# Patient Record
Sex: FEMALE | Race: WHITE | NOT HISPANIC OR LATINO | Employment: FULL TIME | URBAN - METROPOLITAN AREA
[De-identification: names, ages, dates, MRNs, and addresses within clinical notes are randomized per-mention and may not be internally consistent; named-entity substitution may affect disease eponyms.]

---

## 2015-08-20 LAB
CHOLESTEROL, TOTAL: 206
GLUCOSE: 99
HIGH DENSITY CHOLESTEROL: 76 MG/DL
LDLC SERPL CALC-MCNC: 109 MG/DL (ref 0–160)
TRIGL SERPL-MCNC: 107 MG/DL

## 2017-01-11 RX ORDER — ESOMEPRAZOLE MAGNESIUM 40 MG/1
CAPSULE, DELAYED RELEASE ORAL
Qty: 30 CAPSULE | Refills: 2 | Status: SHIPPED | OUTPATIENT
Start: 2017-01-11 | End: 2017-05-01

## 2017-01-18 ENCOUNTER — TELEPHONE (OUTPATIENT)
Dept: INTERNAL MEDICINE | Facility: CLINIC | Age: 57
End: 2017-01-18

## 2017-03-15 DIAGNOSIS — I10 ESSENTIAL HYPERTENSION: ICD-10-CM

## 2017-03-15 RX ORDER — LOSARTAN POTASSIUM AND HYDROCHLOROTHIAZIDE 12.5; 1 MG/1; MG/1
TABLET ORAL
Qty: 90 TABLET | Refills: 0 | Status: SHIPPED | OUTPATIENT
Start: 2017-03-15 | End: 2017-06-21 | Stop reason: SDUPTHER

## 2017-04-04 DIAGNOSIS — R11.0 NAUSEA: ICD-10-CM

## 2017-04-04 RX ORDER — PROMETHAZINE HYDROCHLORIDE 25 MG/1
TABLET ORAL
Qty: 30 TABLET | Refills: 0 | Status: SHIPPED | OUTPATIENT
Start: 2017-04-04 | End: 2017-05-30 | Stop reason: ALTCHOICE

## 2017-04-27 ENCOUNTER — TELEPHONE (OUTPATIENT)
Dept: INTERNAL MEDICINE | Facility: CLINIC | Age: 57
End: 2017-04-27

## 2017-04-27 NOTE — TELEPHONE ENCOUNTER
Message left for pt to return call to clinic re: Rozerem prescription.  Received fax from pharmacy to do a PA, but PA was approved on 1/17/17.  Fax has [employer] listed as insurance.  Does pt have new insurance?

## 2017-04-28 NOTE — TELEPHONE ENCOUNTER
Pt stated she does have new insurance and to not worry about completing prior authorization at this time.

## 2017-05-01 ENCOUNTER — OFFICE VISIT (OUTPATIENT)
Dept: INTERNAL MEDICINE | Facility: CLINIC | Age: 57
End: 2017-05-01
Payer: MEDICAID

## 2017-05-01 VITALS
DIASTOLIC BLOOD PRESSURE: 84 MMHG | HEIGHT: 62 IN | WEIGHT: 200.81 LBS | HEART RATE: 77 BPM | BODY MASS INDEX: 36.95 KG/M2 | TEMPERATURE: 98 F | SYSTOLIC BLOOD PRESSURE: 136 MMHG | OXYGEN SATURATION: 99 %

## 2017-05-01 DIAGNOSIS — J32.0 MAXILLARY SINUSITIS, UNSPECIFIED CHRONICITY: Primary | ICD-10-CM

## 2017-05-01 PROCEDURE — 99999 PR PBB SHADOW E&M-EST. PATIENT-LVL III: CPT | Mod: PBBFAC,,, | Performed by: INTERNAL MEDICINE

## 2017-05-01 PROCEDURE — 99213 OFFICE O/P EST LOW 20 MIN: CPT | Mod: PBBFAC,PO | Performed by: INTERNAL MEDICINE

## 2017-05-01 PROCEDURE — 99213 OFFICE O/P EST LOW 20 MIN: CPT | Mod: S$PBB,,, | Performed by: INTERNAL MEDICINE

## 2017-05-01 RX ORDER — ZOLPIDEM TARTRATE 10 MG/1
1 TABLET ORAL NIGHTLY PRN
Refills: 3 | COMMUNITY
Start: 2017-04-27 | End: 2017-10-16

## 2017-05-01 RX ORDER — OMEPRAZOLE 20 MG/1
1 CAPSULE, DELAYED RELEASE ORAL DAILY
Refills: 0 | COMMUNITY
Start: 2017-04-18 | End: 2017-05-18

## 2017-05-01 RX ORDER — BENZONATATE 200 MG/1
200 CAPSULE ORAL 2 TIMES DAILY PRN
Qty: 20 CAPSULE | Refills: 0 | Status: SHIPPED | OUTPATIENT
Start: 2017-05-01 | End: 2017-05-08

## 2017-05-01 RX ORDER — ESZOPICLONE 3 MG/1
TABLET, FILM COATED ORAL
Refills: 2 | COMMUNITY
Start: 2017-03-03 | End: 2017-05-01

## 2017-05-01 RX ORDER — LORAZEPAM 0.5 MG/1
TABLET ORAL
Refills: 2 | COMMUNITY
Start: 2017-03-10 | End: 2018-04-18

## 2017-05-01 RX ORDER — DOXYCYCLINE HYCLATE 100 MG
100 TABLET ORAL EVERY 12 HOURS
Qty: 14 TABLET | Refills: 0 | Status: SHIPPED | OUTPATIENT
Start: 2017-05-01 | End: 2017-05-08

## 2017-05-01 RX ORDER — HYDROXYZINE PAMOATE 50 MG/1
1 CAPSULE ORAL
Refills: 0 | COMMUNITY
Start: 2017-04-18 | End: 2017-09-11

## 2017-05-01 RX ORDER — DULOXETIN HYDROCHLORIDE 60 MG/1
1 CAPSULE, DELAYED RELEASE ORAL DAILY
Refills: 0 | COMMUNITY
Start: 2017-04-14

## 2017-05-01 NOTE — MR AVS SNAPSHOT
[facility]  [address]  [place] [address]  Phone: [phone]  Fax: [phone]                  Miles Bowen   2017 1:40 PM   Office Visit    Description:  Female : 1960   Provider:  Carlos Paul MD   Department:  [facility] - Internal Medicine           Reason for Visit     Cough     Sinus Problem           Diagnoses this Visit        Comments    Maxillary sinusitis, unspecified chronicity    -  Primary            To Do List           Goals (5 Years of Data)     None      Follow-Up and Disposition     Return if symptoms worsen or fail to improve.       These Medications        Disp Refills Start End    benzonatate (TESSALON) 200 MG capsule 20 capsule 0 2017    Take 1 capsule (200 mg total) by mouth 2 (two) times daily as needed for Cough. - Oral    Pharmacy: [facility] Ph #: [phone]       doxycycline (VIBRA-TABS) 100 MG tablet 14 tablet 0 2017    Take 1 tablet (100 mg total) by mouth every 12 (twelve) hours. - Oral    Pharmacy: [facility]  #: [phone]         [facility]     [facility] -  Assistance  Unless otherwise directed by your provider, please contact [facility], our nurse care line that is available for  assistance.     Registered nurses in the [facility] provide: appointment scheduling, clinical advisement, health education, and other advisory services.  Call: [phone] (toll free)               Medications           Message regarding Medications     Verify the changes and/or additions to your medication regime listed below are the same as discussed with your clinician today.  If any of these changes or additions are incorrect, please notify your healthcare provider.        START taking these NEW medications        Refills    benzonatate (TESSALON) 200  MG capsule 0    Sig: Take 1 capsule (200 mg total) by mouth 2 (two) times daily as needed for Cough.    Class: Normal    Route: Oral    doxycycline (VIBRA-TABS) 100 MG tablet 0    Sig: Take 1 tablet (100 mg total) by mouth every 12 (twelve) hours.    Class: Normal    Route: Oral      STOP taking these medications     cyclobenzaprine (FLEXERIL) 10 MG tablet     hydrocodone-acetaminophen 7.5-325mg (NORCO) 7.5-325 mg per tablet Take 1 tablet by mouth as needed.    nortriptyline (PAMELOR) 25 MG capsule Take 1 capsule (25 mg total) by mouth every evening.    ROZEREM 8 mg tablet take 1 tablet by mouth once daily if needed for sleep    eszopiclone 3 mg Tab TK 1 T PO QD HS    esomeprazole (NEXIUM) 40 MG capsule take 1 capsule by mouth once daily           Verify that the below list of medications is an accurate representation of the medications you are currently taking.  If none reported, the list may be blank. If incorrect, please contact your healthcare provider. Carry this list with you in case of emergency.           Current Medications     duloxetine (CYMBALTA) 60 MG capsule Take 1 capsule by mouth once daily at 6am.    estradiol (ESTRACE) 0.5 MG tablet Take 0.5 mg by mouth once daily.    fluticasone (FLONASE) 50 mcg/actuation nasal spray 2 sprays by Each Nare route once daily.    hydrOXYzine pamoate (VISTARIL) 50 MG Cap Take 1 capsule by mouth as needed.    lorazepam (ATIVAN) 0.5 MG tablet TK 1 T PO Q 6 TO 8 H PRN    losartan-hydrochlorothiazide 100-12.5 mg (HYZAAR) 100-12.5 mg Tab take 1 tablet by mouth once daily    omeprazole (PRILOSEC) 20 MG capsule Take 1 capsule by mouth once daily at 6am.    promethazine (PHENERGAN) 25 MG tablet take 1 tablet by mouth every 6 hours if needed for nausea and vomiting    zolpidem (AMBIEN) 10 mg Tab Take 1 tablet by mouth nightly as needed.    benzonatate (TESSALON) 200 MG capsule Take 1 capsule (200 mg total) by mouth 2 (two) times daily as needed for Cough.    doxycycline  "(VIBRA-TABS) 100 MG tablet Take 1 tablet (100 mg total) by mouth every 12 (twelve) hours.           Clinical Reference Information           Your Vitals Were     BP Pulse Temp Height Weight SpO2    136/84 77 97.7 °F (36.5 °C) (Tympanic) 5' 2" (1.575 m) 91.1 kg (200 lb 13.4 oz) 99%    BMI                36.73 kg/m2          Blood Pressure          Most Recent Value    BP  136/84      Allergies as of 5/1/2017     Amoxicillin    Sulfamethoxazole-trimethoprim    Sumatriptan    Ibuprofen    Nsaids (Non-steroidal Anti-inflammatory Drug)    Prednisone    Singulair [Montelukast]      Immunizations Administered on Date of Encounter - 5/1/2017     None      Language Assistance Services     ATTENTION: Language assistance services are available, free of charge. Please call [phone].      ATENCIÓN: Si eboni swann, tiene a max disposición servicios gratuitos de asistencia lingüística. Llame al [phone].     CHÚ Ý: N?u b?n nói Ti?ng Vi?t, có các d?ch v? h? tr? ngôn ng? mi?n phí dành cho b?n. G?i s? [phone].         [facility] complies with applicable Federal civil rights laws and does not discriminate on the basis of race, color, national origin, age, disability, or sex.        "

## 2017-05-01 NOTE — PROGRESS NOTES
Subjective:      Patient ID: Miles Bowen is a 56 y.o. female.    Chief Complaint: Cough (x 3 days) and Sinus Problem (x 3 days)    HPI Comments: 57 yo with Patient Active Problem List:     Hypertension     Depression     Anxiety     Hematemesis without nausea     GERD (gastroesophageal reflux disease)     Hiatal hernia    Here today c/o cough and nasal congestion. Coughing up yellow mucus intermittently from throat.     Cough   This is a new problem. The current episode started in the past 7 days. The problem has been gradually worsening. The problem occurs every few minutes. Associated symptoms include chest pain, chills, heartburn, nasal congestion, postnasal drip, rhinorrhea, shortness of breath and sweats. Pertinent negatives include no ear pain, fever, hemoptysis, weight loss or wheezing. She has tried OTC cough suppressant, body position changes, cool air and rest for the symptoms. The treatment provided no relief. Her past medical history is significant for bronchitis, environmental allergies and pneumonia. There is no history of asthma or COPD.   Sinus Problem   This is a new problem. The current episode started in the past 7 days. The problem has been gradually improving since onset. There has been no fever. The pain is mild. Associated symptoms include chills, coughing, shortness of breath and sinus pressure. Pertinent negatives include no ear pain. The treatment provided no relief.     Review of Systems   Constitutional: Positive for chills. Negative for fever and weight loss.   HENT: Positive for postnasal drip, rhinorrhea and sinus pressure. Negative for ear pain.    Eyes: Negative for visual disturbance.   Respiratory: Positive for cough and shortness of breath. Negative for hemoptysis and wheezing.    Cardiovascular: Positive for chest pain.   Gastrointestinal: Positive for heartburn.   Allergic/Immunologic: Positive for environmental allergies.     Objective:   /84  Pulse 77  Temp 97.7  "°F (36.5 °C) (Tympanic)   Ht 5' 2" (1.575 m)  Wt 91.1 kg (200 lb 13.4 oz)  SpO2 99%  BMI 36.73 kg/m2    Physical Exam   Constitutional: She appears well-developed and well-nourished. No distress.   HENT:   Right Ear: Tympanic membrane normal.   Left Ear: Tympanic membrane normal.   Nose: Mucosal edema and rhinorrhea present. Right sinus exhibits no maxillary sinus tenderness and no frontal sinus tenderness. Left sinus exhibits no maxillary sinus tenderness and no frontal sinus tenderness.   Mouth/Throat: Posterior oropharyngeal erythema present. No oropharyngeal exudate or posterior oropharyngeal edema.   Cardiovascular: Normal rate.    Pulmonary/Chest: Effort normal.   Neurological: She is alert.   Skin: Skin is warm and dry.   Psychiatric: She has a normal mood and affect. Her behavior is normal.       Assessment:     1. Maxillary sinusitis, unspecified chronicity      Plan:   Maxillary sinusitis, unspecified chronicity  -     benzonatate (TESSALON) 200 MG capsule; Take 1 capsule (200 mg total) by mouth 2 (two) times daily as needed for Cough.  Dispense: 20 capsule; Refill: 0  -     doxycycline (VIBRA-TABS) 100 MG tablet; Take 1 tablet (100 mg total) by mouth every 12 (twelve) hours.  Dispense: 14 tablet; Refill: 0        Lab Frequency Next Occurrence   Mammo Digital Screening Bilat with CAD Once 9/9/2016   IR KRISTA Lumbar Once 10/13/2016         Return if symptoms worsen or fail to improve.  "

## 2017-05-15 ENCOUNTER — TELEPHONE (OUTPATIENT)
Dept: INTERNAL MEDICINE | Facility: CLINIC | Age: 57
End: 2017-05-15

## 2017-05-15 NOTE — TELEPHONE ENCOUNTER
Pt c/o feeling of acid in throat, burning, choking, and coughing fits.  Stated she is not sure if symptoms are from acid reflux or if it's from the sinus issues she has had for the past 2 weeks.  Advised pt that she can schedule an appt with Dr. Paul or with GI if she needs to discuss acid reflux.  Pt stated she will call back in a few days to schedule an appt if symptoms do not get better.

## 2017-05-15 NOTE — TELEPHONE ENCOUNTER
----- Message from Chris Fonseca sent at 5/15/2017 11:38 AM CDT -----  Contact: pt   States she is calling rg being in recently cold / flu symptoms and also wants to discuss having poss acid reflux and can be reached at [phone]//thanks/dbw

## 2017-05-17 ENCOUNTER — TELEPHONE (OUTPATIENT)
Dept: INTERNAL MEDICINE | Facility: CLINIC | Age: 57
End: 2017-05-17

## 2017-05-17 NOTE — TELEPHONE ENCOUNTER
Spoke with pt, she states that she is still coughing and has tried taking mucinex and robitussin with no resolution. Informed pt that she will need to come to clinic to be evaluated. Pt verbalized understanding and scheduled with Patricia on 5/18/17 at 11:20 am.

## 2017-05-17 NOTE — TELEPHONE ENCOUNTER
----- Message from Karmen Lane sent at 5/17/2017  1:29 PM CDT -----  Contact: Pt  Pt is returning the nurse call. Pls call pt back at [phone].

## 2017-05-17 NOTE — TELEPHONE ENCOUNTER
----- Message from Adenike Guzman sent at 5/17/2017 12:09 PM CDT -----  Contact: pt  Pt states she is still coughing and wants to be advised, pt can be reached at [phone]///[id number]

## 2017-05-18 ENCOUNTER — OFFICE VISIT (OUTPATIENT)
Dept: INTERNAL MEDICINE | Facility: CLINIC | Age: 57
End: 2017-05-18
Payer: MEDICAID

## 2017-05-18 VITALS
OXYGEN SATURATION: 98 % | DIASTOLIC BLOOD PRESSURE: 80 MMHG | HEIGHT: 62 IN | TEMPERATURE: 98 F | BODY MASS INDEX: 37.4 KG/M2 | WEIGHT: 203.25 LBS | SYSTOLIC BLOOD PRESSURE: 122 MMHG | HEART RATE: 81 BPM

## 2017-05-18 DIAGNOSIS — K21.9 GASTROESOPHAGEAL REFLUX DISEASE, ESOPHAGITIS PRESENCE NOT SPECIFIED: ICD-10-CM

## 2017-05-18 DIAGNOSIS — J32.9 CHRONIC SINUSITIS, UNSPECIFIED LOCATION: Primary | ICD-10-CM

## 2017-05-18 DIAGNOSIS — R05.9 COUGH: ICD-10-CM

## 2017-05-18 PROCEDURE — 99999 PR PBB SHADOW E&M-EST. PATIENT-LVL V: CPT | Mod: PBBFAC,,, | Performed by: PHYSICIAN ASSISTANT

## 2017-05-18 PROCEDURE — 99215 OFFICE O/P EST HI 40 MIN: CPT | Mod: PBBFAC,PO | Performed by: PHYSICIAN ASSISTANT

## 2017-05-18 PROCEDURE — 99213 OFFICE O/P EST LOW 20 MIN: CPT | Mod: S$PBB,,, | Performed by: PHYSICIAN ASSISTANT

## 2017-05-18 RX ORDER — DICYCLOMINE HYDROCHLORIDE 20 MG/1
10 TABLET ORAL
Refills: 1 | COMMUNITY
Start: 2017-05-06 | End: 2018-10-22 | Stop reason: SINTOL

## 2017-05-18 RX ORDER — PROMETHAZINE HYDROCHLORIDE AND DEXTROMETHORPHAN HYDROBROMIDE 6.25; 15 MG/5ML; MG/5ML
5 SYRUP ORAL NIGHTLY
Qty: 118 ML | Refills: 0 | Status: SHIPPED | OUTPATIENT
Start: 2017-05-18 | End: 2017-05-30

## 2017-05-18 RX ORDER — CEFDINIR 300 MG/1
300 CAPSULE ORAL 2 TIMES DAILY
Qty: 20 CAPSULE | Refills: 0 | Status: SHIPPED | OUTPATIENT
Start: 2017-05-18 | End: 2017-05-28

## 2017-05-18 RX ORDER — GUAIFENESIN 600 MG/1
600 TABLET, EXTENDED RELEASE ORAL 2 TIMES DAILY PRN
COMMUNITY
End: 2018-02-28

## 2017-05-18 RX ORDER — PANTOPRAZOLE SODIUM 40 MG/1
40 TABLET, DELAYED RELEASE ORAL 2 TIMES DAILY
Qty: 30 TABLET | Refills: 3 | Status: SHIPPED | OUTPATIENT
Start: 2017-05-18 | End: 2017-05-30 | Stop reason: CLARIF

## 2017-05-18 NOTE — MR AVS SNAPSHOT
[facility] Internal Medicine  [address]  [place] [address]  Phone: [phone]  Fax: [phone]                  Miles Bowen   2017 11:20 AM   Office Visit    Description:  Female : 1960   Provider:  Patricia Resendez PA-C   Department:  [facility] - Internal Medicine           Reason for Visit     Cough     Nasal Congestion     Chest Congestion           Diagnoses this Visit        Comments    Chronic sinusitis, unspecified location    -  Primary     Gastroesophageal reflux disease, esophagitis presence not specified         Cough                To Do List           Future Appointments        Provider Department Dept Phone    2017 12:00 PM [facility] XR2 [facility] [phone]      Goals (5 Years of Data)     None      Follow-Up and Disposition     Return if symptoms worsen or fail to improve.       These Medications        Disp Refills Start End    pantoprazole (PROTONIX) 40 MG tablet 30 tablet 3 2017    Take 1 tablet (40 mg total) by mouth 2 (two) times daily. - Oral    Pharmacy: [facility]  [place] Ph #: [phone]       promethazine-dextromethorphan (PROMETHAZINE-DM) 6.25-15 mg/5 mL Syrp 118 mL 0 2017    Take 5 mLs by mouth every evening. - Oral    Pharmacy: [facility]  [place] Ph #: [phone]       cefdinir (OMNICEF) 300 MG capsule 20 capsule 0 2017    Take 1 capsule (300 mg total) by mouth 2 (two) times daily. - Oral    Pharmacy: [facility]  [place] Ph #: [phone]         [facility]     [facility] -  Assistance  Unless otherwise directed by your provider, please contact [facility], our nurse care line that is available for  assistance.     Registered nurses in the [facility] provide: appointment scheduling,  clinical advisement, health education, and other advisory services.  Call: [phone] (toll free)               Medications           Message regarding Medications     Verify the changes and/or additions to your medication regime listed below are the same as discussed with your clinician today.  If any of these changes or additions are incorrect, please notify your healthcare provider.        START taking these NEW medications        Refills    pantoprazole (PROTONIX) 40 MG tablet 3    Sig: Take 1 tablet (40 mg total) by mouth 2 (two) times daily.    Class: Normal    Route: Oral    promethazine-dextromethorphan (PROMETHAZINE-DM) 6.25-15 mg/5 mL Syrp 0    Sig: Take 5 mLs by mouth every evening.    Class: Normal    Route: Oral    cefdinir (OMNICEF) 300 MG capsule 0    Sig: Take 1 capsule (300 mg total) by mouth 2 (two) times daily.    Class: Normal    Route: Oral      STOP taking these medications     omeprazole (PRILOSEC) 20 MG capsule Take 1 capsule by mouth once daily.            Verify that the below list of medications is an accurate representation of the medications you are currently taking.  If none reported, the list may be blank. If incorrect, please contact your healthcare provider. Carry this list with you in case of emergency.           Current Medications     ACETAMINOPHEN (TYLENOL EXTRA STRENGTH ORAL) Take 2 tablets by mouth every 4 to 6 hours as needed.    dicyclomine (BENTYL) 20 mg tablet Take 10 mg by mouth as needed.     duloxetine (CYMBALTA) 60 MG capsule Take 1 capsule by mouth once daily.     estradiol (ESTRACE) 0.5 MG tablet Take 0.5 mg by mouth once daily.    fluticasone (FLONASE) 50 mcg/actuation nasal spray 2 sprays by Each Nare route once daily.    guaifenesin (MUCINEX) 600 mg 12 hr tablet Take 600 mg by mouth 2 (two) times daily as needed for Congestion.    GUAIFENESIN/DEXTROMETHORPHAN (ROBITUSSIN-DM ORAL) Take 10 mLs by mouth as needed.    hydrOXYzine pamoate (VISTARIL) 50 MG Cap Take 1  "capsule by mouth as needed.    lorazepam (ATIVAN) 0.5 MG tablet TK 1 T PO Q 6 TO 8 H PRN    losartan-hydrochlorothiazide 100-12.5 mg (HYZAAR) 100-12.5 mg Tab take 1 tablet by mouth once daily    promethazine (PHENERGAN) 25 MG tablet take 1 tablet by mouth every 6 hours if needed for nausea and vomiting    zolpidem (AMBIEN) 10 mg Tab Take 1 tablet by mouth nightly as needed.    cefdinir (OMNICEF) 300 MG capsule Take 1 capsule (300 mg total) by mouth 2 (two) times daily.    pantoprazole (PROTONIX) 40 MG tablet Take 1 tablet (40 mg total) by mouth 2 (two) times daily.    promethazine-dextromethorphan (PROMETHAZINE-DM) 6.25-15 mg/5 mL Syrp Take 5 mLs by mouth every evening.           Clinical Reference Information           Your Vitals Were     BP Pulse Temp Height Weight SpO2    122/80 (BP Location: Right arm, Patient Position: Sitting) 81 97.6 °F (36.4 °C) (Tympanic) 5' 2" (1.575 m) 92.2 kg (203 lb 4.2 oz) 98%    BMI                37.18 kg/m2          Blood Pressure          Most Recent Value    BP  122/80      Allergies as of 5/18/2017     Amoxicillin    Sulfamethoxazole-trimethoprim    Sumatriptan    Ibuprofen    Nsaids (Non-steroidal Anti-inflammatory Drug)    Prednisone    Singulair [Montelukast]    Tessalon Perle [Benzonatate]      Immunizations Administered on Date of Encounter - 5/18/2017     None      Orders Placed During Today's Visit     Future Labs/Procedures Expected by Expires    X-Ray Chest PA And Lateral  5/18/2017 5/18/2018    X-Ray Sinuses Min 3 Views  5/18/2017 5/18/2018      Instructions      Over-the-counter supportive tx for colds and cough:   -start flonase nasal spray (fluticasone) 1 spray each nostril once/day. Continue use for 2-3 weeks.   - refrain from smoking, drink plenty of fluids, hot tea with honey, rest, take medications as prescribed, and use a humidifier or steam in the bathroom.   -Shower in the morning and evening to wash away any allergens and help reduce the production of mucus. "   -Try OTC saline nasal spray or nedi-pot using warm filtered water.   -Take tylenol or Advil for fever, sore throat, and muscle aches.  -warm salt water gargles or Listerine gargles for sore throat frequently throughout the day.  - Try OTC Mucinex (guafenesin) for cough with thick mucous.   - Zinc lozenge, Emergen-C, or Airborne,  to boost immune system.     -No more than 10 in 24 hours.  -Phenylephrine/pseudophedrine or anything labeled with a D after it is for congestion.   Avoid decongestants if diagnosed with hypertension or arrhythmias. To avoid  rebound congestion, refrain from taking decongestant for longer than 5 day.    -DM is for dextromethorphan. This is a cough suppressant.   -For prevention,extremely important to quit smoking, get annual influenza vaccines, reduce exposure to air pollution, and to frequently wash hands to avoid spread of infection.     Acute Bronchitis  Your healthcare provider has told you that you have acute bronchitis. Bronchitis is infection or inflammation of the bronchial tubes (airways in the lungs). Normally, air moves easily in and out of the airways. Bronchitis narrows the airways, making it harder for air to flow in and out of the lungs. This causes symptoms such as shortness of breath, coughing, and wheezing. Bronchitis can be acute or chronic. Acute means the condition comes on quickly and goes away in a short time. Chronic means a condition lasts a long time and often comes back. Read on to learn more about acute bronchitis.    What causes acute bronchitis?  Acute bronchitis almost always starts as a viral respiratory infection, such as a cold or the flu. Certain factors make it more likely for a cold or flu to turn into bronchitis. These include being very young or very old or having a heart or lung problem. Cigarette smoking also makes bronchitis more likely.  When bronchitis develops, the airways become swollen. The airways may also become infected with bacteria.  This is known as a secondary infection.  Diagnosing acute bronchitis  Your healthcare provider will examine you and ask about your symptoms and health history. You may also have a sputum culture to test the fluid in your lungs. Chest X-rays may be done to look for infection in the lungs.  Treating acute bronchitis  Bronchitis usually clears up as the cold or flu goes away. You can help feel better faster by doing the following:  · Take medicine as directed. You may be told to take ibuprofen or other over-the-counter medicines. These help relieve inflammation in your bronchial tubes. Your doctor may prescribe an inhaler to help open up the bronchial tubes. Most of the time, acute bronchitis is caused by a viral infection. Antibiotics are usually not prescribed for viral infections.  · Drink plenty of fluids, such as water, juice, or warm soup. Fluids loosen mucus so that you can cough it up. This helps you breathe more easily. Fluids also prevent dehydration.  · Make sure you get plenty of rest.  · Do not smoke. Do not allow anyone else to smoke in your home.  Recovery and follow-up  Follow up with your doctor as you are told. You will likely feel better in a week or two. But a dry cough can linger beyond that time. Let your doctor know if you still have symptoms (other than a dry cough) after 2 weeks. If youre prone to getting bronchial infections, let your doctor know. And take steps to protect yourself from future infections. These steps include stopping smoking and avoiding tobacco smoke, washing your hands often, and getting a yearly flu shot.  When to call the doctor  Call the doctor if you have any of the following:  · Fever of 100.4°F (38.0°C) higher  · Symptoms that get worse, or new symptoms  · Trouble breathing  · Symptoms that dont start to improve within a week, or within 3 days of taking antibiotics   Date Last Reviewed: 8/4/2014  © 4186-7814 [employer]. [address] [place] [address], [place] [address]. All rights reserved. This information is not intended as a substitute for professional medical care. Always follow your healthcare professional's instructions.        Cough, Chronic, Uncertain Cause (Adult)    Everyone has had a cough as part of the common cold, flu, or bronchitis. This kind of cough occurs along with an achy feeling, low-grade fever, nasal and sinus congestion, and a scratchy or sore throat. This usually gets better in 2 to 3 weeks. A cough that lasts longer than 3 weeks may be due to other causes.  If your cough does not improve over the next 2 weeks, further testing may be needed. Follow up with your healthcare provider as advised. Cough suppressants may be recommended. Based on your exam today, the exact cause of your cough is not certain. Below are some common causes for persistent cough.  Smokers cough  Smokers cough doesnt go away. If you continue to smoke, it only gets worse. The cough is from irritation in the air passages. Talk to your healthcare provider about quitting. Medicines or nicotine-replacement products, like gum or the patch, may make quitting easier.  Postnasal drip  A cough that is worse at night may be due to postnasal drip. Excess mucus in the nose drains from the back of your nose to your throat. This triggers the cough reflex. Postnasal drip may be due to a sinus infection or allergy. Common allergens include dust, tobacco smoke (both inhaled and secondhand smoke), environmental pollutants, pollen, mold, pets, cleaning agents, room deodorizers, and chemical fumes. Over-the-counter antihistamines or decongestants may be helpful for allergies. A sinus infection may requires antibiotic treatment. See your healthcare provider if symptoms continue.  Medicines  Certain prescribed medicines can cause a chronic cough in some people:  · ACE inhibitors for high blood pressure. These include benazepril, captopril, enalapril, fosinopril, lisinopril, quinapril, ramipril,  and others.  · Beta-blockers for high blood pressure and other conditions. These include propranolol, atenolol, metoprolol, nadolol, and others.  Let your healthcare provider know if you are taking any of these.  Asthma  Cough may be the only sign of mild asthma. You may have tests to find out if asthma is causing your cough. You may also take asthma medicine on a trial basis.  Acid reflux (heartburn, GERD)  The esophagus is the tube that carries food from the mouth to the stomach. A valve at its lower end prevents stomach acids from flowing upward. If this valve does not work properly, acid from the stomach enters the esophagus. This may cause a burning pain in the upper abdomen or lower chest, belching, or cough. Symptoms are often worse when lying flat. Avoid eating or drinking before bedtime. Try using extra pillows to raise your upper body, or place 4-inch blocks under the head of your bed. You may try an over-the-counter antacid or an acid-blocking medicine such as famotidine, cimetidine, ranitidine, esomeprazole, lansoprazole, or omeprazole. Stronger medicines for this condition can be prescribed by your healthcare provider.  Follow-up care  Follow up with your healthcare provider, or as advised, if your cough does not improve. Further testing may be needed.  Note: If an X-ray was taken, a specialist will review it. You will be notified of any new findings that may affect your care.  When to seek medical advice  Call your healthcare provider right away if any of these occur:  · Mild wheezing or difficulty breathing  · Fever of 100.4ºF (38ºC) or higher, or as directed by your healthcare provider  · Unexpected weight loss  · Coughing up large amounts of colored sputum  · Night sweats (sheets and pajamas get soaking wet)  Call 911, or get immediate medical care  Contact emergency services right away if any of these occur:  · Coughing up blood  · Moderate to severe trouble breathing or wheezing  Date Last  Reviewed: 9/13/2015  © 1922-5144 [employer]. [address] [place] [address], [place] [address]. All rights reserved. This information is not intended as a substitute for professional medical care. Always follow your healthcare professional's instructions.             Language Assistance Services     ATTENTION: Language assistance services are available, free of charge. Please call [phone].      ATENCIÓN: Si habla español, tiene a max disposición servicios gratuitos de asistencia lingüística. Llame al [phone].     [facility] Ý: N?u b?n nói Ti?ng Vi?t, có các d?ch v? h? tr? ngôn ng? mi?n phí dành cho b?n. G?i s? [phone].         [facility] complies with applicable Federal civil rights laws and does not discriminate on the basis of race, color, national origin, age, disability, or sex.

## 2017-05-18 NOTE — PROGRESS NOTES
Subjective:      Patient ID: Miles Bowen is a 56 y.o. female.    Chief Complaint: Cough; Nasal Congestion; and Chest Congestion    Cough   This is a new problem. The current episode started 1 to 4 weeks ago. The problem has been gradually worsening. Associated symptoms include nasal congestion, postnasal drip, rhinorrhea and shortness of breath. Pertinent negatives include no chest pain, chills, ear congestion, ear pain, fever, headaches, heartburn, hemoptysis, myalgias, rash, sore throat, sweats, weight loss or wheezing. Treatments tried: robitussin, mucinex, doxycycline for 10 days, tessalon pearls. The treatment provided no relief. There is no history of asthma, bronchiectasis, bronchitis, COPD, emphysema, environmental allergies or pneumonia.   Reflux not controlled on omeprazole.     Review of Systems   Constitutional: Negative for activity change, appetite change, chills, diaphoresis, fatigue, fever, unexpected weight change and weight loss.   HENT: Positive for congestion, postnasal drip, rhinorrhea, sinus pressure and sneezing. Negative for ear pain, hearing loss, sore throat, trouble swallowing and voice change.    Eyes: Negative.  Negative for visual disturbance.   Respiratory: Positive for cough and shortness of breath. Negative for hemoptysis, choking, chest tightness and wheezing.    Cardiovascular: Negative for chest pain, palpitations and leg swelling.   Gastrointestinal: Negative for abdominal distention, abdominal pain, blood in stool, constipation, diarrhea, heartburn, nausea and vomiting.   Endocrine: Negative for cold intolerance, heat intolerance, polydipsia and polyuria.   Genitourinary: Negative.  Negative for difficulty urinating and frequency.   Musculoskeletal: Negative for arthralgias, back pain, gait problem, joint swelling and myalgias.   Skin: Negative for color change, pallor, rash and wound.   Allergic/Immunologic: Negative for environmental allergies.   Neurological:  "Negative for dizziness, tremors, weakness, light-headedness, numbness and headaches.   Hematological: Negative for adenopathy.   Psychiatric/Behavioral: Negative for behavioral problems, confusion, self-injury, sleep disturbance and suicidal ideas. The patient is not nervous/anxious.      Objective:   /80 (BP Location: Right arm, Patient Position: Sitting)  Pulse 81  Temp 97.6 °F (36.4 °C) (Tympanic)   Ht 5' 2" (1.575 m)  Wt 92.2 kg (203 lb 4.2 oz)  SpO2 98%  BMI 37.18 kg/m2    Physical Exam   Constitutional: She is oriented to person, place, and time. She appears well-developed and well-nourished.   HENT:   Head: Normocephalic and atraumatic.   Right Ear: Hearing, tympanic membrane, external ear and ear canal normal. No tenderness.   Left Ear: Hearing, tympanic membrane, external ear and ear canal normal. No tenderness.   Nose: Mucosal edema and rhinorrhea present. No sinus tenderness. Right sinus exhibits maxillary sinus tenderness and frontal sinus tenderness. Left sinus exhibits maxillary sinus tenderness and frontal sinus tenderness.   Mouth/Throat: Uvula is midline and mucous membranes are normal. No oral lesions. Normal dentition. No dental abscesses, uvula swelling or dental caries. Oropharyngeal exudate and posterior oropharyngeal erythema present. No posterior oropharyngeal edema or tonsillar abscesses. No tonsillar exudate.   Eyes: Conjunctivae and EOM are normal. Pupils are equal, round, and reactive to light. Right eye exhibits no discharge. Left eye exhibits no discharge.   Neck: Normal range of motion. Neck supple.   Cardiovascular: Normal rate, regular rhythm and normal heart sounds.  Exam reveals no gallop and no friction rub.    No murmur heard.  Pulmonary/Chest: Effort normal. No accessory muscle usage. No respiratory distress. She has decreased breath sounds. She has no wheezes. She has no rales.   Lymphadenopathy:     She has no cervical adenopathy.   Neurological: She is alert and " oriented to person, place, and time.   Skin: Skin is warm. No rash noted. No erythema. No pallor.   Psychiatric: She has a normal mood and affect. Her behavior is normal. Judgment and thought content normal.   Nursing note and vitals reviewed.      Assessment:     1. Chronic sinusitis, unspecified location    2. Gastroesophageal reflux disease, esophagitis presence not specified    3. Cough      Plan:   Chronic sinusitis, unspecified location  -     X-Ray Sinuses Min 3 Views; Future; Expected date: 5/18/17  -     promethazine-dextromethorphan (PROMETHAZINE-DM) 6.25-15 mg/5 mL Syrp; Take 5 mLs by mouth every evening.  Dispense: 118 mL; Refill: 0  -     cefdinir (OMNICEF) 300 MG capsule; Take 1 capsule (300 mg total) by mouth 2 (two) times daily.  Dispense: 20 capsule; Refill: 0  -in AM take guafenesin(mucinex) DM with large glass of water, promethazine DM in the evening.   -cepacol max drops throughout the day for acute coughing spasm    Gastroesophageal reflux disease, esophagitis presence not specified  -     pantoprazole (PROTONIX) 40 MG tablet; Take 1 tablet (40 mg total) by mouth 2 (two) times daily.  Dispense: 30 tablet; Refill: 3  -discontinue omeprazole due to ineffective therapy    Cough  -     X-Ray Chest PA And Lateral; Future; Expected date: 5/18/17    Return if symptoms worsen or fail to improve.

## 2017-05-18 NOTE — PATIENT INSTRUCTIONS
Over-the-counter supportive tx for colds and cough:   -start flonase nasal spray (fluticasone) 1 spray each nostril once/day. Continue use for 2-3 weeks.   - refrain from smoking, drink plenty of fluids, hot tea with honey, rest, take medications as prescribed, and use a humidifier or steam in the bathroom.   -Shower in the morning and evening to wash away any allergens and help reduce the production of mucus.   -Try OTC saline nasal spray or nedi-pot using warm filtered water.   -Take tylenol or Advil for fever, sore throat, and muscle aches.  -warm salt water gargles or Listerine gargles for sore throat frequently throughout the day.  - Try OTC Mucinex (guafenesin) for cough with thick mucous.   - Zinc lozenge, Emergen-C, or Airborne,  to boost immune system.     -No more than 10 in 24 hours.  -Phenylephrine/pseudophedrine or anything labeled with a D after it is for congestion.   Avoid decongestants if diagnosed with hypertension or arrhythmias. To avoid  rebound congestion, refrain from taking decongestant for longer than 5 day.    -DM is for dextromethorphan. This is a cough suppressant.   -For prevention,extremely important to quit smoking, get annual influenza vaccines, reduce exposure to air pollution, and to frequently wash hands to avoid spread of infection.     Acute Bronchitis  Your healthcare provider has told you that you have acute bronchitis. Bronchitis is infection or inflammation of the bronchial tubes (airways in the lungs). Normally, air moves easily in and out of the airways. Bronchitis narrows the airways, making it harder for air to flow in and out of the lungs. This causes symptoms such as shortness of breath, coughing, and wheezing. Bronchitis can be acute or chronic. Acute means the condition comes on quickly and goes away in a short time. Chronic means a condition lasts a long time and often comes back. Read on to learn more about acute bronchitis.    What causes acute  bronchitis?  Acute bronchitis almost always starts as a viral respiratory infection, such as a cold or the flu. Certain factors make it more likely for a cold or flu to turn into bronchitis. These include being very young or very old or having a heart or lung problem. Cigarette smoking also makes bronchitis more likely.  When bronchitis develops, the airways become swollen. The airways may also become infected with bacteria. This is known as a secondary infection.  Diagnosing acute bronchitis  Your healthcare provider will examine you and ask about your symptoms and health history. You may also have a sputum culture to test the fluid in your lungs. Chest X-rays may be done to look for infection in the lungs.  Treating acute bronchitis  Bronchitis usually clears up as the cold or flu goes away. You can help feel better faster by doing the following:  · Take medicine as directed. You may be told to take ibuprofen or other over-the-counter medicines. These help relieve inflammation in your bronchial tubes. Your doctor may prescribe an inhaler to help open up the bronchial tubes. Most of the time, acute bronchitis is caused by a viral infection. Antibiotics are usually not prescribed for viral infections.  · Drink plenty of fluids, such as water, juice, or warm soup. Fluids loosen mucus so that you can cough it up. This helps you breathe more easily. Fluids also prevent dehydration.  · Make sure you get plenty of rest.  · Do not smoke. Do not allow anyone else to smoke in your home.  Recovery and follow-up  Follow up with your doctor as you are told. You will likely feel better in a week or two. But a dry cough can linger beyond that time. Let your doctor know if you still have symptoms (other than a dry cough) after 2 weeks. If youre prone to getting bronchial infections, let your doctor know. And take steps to protect yourself from future infections. These steps include stopping smoking and avoiding tobacco smoke,  washing your hands often, and getting a yearly flu shot.  When to call the doctor  Call the doctor if you have any of the following:  · Fever of 100.4°F (38.0°C) higher  · Symptoms that get worse, or new symptoms  · Trouble breathing  · Symptoms that dont start to improve within a week, or within 3 days of taking antibiotics   Date Last Reviewed: 8/4/2014  © 3822-2222 [employer]. [address] [place] [address], [place] [address]. All rights reserved. This information is not intended as a substitute for professional medical care. Always follow your healthcare professional's instructions.        Cough, Chronic, Uncertain Cause (Adult)    Everyone has had a cough as part of the common cold, flu, or bronchitis. This kind of cough occurs along with an achy feeling, low-grade fever, nasal and sinus congestion, and a scratchy or sore throat. This usually gets better in 2 to 3 weeks. A cough that lasts longer than 3 weeks may be due to other causes.  If your cough does not improve over the next 2 weeks, further testing may be needed. Follow up with your healthcare provider as advised. Cough suppressants may be recommended. Based on your exam today, the exact cause of your cough is not certain. Below are some common causes for persistent cough.  Smokers cough  Smokers cough doesnt go away. If you continue to smoke, it only gets worse. The cough is from irritation in the air passages. Talk to your healthcare provider about quitting. Medicines or nicotine-replacement products, like gum or the patch, may make quitting easier.  Postnasal drip  A cough that is worse at night may be due to postnasal drip. Excess mucus in the nose drains from the back of your nose to your throat. This triggers the cough reflex. Postnasal drip may be due to a sinus infection or allergy. Common allergens include dust, tobacco smoke (both inhaled and secondhand smoke), environmental pollutants, pollen, mold, pets, cleaning agents, room  deodorizers, and chemical fumes. Over-the-counter antihistamines or decongestants may be helpful for allergies. A sinus infection may requires antibiotic treatment. See your healthcare provider if symptoms continue.  Medicines  Certain prescribed medicines can cause a chronic cough in some people:  · ACE inhibitors for high blood pressure. These include benazepril, captopril, enalapril, fosinopril, lisinopril, quinapril, ramipril, and others.  · Beta-blockers for high blood pressure and other conditions. These include propranolol, atenolol, metoprolol, nadolol, and others.  Let your healthcare provider know if you are taking any of these.  Asthma  Cough may be the only sign of mild asthma. You may have tests to find out if asthma is causing your cough. You may also take asthma medicine on a trial basis.  Acid reflux (heartburn, GERD)  The esophagus is the tube that carries food from the mouth to the stomach. A valve at its lower end prevents stomach acids from flowing upward. If this valve does not work properly, acid from the stomach enters the esophagus. This may cause a burning pain in the upper abdomen or lower chest, belching, or cough. Symptoms are often worse when lying flat. Avoid eating or drinking before bedtime. Try using extra pillows to raise your upper body, or place 4-inch blocks under the head of your bed. You may try an over-the-counter antacid or an acid-blocking medicine such as famotidine, cimetidine, ranitidine, esomeprazole, lansoprazole, or omeprazole. Stronger medicines for this condition can be prescribed by your healthcare provider.  Follow-up care  Follow up with your healthcare provider, or as advised, if your cough does not improve. Further testing may be needed.  Note: If an X-ray was taken, a specialist will review it. You will be notified of any new findings that may affect your care.  When to seek medical advice  Call your healthcare provider right away if any of these occur:  · Mild  wheezing or difficulty breathing  · Fever of 100.4ºF (38ºC) or higher, or as directed by your healthcare provider  · Unexpected weight loss  · Coughing up large amounts of colored sputum  · Night sweats (sheets and pajamas get soaking wet)  Call 911, or get immediate medical care  Contact emergency services right away if any of these occur:  · Coughing up blood  · Moderate to severe trouble breathing or wheezing  Date Last Reviewed: 9/13/2015 © 2000-2016 [employer]. [address] [place] [address], [place] [address]. All rights reserved. This information is not intended as a substitute for professional medical care. Always follow your healthcare professional's instructions.

## 2017-05-26 ENCOUNTER — TELEPHONE (OUTPATIENT)
Dept: INTERNAL MEDICINE | Facility: CLINIC | Age: 57
End: 2017-05-26

## 2017-05-26 NOTE — TELEPHONE ENCOUNTER
----- Message from Christina García sent at 5/26/2017  9:52 AM CDT -----  Contact: Patient  Patient is returning a call, please call them back at [phone]. Thank you

## 2017-05-26 NOTE — TELEPHONE ENCOUNTER
----- Message from Jennifer Guzman sent at 5/26/2017  7:40 AM CDT -----  Pt states she has been in the office twice and she is still sick./ States she does not know what to do../ Pt can be reached at [phone] (home)

## 2017-05-29 ENCOUNTER — TELEPHONE (OUTPATIENT)
Dept: INTERNAL MEDICINE | Facility: CLINIC | Age: 57
End: 2017-05-29

## 2017-05-29 ENCOUNTER — NURSE TRIAGE (OUTPATIENT)
Dept: ADMINISTRATIVE | Facility: CLINIC | Age: 57
End: 2017-05-29

## 2017-05-29 NOTE — TELEPHONE ENCOUNTER
Pt stated she still has a cough and stated cough is so bad that it causes her to urinate on herself and vomit.  Advised pt that she needs to come to clinic for evaluation or at the very least complete the x-rays that were ordered on 5/18/17 by Patricia Resendez.  Pt stated that she cannot keep coming to clinic for a cough and that she will lose her job if she keeps coming to the doctor for the same thing.  Pt ended up scheduling an appt with Dr. Paul for 5/30/17 at 1:20 pm.

## 2017-05-29 NOTE — TELEPHONE ENCOUNTER
----- Message from Mariza Saab sent at 5/29/2017 11:33 AM CDT -----  Contact: Patient  Patient called to speak with the nurse; she wants to speak with someone before scheduling another appointment. She is still coughing and the medication doesn't help. She is now losing her voice and wants to know what she should do. She can be contacted at [phone].    Thanks,  Mariza

## 2017-05-29 NOTE — TELEPHONE ENCOUNTER
Attempted to call pt back twice.  Phone would ring and then make a loud buzzing sound.  V/m did not .

## 2017-05-29 NOTE — TELEPHONE ENCOUNTER
Pt reports ongoing cough which she has been seeing MD for as well as speaking w/ office/ taking meds as directed/ gets coughing episodes @ night that lead to vomiting and wetting herself/ has appt for f/u tomorrow afternoon/ inquiring as to what else she can do this PM to ease the cough    Reviewed basic interim care for cough--humidifier, warm fluids, steam in bathroom    Offered  @ [facility] this PM--she declined --states she will see Dr Paul tomorrow    Aliya White RN

## 2017-05-29 NOTE — TELEPHONE ENCOUNTER
----- Message from Sophy Roman sent at 5/29/2017  1:52 PM CDT -----  Contact: self [phone]  States that she is returning call please call back at [phone]//thank you acc

## 2017-05-29 NOTE — TELEPHONE ENCOUNTER
----- Message from Key Aleman sent at 5/29/2017  4:15 PM CDT -----  Contact: pt   States she need to speak to the nurse, she can't sleep at night due to coughing and she would like to get something. States she coughing until she throws up. Please call pt at [phone]. Thank you

## 2017-05-30 ENCOUNTER — TELEPHONE (OUTPATIENT)
Dept: INTERNAL MEDICINE | Facility: CLINIC | Age: 57
End: 2017-05-30

## 2017-05-30 ENCOUNTER — OFFICE VISIT (OUTPATIENT)
Dept: INTERNAL MEDICINE | Facility: CLINIC | Age: 57
End: 2017-05-30
Payer: MEDICAID

## 2017-05-30 VITALS
SYSTOLIC BLOOD PRESSURE: 146 MMHG | OXYGEN SATURATION: 97 % | BODY MASS INDEX: 37.48 KG/M2 | HEART RATE: 79 BPM | WEIGHT: 203.69 LBS | HEIGHT: 62 IN | DIASTOLIC BLOOD PRESSURE: 102 MMHG | TEMPERATURE: 97 F

## 2017-05-30 DIAGNOSIS — Z91.09 ENVIRONMENTAL ALLERGIES: Primary | ICD-10-CM

## 2017-05-30 DIAGNOSIS — R05.9 COUGH: ICD-10-CM

## 2017-05-30 PROCEDURE — 99999 PR PBB SHADOW E&M-EST. PATIENT-LVL IV: CPT | Mod: PBBFAC,,, | Performed by: INTERNAL MEDICINE

## 2017-05-30 PROCEDURE — 99214 OFFICE O/P EST MOD 30 MIN: CPT | Mod: PBBFAC,PO | Performed by: INTERNAL MEDICINE

## 2017-05-30 PROCEDURE — 99213 OFFICE O/P EST LOW 20 MIN: CPT | Mod: S$PBB,,, | Performed by: INTERNAL MEDICINE

## 2017-05-30 RX ORDER — CETIRIZINE HYDROCHLORIDE 10 MG/1
10 TABLET ORAL DAILY
Qty: 90 TABLET | Refills: 0 | Status: SHIPPED | OUTPATIENT
Start: 2017-05-30 | End: 2017-08-31 | Stop reason: SINTOL

## 2017-05-30 RX ORDER — PROMETHAZINE HYDROCHLORIDE AND CODEINE PHOSPHATE 6.25; 1 MG/5ML; MG/5ML
5 SOLUTION ORAL EVERY 8 HOURS
Qty: 150 ML | Refills: 0 | Status: SHIPPED | OUTPATIENT
Start: 2017-05-30 | End: 2018-02-28 | Stop reason: SDUPTHER

## 2017-05-30 NOTE — TELEPHONE ENCOUNTER
Spoke with pt, she was upset that I did not call her back after the phone had dropped twice after me being on hold. Informed pt that I have patients and cannot keep playing phone tag. Pt states then that she was unable to make it to the 9 am appointment even after her job gave her the okay. She requested to speak to supervisor, placed patient on hold; patient hung up. Called patient back to let her know that my supervisor was in a meeting but will give her the number to call back and pt expressed that she cannot keep wasting time on the phone while at work. States she will call back at her convenience.

## 2017-05-30 NOTE — TELEPHONE ENCOUNTER
----- Message from Tra Terry sent at 5/30/2017  8:39 AM CDT -----  Contact: pt  Pt is calling nurse staff regarding an early appt for today.  Pt was speaking with the nurse and the call dropped . Pt call back [phone] to be advised. thanks

## 2017-05-30 NOTE — TELEPHONE ENCOUNTER
----- Message from Christina García sent at 5/30/2017  7:50 AM CDT -----  Contact: Pateint  Patient has an appointment today, and states that she cant stop coughing, the reason for the appointment, patient wants to come in sooner, but there are no openings, please call her back at [phone]. Thank you

## 2017-05-30 NOTE — PROGRESS NOTES
"Subjective:      Patient ID: Miles Bowen is a 56 y.o. female.    Chief Complaint: Cough    Pt states could not take singulair or tessalon or sudafed caused heart palpitation.       Cough   This is a chronic problem. Episode onset: 3-4 weeks. The problem has been unchanged. The problem occurs every few minutes. The cough is non-productive (less productive than earlier in course). Associated symptoms include headaches and postnasal drip. Pertinent negatives include no ear congestion, ear pain, fever, nasal congestion, rash, rhinorrhea, sore throat, shortness of breath or wheezing. Associated symptoms comments: Itchy throat. The symptoms are aggravated by lying down (talking). She has tried prescription cough suppressant (doxy, cefdinir, mucinex, robitussin, tessalon, tylenol) for the symptoms.     Review of Systems   Constitutional: Negative for fever.   HENT: Positive for postnasal drip. Negative for ear pain, rhinorrhea and sore throat.    Respiratory: Positive for cough. Negative for shortness of breath and wheezing.    Skin: Negative for rash.   Neurological: Positive for headaches.     Objective:   BP (!) 146/102 (BP Location: Right arm, Patient Position: Sitting)   Pulse 79   Temp 97.3 °F (36.3 °C) (Tympanic)   Ht 5' 2" (1.575 m)   Wt 92.4 kg (203 lb 11.3 oz)   SpO2 97%   BMI 37.26 kg/m²     Physical Exam   Constitutional: She is oriented to person, place, and time. She appears well-developed and well-nourished. No distress.   HENT:   Head: Normocephalic and atraumatic.   Right Ear: Tympanic membrane normal.   Left Ear: Tympanic membrane normal.   Nose: Mucosal edema and rhinorrhea present. Right sinus exhibits no maxillary sinus tenderness and no frontal sinus tenderness. Left sinus exhibits no maxillary sinus tenderness and no frontal sinus tenderness.   Mouth/Throat: Posterior oropharyngeal erythema present. No oropharyngeal exudate or posterior oropharyngeal edema.   Eyes: EOM are normal. Pupils " are equal, round, and reactive to light.   Neck: Neck supple. No thyromegaly present.   Cardiovascular: Normal rate and regular rhythm.    Pulmonary/Chest: Breath sounds normal. She has no wheezes. She has no rales.   Abdominal: Soft. Bowel sounds are normal. There is no tenderness.   Lymphadenopathy:     She has no cervical adenopathy.   Neurological: She is alert and oriented to person, place, and time.   Skin: Skin is warm and dry.   Psychiatric: She has a normal mood and affect. Her behavior is normal.       Assessment:     1. Environmental allergies    2. Cough      Plan:   Environmental allergies  -     cetirizine (ZYRTEC) 10 MG tablet; Take 1 tablet (10 mg total) by mouth once daily.  Dispense: 90 tablet; Refill: 0    Cough  -     promethazine-codeine 6.25-10 mg/5 ml (PHENERGAN WITH CODEINE) 6.25-10 mg/5 mL syrup; Take 5 mLs by mouth every 8 (eight) hours. Prn cough  Dispense: 150 mL; Refill: 0      rec pt complete cxr, sinus xray, and allergy appt as prev ordered.     Lab Frequency Next Occurrence   Mammo Digital Screening Bilat with CAD Once 09/09/2016   IR KRISTA Lumbar Once 10/13/2016   X-Ray Sinuses Min 3 Views Once 05/18/2017   X-Ray Chest PA And Lateral Once 05/18/2017         Return if symptoms worsen or fail to improve.

## 2017-06-21 DIAGNOSIS — I10 ESSENTIAL HYPERTENSION: ICD-10-CM

## 2017-06-21 RX ORDER — LOSARTAN POTASSIUM AND HYDROCHLOROTHIAZIDE 12.5; 1 MG/1; MG/1
TABLET ORAL
Qty: 90 TABLET | Refills: 0 | Status: SHIPPED | OUTPATIENT
Start: 2017-06-21 | End: 2017-09-26 | Stop reason: SDUPTHER

## 2017-07-10 ENCOUNTER — TELEPHONE (OUTPATIENT)
Dept: INTERNAL MEDICINE | Facility: CLINIC | Age: 57
End: 2017-07-10

## 2017-07-10 NOTE — TELEPHONE ENCOUNTER
----- Message from Isi Rivers sent at 7/10/2017 11:05 AM CDT -----  pt would like to be worked in asap, refuses to see anyone else. states that she doesn't have medicaid anymore, just [employer] but didn't want to give me [employer] ins so unable to flor..[phone] (home)

## 2017-07-11 ENCOUNTER — TELEPHONE (OUTPATIENT)
Dept: INTERNAL MEDICINE | Facility: CLINIC | Age: 57
End: 2017-07-11

## 2017-07-11 NOTE — TELEPHONE ENCOUNTER
----- Message from Adenike Guzman sent at 7/11/2017 11:13 AM CDT -----  Contact: pt  Pt states she is returning a missed call, pt can be reached at [phone]///thxMW

## 2017-07-11 NOTE — TELEPHONE ENCOUNTER
----- Message from Nelly Goldman sent at 7/11/2017 10:51 AM CDT -----  Contact: Pt  Pt called and stated she was returning a call to the nurse. She can be reached [phone].    Thanks,  TF

## 2017-07-11 NOTE — TELEPHONE ENCOUNTER
Pt stated she had to leave work because headache was so bad.  Pt notified of Dr. Paul's recommendation to be seen by urgent care.  Pt verbalized understanding.

## 2017-07-11 NOTE — TELEPHONE ENCOUNTER
Pt stated she has a migraine and has taken Phenergan for the nausea and takes Extra Strength Tylenol 1000 mg to 1500 mg every 4 hours and migraine does not go away.  Stated most of her headaches are usually sinus related.   Advised pt that she would need to be seen in clinic for appropriate eval if otc meds are not helping.  Notified pt that first available appt with Dr. Paul is 8/1/17 and first available appt with another internal med provider is 7/18/17.  Pt requested recommendation from Dr. Paul.  Please advise.

## 2017-07-11 NOTE — TELEPHONE ENCOUNTER
----- Message from Karmen Lane sent at 7/11/2017  9:38 AM CDT -----  Contact: Pt  Pt is returning the nurse call. Pls call pt back at [phone].

## 2017-07-11 NOTE — TELEPHONE ENCOUNTER
----- Message from Catia Herrera sent at 7/11/2017 12:04 PM CDT -----  Pt returned the nurse s call but did not disclose /please call [phone]/ma

## 2017-07-24 DIAGNOSIS — R11.0 NAUSEA: ICD-10-CM

## 2017-07-24 RX ORDER — PROMETHAZINE HYDROCHLORIDE 25 MG/1
TABLET ORAL
Qty: 30 TABLET | Refills: 0 | Status: SHIPPED | OUTPATIENT
Start: 2017-07-24 | End: 2017-09-12 | Stop reason: SDUPTHER

## 2017-07-31 ENCOUNTER — TELEPHONE (OUTPATIENT)
Dept: FAMILY MEDICINE | Facility: CLINIC | Age: 57
End: 2017-07-31

## 2017-07-31 ENCOUNTER — OFFICE VISIT (OUTPATIENT)
Dept: FAMILY MEDICINE | Facility: CLINIC | Age: 57
End: 2017-07-31
Payer: COMMERCIAL

## 2017-07-31 DIAGNOSIS — K21.9 GASTROESOPHAGEAL REFLUX DISEASE, ESOPHAGITIS PRESENCE NOT SPECIFIED: ICD-10-CM

## 2017-07-31 DIAGNOSIS — J30.9 ALLERGIC RHINITIS, UNSPECIFIED CHRONICITY, UNSPECIFIED SEASONALITY, UNSPECIFIED TRIGGER: ICD-10-CM

## 2017-07-31 DIAGNOSIS — R51.9 HEADACHE, UNSPECIFIED HEADACHE TYPE: Primary | ICD-10-CM

## 2017-07-31 PROCEDURE — 99999 PR PBB SHADOW E&M-EST. PATIENT-LVL IV: CPT | Mod: PBBFAC,,, | Performed by: FAMILY MEDICINE

## 2017-07-31 PROCEDURE — 99214 OFFICE O/P EST MOD 30 MIN: CPT | Mod: S$GLB,,, | Performed by: FAMILY MEDICINE

## 2017-07-31 RX ORDER — OMEPRAZOLE 20 MG/1
CAPSULE, DELAYED RELEASE ORAL
Refills: 0 | COMMUNITY
Start: 2017-07-13 | End: 2017-07-31 | Stop reason: ALTCHOICE

## 2017-07-31 RX ORDER — BUTALBITAL, ACETAMINOPHEN AND CAFFEINE 50; 325; 40 MG/1; MG/1; MG/1
1 TABLET ORAL 2 TIMES DAILY PRN
Qty: 30 TABLET | Refills: 0 | Status: SHIPPED | OUTPATIENT
Start: 2017-07-31 | End: 2017-08-31

## 2017-07-31 RX ORDER — PANTOPRAZOLE SODIUM 20 MG/1
20 TABLET, DELAYED RELEASE ORAL DAILY
Qty: 30 TABLET | Refills: 2 | Status: SHIPPED | OUTPATIENT
Start: 2017-07-31 | End: 2017-08-11 | Stop reason: SDUPTHER

## 2017-07-31 NOTE — TELEPHONE ENCOUNTER
----- Message from Radha Rodarte sent at 7/31/2017  8:45 AM CDT -----  Contact: pt  Pt called to report she is in route. Pt needed help with directions. Pt can be reached at [phone]

## 2017-07-31 NOTE — PROGRESS NOTES
Subjective:       Patient ID: Miles Bowen is a 56 y.o. female.    Chief Complaint: Headache      HPI   Ms. Bowen presents to clinic today for complaints of congestion, runny nose, and sinus pressure.   She has been using her nose xray and antihistamine intermittently.     She also states she has been having headache more.   She states she lives in a town house and there are rodents in the townhouse.   She states it has been going on for 1 month.   She states she is deathly afraid and even fearful of sleeping due to the rodents.   She states she can hear them moving.   She states she is not able to move out.   She states lately the migraines are worst.     She also needs medicine for her stomach.   She was told that protonix will get paid for by her insurance.     Review of Systems   Constitutional: Negative for fever.   HENT: Positive for congestion, rhinorrhea, sinus pressure and sneezing. Negative for sore throat.    Respiratory: Positive for cough. Negative for shortness of breath.    Cardiovascular: Negative for chest pain.   Gastrointestinal: Positive for nausea. Negative for abdominal pain and vomiting.   Neurological: Positive for headaches.       Medication List with Changes/Refills   New Medications    BUTALBITAL-ACETAMINOPHEN-CAFFEINE -40 MG (FIORICET, ESGIC) -40 MG PER TABLET    Take 1 tablet by mouth 2 (two) times daily as needed for Pain or Headaches.    PANTOPRAZOLE (PROTONIX) 20 MG TABLET    Take 1 tablet (20 mg total) by mouth once daily.   Current Medications    ACETAMINOPHEN (TYLENOL EXTRA STRENGTH ORAL)    Take 2 tablets by mouth every 4 to 6 hours as needed.    CETIRIZINE (ZYRTEC) 10 MG TABLET    Take 1 tablet (10 mg total) by mouth once daily.    DICYCLOMINE (BENTYL) 20 MG TABLET    Take 10 mg by mouth as needed.     DULOXETINE (CYMBALTA) 60 MG CAPSULE    Take 1 capsule by mouth once daily.     ESTRADIOL (ESTRACE) 0.5 MG TABLET    Take 0.5 mg by mouth once daily.     FLUTICASONE (FLONASE) 50 MCG/ACTUATION NASAL SPRAY    2 sprays by Each Nare route once daily.    GUAIFENESIN (MUCINEX) 600 MG 12 HR TABLET    Take 600 mg by mouth 2 (two) times daily as needed for Congestion.    GUAIFENESIN/DEXTROMETHORPHAN (ROBITUSSIN-DM ORAL)    Take 10 mLs by mouth as needed.    HYDROXYZINE PAMOATE (VISTARIL) 50 MG CAP    Take 1 capsule by mouth as needed.    LORAZEPAM (ATIVAN) 0.5 MG TABLET    TK 1 T PO Q 6 TO 8 H PRN    LOSARTAN-HYDROCHLOROTHIAZIDE 100-12.5 MG (HYZAAR) 100-12.5 MG TAB    take 1 tablet by mouth once daily    PROMETHAZINE (PHENERGAN) 25 MG TABLET    take 1 tablet by mouth every 6 hours if needed for nausea and vomiting    ZOLPIDEM (AMBIEN) 10 MG TAB    Take 1 tablet by mouth nightly as needed.   Discontinued Medications    OMEPRAZOLE (PRILOSEC) 20 MG CAPSULE           Patient Active Problem List   Diagnosis    Hypertension    Depression    Anxiety    Hematemesis without nausea    GERD (gastroesophageal reflux disease)    Hiatal hernia         Objective:     Physical Exam   Constitutional: She is oriented to person, place, and time. She appears well-developed and well-nourished. No distress.   HENT:   Head: Normocephalic and atraumatic.   Right Ear: External ear normal.   Left Ear: External ear normal.   Eyes: EOM are normal. Right eye exhibits no discharge. Left eye exhibits no discharge.   Cardiovascular: Normal rate and regular rhythm.    Pulmonary/Chest: Effort normal and breath sounds normal. No respiratory distress. She has no wheezes.   Musculoskeletal: She exhibits no edema.   Neurological: She is alert and oriented to person, place, and time.   Skin: Skin is warm and dry. She is not diaphoretic. No erythema.   Psychiatric:   Tearful    Vitals reviewed.    Vitals:    07/31/17 0907   BP: 122/88   Pulse: 98   Resp: 18   Temp: 97 °F (36.1 °C)       Assessment/  PLAN     Headache, unspecified headache type  -     butalbital-acetaminophen-caffeine -40 mg (FIORICET,  ESGIC) -40 mg per tablet; Take 1 tablet by mouth 2 (two) times daily as needed for Pain or Headaches.  Dispense: 30 tablet; Refill: 0  - advised patient that sleep is very important to preventing headache , counseled on maybe getting mouse trap or sleeping over  At someone else house   - also counseled on water and healthy diet to prevent migraine   - stress reduction    Gastroesophageal reflux disease, esophagitis presence not specified  -     pantoprazole (PROTONIX) 20 MG tablet; Take 1 tablet (20 mg total) by mouth once daily.  Dispense: 30 tablet; Refill: 2      Allergic rhinitis, unspecified chronicity, unspecified seasonality, unspecified trigger  - continue flonase and zyrtec , take consistently       Plan as above   rtc prn     Opal Martinez MD  [facility]

## 2017-08-01 VITALS
HEART RATE: 98 BPM | WEIGHT: 205.69 LBS | TEMPERATURE: 97 F | HEIGHT: 62 IN | SYSTOLIC BLOOD PRESSURE: 122 MMHG | DIASTOLIC BLOOD PRESSURE: 88 MMHG | BODY MASS INDEX: 37.85 KG/M2 | RESPIRATION RATE: 18 BRPM | OXYGEN SATURATION: 97 %

## 2017-08-08 ENCOUNTER — TELEPHONE (OUTPATIENT)
Dept: INTERNAL MEDICINE | Facility: CLINIC | Age: 57
End: 2017-08-08

## 2017-08-08 NOTE — TELEPHONE ENCOUNTER
----- Message from Christina García sent at 8/8/2017  7:43 AM CDT -----  Contact: Patient  Patient states she has some urgent concerns that she would like to speak to nurse about, but would not be specific, please call her back at [phone]. Thank you

## 2017-08-09 ENCOUNTER — TELEPHONE (OUTPATIENT)
Dept: INTERNAL MEDICINE | Facility: CLINIC | Age: 57
End: 2017-08-09

## 2017-08-09 NOTE — TELEPHONE ENCOUNTER
Pt stated she has horrible pain in her chest and Protonix is not relieving pain.  Advised pt to go to nearest ER.  Pt verbalized understanding.

## 2017-08-09 NOTE — TELEPHONE ENCOUNTER
----- Message from Christina García sent at 8/9/2017 12:47 PM CDT -----  Contact: Patient  Patient is returning a call, please call them back at [phone]. Thank you

## 2017-08-10 ENCOUNTER — NURSE TRIAGE (OUTPATIENT)
Dept: ADMINISTRATIVE | Facility: CLINIC | Age: 57
End: 2017-08-10

## 2017-08-10 NOTE — TELEPHONE ENCOUNTER
Reason for Disposition   [1] Chest pain lasts > 5 minutes AND [2] described as crushing, pressure-like, or heavy   [1] Chest pain lasts > 5 minutes AND [2] age > 50    Protocols used: ST CHEST PAIN-A-

## 2017-08-11 ENCOUNTER — LAB VISIT (OUTPATIENT)
Dept: LAB | Facility: HOSPITAL | Age: 57
End: 2017-08-11
Attending: INTERNAL MEDICINE
Payer: COMMERCIAL

## 2017-08-11 ENCOUNTER — OFFICE VISIT (OUTPATIENT)
Dept: INTERNAL MEDICINE | Facility: CLINIC | Age: 57
End: 2017-08-11
Payer: COMMERCIAL

## 2017-08-11 VITALS
DIASTOLIC BLOOD PRESSURE: 84 MMHG | WEIGHT: 205.69 LBS | HEART RATE: 90 BPM | HEIGHT: 62 IN | TEMPERATURE: 97 F | SYSTOLIC BLOOD PRESSURE: 126 MMHG | OXYGEN SATURATION: 97 % | BODY MASS INDEX: 37.85 KG/M2

## 2017-08-11 DIAGNOSIS — R42 VERTIGO: Primary | ICD-10-CM

## 2017-08-11 DIAGNOSIS — R42 DIZZINESS: ICD-10-CM

## 2017-08-11 DIAGNOSIS — R11.0 NAUSEA: ICD-10-CM

## 2017-08-11 DIAGNOSIS — K21.9 GASTROESOPHAGEAL REFLUX DISEASE, ESOPHAGITIS PRESENCE NOT SPECIFIED: ICD-10-CM

## 2017-08-11 DIAGNOSIS — R07.89 ATYPICAL CHEST PAIN: ICD-10-CM

## 2017-08-11 DIAGNOSIS — K44.9 HIATAL HERNIA: ICD-10-CM

## 2017-08-11 LAB
T4 FREE SERPL-MCNC: 1.01 NG/DL
TSH SERPL DL<=0.005 MIU/L-ACNC: 0.27 UIU/ML

## 2017-08-11 PROCEDURE — 3079F DIAST BP 80-89 MM HG: CPT | Mod: S$GLB,,, | Performed by: INTERNAL MEDICINE

## 2017-08-11 PROCEDURE — 3008F BODY MASS INDEX DOCD: CPT | Mod: S$GLB,,, | Performed by: INTERNAL MEDICINE

## 2017-08-11 PROCEDURE — 3074F SYST BP LT 130 MM HG: CPT | Mod: S$GLB,,, | Performed by: INTERNAL MEDICINE

## 2017-08-11 PROCEDURE — 99214 OFFICE O/P EST MOD 30 MIN: CPT | Mod: S$GLB,,, | Performed by: INTERNAL MEDICINE

## 2017-08-11 PROCEDURE — 84439 ASSAY OF FREE THYROXINE: CPT

## 2017-08-11 PROCEDURE — 36415 COLL VENOUS BLD VENIPUNCTURE: CPT | Mod: PO

## 2017-08-11 PROCEDURE — 99999 PR PBB SHADOW E&M-EST. PATIENT-LVL III: CPT | Mod: PBBFAC,,, | Performed by: INTERNAL MEDICINE

## 2017-08-11 PROCEDURE — 84443 ASSAY THYROID STIM HORMONE: CPT

## 2017-08-11 RX ORDER — MECLIZINE HYDROCHLORIDE 25 MG/1
25 TABLET ORAL 3 TIMES DAILY PRN
Qty: 30 TABLET | Refills: 0 | Status: SHIPPED | OUTPATIENT
Start: 2017-08-11 | End: 2017-08-21 | Stop reason: SDUPTHER

## 2017-08-11 RX ORDER — PANTOPRAZOLE SODIUM 40 MG/1
40 TABLET, DELAYED RELEASE ORAL DAILY
Qty: 90 TABLET | Refills: 0 | Status: SHIPPED | OUTPATIENT
Start: 2017-08-11 | End: 2017-10-31 | Stop reason: SDUPTHER

## 2017-08-11 NOTE — PROGRESS NOTES
Subjective:      Patient ID: Miles Bowen is a 56 y.o. female.    Chief Complaint: Chest Pain (non cardiac)    55 yo with Patient Active Problem List:     Hypertension     Depression     Anxiety     Hematemesis without nausea     GERD (gastroesophageal reflux disease)     Hiatal hernia    Here today for hosp f/u.  Patient was admitted to [facility] for an overnight stay for chest pain.  She presented to the emergency room reporting 3 day history of substernal chest tightness and intermittent radiation to the left jaw.  It was not necessarily associated with exertion and resolved spontaneously.  It was not associated with shortness of breath nausea vomiting or diaphoresis.  She had rule out MI with serial cardiac enzymes and EKG.  She also had negative cardiac stress testing while an inpatient.  DC summary reviewed reveals normal CMP CBC without anemia.  Today she reports that her chest pain is improved but not resolved.  There is no more radiation.  She has been compliant with her medications including Protonix 20 mg.  Her chest pain/pressure extends from her epigastrium to her lower sternum.  She also complains of 4 - 5 days of dizziness.  Worse with certain movements and head position.  There is been no trauma.  She also reports that she developed a headache in the hospital after ntg.    Headache    This is a new problem. The current episode started in the past 7 days. The problem occurs constantly. The problem has been waxing and waning. The pain is located in the bilateral and frontal region. The pain does not radiate. The pain quality is not similar to prior headaches. The quality of the pain is described as aching. Pain severity now: mild to mod. Associated symptoms include dizziness and nausea. Pertinent negatives include no blurred vision, eye pain, eye redness, eye watering, facial sweating, fever, hearing loss, neck pain, numbness, phonophobia, photophobia or scalp tenderness.  "Associated symptoms comments: No diplopia. Exacerbated by: ntg. She has tried nothing for the symptoms. The treatment provided mild relief. Her past medical history is significant for migraine headaches and obesity. There is no history of immunosuppression or recent head traumas.   Dizziness:   Chronicity:  Recurrent  Onset:  In the past 7 days  Progression since onset:  Waxing and waning and unchanged  Frequency:  Constantly  Severity:  Moderate  Duration:  Off/on all day  Dizziness characteristics:  Sensation of movement   Associated symptoms: headaches and nausea.no hearing loss and no fever.  Aggravated by:  Position changes  Treatments tried:  Nothing  Improvements on treatment:  No relief   PMH includes: anxiety.no strokes, no head trauma and no head trauma.    Review of Systems   Constitutional: Negative for fever.   HENT: Negative for hearing loss.    Eyes: Negative for blurred vision, photophobia, pain and redness.   Gastrointestinal: Positive for nausea.   Musculoskeletal: Negative for neck pain.   Neurological: Positive for dizziness and headaches. Negative for numbness.     Objective:   /84 (BP Location: Right arm, Patient Position: Sitting)   Pulse 90   Temp 96.6 °F (35.9 °C) (Tympanic)   Ht 5' 2" (1.575 m)   Wt 93.3 kg (205 lb 11 oz)   SpO2 97%   BMI 37.62 kg/m²     Physical Exam   Constitutional: She is oriented to person, place, and time. She appears well-developed and well-nourished. No distress.   HENT:   Head: Normocephalic and atraumatic.   Right Ear: Tympanic membrane normal.   Left Ear: Tympanic membrane normal.   Mouth/Throat: Oropharynx is clear and moist.   Eyes: EOM are normal. Pupils are equal, round, and reactive to light.   Neck: Neck supple.   Cardiovascular: Normal rate and regular rhythm.    Pulmonary/Chest: Breath sounds normal. She has no wheezes. She has no rales.   Abdominal: Soft. Bowel sounds are normal. There is no tenderness.   Musculoskeletal: She exhibits no " edema.   Lymphadenopathy:     She has no cervical adenopathy.   Neurological: She is alert and oriented to person, place, and time. She has normal reflexes. She displays normal reflexes. No cranial nerve deficit. She exhibits normal muscle tone. Coordination normal.   Butch-Hallpike test is negative however symptoms reproduced with maneuver bilaterally.   Skin: Skin is warm and dry.   Psychiatric: She has a normal mood and affect. Her behavior is normal.     No visits with results within 2 Week(s) from this visit.   Latest known visit with results is:   Lab Visit on 09/26/2016   Component Date Value Ref Range Status    TSH 09/26/2016 0.245* 0.400 - 4.000 uIU/mL Final    WBC 09/26/2016 8.63  3.90 - 12.70 K/uL Final    RBC 09/26/2016 4.06  4.00 - 5.40 M/uL Final    Hemoglobin 09/26/2016 11.4* 12.0 - 16.0 g/dL Final    Hematocrit 09/26/2016 35.1* 37.0 - 48.5 % Final    MCV 09/26/2016 87  82 - 98 fL Final    MCH 09/26/2016 28.1  27.0 - 31.0 pg Final    MCHC 09/26/2016 32.5  32.0 - 36.0 % Final    RDW 09/26/2016 14.1  11.5 - 14.5 % Final    Platelets 09/26/2016 458* 150 - 350 K/uL Final    MPV 09/26/2016 9.6  9.2 - 12.9 fL Final    Gran # 09/26/2016 6.3  1.8 - 7.7 K/uL Final    Lymph # 09/26/2016 1.7  1.0 - 4.8 K/uL Final    Mono # 09/26/2016 0.5  0.3 - 1.0 K/uL Final    Eos # 09/26/2016 0.1  0.0 - 0.5 K/uL Final    Baso # 09/26/2016 0.04  0.00 - 0.20 K/uL Final    Gran% 09/26/2016 73.0  38.0 - 73.0 % Final    Lymph% 09/26/2016 19.9  18.0 - 48.0 % Final    Mono% 09/26/2016 5.8  4.0 - 15.0 % Final    Eosinophil% 09/26/2016 0.6  0.0 - 8.0 % Final    Basophil% 09/26/2016 0.5  0.0 - 1.9 % Final    Differential Method 09/26/2016 Automated   Final    Sodium 09/26/2016 137  136 - 145 mmol/L Final    Potassium 09/26/2016 3.3* 3.5 - 5.1 mmol/L Final    Chloride 09/26/2016 102  95 - 110 mmol/L Final    CO2 09/26/2016 22* 23 - 29 mmol/L Final    Glucose 09/26/2016 98  70 - 110 mg/dL Final    BUN, Bld  09/26/2016 6  6 - 20 mg/dL Final    Creatinine 09/26/2016 0.8  0.5 - 1.4 mg/dL Final    Calcium 09/26/2016 9.8  8.7 - 10.5 mg/dL Final    Total Protein 09/26/2016 7.4  6.0 - 8.4 g/dL Final    Albumin 09/26/2016 4.1  3.5 - 5.2 g/dL Final    Total Bilirubin 09/26/2016 0.8  0.1 - 1.0 mg/dL Final    Comment: For infants and newborns, interpretation of results should be based  on gestational age, weight and in agreement with clinical  observations.  Premature Infant recommended reference ranges:  Up to 24 hours.............<8.0 mg/dL  Up to 48 hours............<12.0 mg/dL  3-5 days..................<15.0 mg/dL  6-29 days.................<15.0 mg/dL      Alkaline Phosphatase 09/26/2016 68  55 - 135 U/L Final    AST 09/26/2016 11  10 - 40 U/L Final    ALT 09/26/2016 8* 10 - 44 U/L Final    Anion Gap 09/26/2016 13  8 - 16 mmol/L Final    eGFR if African American 09/26/2016 >60.0  >60 mL/min/1.73 m^2 Final    eGFR if non African American 09/26/2016 >60.0  >60 mL/min/1.73 m^2 Final    Comment: Calculation used to obtain the estimated glomerular filtration  rate (eGFR) is the CKD-EPI equation. Since race is unknown   in our information system, the eGFR values for   -American and Non--American patients are given   for each creatinine result.      Free T4 09/26/2016 0.97  0.71 - 1.51 ng/dL Final       Assessment:     1. Vertigo    2. Hiatal hernia    3. Dizziness    4. Nausea    5. Atypical chest pain    6. Gastroesophageal reflux disease, esophagitis presence not specified      Plan:   Vertigo    Hiatal hernia    Dizziness  -     meclizine (ANTIVERT) 25 mg tablet; Take 1 tablet (25 mg total) by mouth 3 (three) times daily as needed for Dizziness.  Dispense: 30 tablet; Refill: 0  Vertigo exercises discussed with patient.  Patient information regarding vertigo was selected for patient    Nausea  Comments:  reschedule with GI    Atypical chest pain  -     TSH; Future; Expected date: 08/11/2017  -      X-Ray Chest PA And Lateral; Future; Expected date: 08/11/2017    Gastroesophageal reflux disease, esophagitis presence not specified  Try increase protonix  -     pantoprazole (PROTONIX) 40 MG tablet; Take 1 tablet (40 mg total) by mouth once daily.  Dispense: 90 tablet; Refill: 0        Lab Frequency Next Occurrence   Mammo Digital Screening Bilat with CAD Once 09/09/2016   IR KRISTA Lumbar Once 10/13/2016   X-Ray Sinuses Min 3 Views Once 05/18/2017   X-Ray Chest PA And Lateral Once 05/18/2017         Return if symptoms worsen or fail to improve.

## 2017-08-11 NOTE — PATIENT INSTRUCTIONS
Managing Dizziness (Vertigo) with Medicines    Although medicines can't cure your problem, they can help control symptoms. Your doctor may prescribe medicines for a few weeks and then taper them off. Always take your medicine as prescribed. Never share your medicine with others.  Contact your healthcare provider right away if you have side effects from your medicines.   How medicines can help  · Treat infection or inflammation. If you have an infection caused by bacteria, your doctor can prescribe antibiotics.  · Limit conflicting balance signals. These medicines are often in pill form.  · Ease nausea. Suppositories, pills, or shots can reduce vomiting.  · Reduce pressure in the canals. Diuretics can be used to treat Meniere's disease. These medicines help your body get rid of extra fluid.  · Ease other symptoms. Other medicines can help ease depression and anxiety caused by living with dizziness or fainting.  Date Last Reviewed: 11/1/2016  © 3705-3521 [employer]. [address] [place] [address]. All rights reserved. This information is not intended as a substitute for professional medical care. Always follow your healthcare professional's instructions.        Benign Paroxysmal Positional Vertigo  Benign paroxysmal positional vertigo (BPPV) is a problem with the inner ear. The inner ear contains the vestibular system. This system is what helps you keep your balance. BPPV causes a feeling of spinning. It is a common problem of the vestibular system.  Understanding the vestibular system  The vestibular system of the ear is made up of very tiny parts. They include the utricle, saccule, and semicircular canals. The utricle is a tiny organ that contains calcium crystals. In some people, the crystals can move into the semicircular canals. When this happens, the system no longer works as it should. This causes BPPV. Benign means it is not life-threatening. Paroxysmal means it happens suddenly.  Positional means that it happens when you move your head. Vertigo is a feeling of spinning.  What causes BPPV?  Causes include injury to your head or neck. Other problems with the vestibular system may cause BPPV. In many people, the cause of BPPV is not known.  Symptoms of BPPV  You many have repeated feelings of spinning (vertigo). The vertigo usually lasts less than 1 minute. Some movements, suchas rolling over in bed, can bring on vertigo.  Diagnosing BPPV  Your primary health care provider may diagnose and treat your BPPV. Or you may see an ear, nose, and throat doctor (otolaryngologist). In some cases, you may see a nervous system doctor (neurologist).  The health care provider will ask about your symptoms and your medical history. He or she will examine you. You may have hearing and balance tests. As part of the exam, your health care provider may have you move your head and body in certain ways. If you have BPPV, the movements can bring on vertigo. Your provider will also look for abnormal movements of your eyes. You may have other tests to check your vestibular or nervous systems.  Treatment for BPPV  Your health care provider may try to move the calcium crystals. This is done by having you move your head and neck in certain ways. This treatment is safe and often works well. You may also be told to do these movements at home. You may still have vertigo for a few weeks. Your health care provider will recheck your symptoms, usually in about a month. Special physical therapy may also be part of treatment.  In rare cases surgery may be needed for BPPV that does not go away.     When to call the health care provider  Call your health care provider right away if you have any of these:  · Symptoms that do not go away with treatment  · Symptoms that get worse  · New symptoms   Date Last Reviewed: 3/19/2015  © 1559-9259 [employer]. [address] [place] [address], [place] [address]. All rights reserved. This  information is not intended as a substitute for professional medical care. Always follow your healthcare professional's instructions.        Benign Paroxysmal Positional Vertigo  Benign paroxysmal positional vertigo (BPPV) is a problem with the inner ear. The inner ear contains the vestibular system. This system is what helps you keep your balance. BPPV causes a feeling of spinning. It is a common problem of the vestibular system.  Understanding the vestibular system  The vestibular system of the ear is made up of very tiny parts. They include the utricle, saccule, and semicircular canals. The utricle is a tiny organ that contains calcium crystals. In some people, the crystals can move into the semicircular canals. When this happens, the system no longer works as it should. This causes BPPV. Benign means it is not life-threatening. Paroxysmal means it happens suddenly. Positional means that it happens when you move your head. Vertigo is a feeling of spinning.  What causes BPPV?  Causes include injury to your head or neck. Other problems with the vestibular system may cause BPPV. In many people, the cause of BPPV is not known.  Symptoms of BPPV  You many have repeated feelings of spinning (vertigo). The vertigo usually lasts less than 1 minute. Some movements, suchas rolling over in bed, can bring on vertigo.  Diagnosing BPPV  Your primary health care provider may diagnose and treat your BPPV. Or you may see an ear, nose, and throat doctor (otolaryngologist). In some cases, you may see a nervous system doctor (neurologist).  The health care provider will ask about your symptoms and your medical history. He or she will examine you. You may have hearing and balance tests. As part of the exam, your health care provider may have you move your head and body in certain ways. If you have BPPV, the movements can bring on vertigo. Your provider will also look for abnormal movements of your eyes. You may have other tests to  check your vestibular or nervous systems.  Treatment for BPPV  Your health care provider may try to move the calcium crystals. This is done by having you move your head and neck in certain ways. This treatment is safe and often works well. You may also be told to do these movements at home. You may still have vertigo for a few weeks. Your health care provider will recheck your symptoms, usually in about a month. Special physical therapy may also be part of treatment.  In rare cases surgery may be needed for BPPV that does not go away.     When to call the health care provider  Call your health care provider right away if you have any of these:  · Symptoms that do not go away with treatment  · Symptoms that get worse  · New symptoms   Date Last Reviewed: 3/19/2015  © 5135-6522 [employer]. [address] [place] [address], [place] [address]. All rights reserved. This information is not intended as a substitute for professional medical care. Always follow your healthcare professional's instructions.

## 2017-08-14 ENCOUNTER — TELEPHONE (OUTPATIENT)
Dept: INTERNAL MEDICINE | Facility: CLINIC | Age: 57
End: 2017-08-14

## 2017-08-14 DIAGNOSIS — E05.90 SUBCLINICAL HYPERTHYROIDISM: Primary | ICD-10-CM

## 2017-08-16 ENCOUNTER — TELEPHONE (OUTPATIENT)
Dept: INTERNAL MEDICINE | Facility: CLINIC | Age: 57
End: 2017-08-16

## 2017-08-16 NOTE — TELEPHONE ENCOUNTER
----- Message from Jo-Ann Prakash sent at 8/16/2017  2:38 PM CDT -----  Returning nurse call. Please call back at [phone]. thanks

## 2017-08-16 NOTE — TELEPHONE ENCOUNTER
----- Message from Andrea Duran sent at 8/16/2017  3:01 PM CDT -----  Contact: pt  She's calling in regards to a missed call, please advise, [phone] (home)

## 2017-08-16 NOTE — TELEPHONE ENCOUNTER
Notified pt that Dr. Paul recommends she complete thyroid labs as ordered and to discuss subclinical hyperthyroidism more with endocrine.  Pt verbalized understanding and scheduled appt with Dr. Fisher for 8/17/17 at 12:30 pm and scheduled lab work to follow that appt.

## 2017-08-21 ENCOUNTER — TELEPHONE (OUTPATIENT)
Dept: ENDOCRINOLOGY | Facility: CLINIC | Age: 57
End: 2017-08-21

## 2017-08-21 DIAGNOSIS — R42 DIZZINESS: ICD-10-CM

## 2017-08-21 RX ORDER — MECLIZINE HYDROCHLORIDE 25 MG/1
25 TABLET ORAL 3 TIMES DAILY PRN
Qty: 30 TABLET | Refills: 0 | Status: SHIPPED | OUTPATIENT
Start: 2017-08-21 | End: 2017-08-30 | Stop reason: SDUPTHER

## 2017-08-30 ENCOUNTER — OFFICE VISIT (OUTPATIENT)
Dept: ENDOCRINOLOGY | Facility: CLINIC | Age: 57
End: 2017-08-30
Payer: COMMERCIAL

## 2017-08-30 ENCOUNTER — LAB VISIT (OUTPATIENT)
Dept: LAB | Facility: HOSPITAL | Age: 57
End: 2017-08-30
Attending: INTERNAL MEDICINE
Payer: COMMERCIAL

## 2017-08-30 VITALS
HEIGHT: 62 IN | SYSTOLIC BLOOD PRESSURE: 128 MMHG | HEART RATE: 100 BPM | TEMPERATURE: 98 F | BODY MASS INDEX: 38.06 KG/M2 | DIASTOLIC BLOOD PRESSURE: 76 MMHG | WEIGHT: 206.81 LBS

## 2017-08-30 DIAGNOSIS — E04.2 MULTINODULAR GOITER: ICD-10-CM

## 2017-08-30 DIAGNOSIS — E05.90 SUBCLINICAL HYPERTHYROIDISM: Primary | ICD-10-CM

## 2017-08-30 DIAGNOSIS — E05.90 SUBCLINICAL HYPERTHYROIDISM: ICD-10-CM

## 2017-08-30 DIAGNOSIS — R42 DIZZINESS: ICD-10-CM

## 2017-08-30 LAB
T3FREE SERPL-MCNC: 2.9 PG/ML
T4 FREE SERPL-MCNC: 1.04 NG/DL
THYROGLOB AB SERPL IA-ACNC: <4 IU/ML
THYROPEROXIDASE IGG SERPL-ACNC: <6 IU/ML
TSH SERPL DL<=0.005 MIU/L-ACNC: 0.62 UIU/ML

## 2017-08-30 PROCEDURE — 99999 PR PBB SHADOW E&M-EST. PATIENT-LVL III: CPT | Mod: PBBFAC,,, | Performed by: INTERNAL MEDICINE

## 2017-08-30 PROCEDURE — 84481 FREE ASSAY (FT-3): CPT

## 2017-08-30 PROCEDURE — 86800 THYROGLOBULIN ANTIBODY: CPT

## 2017-08-30 PROCEDURE — 99244 OFF/OP CNSLTJ NEW/EST MOD 40: CPT | Mod: S$GLB,,, | Performed by: INTERNAL MEDICINE

## 2017-08-30 PROCEDURE — 84445 ASSAY OF TSI GLOBULIN: CPT

## 2017-08-30 PROCEDURE — 86376 MICROSOMAL ANTIBODY EACH: CPT

## 2017-08-30 PROCEDURE — 84439 ASSAY OF FREE THYROXINE: CPT

## 2017-08-30 PROCEDURE — 84443 ASSAY THYROID STIM HORMONE: CPT

## 2017-08-30 PROCEDURE — 36415 COLL VENOUS BLD VENIPUNCTURE: CPT | Mod: PO

## 2017-08-30 RX ORDER — METHIMAZOLE 5 MG/1
5 TABLET ORAL DAILY
Qty: 30 TABLET | Refills: 2 | Status: SHIPPED | OUTPATIENT
Start: 2017-08-30 | End: 2017-09-07

## 2017-08-30 NOTE — TELEPHONE ENCOUNTER
----- Message from Kelly Alonso sent at 8/30/2017  8:47 AM CDT -----  Contact: Patient   1. What is the name of the medication you are requesting? Rx Meclizine  2. What is the dose? N/A  3. How do you take the medication? Orally, topically, etc? oral  4. How often do you take this medication? Three times day (she thinks)  5. Do you need a 30 day or 90 day supply? 30 days if possible 90 days  6. How many refills are you requesting? N/A  7. What is your preferred pharmacy and location of the pharmacy?   [facility] - [place], [place] - [address]  [address] [address]  Phone: [phone] Fax: [phone]  8. Who can we contact with further questions? Patient/[phone]    1. What is the name of the medication you are requesting? Rx Promethazine codeine  2. What is the dose? N/A  3. How do you take the medication? Orally, topically, etc? oral  4. How often do you take this medication? At night  5. Do you need a 30 day or 90 day supply? N/A  6. How many refills are you requesting? N/A  7. What is your preferred pharmacy and location of the pharmacy?   [facility] - [place], [place] - [address]  [address] [facility] [address]  Phone: [phone] Fax: [phone]  8. Who can we contact with further questions? Patient/[phone]

## 2017-08-30 NOTE — PROGRESS NOTES
""This note will be shared with the patient"Subjective:       Patient ID: Miles Bowen is a 57 y.o. female.  Patient is new to me  Chief Complaint: Hyperthyroidism    HPI    Consultation was requested by Dr. Carlos Paul       Diagnosed: per Epic had suppressed TSH starting September 2016;    Previous radiology tests:  Thyroid ultrasound : Yes - September 2016and I directly reviewed the images.  Patient has multiple right-sided thyroid nodules largest measuring up to 1.8 cm, absent left thyroid, no history of thyroid surgery ;saw Dr. Ruth at [facility] and had repeat thyroid ultrasound showing the following: Multinodular goiter, conglomerate of nodules noted on mid-inferior right pole, biggest nodule measures 1.2 cm x 1.3 cm x 1.5 cm, has peripheral vascularity. No microcalcification.  - Absent Left thyroid lobe.    She had an attempted FNA but could not go through with procedure due to anxiety and pain  NM uptake and scan: Yes - October 2016 had increased uptake on the right side at 6 in 24 hours    Previous thyroid surgery: No    Congenital Absence left thyroid      Thyroid symptoms:weight gain, anxiousness, tremulousness and palpitations      Thyroid medications: none      Takes medication appropriately: n/a    I have reviewed the past medical, family and social history  Review of Systems   Constitutional: Positive for unexpected weight change. Negative for appetite change, fatigue and fever.   HENT: Negative for sore throat and trouble swallowing.    Eyes:        Eyes get watery and irritated   Respiratory: Negative for shortness of breath and wheezing.    Cardiovascular: Positive for palpitations. Negative for chest pain and leg swelling.   Gastrointestinal: Positive for nausea. Negative for diarrhea and vomiting.        Loose stools, has IBS   Endocrine: Positive for heat intolerance. Negative for cold intolerance, polydipsia, polyphagia and polyuria.   Genitourinary: Negative for difficulty urinating, " dysuria and menstrual problem.   Musculoskeletal: Negative for arthralgias and joint swelling.   Skin: Negative for rash.   Neurological: Negative for dizziness, weakness, numbness and headaches.   Psychiatric/Behavioral: Positive for sleep disturbance. Negative for confusion and dysphoric mood. The patient is nervous/anxious.         Mood swings       Objective:      Physical Exam   Constitutional: She is oriented to person, place, and time. She appears well-developed and well-nourished. No distress.   HENT:   Head: Normocephalic and atraumatic.   Right Ear: External ear normal.   Left Ear: External ear normal.   Nose: Nose normal.   Mouth/Throat: Oropharynx is clear and moist. No oropharyngeal exudate.   Eyes: Conjunctivae and EOM are normal. Pupils are equal, round, and reactive to light. No scleral icterus.   Has bilateral lid lag   Neck: No JVD present. No tracheal deviation present. No thyromegaly present.   Cardiovascular: Normal rate, regular rhythm, normal heart sounds and intact distal pulses.  Exam reveals no gallop and no friction rub.    No murmur heard.  Pulmonary/Chest: Effort normal and breath sounds normal. No respiratory distress. She has no wheezes. She has no rales.   Abdominal: Soft. Bowel sounds are normal. She exhibits no distension and no mass. There is no tenderness. There is no rebound and no guarding. No hernia.   Musculoskeletal: She exhibits no edema or deformity.   Lymphadenopathy:     She has no cervical adenopathy.   Neurological: She is alert and oriented to person, place, and time. She has normal reflexes. No cranial nerve deficit.   Skin: Skin is warm. No rash noted. No erythema.   Psychiatric: She has a normal mood and affect. Her behavior is normal.   Vitals reviewed.        Lab Review:   Lab Results   Component Value Date    TSH 0.268 (L) 08/11/2017    TSH 0.245 (L) 09/26/2016     Lab Results   Component Value Date    FREET4 1.01 08/11/2017    FREET4 0.97 09/26/2016     No  components found for: FREET3      Assessment:     1. Subclinical hyperthyroidism  TSH    T4, free    T3, free    Thyroid peroxidase antibody    Thyroid stimulating immunoglobulin    Anti-thyroglobulin antibody    CBC auto differential    Hepatic function panel   2. Multinodular goiter  US Soft Tissue Head Neck Thyroid        Based on chronic nature of her hyperthyroidism, and her symptoms and her findings on thyroid uptake likely has Graves' disease and did not due to long-standing history and symptoms we'll start on methimazole 5 mg to see if this may help some of her symptoms    Repeat thyroid ultrasound and consider fna-due to her anxiety issues we discuss possibly having her take a Valium prior to procedure    Check labs below  I reviewed treatment options for hyperthyroidism including antithyroid medications(ATD), or radioactive iodine(I-131) treatment or surgery(surgery isusually reserved for severe disease or allergy to ATD or if reason not to get I 131).     Discussed risks of worsening Graves orpitopathy with I131 especially in smokers and association of smoking with Grave's disease    Discussed the chances of Graves remission after prolonged use(12 to 18 months or more) of ATD therapy(50% remission if thyroid antibodies are normal )    Counseled patient that if on ATD therapy should call if fever, sore throat, rash, anorexia, nausea or vomiitng.  Reviewed side effects of ATDs are rare (agranulocytosis and liver failure) but can be very serious.     Discussed complications of radioactive iodine including nausea, local pain and thyroid storm(rare) and need for short term contact precautions        Plan:   Subclinical hyperthyroidism  -     TSH; Future; Expected date: 08/30/2017  -     T4, free; Future; Expected date: 08/30/2017  -     T3, free; Future; Expected date: 08/30/2017  -     Thyroid peroxidase antibody; Future; Expected date: 08/30/2017  -     Thyroid stimulating immunoglobulin; Future; Expected  date: 08/30/2017  -     Anti-thyroglobulin antibody; Future; Expected date: 08/30/2017  -     CBC auto differential; Future; Expected date: 08/30/2017  -     Hepatic function panel; Future; Expected date: 08/30/2017    Multinodular goiter  -     US Soft Tissue Head Neck Thyroid; Future; Expected date: 08/30/2017    Other orders  -     methimazole (TAPAZOLE) 5 MG Tab; Take 1 tablet (5 mg total) by mouth once daily.  Dispense: 30 tablet; Refill: 2        Return in about 6 weeks (around 10/11/2017).     Thank you to Dr. Carlos Paul for this consultation

## 2017-08-30 NOTE — LETTER
August 30, 2017      Carlos Paul MD  [address]  [place] [address]           [facility] - Endocrinology  [address]  [address]  [place] [address]  Phone: [phone]  Fax: [phone]          Patient: Miles Bowen   MR Number: [id number]   YOB: 1960   Date of Visit: 8/30/2017       Dear Dr. Carlos Paul:    Thank you for referring Miles Bowen to me for evaluation. Attached you will find relevant portions of my assessment and plan of care.    If you have questions, please do not hesitate to call me. I look forward to following Miles Bowen along with you.    Sincerely,    Caity Fisher MD    Enclosure  CC:  No Recipients    If you would like to receive this communication electronically, please contact [email or web address] or [phone] to request more information on [employer] access.    For providers and/or their staff who would like to refer a patient to [facility], please contact us through our one-stop-shop provider referral line, [facility], at [phone].    If you feel you have received this communication in error or would no longer like to receive these types of communications, please e-mail [email or web address]

## 2017-08-30 NOTE — TELEPHONE ENCOUNTER
Pt stated she needs refill of promethazine with codeine and meclizine because she is having a flare up of sinus issues with dry cough and sinuses are burning.  Stated she has been taking Zyrtec, Flonase, and Robitussin, all of which are not relieving symptoms.  Notified pt that meclizine was just refilled on 8/21/17.  Pt stated she is taking it three times daily so she only has one day of med left.  Notified pt that she will need to come to clinic for appropriate eval and treatment if otc meds are not helping sinus symptoms.  Pt verbalized understanding and scheduled appt for 8/31/17 at 2:00 pm.

## 2017-08-31 ENCOUNTER — OFFICE VISIT (OUTPATIENT)
Dept: INTERNAL MEDICINE | Facility: CLINIC | Age: 57
End: 2017-08-31
Payer: COMMERCIAL

## 2017-08-31 VITALS
WEIGHT: 207.25 LBS | BODY MASS INDEX: 38.14 KG/M2 | HEIGHT: 62 IN | HEART RATE: 123 BPM | SYSTOLIC BLOOD PRESSURE: 112 MMHG | TEMPERATURE: 97 F | OXYGEN SATURATION: 97 % | DIASTOLIC BLOOD PRESSURE: 80 MMHG

## 2017-08-31 DIAGNOSIS — J01.01 ACUTE RECURRENT MAXILLARY SINUSITIS: Primary | ICD-10-CM

## 2017-08-31 DIAGNOSIS — R05.9 COUGH: ICD-10-CM

## 2017-08-31 PROCEDURE — 99214 OFFICE O/P EST MOD 30 MIN: CPT | Mod: S$GLB,,, | Performed by: NURSE PRACTITIONER

## 2017-08-31 PROCEDURE — 3074F SYST BP LT 130 MM HG: CPT | Mod: S$GLB,,, | Performed by: NURSE PRACTITIONER

## 2017-08-31 PROCEDURE — 3079F DIAST BP 80-89 MM HG: CPT | Mod: S$GLB,,, | Performed by: NURSE PRACTITIONER

## 2017-08-31 PROCEDURE — 3008F BODY MASS INDEX DOCD: CPT | Mod: S$GLB,,, | Performed by: NURSE PRACTITIONER

## 2017-08-31 PROCEDURE — 99999 PR PBB SHADOW E&M-EST. PATIENT-LVL V: CPT | Mod: PBBFAC,,, | Performed by: NURSE PRACTITIONER

## 2017-08-31 RX ORDER — DOXYCYCLINE 100 MG/1
100 CAPSULE ORAL EVERY 12 HOURS
Qty: 14 CAPSULE | Refills: 0 | Status: SHIPPED | OUTPATIENT
Start: 2017-08-31 | End: 2017-09-07

## 2017-08-31 RX ORDER — PROMETHAZINE HYDROCHLORIDE AND CODEINE PHOSPHATE 6.25; 1 MG/5ML; MG/5ML
5 SOLUTION ORAL EVERY 8 HOURS PRN
Qty: 118 ML | Refills: 0 | Status: SHIPPED | OUTPATIENT
Start: 2017-08-31 | End: 2017-09-11

## 2017-08-31 RX ORDER — MECLIZINE HYDROCHLORIDE 25 MG/1
25 TABLET ORAL 3 TIMES DAILY PRN
Qty: 30 TABLET | Refills: 0 | Status: SHIPPED | OUTPATIENT
Start: 2017-08-31 | End: 2017-09-12 | Stop reason: SDUPTHER

## 2017-08-31 NOTE — TELEPHONE ENCOUNTER
Message left for pt to return call to clinic re: needs to reschedule appt as Dr. Paul is not in clinic today.

## 2017-08-31 NOTE — PATIENT INSTRUCTIONS
Sinusitis (Antibiotic Treatment)    The sinuses are air-filled spaces within the bones of the face. They connect to the inside of the nose. Sinusitis is an inflammation of the tissue lining the sinus cavity. Sinus inflammation can occur during a cold. It can also be due to allergies to pollens and other particles in the air. Sinusitis can cause symptoms of sinus congestion and fullness. A sinus infection causes fever, headache and facial pain. There is often green or yellow drainage from the nose or into the back of the throat (post-nasal drip). You have been given antibiotics to treat this condition.  Home care:  · Take the full course of antibiotics as instructed. Do not stop taking them, even if you feel better.  · Drink plenty of water, hot tea, and other liquids. This may help thin mucus. It also may promote sinus drainage.  · Heat may help soothe painful areas of the face. Use a towel soaked in hot water. Or,  the shower and direct the hot spray onto your face. Using a vaporizer along with a menthol rub at night may also help.   · An expectorant containing guaifenesin may help thin the mucus and promote drainage from the sinuses.  · Over-the-counter decongestants may be used unless a similar medicine was prescribed. Nasal sprays work the fastest. Use one that contains phenylephrine or oxymetazoline. First blow the nose gently. Then use the spray. Do not use these medicines more often than directed on the label or symptoms may get worse. You may also use tablets containing pseudoephedrine. Avoid products that combine ingredients, because side effects may be increased. Read labels. You can also ask the pharmacist for help. (NOTE: Persons with high blood pressure should not use decongestants. They can raise blood pressure.)  · Over-the-counter antihistamines may help if allergies contributed to your sinusitis.    · Do not use nasal rinses or irrigation during an acute sinus infection, unless told to by  your health care provider. Rinsing may spread the infection to other sinuses.  · Use acetaminophen or ibuprofen to control pain, unless another pain medicine was prescribed. (If you have chronic liver or kidney disease or ever had a stomach ulcer, talk with your doctor before using these medicines. Aspirin should never be used in anyone under 18 years of age who is ill with a fever. It may cause severe liver damage.)  · Don't smoke. This can worsen symptoms.  Follow-up care  Follow up with your healthcare provider or our staff if you are not improving within the next week.  When to seek medical advice  Call your healthcare provider if any of these occur:  · Facial pain or headache becoming more severe  · Stiff neck  · Unusual drowsiness or confusion  · Swelling of the forehead or eyelids  · Vision problems, including blurred or double vision  · Fever of 100.4ºF (38ºC) or higher, or as directed by your healthcare provider  · Seizure  · Breathing problems  · Symptoms not resolving within 10 days  Date Last Reviewed: 4/13/2015  © 9574-8119 [employer]. [address] [place] [address], [place] [address]. All rights reserved. This information is not intended as a substitute for professional medical care. Always follow your healthcare professional's instructions.

## 2017-08-31 NOTE — PROGRESS NOTES
CHIEF COMPLAINT/REASON FOR VISIT: nasal congestion, post nasal drip, sore throat, facial pressure    HISTORY OF PRESENT ILLNESS:    Miles Bowen.  57 y.o.  female complains of a sinus issue with nasal congestion, post nasal drip, sore throat, facial pressure,  ear fullness  and productive cough onset 7 days ago. Patient admits tried OTC medications with little relief.     PAST MEDICAL HISTORY:   Past Medical History:   Diagnosis Date    Anxiety     Depression     GERD (gastroesophageal reflux disease)     Heart murmur     Hypertension     MVP (mitral valve prolapse)     Obesity          PAST SURGICAL HISTORY:.  Past Surgical History:   Procedure Laterality Date    BACK SURGERY  2016    Bone fusion    COLONOSCOPY  2014    FIXATION KYPHOPLASTY THORACIC SPINE  05/2016    HYSTERECTOMY      NASAL SINUS SURGERY  2015    Polyps removed    SINUS SURGERY  11/2014    TONSILLECTOMY           SOCIAL HISTORY:.  Social History     Social History    Marital status:      Spouse name: N/A    Number of children: N/A    Years of education: N/A     Occupational History    Not on file.     Social History Main Topics    Smoking status: Former Smoker     Packs/day: 0.25     Years: 1.00     Types: Cigarettes     Quit date: 1/1/2006    Smokeless tobacco: Never Used    Alcohol use Yes      Comment: rarely    Drug use: No    Sexual activity: Not Currently     Other Topics Concern    Not on file     Social History Narrative     with 1 adult child.        ROS:  GENERAL: Reports no fever, chills.  SKIN: No rashes, itching or changes in color or texture of skin.  HEENT: Reports sinus pressure, nasal congestion, postnasal drip, sore throat, ear discomfort, rhinorrhea.  CHEST: Reports cough and sputum production (intermittent).  Denies shortness of breath and wheezing.  CARDIOVASCULAR: Denies chest pain, PND, orthopnea or reduced exercise tolerance.  ABDOMEN: Appetite fine. No weight loss.  ".  MUSCULOSKELETAL: No joint stiffness, pain or swelling. Denies back pain.  NEUROLOGIC: Report headaches. No history of seizures, paralysis, alteration of gait or coordination.  PSYCHIATRIC: Denies mood swings, depression or suicidal thoughts.    /80 (BP Location: Right arm, Patient Position: Sitting, BP Method: Large (Manual))   Pulse (!) 123   Temp 96.8 °F (36 °C) (Tympanic)   Ht 5' 2" (1.575 m)   Wt 94 kg (207 lb 3.7 oz)   SpO2 97%   BMI 37.90 kg/m² .    PE:   APPEARANCE: Well nourished, well developed, in mild distress.   SKIN: Normal skin turgor, no lesions.  HEENT: Turbinates red and enlarge, sinus tenderness (maxillary) on palpation, erythematous pharynx, TMs poor light reflex bilaterally.  CHEST: Lungs clear to auscultation. no wheezing  CARDIOVASCULAR: Regular rate and rhythm.  MUSCULOSKELETAL: Motor: 5/5 strength major flexors/extensors.    PLAN:   Advise Hydrate and rest.    Normal saline nasal wash  to irrigate sinuses and for congestion/runny nose.  Practice good handwashing.  Tylenol or Ibuprofen for fever, headache and body aches.  Warm salt water gargles for throat comfort.  Lozenges for throat comfort.  See PCP or go to ER if symptoms worsen or fail to improve with treatment.        DIAGNOSIS:  Acute recurrent maxillary sinusitis  -     doxycycline (VIBRAMYCIN) 100 MG Cap; Take 1 capsule (100 mg total) by mouth every 12 (twelve) hours.  Dispense: 14 capsule; Refill: 0    Cough  -     promethazine-codeine 6.25-10 mg/5 ml (PHENERGAN WITH CODEINE) 6.25-10 mg/5 mL syrup; Take 5 mLs by mouth every 8 (eight) hours as needed for Cough. May cause drowsiness.  Dispense: 118 mL; Refill: 0        Instructed to take all medications as ordered.  Informed if no improvement or symptoms worsens to return to clinic.  Informed to hydrate and rest.  Printed and review after visit summary with patient.  "

## 2017-08-31 NOTE — TELEPHONE ENCOUNTER
----- Message from Lina Fisher sent at 8/31/2017  8:43 AM CDT -----  Pt at [phone]//states she is returning your call//please call again//kathya/sariah

## 2017-09-01 LAB — TSI SER-ACNC: <0.1 IU/L

## 2017-09-05 ENCOUNTER — TELEPHONE (OUTPATIENT)
Dept: ENDOCRINOLOGY | Facility: CLINIC | Age: 57
End: 2017-09-05

## 2017-09-05 ENCOUNTER — TELEPHONE (OUTPATIENT)
Dept: INTERNAL MEDICINE | Facility: CLINIC | Age: 57
End: 2017-09-05

## 2017-09-05 DIAGNOSIS — J01.91 ACUTE RECURRENT SINUSITIS, UNSPECIFIED LOCATION: Primary | ICD-10-CM

## 2017-09-05 NOTE — TELEPHONE ENCOUNTER
----- Message from Elías Colbert sent at 9/5/2017 11:06 AM CDT -----  The is returning a call from the staff.  The pt can be reached at [phone]

## 2017-09-05 NOTE — TELEPHONE ENCOUNTER
----- Message from Elías Colbert sent at 9/5/2017 12:00 PM CDT -----  The pt would like a return call from the staff of Dr Paul.  The pt can be reached at [phone].

## 2017-09-05 NOTE — TELEPHONE ENCOUNTER
Pt advised the following, per MD orders:thyroid function tests were normal so I recommend stopping methimazole but will plan to follow. Pt verbalized understanding.

## 2017-09-05 NOTE — TELEPHONE ENCOUNTER
----- Message from Jose Antonio Islas sent at 9/5/2017  4:14 PM CDT -----  Pt is returning a call to nurse.        Please call pt back at [phone]

## 2017-09-05 NOTE — TELEPHONE ENCOUNTER
----- Message from Guillermina Lara sent at 9/5/2017  1:34 PM CDT -----  Patient states that she was returning your call.   Call her at [phone].                                  [email or web address]

## 2017-09-05 NOTE — TELEPHONE ENCOUNTER
----- Message from Lina Fisher sent at 9/5/2017  7:50 AM CDT -----  Pt at [phone]//states she saw a PA last week for a sinus infection//has not gotten any better//would like to discuss with your office//please call//thanks/sariah

## 2017-09-05 NOTE — TELEPHONE ENCOUNTER
Attempted to inform pt the following:thyroid function tests were normal so I recommend stopping methimazole but will plan to follow.

## 2017-09-06 NOTE — TELEPHONE ENCOUNTER
----- Message from Christina García sent at 9/6/2017  7:48 AM CDT -----  Contact: Patient  Patient needs to speak to Krystal, states she has al playing phone tag, please geeta her back [phone]. Thank you

## 2017-09-06 NOTE — TELEPHONE ENCOUNTER
Pt was seen in clinic on 8/31/17 for sinus infection and cough.  Was prescribed doxycycline 100 mg bid x 7 days and Phenergan with codeine.  Stated she has been taking meds as prescribed along with otc Flonase, Mucinex, and Zyrtec.  Stated cough and congestion are worsening.  Wants to know what else is recommended or does another antibiotic need to be prescribed.  Please advise.

## 2017-09-06 NOTE — TELEPHONE ENCOUNTER
Please ask Ms. Martinez to follow up on this. If treatment is not working then ENT referral may be warranted.

## 2017-09-06 NOTE — TELEPHONE ENCOUNTER
Pt saw Alicai Martinez NP, on 8/31/17 for sinus infection and cough.  Was prescribed doxycycline 100 mg bid x 7 days and Phenergan with codeine.  Stated she has been taking meds as prescribed along with otc Flonase, Mucinex, and Zyrtec.  Stated cough and congestion are worsening.  Wants to know what else is recommended or does another antibiotic need to be prescribed.  Pt was extremely upset at how long it has taken to get a hold of clinic due to back and forth phone calls.  Requested message be sent to Dr. Paul and not Ms. Martinez.

## 2017-09-06 NOTE — TELEPHONE ENCOUNTER
Message left for pt to return call to clinic to schedule ENT appt per Alicia Martinez' referral and recommendation.

## 2017-09-07 ENCOUNTER — OFFICE VISIT (OUTPATIENT)
Dept: OTOLARYNGOLOGY | Facility: CLINIC | Age: 57
End: 2017-09-07
Payer: COMMERCIAL

## 2017-09-07 ENCOUNTER — TELEPHONE (OUTPATIENT)
Dept: INTERNAL MEDICINE | Facility: CLINIC | Age: 57
End: 2017-09-07

## 2017-09-07 VITALS
DIASTOLIC BLOOD PRESSURE: 81 MMHG | TEMPERATURE: 97 F | HEIGHT: 62 IN | SYSTOLIC BLOOD PRESSURE: 140 MMHG | HEART RATE: 110 BPM | RESPIRATION RATE: 18 BRPM | BODY MASS INDEX: 38.46 KG/M2 | WEIGHT: 209 LBS

## 2017-09-07 DIAGNOSIS — R05.9 COUGH: Primary | ICD-10-CM

## 2017-09-07 DIAGNOSIS — J30.9 ALLERGIC RHINITIS, UNSPECIFIED CHRONICITY, UNSPECIFIED SEASONALITY, UNSPECIFIED TRIGGER: ICD-10-CM

## 2017-09-07 DIAGNOSIS — Z98.890 HISTORY OF ENDOSCOPIC SINUS SURGERY: ICD-10-CM

## 2017-09-07 PROCEDURE — 3077F SYST BP >= 140 MM HG: CPT | Mod: S$GLB,,, | Performed by: PHYSICIAN ASSISTANT

## 2017-09-07 PROCEDURE — 3008F BODY MASS INDEX DOCD: CPT | Mod: S$GLB,,, | Performed by: PHYSICIAN ASSISTANT

## 2017-09-07 PROCEDURE — 99999 PR PBB SHADOW E&M-EST. PATIENT-LVL V: CPT | Mod: PBBFAC,,, | Performed by: PHYSICIAN ASSISTANT

## 2017-09-07 PROCEDURE — 3079F DIAST BP 80-89 MM HG: CPT | Mod: S$GLB,,, | Performed by: PHYSICIAN ASSISTANT

## 2017-09-07 PROCEDURE — 99214 OFFICE O/P EST MOD 30 MIN: CPT | Mod: S$GLB,,, | Performed by: PHYSICIAN ASSISTANT

## 2017-09-07 RX ORDER — AZELASTINE 1 MG/ML
1 SPRAY, METERED NASAL 2 TIMES DAILY
Qty: 30 ML | Refills: 0 | Status: SHIPPED | OUTPATIENT
Start: 2017-09-07 | End: 2018-02-28

## 2017-09-07 NOTE — TELEPHONE ENCOUNTER
----- Message from Leela Martinez sent at 9/7/2017 11:28 AM CDT -----  Please call pt again at work number at [phone].

## 2017-09-07 NOTE — PROGRESS NOTES
Subjective:   Patient: Miles Bowen [id number], :1960   Visit date:2017 8:21 AM    Chief Complaint:  Cough (x 1 week) and Sinus Problem (patient complaints of cold/congestion)    HPI:  Miles is a 57 y.o. female who is here to see me today for the first time for evaluation of cough and congestion. She was last here with Dr. Cadena in 2016.  She has history of prior nasal surgery, including 2014 FESS with Dr. Singh.  She reports several episodes of sinusitis since May 2017 - treated with Doxycycline, Cefdinir, and Doxycycline again.  She also had an ED visit ([facility]) last month for chest pain.  She's currently complaining of cough and sinus congestion, burning in her sinuses and facial pressure.  She says her cough varies from dry to productive at times.  She's tried Tessalon and Phenergan with Codeine with no relief.  Her throat feels irritated at times.  She has some clear nasal drainage but not much and occasionally sneezes.  She always has itchy, watery eyes.  She's on Flonase now but says it's not working.  She's tried Zyrtec, Mucinex with no relief.  She had CT sinuses in 2016.  She was referred to allergist by Dr. Cadena but she did not go.  She's also been told to see a GI specialist but she doesn't think reflux is her issue either.  She's very frustrated today and says she's missing lots of work due to her illnesses and nothing is helping her.  She's worried about exposure to mold in her home. She's not had allergy testing.  There is carpet there; no pets.  She quit smoking years ago.  Denies fever.        Review of Systems:  -     Allergic/Immunologic: is allergic to amoxicillin; sulfamethoxazole-trimethoprim; sumatriptan; ibuprofen; nsaids (non-steroidal anti-inflammatory drug); prednisone; singulair [montelukast]; and tessalon perle [benzonatate]..  -     Constitutional: Current temp: 97.4 °F (36.3 °C) (Tympanic)    Her meds, allergies, medical, surgical, social & family histories were  "reviewed & updated:  -     She has a current medication list which includes the following prescription(s): doxycycline, duloxetine, estradiol, fluticasone, guaifenesin, losartan-hydrochlorothiazide 100-12.5 mg, pantoprazole, acetaminophen, azelastine, dicyclomine, hydroxyzine pamoate, lorazepam, meclizine, promethazine, promethazine-codeine 6.25-10 mg/5 ml, and zolpidem.  -     She  has a past medical history of Anxiety; Depression; GERD (gastroesophageal reflux disease); Heart murmur; Hypertension; MVP (mitral valve prolapse); and Obesity.   -     She  does not have any pertinent problems on file.   -     She  has a past surgical history that includes Hysterectomy; Tonsillectomy; Fixation kyphoplasty thoracic spine (05/2016); Sinus surgery (11/2014); Colonoscopy (2014); Nasal sinus surgery (2015); and Back surgery (2016).  -     She  reports that she quit smoking about 11 years ago. Her smoking use included Cigarettes. She has a 0.25 pack-year smoking history. She has never used smokeless tobacco. She reports that she drinks alcohol. She reports that she does not use drugs.  -     Her family history includes Cancer in her maternal grandmother; Heart attack (age of onset: 63) in her father; Hypertension in her father and mother.  -     She is allergic to amoxicillin; sulfamethoxazole-trimethoprim; sumatriptan; ibuprofen; nsaids (non-steroidal anti-inflammatory drug); prednisone; singulair [montelukast]; and tessalon perle [benzonatate].    Objective:     Physical Exam:  Vitals:  BP (!) 140/81   Pulse 110   Temp 97.4 °F (36.3 °C) (Tympanic)   Resp 18   Ht 5' 2" (1.575 m)   Wt 94.8 kg (208 lb 15.9 oz)   BMI 38.23 kg/m²   Appearance:  Well-developed, well-nourished.  Communication:  Able to communicate, no hoarseness.  Head & Face:  Normocephalic, atraumatic, R>L maxillary sinus tenderness, normal facial strength.  Eyes:  Extraocular motions intact.  Ears:  Otoscopy of external auditory canals and tympanic " membranes was normal, clinical speech reception thresholds grossly intact, no mass/lesion of auricle.  Nose:  No masses/lesions of external nose, nasal mucosa, septum midline, and turbinates were within normal limits.  Small central septal perforation noted.  Mouth:  No mass/lesion of lips, teeth, gums, hard/soft palate, tongue, tonsils, or oropharynx.  No erythema of posterior pharynx.  Neck & Lymphatics:  No cervical lymphadenopathy, no neck mass/crepitus/ asymmetry, trachea is midline, no thyroid enlargement/tenderness/mass.  Neuro/Psych: Alert with normal mood and affect.   Abdominal: Normal appearance.   Respiration/Chest:  Symmetric expansion during respiration, normal respiratory effort.  Skin:  Warm and intact  Cardiovascular:  No peripheral vascular edema or varicosities.    Assessment & Plan:   Miles was seen today for cough and sinus problem.    Diagnoses and all orders for this visit:    Cough  -     X-Ray Sinuses Min 3 Views; Future  -     Ambulatory Referral to Allergy  -     Ambulatory Referral to Gastroenterology    Allergic rhinitis, unspecified chronicity, unspecified seasonality, unspecified trigger  -     X-Ray Sinuses Min 3 Views; Future  -     Ambulatory Referral to Allergy  -     Ambulatory Referral to Gastroenterology    History of endoscopic sinus surgery    Other orders  -     azelastine (ASTELIN) 137 mcg (0.1 %) nasal spray; 1 spray (137 mcg total) by Nasal route 2 (two) times daily.        Miles presents today for initial evaluation with me of cough and congestion.  We discussed the cough is very complex symptom as it may arise from numerous sources and frequently represents a combination of contributing factors resulting in chronic cough.    We discussed that chronic postnasal drip/nasal drainage whether infectious or ALLERGIC in nature may result in chronic irritation of the larynx leading to irritation and cough.  We discussed the primary pulmonary pathology such as COPD or asthma may  also result in chronic cough.  Additionally, laryngopharyngeal reflux may be present in patients who have a paucity of traditional GERD symptoms as only 3-4 reflux events per day lead to laryngeal irritation, whereas it requires 30-40 reflux events per day before patients began having dyspepsia and heartburn.      I recommend evaluation of her sinuses with sinus x-rays to check for air-fluid levels.  I see films were ordered in May but I do not see images.  Discussed that if sinusitis noted on films, she will need course of antibiotics.  We also discussed that allergies, postnasal drainage can contribute to cough and for that, I recommend referral to allergist, Dr. Acosta and trial of Astelin in addition to her Flonase which she says she's currently using.  I also recommend referral to GI for evaluation of GE reflux.  She's currently on Protonix and says she's not having heartburn but still has occasional discomfort in her chest.  She's very frustrated today and is not happy with this treatment plan.

## 2017-09-07 NOTE — LETTER
September 7, 2017      [facility]  [address] [facility] [address]  [place] [address]  Phone: [phone]  Fax: [phone]       Patient: Miles Bowen   YOB: 1960  Date of Visit: 09/07/2017    To Whom It May Concern:    Valerie Bowen  was at [facility] on 09/07/2017. She may return to work on 09-07-17. Her appointment time was at 7:40 am and being the extent of the visit, patient did not leave the clinic until 9:30 am. If you have any questions or concerns, or if I can be of further assistance, please do not hesitate to contact me.    Sincerely,        Eligio Norman LPN

## 2017-09-07 NOTE — TELEPHONE ENCOUNTER
cxr ordered. After reviewing her medications and allergies the only possible additional cough medication would be Tessalon perles but she has an adverse reaction to this listed on her chart.  Make sure to take the astelin nasal spray as directed along with an antihistamine like allegra or zyrtec every morning. Continue protonix daily. Will let her know if her chest xray reveals any lung infection.

## 2017-09-07 NOTE — PROGRESS NOTES
Patient came in today to see RAGHAV Grossman for some congestion and a cough she had been having over the last week. Patient was examined by RAGHAV Grossman and after the visit was done, patient came to my desk for me to schedule her appointment with Dr. Acosta, sinus X Rays, and Gastroenterology. As I began scheduling patients appointments she began to complain that nothing was done for her today. The more the patient spoke the more upset she got. Patient did not want to schedule any more sinus X Rays because she had just done some a few months back. Patient was complaining that she is going to have to miss even more work and we act like she doesn't have a job. Patient stated she is leaving here without any help. Patient was very resistant to the care and did not want to schedule her appointment with Gastroenterology like RAGHAV Grossman suggested. Patient stated she already has a GI doctor but had been wanting to switch to a different doctor because she was not happy with the one she had seen months back. Patient verbalized how upset she was and stated she did not want to speak with RAGHAV Grossman because she was going to lose it on her. I attempted numerous times to try and accommodate better appointment times for patient but she continued to be resistant with care. I offered patient to speak with the nursing supervisor in which she agreed on. Patient had a lengthy phone conversation with the nursing supervisor and still after that patient continued to voice how the care that she got over here sucked. Patient asked me to see if her Internal Medicine Doctor had any openings today in which Dr. Paul did and patient wanted to take that appointment time. The appoitnment was booked for patient per her request to see Dr. Paul. Patient stated in her exact words that we all suck here at [facility] and began to raise her voice. I tried for the last time to accommodate the referrals that Katey  RAGHAV Guzman put in for patient along with trying to get her in today to see Dr. Acosta but patient began complaining once again that she cannot miss any more work. I typed up a letter for patient that listed her arrival time and the time she left per patient request but when I showed it to patient she was not happy with the letter either. Patient wanted to letter to explain that she showed up and that she did not receive any care for her reasons of being here and that she wasted her time. I explained to patient that I cannot release a letter with that explanation. Patient was given her after visit summary and her excuse for work and left.

## 2017-09-07 NOTE — TELEPHONE ENCOUNTER
Notified pt that Dr. Paul can order a chest x-ray for her to complete along with the sinus x-ray ordered by ENT so that she doesn't have to pay a co-pay to see him, and he can determine from x-rays if she will need another antibiotic.  Pt verbalized understanding but also stated that she wants to know what can be done about her cough now.  Stated she has tried several meds including Robitussin, Delsym, and Phenergan with codeine and nothing is helping.  Notified pt that Dr. Paul will review her meds to see if there is anything else he can recommend or prescribe.

## 2017-09-08 ENCOUNTER — TELEPHONE (OUTPATIENT)
Dept: INTERNAL MEDICINE | Facility: CLINIC | Age: 57
End: 2017-09-08

## 2017-09-08 NOTE — TELEPHONE ENCOUNTER
Pt stated she is still coughing and doesn't feel well but didn't do x-rays that were ordered because she wants to re-evaluate her situation and what to do.  Stated she is not sure if she is going to continue coming to [facility] because she is fed up with her experiences at [facility] in the last couple of weeks.  Notified pt that orders for x-rays are still in the system so if she changes her mind and wants to complete them she can.  Pt verbalized understanding.

## 2017-09-08 NOTE — TELEPHONE ENCOUNTER
----- Message from Carlitos Samaniego sent at 9/8/2017 11:22 AM CDT -----  Contact: Pt  Pt was returning the nurse call. Please give pt a call at ..[phone] (home)

## 2017-09-11 ENCOUNTER — OFFICE VISIT (OUTPATIENT)
Dept: ALLERGY | Facility: CLINIC | Age: 57
End: 2017-09-11
Payer: COMMERCIAL

## 2017-09-11 VITALS
DIASTOLIC BLOOD PRESSURE: 80 MMHG | HEART RATE: 74 BPM | RESPIRATION RATE: 15 BRPM | HEIGHT: 62 IN | SYSTOLIC BLOOD PRESSURE: 120 MMHG | WEIGHT: 209.88 LBS | TEMPERATURE: 98 F | BODY MASS INDEX: 38.62 KG/M2

## 2017-09-11 DIAGNOSIS — Z98.890 S/P SINUS SURGERY: ICD-10-CM

## 2017-09-11 DIAGNOSIS — J30.89 ALLERGIC RHINITIS DUE TO OTHER ALLERGEN: ICD-10-CM

## 2017-09-11 DIAGNOSIS — K21.9 GASTROESOPHAGEAL REFLUX DISEASE, ESOPHAGITIS PRESENCE NOT SPECIFIED: ICD-10-CM

## 2017-09-11 DIAGNOSIS — J30.89 CHRONIC NONSEASONAL ALLERGIC RHINITIS DUE TO POLLEN: Primary | ICD-10-CM

## 2017-09-11 DIAGNOSIS — H10.13 ALLERGIC CONJUNCTIVITIS, BILATERAL: ICD-10-CM

## 2017-09-11 DIAGNOSIS — R51.9 FACIAL PAIN: ICD-10-CM

## 2017-09-11 PROCEDURE — 3008F BODY MASS INDEX DOCD: CPT | Mod: S$GLB,,, | Performed by: ALLERGY & IMMUNOLOGY

## 2017-09-11 PROCEDURE — 95004 PERQ TESTS W/ALRGNC XTRCS: CPT | Mod: S$GLB,,, | Performed by: ALLERGY & IMMUNOLOGY

## 2017-09-11 PROCEDURE — 3074F SYST BP LT 130 MM HG: CPT | Mod: S$GLB,,, | Performed by: ALLERGY & IMMUNOLOGY

## 2017-09-11 PROCEDURE — 99204 OFFICE O/P NEW MOD 45 MIN: CPT | Mod: 25,S$GLB,, | Performed by: ALLERGY & IMMUNOLOGY

## 2017-09-11 PROCEDURE — 3079F DIAST BP 80-89 MM HG: CPT | Mod: S$GLB,,, | Performed by: ALLERGY & IMMUNOLOGY

## 2017-09-11 PROCEDURE — 99999 PR PBB SHADOW E&M-EST. PATIENT-LVL III: CPT | Mod: PBBFAC,,, | Performed by: ALLERGY & IMMUNOLOGY

## 2017-09-11 NOTE — LETTER
September 12, 2017      Katey Guzman PA-C  [address]  [place] [address]           [facility]  [address]  [place] [address]  Phone: [phone]  Fax: [phone]          Patient: Miles Bowen   MR Number: [id number]   YOB: 1960   Date of Visit: 9/11/2017       Dear Katey Guzman:    Thank you for referring Miles oBwen to me for evaluation. Attached you will find relevant portions of my assessment and plan of care.    If you have questions, please do not hesitate to call me. I look forward to following Miles Bowen along with you.    Sincerely,    Gustavo Acosta MD    Enclosure  CC:  No Recipients    If you would like to receive this communication electronically, please contact [email or web address] or [phone] to request more information on [employer] Link access.    For providers and/or their staff who would like to refer a patient to [facility], please contact us through our one-stop-shop provider referral line, [facility] , at [phone].    If you feel you have received this communication in error or would no longer like to receive these types of communications, please e-mail [email or web address]

## 2017-09-12 DIAGNOSIS — R42 DIZZINESS: ICD-10-CM

## 2017-09-12 DIAGNOSIS — R11.0 NAUSEA: ICD-10-CM

## 2017-09-12 NOTE — PROGRESS NOTES
Chief complaint: ALLERGIES    This note was dictated using voice recognition software and may contain errors.  Her graft history:    She had a 1 PM appointment on Monday, September 11, 2017.  Information in her medical record regarding her past medical history family history and social history was reviewed and updated today.  Significant additions if any are as noted below.    She is concerned regarding troublesome respiratory symptoms.  She experiences nasal obstruction, facial pain and pressure, itching of the eyes and pharynx, some excessive nasal mucus production, and a sensation of burning and tingling in her nasal passages especially on the right side.  In the past she has had sinus surgery performed.    Currently she is living in a townhouse.  She stated she is concerned that her home has experienced water ingress and she is concerned about possible mold ALLERGY.  She has central heating and cooling.  There is carpeting in her bedroom.  No one smokes indoors.  There are no animals indoors.    Past medical history:    She is receiving treatment for GE reflux.  She stated that she has a history of a hiatal hernia.  She does not believe that she is ALLERGIC to any foods when she eats them.  When she touches the shells she develops rash and itching of the skin of the hands.  She believes that she may be ALLERGIC to cats.  In the past and ALLERGY evaluation has not been performed.    Social history she is a native of [place].  She lived in [place] between 1987 and 2012.  She is an .    Family history is positive for hayfever.    Review of systems: See above    We reviewed information in her medical record.  A CT scan of the paranasal sinuses was performed in September 2016.  Please refer to the report.    Exam:    In general she is in no distress.  She is alert oriented well-developed in good mood attentive  Gait steady  Skin no rash noted  Head no swelling noted  Eyes sclera white  conjunctiva pink  Nose patent no polyps seen pink mucosa with clear secretions  Mouth no inflammation or swelling of the lips tongue or in the throat noted  Ears not inflamed tympanic membranes not inflamed  Neck no thyromegaly or masses noted  Lymph nodes no significant cervical or epitrochlear lymphadenopathy noted  Heart no murmurs heard regular rhythm  Lungs clear to auscultation  ALLERGIES no swelling or inflammation of the hands or legs noted    ALLERGY testing:    She elected to have diagnostic immediate hypersensitivity skin tests performed.  Prick skin tests were performed on the volar forearms.  These tests were personally evaluated and interpreted by myself 15 minutes after they were performed.  The histamine control was positive.  The saline control was negative.  Positive skin test reactions were noted for cat, cockroach protein, 2 different house dust mites, pecan tree pollen, 3 grass pollens, 2 weed pollens.  All 11 mold spores tested revealed negative results.  I reviewed the results of the tests with her.    Impression:    #1 ALLERGIC rhinitis  #2 ALLERGIC conjunctivitis  #3 GE reflux occurring in an individual with a hiatal hernia  #4 status post sinus surgery  #5 other health concerns as noted in her medical record    Assessment and plan:    Using anatomical teaching models of the nose and had I reviewed anatomy with her.  She was instructed in the proper technique for using a nasal spray.  Recently she was issued a prescription for Astelin nasal spray.  I recommended she continue to evaluate this medication.  Additional recommendations may be made based upon her response or lack of response treatment with this medicine.  Should Astelin alone improve not to be all that helpful to her consideration could be given to using Astelin and Flonase concurrently.    Treatment options for ALLERGIC respiratory disease were discussed in detail.  Avoidance measures were reviewed.  The importance of house dust  mite avoidance measures was emphasized and reviewed.  At this time allergen immunotherapy will not be instituted as a voluntary form of treatment.  It will remain potential treatment option to consider should she continued to be symptomatic despite the use of appropriate medication and avoidance measures.    She was given the office phone number.  Should she have additional questions or concerns she was instructed to call.  Her appointment was 50 minutes in duration spent entirely in face-to-face contact.  More than 50% of the visit was spent in counseling and coordination of care.  An additional 15 minutes were required for the performance and evaluation of her immediate hypersensitivity skin tests.

## 2017-09-13 RX ORDER — PROMETHAZINE HYDROCHLORIDE 25 MG/1
TABLET ORAL
Qty: 30 TABLET | Refills: 0 | Status: SHIPPED | OUTPATIENT
Start: 2017-09-13 | End: 2017-11-19 | Stop reason: SDUPTHER

## 2017-09-13 RX ORDER — MECLIZINE HYDROCHLORIDE 25 MG/1
TABLET ORAL
Qty: 30 TABLET | Refills: 0 | Status: SHIPPED | OUTPATIENT
Start: 2017-09-13 | End: 2017-10-16

## 2017-09-26 DIAGNOSIS — I10 ESSENTIAL HYPERTENSION: ICD-10-CM

## 2017-09-26 RX ORDER — LOSARTAN POTASSIUM AND HYDROCHLOROTHIAZIDE 12.5; 1 MG/1; MG/1
TABLET ORAL
Qty: 90 TABLET | Refills: 0 | Status: SHIPPED | OUTPATIENT
Start: 2017-09-26 | End: 2017-12-26 | Stop reason: SDUPTHER

## 2017-10-15 ENCOUNTER — OFFICE VISIT (OUTPATIENT)
Dept: URGENT CARE | Facility: CLINIC | Age: 57
End: 2017-10-15
Payer: COMMERCIAL

## 2017-10-15 VITALS
HEART RATE: 95 BPM | DIASTOLIC BLOOD PRESSURE: 88 MMHG | WEIGHT: 209.19 LBS | SYSTOLIC BLOOD PRESSURE: 140 MMHG | TEMPERATURE: 99 F | HEIGHT: 62 IN | BODY MASS INDEX: 38.5 KG/M2 | OXYGEN SATURATION: 99 %

## 2017-10-15 DIAGNOSIS — M60.9 MYOSITIS OF MULTIPLE SITES, UNSPECIFIED MYOSITIS TYPE: Primary | ICD-10-CM

## 2017-10-15 PROCEDURE — 99213 OFFICE O/P EST LOW 20 MIN: CPT | Mod: S$GLB,,, | Performed by: FAMILY MEDICINE

## 2017-10-15 PROCEDURE — 99999 PR PBB SHADOW E&M-EST. PATIENT-LVL IV: CPT | Mod: PBBFAC,,,

## 2017-10-15 RX ORDER — CHLORZOXAZONE 500 MG/1
500 TABLET ORAL 4 TIMES DAILY PRN
Qty: 40 TABLET | Refills: 1 | Status: SHIPPED | OUTPATIENT
Start: 2017-10-15 | End: 2017-10-16 | Stop reason: SDUPTHER

## 2017-10-15 NOTE — PROGRESS NOTES
"Subjective:      Patient ID: Miles Bowen is a 57 y.o. female.    Chief Complaint: Back Pain    Mrs. Juliocesar simmons is a 57-year-old female presenting to the clinic with complaint of lower back pain.    History of present illness reveals the patient was in her usual state of health until approximately 3 days ago when she noticed her lower back beginning to bother her more.  She has a history of lower back pain dating back several years and has been controlled quite well with conservative measures including muscle relaxers, nonsteroidal anti-inflammatories and analgesics.  She has tried the usual modalities to reduce her back pain but it has not resolved.  She presents today for further evaluation and treatment as may be appropriate.        Review of Systems   Constitutional: Negative.    HENT: Negative.    Eyes: Negative.    Respiratory: Negative.    Cardiovascular: Negative.    Gastrointestinal: Negative.    Endocrine: Negative.    Genitourinary: Negative.    Musculoskeletal: Negative.    Skin: Negative.    Allergic/Immunologic: Negative.    Neurological: Negative.    Hematological: Negative.    Psychiatric/Behavioral: Negative.         Review of patient's allergies indicates:   Allergen Reactions    Amoxicillin      GI upset/ stomach pain    Sulfamethoxazole-trimethoprim     Sumatriptan      Chest pain    Ibuprofen Rash     GI upset    Nsaids (non-steroidal anti-inflammatory drug) Rash     GI Upset    Prednisone Palpitations    Singulair [montelukast] Palpitations    Tessalon perle [benzonatate] Palpitations       Past Medical History:   Diagnosis Date    Anxiety     Depression     GERD (gastroesophageal reflux disease)     Heart murmur     Hypertension     MVP (mitral valve prolapse)     Obesity        Objective:   BP (!) 140/88   Pulse 95   Temp 98.6 °F (37 °C)   Ht 5' 2" (1.575 m)   Wt 94.9 kg (209 lb 3.5 oz)   SpO2 99%   BMI 38.27 kg/m²     Physical Exam   Constitutional: She appears " well-developed and well-nourished. No distress.   Musculoskeletal:        Lumbar back: She exhibits tenderness (enderness to palpation of the paraspinous muscles more so on the right.). She exhibits normal range of motion, no bony tenderness, no swelling, no edema, no deformity, no laceration, no pain, no spasm and normal pulse.   Skin: She is not diaphoretic.     Assessment:     1. Myositis of multiple sites, unspecified myositis type       Plan:   Miles Bowen is seen today for   1. Myositis of multiple sites, unspecified myositis type      We have discussed the etiology and treatment options associated with the diagnosis as well as alternatives. She has elected the following treatments.     Myositis of multiple sites, unspecified myositis type  -     Ambulatory referral to Physiatry  - Tylenol Arthritis strength 2 tabs tid.  - Apply Heat  - Follow up as necessary.      The patient was explained the above plan and given opportunity to ask questions. She understands, chooses and consents to this plan and accepts all the risks, which include but are not limited to the risks mentioned above. She understands the alternative of having no testing, interventions or treatments at this time. She left content and without further questions.

## 2017-10-16 ENCOUNTER — TELEPHONE (OUTPATIENT)
Dept: INTERNAL MEDICINE | Facility: CLINIC | Age: 57
End: 2017-10-16

## 2017-10-16 ENCOUNTER — OFFICE VISIT (OUTPATIENT)
Dept: PAIN MEDICINE | Facility: CLINIC | Age: 57
End: 2017-10-16
Payer: COMMERCIAL

## 2017-10-16 ENCOUNTER — OFFICE VISIT (OUTPATIENT)
Dept: PHYSICAL MEDICINE AND REHAB | Facility: CLINIC | Age: 57
End: 2017-10-16
Payer: COMMERCIAL

## 2017-10-16 VITALS
HEIGHT: 62 IN | BODY MASS INDEX: 38.46 KG/M2 | RESPIRATION RATE: 14 BRPM | HEART RATE: 69 BPM | DIASTOLIC BLOOD PRESSURE: 90 MMHG | WEIGHT: 209 LBS | SYSTOLIC BLOOD PRESSURE: 147 MMHG

## 2017-10-16 VITALS
BODY MASS INDEX: 38.46 KG/M2 | SYSTOLIC BLOOD PRESSURE: 169 MMHG | WEIGHT: 209 LBS | DIASTOLIC BLOOD PRESSURE: 108 MMHG | RESPIRATION RATE: 16 BRPM | HEIGHT: 62 IN | HEART RATE: 79 BPM

## 2017-10-16 DIAGNOSIS — M51.36 DDD (DEGENERATIVE DISC DISEASE), LUMBAR: Primary | ICD-10-CM

## 2017-10-16 DIAGNOSIS — M62.838 MUSCLE SPASM: ICD-10-CM

## 2017-10-16 DIAGNOSIS — M54.16 LUMBAR RADICULOPATHY: ICD-10-CM

## 2017-10-16 DIAGNOSIS — M47.816 SPONDYLOSIS OF LUMBAR REGION WITHOUT MYELOPATHY OR RADICULOPATHY: ICD-10-CM

## 2017-10-16 DIAGNOSIS — M47.816 LUMBAR SPONDYLOSIS: Primary | ICD-10-CM

## 2017-10-16 PROBLEM — J33.9 NASAL POLYPS: Status: ACTIVE | Noted: 2017-10-16

## 2017-10-16 PROCEDURE — 99999 PR PBB SHADOW E&M-EST. PATIENT-LVL III: CPT | Mod: PBBFAC,,, | Performed by: PHYSICAL MEDICINE & REHABILITATION

## 2017-10-16 PROCEDURE — 99999 PR PBB SHADOW E&M-EST. PATIENT-LVL III: CPT | Mod: PBBFAC,,, | Performed by: ANESTHESIOLOGY

## 2017-10-16 PROCEDURE — 99204 OFFICE O/P NEW MOD 45 MIN: CPT | Mod: S$GLB,,, | Performed by: PHYSICAL MEDICINE & REHABILITATION

## 2017-10-16 PROCEDURE — 99214 OFFICE O/P EST MOD 30 MIN: CPT | Mod: S$GLB,,, | Performed by: ANESTHESIOLOGY

## 2017-10-16 RX ORDER — HYDROCODONE BITARTRATE AND ACETAMINOPHEN 5; 325 MG/1; MG/1
1 TABLET ORAL EVERY 8 HOURS PRN
Qty: 21 TABLET | Refills: 0 | Status: SHIPPED | OUTPATIENT
Start: 2017-10-16 | End: 2017-10-23 | Stop reason: SDUPTHER

## 2017-10-16 RX ORDER — CYCLOBENZAPRINE HCL 10 MG
10 TABLET ORAL 3 TIMES DAILY PRN
Qty: 90 TABLET | Refills: 0 | Status: SHIPPED | OUTPATIENT
Start: 2017-10-16 | End: 2017-11-19 | Stop reason: SDUPTHER

## 2017-10-16 NOTE — LETTER
October 16, 2017      Javad George Jr., VINEET  [address]  [place] [address]           [facility] - Physiatry  [address]  [place] [address]  Phone: [phone]  Fax: [phone]          Patient: Miles Bowen   MR Number: [id number]   YOB: 1960   Date of Visit: 10/16/2017       Dear Javad George Jr.:    Thank you for referring Miles Bowen to me for evaluation. Attached you will find relevant portions of my assessment and plan of care.    If you have questions, please do not hesitate to call me. I look forward to following Miles Bowen along with you.    Sincerely,    Beth Clement MD    Enclosure  CC:  No Recipients    If you would like to receive this communication electronically, please contact [email or web address] or [phone] to request more information on [employer] Link access.    For providers and/or their staff who would like to refer a patient to [facility], please contact us through our one-stop-shop provider referral line, [facility], at [phone].    If you feel you have received this communication in error or would no longer like to receive these types of communications, please e-mail [email or web address]

## 2017-10-16 NOTE — PROGRESS NOTES
PM&R NEW PATIENT HISTORY & PHYSICAL :    Referring Provider: TAIWO George    Chief Complaint   Patient presents with    Back Pain     low back to the legs, right side is the worst    Numbness     right leg numbness/tingling     HPI: This is a 57 y.o.  female being seen in clinic today for evaluation of increased achy pain with burning, sharp pain across her low back into her right leg.  Her pain radiates down the back of her leg to the knee.  Change of position helps somewhat, but she isn't able to get comfortable.  She feels weakness in her right leg with limited mobility and impaired gait.     History obtained from patient    Functional History:  Walking: limited  Transfers: Independent  Assistive devices: No  Power mobility: No  Falls: None   Directional preference:  Employment status:    Needs help with:  Nothing - all ADLS normal      Past family, medical, social, and surgical history reviewed in chart    Review of Systems:     General- denies lethargy, weight change, fever, chills  Head/neck- denies swallowing difficulties +vertigo  ENT- denies hearing changes  Cardiovascular-denies chest pain  Pulmonary- denies shortness of breath  GI- denies constipation or bowel incontinence  - denies bladder incontinence  Skin- denies wounds or rashes  Musculoskeletal- +weakness, +pain  Neurologic- +numbness and tingling  Psychiatric- denies depressive or psychotic features, +anxiety  Lymphatic-denies swelling  Endocrine- denies hypoglycemic symptoms/DM history  All other pertinent systems negative     Physical Examination:  General: Well developed, well nourished female, NAD  HEENT:NCAT EOMI bilaterally   Pulmonary:Normal respirations    Spinal Examination: CERVICAL  Active ROM is within normal limits.  Inspection: No deformity of spinal alignment.  Palpation: No vertebral tenderness to percussion.      Spinal Examination: LUMBAR or THORACIC  Active ROM is limited in all planes  Inspection: No deformity of spinal  alignment.  No palpable olisthesis.  Palpation: No vertebral tenderness to percussion. ttp right si joint, gt bursa, paraspinals  SLR Test (seated ):positive on right    Bilateral Upper and Lower Extremities:  Pulses are 2+ at radial,bilaterally.  Shoulder/Elbow/Wrist/Hand ROM   Hip/Knee/Ankle ROM wnl except limited knee ext on right  Bilateral Extremities show normal capillary refill.  No signs of cyanosis, rubor, edema, skin changes, or dysvascular changes of appendages.  Nails appear intact.    Neurological Exam:  Cranial Nerves:  II-XII grossly intact    Manual Muscle Testing: (Motor 5=normal)  RIGHT Lower extremity: Hip flexion 4/5, Hip Abduction 5/5, Hip Adduction 5/5, Knee extension 3+p/5, Knee flexion 4/5, Ankle dorsiflexion 5/5, Extensor hallucis longus 5/5, Ankle plantarflexion 5/5  LEFT Lower extremity:  Hip flexion 5/5, Hip Abduction 5/5,Hip Adduction 5/5, Knee extension 5/5, Knee flexion 5/5, Ankle dorsiflexion 5/5, Extensor hallucis longus 5/5, Ankle plantarflexion 5/5    No focal atrophy is noted of either upper or lower extremity.    Bilateral Reflexes:hypo at right, patellar, 2+ at left  No clonus at knee or ankle.    Sensation: tested to light touch  - intact in legs except dec at lat and post right thigh  Gait: antalgic at right leg    IMPRESSION/PLAN: This is a 57 y.o.  female with lumbar DDD, right leg radiculitis     1. Pt unable to tolerate steroid or gabapentin  2. Refer to Dr Saravia for KRISTA eval and other medication options  3. Handouts on back care, stretching, etc provided    Beth Clement M.D.  Physical Medicine and Rehab

## 2017-10-16 NOTE — LETTER
October 16, 2017      Beth Clement MD  [address] [place] [address]           [facility]  [address] [place] [address]  Phone: [phone]  Fax: [phone]          Patient: Miles Bowen   MR Number: [id number]   YOB: 1960   Date of Visit: 10/16/2017       Dear Dr. Beth Clement:    Thank you for referring Miles Bowen to me for evaluation. Attached you will find relevant portions of my assessment and plan of care.    If you have questions, please do not hesitate to call me. I look forward to following Miles Bowen along with you.    Sincerely,    Sonny Saravia MD    Enclosure  CC:  No Recipients    If you would like to receive this communication electronically, please contact [email or web address] or [phone] to request more information on [employer] Link access.    For providers and/or their staff who would like to refer a patient to [facility], please contact us through our one-stop-shop provider referral line, [facility], at [phone].    If you feel you have received this communication in error or would no longer like to receive these types of communications, please e-mail [email or web address]

## 2017-10-16 NOTE — PROGRESS NOTES
Chief Pain Complaint:  Low-back Pain (right)        History of Present Illness:   Miles Bowen is a 57 y.o. female  who is presenting with a chief complaint of Low-back Pain (right)  . The patient began experiencing this problem abruptly, and the pain has been gradually worsening over the past 3 day(s). The pain is described as throbbing, shooting, burning and electrical and is located in the right lumbar spine, posterior thigh. Pain is constant and lasts hours. The pain radiates to right leg. The patient rates her pain a 8 out of ten and interferes with activities of daily living a 7 out of ten. Pain is exacerbated by flexion of the lumbar spine, and is improved by rest. Patient reports no prior trauma, no prior spinal surgery     - pertinent negatives: No fever, No chills, No weight loss, No bladder dysfunction, No bowel dysfunction No saddle anesthesia  - pertinent positives: generalized nonspecific Lower Extremity weakness bilaterally    - medications, other therapies tried (physical therapy, injections):     >> NSAIDs, Tylenol, Norco and flexeril Gabapentin (did no tolerate)    >> Has NOT previously undergone Physical Therapy    >> Has previously undergone spinal injection/s      Imaging / Labs / Studies (reviewed on 10/16/2017):    Review of Systems:  CONSTITUTIONAL: patient denies any fever, chills, or weight loss  SKIN: patient denies any rash or itching  RESPIRATORY: patient denies having any shortness of breath  GASTROINTESTINAL: patient denies having any diarrhea, constipation, or bowel incontinence  GENITOURINARY: patient denies having any abnormal bladder function    MUSCULOSKELETAL:  - patient complains of the above noted pain/s (see chief pain complaint)    NEUROLOGICAL:   - pain as above  - strength in Lower extremities is decreased, BILATERALLY  - sensation in Lower extremities is intact, BILATERALLY  - patient denies any loss of bowel or bladder control      PSYCHIATRIC: patient denies any  "change in mood    Other:  All other systems reviewed and are negative      Physical Exam:  BP (!) 169/108 (BP Location: Right arm, Patient Position: Sitting)   Pulse 79   Resp 16   Ht 5' 2" (1.575 m)   Wt 94.8 kg (209 lb)   BMI 38.23 kg/m²  (reviewed on 10/16/2017)  General: Alert and oriented, in no apparent distress.  Gait: normal gait.  Skin: No rashes, No discoloration, No obvious lesions  HEENT: Normocephalic, atraumatic. Pupils equal and round.  Cardiovascular: Regular rate and rhythm , no significant peripheral edema present  Respiratory: Without audible wheezing, without use of accessory muscles of respiration.    Musculoskeletal:    Lumbar Spine    - Pain on flexion of lumbar spine Present  - Straight Leg Raise:  Present on right    - Pain on extension of lumbar spine Absent  - TTP over the lumbar facet joints Absent  - Lumbar facet loading Absent    -Pain on palpation over the SI joint  Absent  - PACO: Absent      Neuro:    Strength:    LE R/L: HF: 4/5, HE: 5/5, KF: 5/5; KE: 5/5; FE: 5/5; FF: 5/5    Extremity Reflexes: Brisk and symmetric throughout.      Extremity Sensory: Sensation to pinprick and temperature symmetric. Proprioception intact.      Psych:  Mood and affect is appropriate      Assessment:    Miles Bowen is a 57 y.o. year old female who is presenting with .    Encounter Diagnoses   Name Primary?    Lumbar spondylosis Yes    Lumbar radiculopathy        Plan:    1. Interventional: Consider right S1 TFESI if symptoms do not improve with conservative therapy.     2. Pharmacologic: Start Flexeril 10 mg PO TID PRN (90 tabs), Norco 5/325 mg PO TID PRN (90 tabs).  checked. Opioid contract signed. Patient did not tolerate Gabapentin in the past.      3. Rehabilitative: PT when pain improved.    4. Diagnostic: None at this time.    5. Follow up: PRN    30 minutes were spent in this encounter with more than 50% of the time used for counseling and review of the plan.  Imaging / studies " reviewed, detailed above.  I discussed in detail the risks, benefits, and alternatives to any and all potential treatment options.  All questions and concerns were fully addressed today in clinic. Medical decision making moderate.    Thank you for the opportunity to assist in the care of this patient.    Best wishes,    Signed:    Sonny Saravia MD

## 2017-10-16 NOTE — TELEPHONE ENCOUNTER
----- Message from Christina García sent at 10/16/2017  7:39 AM CDT -----  Contact: Patient  Patient states she had hurt her back over the week end, and went to urgent care, was prescribed a muscle relaxer, one she has never had before. Now she is nauseated and vomiting and her back still hurts. An appointment was made for her in Physiatry for today. Patient states she does not know what to do and would like to be advised. Please call her back at  [phone]. Thank you

## 2017-10-16 NOTE — PATIENT INSTRUCTIONS
Possible Causes of Low Back or Leg Pain    The symptoms in your back or leg may be due to pressure on a nerve. This pressure may be caused by a damaged disk or by abnormal bone growth. Either way, you may feel pain, burning, tingling, or numbness. If you have pressure on a nerve that connects to the sciatic nerve, pain may shoot down your leg.    Pressure from the disk  Constant wear and tear can weaken a disk over time and cause back pain. The disk can then be damaged by a sudden movement or injury. If its soft center begins to bulge, the disk may press on a nerve. Or the outside of the disk may tear, and the soft center may squeeze through and pinch a nerve.    Pressure from bone  As a disk wears out, the vertebrae right above and below the disk begin to touch. This can put pressure on a nerve. Often, abnormal bone (called bone spurs) grows where the vertebrae rub against each other. This can cause the foramen or the spinal canal to narrow (called stenosis) and press against a nerve.  Date Last Reviewed: 10/4/2015  © 0777-4496 [employer]. [address] [place] [address]. All rights reserved. This information is not intended as a substitute for professional medical care. Always follow your healthcare professional's instructions.        Causes of Lumbar (Low Back) Pain  Low back pain can be caused by problems with any part of the lumbar spine. A disk can herniate (push out) and press on a nerve. Vertebrae can rub against each other or slip out of place. This can irritate facet joints and nerves. It can also lead to stenosis, a narrowing of the spinal canal or foramen.  Pressure from a disk  Constant wear and tear on a disk can cause it to weaken and push outward. Part of the disk may then press on nearby nerves. There are two common types of herniated disks:  Contained means the soft nucleus is protruding outward.   Extruded means the firm annulus has torn, letting the soft center squeeze  through.     Pressure from bone  An unstable spine   With age, a disk may thin and wear out. Vertebrae above and below the disk may begin to touch. This can put pressure on nerves. It can also cause bone spurs (growths) to form where the bones rub together.    Stenosis results when bone spurs narrow the foramen or spinal canal. This also puts pressure on nerves. Slipping vertebrae can irritate nerves and joints. They can also worsen stenosis.    In some cases, vertebrae become unstable and slip forward. This is called spondylolisthesis.     Date Last Reviewed: 10/12/2015  © 6935-4200 [employer]. [address] [place] [address], [place] [address]. All rights reserved. This information is not intended as a substitute for professional medical care. Always follow your healthcare professional's instructions.        Relieving Back Pain  Back pain is a common problem. You can strain back muscles by lifting too much weight or just by moving the wrong way. Back strain can be uncomfortable, even painful. And it can take weeks or months to improve. To help yourself feel better and prevent future back strains, try these tips.  Important Note: Do not give aspirin to children or teens without first discussing it with your healthcare provider.      ? Ice    Ice reduces muscle pain and swelling. It helps most during the first 24 to 48 hours after an injury.  · Wrap an ice pack or a bag of frozen peas in a thin towel. (Never place ice directly on your skin.)  · Place the ice where your back hurts the most.  · Dont ice for more than 20 minutes at a time.  · You can use ice several times a day.  ? Medicines  Over-the-counter pain relievers can include acetaminophen and anti-inflammatory medicines, which includes aspirin or ibuprofen. They can help ease discomfort. Some also reduce swelling.  · Tell your healthcare provider about any medicines you are already taking.  · Take medicines only as directed.  ? Heat  After the first  48 hours, heat can relax sore muscles and improve blood flow.  · Try a warm bath or shower. Or use a heating pad set on low. To prevent a burn, keep a cloth between you and the heating pad.  · Dont use a heating pad for more than 15 minutes at a time. Never sleep on a heating pad.  Date Last Reviewed: 9/1/2015  © 1003-8268 [employer]. [address] [place] [address]. All rights reserved. This information is not intended as a substitute for professional medical care. Always follow your healthcare professional's instructions.        Back Safety: Poor Posture Hurts  An unhealthy spine often starts with bad habits. Poor movement patterns and posture problems are common causes of back pain. Disk, bone, nerve, and soft tissue problems can all be affected by poor posture. They can lead to pain, stiffness, and other symptoms.    Poor posture backfires  Poor posture can cause pain. Too much slouching puts increased pressure on the disks. An excessive lumbar curve can overload and inflame the vertebrae. As a result, the back muscles may tighten or spasm to splint and protect the spine. This adds to the pain you feel.    Proper posture: The key to safe movement  Your spine bears your weight throughout the day. This is true whether youre sleeping, standing, or bending. Certain positions strain your spine more than others. But by maintaining proper posture in all positions, you can reduce the stress on your spine.       To improve your standing posture, follow these steps:  · Breathe deeply.  · Relax your shoulders, hips, and ankles. · Think of the ears, shoulders, hips, and ankles as a series of dots. Now, adjust your body to connect the dots in a straight line.  · Tuck your buttocks in just a bit if you need to.      Date Last Reviewed: 10/28/2015  © 6098-9146 [employer]. [address] [place] [address]. All rights reserved. This information is not intended as a substitute  for professional medical care. Always follow your healthcare professional's instructions.        Caring for Your Back Throughout the Day  Take care of your back throughout the day. You will likely have fewer back problems if you do. Try to warm up before you move. Shift positions often. Also do your best to form healthy habits.    Warm up for the day  Do a few slow, catlike stretches before starting your day. This simple warmup can soften your disks, stretch your back muscles, and help prevent injuries.  Shift positions often  At work and at home, change positions often. This helps keep your body from getting stiff. Stand up or lean back while you sit. If you can, get up and move every 1/2 hour.  Form healthy habits  Here are some suggestions:   · Keep a healthy weight. When you weigh too much, your back is under excess strain. But losing just a few extra pounds can help a lot.  · Try not to overeat. Learn about serving sizes. The size of a serving depends on the food and the food group. Many foods list serving sizes on the labels.  · Handle minor aches with cold and heat. Apply cold the first 24 to 48 hours. Use heat after that. Always place a thin cloth between your skin and the source of cold or heat.  · Take medicines as directed. This helps keep pain under control. Always read labels, and call your healthcare provider or pharmacist if you have any questions.  Walk each day  A daily walk keeps your back and thigh muscles stretched and strong. This gives your back better support. Be sure to walk with your spines three curves aligned, by keeping your head, hips, and toes connected by a vertical line.   Date Last Reviewed: 10/18/2015  © 4078-6815 [employer]. [address] [place] [address], [place] [address]. All rights reserved. This information is not intended as a substitute for professional medical care. Always follow your healthcare professional's instructions.        Relieving Tension in Your  Back  Being relaxed helps keep your mind healthy and your back ready to move. Take short breaks often. Walk around. Stretch. Switch tasks. Also give the following a try.  Make time to relax. Start by setting aside 5 minutes daily.   Deep breathing    Deep breathing is a simple way to reduce stress. You can do it almost any time you need to relax.  · Inhale slowly through your nose. Let your lungs and stomach expand.  · Hold your breath for 2 to 3 seconds.  · Exhale slowly through your mouth until your lungs feel empty. Repeat 3 to 4 times.  Relieve tension  Muscle tension can create tender spots called trigger points. The tips below may help relieve muscle tension.  · Press the trigger point if you can reach it. If not, lie on a soft tennis ball, or ask a friend to press the spot. Use steady pressure for 10 to 15 seconds. Breathe deeply. Repeat a few times.  · Massage trigger points with ice for 2 to 5 minutes. Press lightly at first. Slowly increase firmness.  Date Last Reviewed: 10/18/2015  © 6481-7137 [employer]. [address] [place] [address]. All rights reserved. This information is not intended as a substitute for professional medical care. Always follow your healthcare professional's instructions.        Back Safety: Basics of Good Posture  Good posture helps protect you from injury. It also increases your comfort. Aim for good posture throughout the day.    Check your posture  The human body works best when it is properly aligned. To improve your standing posture, follow these steps:  · Take a moment to close your eyes and feel your body. Then breathe deeply and relax your shoulders, hips, and knees.  · Now, from the very top of your head, lift up just a bit. Think of a line linking your ears, shoulders, hips, and ankles. Adjust your body to follow the line. You may need to relax your hips and tuck your buttocks under a bit.  · Next, take a look at yourself in a mirror. Is one ear,  shoulder, or hip higher than the other? They should be level.  Check how you sit  When you sit properly, pressure on your back is reduced. Try these steps:  · Sit so that the curve of your lower back fits easily against the chair. Keep your gaze level.  · Support your feet. They should be flat on the floor or on a footrest. Your knees should be level with your hips.  · Adjust the chair height as needed. Sit so your forearms are level with the work surface.  Proper posture helps  When your back is aligned, its more likely to stay safe throughout the day.  · Standing in place. Rest one foot on a stool or low box to ease pressure on your lower back. Switch feet often. If you can, adjust the height of your work surface so your neck and shoulders arent under strain.  · Driving. Sit close enough to the steering wheel to keep your knees slightly bent. For comfort, your knees should be level with your hips or just a bit lower. Sit as straight as you can. The curve of your lower back should be fully supported.  · Walking. Stand tall and walk with your head up. Let your arms swing while you walk. This helps relax muscles. Wear shoes that fit and support your feet. If you will be standing or walking for a long time, dont wear high heels.  · Sitting and sleeping. Choose your furniture with care. Make sure its not causing or increasing your back pain. Chairs should allow for comfortable, correct sitting posture. Use pillows for added support if needed. Your bed should support your backs natural curves without being too hard or too soft.  Date Last Reviewed: 10/18/2015  © 9274-3615 [employer]. [address] [place] [address], [place] [address]. All rights reserved. This information is not intended as a substitute for professional medical care. Always follow your healthcare professional's instructions.        Degenerative Disk Disease    Spinal disks are gel-filled cushions between the bones, or vertebrae, of the spine.  The disks act like shock absorbers. Over time, the disks may break down. This is called degenerative disk disease.  This condition can affect the neck or back. It is one of the most common causes of low back pain. It is the leading cause of disability in people under age 45 in the [place]. The pain often remains localized to the lower back or neck. Muscle spasm is often present and adds to the pain.  Disk degeneration is a natural part of aging. But it is not painful for most people. It may also occur as a result of repeated minor injuries due to daily activities, sports, or accidents. It may lead to osteoarthritis of the spine. Back pain related to disk disease may come and go. Or it may become chronic and last for months or years. The disk may bulge or rupture. This is called a slipped disk or herniated disk. That can put pressure on a nearby spinal nerve and cause neck or back pain that spreads down one arm or leg.  X-rays or an MRI may help to diagnose this condition. For acute pain, treatment includes anti-inflammatory medicines, muscle relaxants, rest, ice, or heat. Strong prescription pain medicines, called opioids, may be needed for short-term treatment if pain suddenly gets worse. Opioid medicines can be addictive. So they are not advised for long-term pain management. Other types of medicines are preferred. Surgery is generally not used to treat this condition unless there is a complication.    Home care  · For neck pain: Use a comfortable pillow that supports the head and keeps the spine in a neutral position. Your head should not be tilted forward or backward.  · For back pain: Avoid sitting for long periods of time. This puts more stress on the lower back than standing or walking. Starting a regular exercise program to strengthen the supporting muscles of the spine will make it easier to live with degenerative disk disease.  · Apply an ice pack over the injured area for no more than 15 to 20  minutes. Do this every 3 to 6 hours for the first 24 to 48 hours. To make an ice pack, put ice cubes in a plastic bag that seals at the top. Wrap the bag in a clean, thin towel or cloth. Never put ice or an ice pack directly on the skin. Keep using ice packs to ease pain and swelling as needed. After 48 hours, apply heat (warm shower or warm bath) for 20 minutes several times a day, or switch between ice and heat.   · You may use over-the-counter pain medicine to control pain, unless another pain medicine was prescribed. If you have chronic liver or kidney disease or ever had a stomach ulcer or GI bleeding, talk with your provider before using these medicines.  Follow-up care  Follow up with your healthcare provider, or as directed.  If X-rays, a CT scan or an MRI were done, you will be notified of any new findings that may affect your care.  When to seek medical advice  Contact your healthcare provider right away if any of these occur:  · Increasing back pain  · Your foot drags when you walk, a condition called foot drop  · You have new weakness, numbness, or pain in one or both arms or legs  · Loss of bowel or bladder control  · Numbness or tingling in the buttock or groin area  Date Last Reviewed: 11/23/2015  © 9867-4193 [employer]. [address] [place] [address], [place] [address]. All rights reserved. This information is not intended as a substitute for professional medical care. Always follow your healthcare professional's instructions.        Exercises to Strengthen Your Lower Back  Strong lower back and abdominal muscles work together to support your spine. The exercises below will help strengthen the lower back. It is important that you begin exercising slowly and increase levels gradually.  Always begin any exercise program with stretching. If you feel pain while doing any of these exercises, stop and talk to your doctor about a more specific exercise program that better suits your condition.   Low  back stretch  The point of stretching is to make you more flexible and increase your range of motion. Stretch only as much as you are able. Stretch slowly. Do not push your stretch to the limit. If at any point you feel pain while stretching, this is your (temporary) limit.  · Lie on your back with your knees bent and both feet on the ground.  · Slowly raise your left knee to your chest as you flatten your lower back against the floor. Hold for 5 seconds.  · Relax and repeat the exercise with your right knee.  · Do 10 of these exercises for each leg.  · Repeat hugging both knees to your chest at the same time.  Building lower back strength  Start your exercise routine with 10 to 30 minutes a day, 1 to 3 times a day.  Initial exercises  Lying on your back:  1. Ankle pumps: Move your foot up and down, towards your head, and then away. Repeat 10 times with each foot.  2. Heel slides: Slowly bend your knee, drawing the heel of your foot towards you. Then slide your heel/foot from you, straightening your knee. Do not lift your foot off the floor (this is not a leg lift).  3. Abdominal contraction: Bend your knees and put your hands on your stomach. Tighten your stomach muscles. Hold for 5 seconds, then relax. Repeat 10 times.  4. Straight leg raise: Bend one leg at the knee and keep the other leg straight. Tighten your stomach muscles. Slowly lift your straight leg 6 to 12 inches off the floor and hold for up to 5 seconds. Repeat 10 times on each side.  Standin. Wall squats: Stand with your back against the wall. Move your feet about 12 inches away from the wall. Tighten your stomach muscles, and slowly bend your knees until they are at about a 45 degree angle. Do not go down too far. Hold about 5 seconds. Then slowly return to your starting position. Repeat 10 times.  2. Heel raises: Stand facing the wall. Slowly raise the heels of your feet up and down, while keeping your toes on the floor. If you have trouble  balancing, you can touch the wall with your hands. Repeat 10 times.  More advanced exercises  When you feel comfortable enough, try these exercises.  1. Kneeling lumbar extension: Begin on your hands and knees. At the same time, raise and straighten your right arm and left leg until they are parallel to the ground. Hold for 2 seconds and come back slowly to a starting position. Repeat with left arm and right leg, alternating 10 times.  2. Prone lumbar extension: Lie face down, arms extended overhead, palms on the floor. At the same time, raise your right arm and left leg as high as comfortably possible. Hold for 10 seconds and slowly return to start. Repeat with left arm and right leg, alternating 10 times. Gradually build up to 20 times. (Advanced: Repeat this exercise raising both arms and both legs a few inches off the floor at the same time. Hold for 5 seconds and release.)  3. Pelvic tilt: Lie on the floor on your back with your knees bent at 90 degrees. Your feet should be flat on the floor. Inhale, exhale, then slowly contract your abdominal muscles bringing your navel toward your spine. Let your pelvis rock back until your lower back is flat on the floor. Hold for 10 seconds while breathing smoothly.  4. Abdominal crunch: Perform a pelvic tilt (above) flattening your lower back against the floor. Holding the tension in your abdominal muscles, take another breath and raise your shoulder blades off the ground (this is not a full sit-up). Keep your head in line with your body (dont bend your neck forward). Hold for 2 seconds, then slowly lower.  Date Last Reviewed: 6/1/2016 © 2000-2017 [employer]. [address] [place] [address], [place] [address]. All rights reserved. This information is not intended as a substitute for professional medical care. Always follow your healthcare professional's instructions.

## 2017-10-20 ENCOUNTER — TELEPHONE (OUTPATIENT)
Dept: PAIN MEDICINE | Facility: CLINIC | Age: 57
End: 2017-10-20

## 2017-10-23 ENCOUNTER — PATIENT MESSAGE (OUTPATIENT)
Dept: PAIN MEDICINE | Facility: CLINIC | Age: 57
End: 2017-10-23

## 2017-10-23 ENCOUNTER — TELEPHONE (OUTPATIENT)
Dept: PAIN MEDICINE | Facility: CLINIC | Age: 57
End: 2017-10-23

## 2017-10-23 RX ORDER — HYDROCODONE BITARTRATE AND ACETAMINOPHEN 5; 325 MG/1; MG/1
1 TABLET ORAL EVERY 8 HOURS PRN
Qty: 90 TABLET | Refills: 0 | Status: SHIPPED | OUTPATIENT
Start: 2017-10-23 | End: 2017-11-06 | Stop reason: ALTCHOICE

## 2017-10-23 NOTE — TELEPHONE ENCOUNTER
----- Message from Catia Herrera sent at 10/23/2017 11:42 AM CDT -----  Pt would like to speak to the nurse but refuse to disclose the reason/please call [phone]/ma

## 2017-10-30 ENCOUNTER — OFFICE VISIT (OUTPATIENT)
Dept: PAIN MEDICINE | Facility: CLINIC | Age: 57
End: 2017-10-30
Payer: COMMERCIAL

## 2017-10-30 VITALS
HEIGHT: 62 IN | WEIGHT: 209 LBS | BODY MASS INDEX: 38.46 KG/M2 | DIASTOLIC BLOOD PRESSURE: 78 MMHG | RESPIRATION RATE: 18 BRPM | HEART RATE: 98 BPM | SYSTOLIC BLOOD PRESSURE: 110 MMHG

## 2017-10-30 DIAGNOSIS — M54.16 LUMBAR RADICULOPATHY: Primary | ICD-10-CM

## 2017-10-30 DIAGNOSIS — M47.816 LUMBAR SPONDYLOSIS: ICD-10-CM

## 2017-10-30 PROCEDURE — 99214 OFFICE O/P EST MOD 30 MIN: CPT | Mod: S$GLB,,, | Performed by: ANESTHESIOLOGY

## 2017-10-30 PROCEDURE — 99999 PR PBB SHADOW E&M-EST. PATIENT-LVL III: CPT | Mod: PBBFAC,,, | Performed by: ANESTHESIOLOGY

## 2017-10-30 NOTE — PROGRESS NOTES
Chief Pain Complaint:  Low-back Pain    Interval History: The patient was last seen 10/16/2017. At that visit the plan was to continue conservative management with Sun.  The patient reports that she is/was worse following the last visit. Her pain is now 9-10/10.      History of Present Illness:   Miles Bowen is a 57 y.o. female  who is presenting with a chief complaint of Low-back Pain  . The patient began experiencing this problem abruptly, and the pain has been gradually worsening over the past 3 day(s). The pain is described as throbbing, shooting, burning and electrical and is located in the bilateral lumbar spine. Pain is constant and lasts hours. The pain radiates to bilateral lower extremity R>L. The patient rates her pain a 8 out of ten and interferes with activities of daily living a 7 out of ten. Pain is exacerbated by flexion of the lumbar spine, and is improved by rest. Patient reports no prior trauma, no prior spinal surgery     - pertinent negatives: No fever, No chills, No weight loss, No bladder dysfunction, No bowel dysfunction No saddle anesthesia  - pertinent positives: generalized nonspecific Lower Extremity weakness bilaterally    - medications, other therapies tried (physical therapy, injections):     >> NSAIDs, Tylenol, Norco and flexeril Gabapentin (did not tolerate)    >> Has NOT previously undergone Physical Therapy    >> Has previously undergone spinal injection/s      Imaging / Labs / Studies (reviewed on 10/30/2017):    Review of Systems:  CONSTITUTIONAL: patient denies any fever, chills, or weight loss  SKIN: patient denies any rash or itching  RESPIRATORY: patient denies having any shortness of breath  GASTROINTESTINAL: patient denies having any diarrhea, constipation, or bowel incontinence  GENITOURINARY: patient denies having any abnormal bladder function    MUSCULOSKELETAL:  - patient complains of the above noted pain/s (see chief pain complaint)    NEUROLOGICAL:   - pain  "as above  - strength in Lower extremities is decreased, BILATERALLY R>L  - sensation in Lower extremities is intact, BILATERALLY  - patient denies any loss of bowel or bladder control      PSYCHIATRIC: patient denies any change in mood    Other:  All other systems reviewed and are negative      Physical Exam:  /78 (BP Location: Right arm, Patient Position: Sitting)   Pulse 98   Resp 18   Ht 5' 2" (1.575 m)   Wt 94.8 kg (209 lb)   BMI 38.23 kg/m²  (reviewed on 10/30/2017)  General: Alert and oriented, in no apparent distress.  Gait: normal gait.  Skin: No rashes, No discoloration, No obvious lesions  HEENT: Normocephalic, atraumatic. Pupils equal and round.  Cardiovascular: Regular rate and rhythm , no significant peripheral edema present  Respiratory: Without audible wheezing, without use of accessory muscles of respiration.    Musculoskeletal:    Lumbar Spine    - Pain on flexion of lumbar spine Present  - Straight Leg Raise:  Present on right    - Pain on extension of lumbar spine Absent  - TTP over the lumbar facet joints Absent  - Lumbar facet loading Absent    -Pain on palpation over the SI joint  Absent  - PACO: Absent      Neuro:    Strength:    LE R/L: HF: 4/5, HE: 5/5, KF: 4/5; KE: 4/5; FE: 4/5; FF: 4/5    Extremity Reflexes: Brisk and symmetric throughout.      Extremity Sensory: Sensation to pinprick and temperature symmetric. Proprioception intact.      Psych:  Mood and affect is appropriate      Assessment:    Miles Bowen is a 57 y.o. year old female who is presenting with .    Encounter Diagnoses   Name Primary?    Lumbar radiculopathy Yes    Lumbar spondylosis        Plan:    1. Interventional: Schedule patient for bilateral L5, S1 TFESI     2. Pharmacologic: Continue Flexeril 10 mg PO TID PRN (90 tabs), Norco 5/325 mg PO TID PRN (90 tabs).  checked. Patient did not tolerate Gabapentin in the past.      3. Rehabilitative: PT when pain improved.    4. Diagnostic: Lumbar MRI. "     5. Follow up: Post injection.     30 minutes were spent in this encounter with more than 50% of the time used for counseling and review of the plan.  Imaging / studies reviewed, detailed above.  I discussed in detail the risks, benefits, and alternatives to any and all potential treatment options.  All questions and concerns were fully addressed today in clinic. Medical decision making moderate.    Thank you for the opportunity to assist in the care of this patient.    Best wishes,    Signed:    Sonny Saravia MD

## 2017-10-30 NOTE — PATIENT INSTRUCTIONS
Understanding Lumbar Radiculopathy    Lumbar radiculopathy is irritation or inflammation of a nerve root in the low back. It causes symptoms that spread out from the back down one or both legs. To understand this condition, it helps to understand the parts of the spine:  · Vertebrae. These are bones that stack to form the spine. The lumbar spine contains the 5 bottom vertebrae.  · Disks. These are soft pads of tissue between the vertebrae. They act as shock absorbers for the spine.  · Spinal canal. This is a tunnel formed within the stacked vertebrae. In the lumbar spine, nerves run through this canal.  · Nerves. These branch off and leave the spinal canal, traveling out to parts of the body. As they leave the spinal canal, nerves pass through openings between the vertebrae. The nerve root is the part of the nerve that is closest to the spinal canal.  · Sciatic nerve. This is a large nerve formed from several nerve roots in the low back. This nerve extends down the back of the leg to the foot.  With lumbar radiculopathy, nerve roots in the low back become irritated. This leads to pain and symptoms. The sciatic nerve is commonly involved, so the condition is often called sciatica.  What causes lumbar radiculopathy?  Aging, injury, poor posture, extra body weight, and other issues can lead to problems in the low back. These problems may then irritate nerve roots. They include:  · Damage to a disk in the lumbar spine. The damaged disk may then press on nearby nerve roots.  · Degeneration from wear and tear, and aging. This can lead to narrowing (stenosis) of the openings between the vertebrae. The narrowed openings press on nerve roots as they leave the spinal canal.  · Unstable spine. This is when a vertebra slips forward. It can then press on a nerve root.  Other, less common things can put pressure on nerves in the low back. These include diabetes, infection, or a tumor.  Symptoms of lumbar radiculopathy  These  include:  · Pain in the low back  · Pain, numbness, tingling, or weakness that travels into the buttocks, hip, groin, or leg  · Muscle spasms  Treatment for lumbar radiculopathy  In most cases, your healthcare provider will first try treatments that help relieve symptoms. These may include:  · Prescription and over-the-counter pain medicines. These help relieve pain, swelling, and irritation.  · Limits on positions and activities that increase pain. But lying in bed or avoiding all movement is only recommended for a short period of time.  · Physical therapy, including exercises and stretches. This helps decrease pain and increase movement and function.  · Steroid shots into the lower back. This may help relieve symptoms for a time.  · Weight-loss program. If you are overweight, losing extra pounds may help relieve symptoms.  In some cases, you may need surgery to fix the underlying problem. This depends on the cause, the symptoms, and how long the pain has lasted.  Possible complications  Over time, an irritated and inflamed nerve may become damaged. This may lead to long-lasting (permanent) numbness or weakness in your legs and feet. If symptoms change suddenly or get worse, be sure to let your healthcare provider know.  When to call your healthcare provider  Call your healthcare provider right away if you have any of these:  · New pain or pain that gets worse  · New or increasing weakness, tingling, or numbness in your leg or foot  · Problems controlling your bladder or bowel   Date Last Reviewed: 3/10/2016  © 6439-9758 [employer]. [address] [place] [address], [place] [address]. All rights reserved. This information is not intended as a substitute for professional medical care. Always follow your healthcare professional's instructions.

## 2017-10-31 ENCOUNTER — TELEPHONE (OUTPATIENT)
Dept: PAIN MEDICINE | Facility: CLINIC | Age: 57
End: 2017-10-31

## 2017-10-31 ENCOUNTER — PATIENT MESSAGE (OUTPATIENT)
Dept: PAIN MEDICINE | Facility: CLINIC | Age: 57
End: 2017-10-31

## 2017-10-31 DIAGNOSIS — K21.9 GASTROESOPHAGEAL REFLUX DISEASE, ESOPHAGITIS PRESENCE NOT SPECIFIED: ICD-10-CM

## 2017-10-31 RX ORDER — PANTOPRAZOLE SODIUM 40 MG/1
40 TABLET, DELAYED RELEASE ORAL DAILY
Qty: 90 TABLET | Refills: 0 | Status: SHIPPED | OUTPATIENT
Start: 2017-10-31 | End: 2018-01-16 | Stop reason: SDUPTHER

## 2017-10-31 NOTE — TELEPHONE ENCOUNTER
----- Message from Guillermina Lara sent at 10/31/2017  7:49 AM CDT -----  She has pulled her back and it is really hurting and she wants to know if she can increase the frequency of taking her pain medication until this gets better.  Call her at [phone].                                    wolff

## 2017-11-01 ENCOUNTER — PATIENT MESSAGE (OUTPATIENT)
Dept: PAIN MEDICINE | Facility: CLINIC | Age: 57
End: 2017-11-01

## 2017-11-06 ENCOUNTER — OFFICE VISIT (OUTPATIENT)
Dept: PAIN MEDICINE | Facility: CLINIC | Age: 57
End: 2017-11-06
Payer: COMMERCIAL

## 2017-11-06 VITALS
WEIGHT: 209 LBS | BODY MASS INDEX: 38.46 KG/M2 | HEIGHT: 62 IN | DIASTOLIC BLOOD PRESSURE: 87 MMHG | RESPIRATION RATE: 18 BRPM | SYSTOLIC BLOOD PRESSURE: 145 MMHG | HEART RATE: 93 BPM

## 2017-11-06 DIAGNOSIS — M47.816 LUMBAR SPONDYLOSIS: Primary | ICD-10-CM

## 2017-11-06 DIAGNOSIS — M54.16 LUMBAR RADICULOPATHY: ICD-10-CM

## 2017-11-06 PROCEDURE — 99214 OFFICE O/P EST MOD 30 MIN: CPT | Mod: S$GLB,,, | Performed by: ANESTHESIOLOGY

## 2017-11-06 PROCEDURE — 99999 PR PBB SHADOW E&M-EST. PATIENT-LVL III: CPT | Mod: PBBFAC,,, | Performed by: ANESTHESIOLOGY

## 2017-11-06 RX ORDER — HYDROCODONE BITARTRATE AND ACETAMINOPHEN 10; 325 MG/1; MG/1
TABLET ORAL
Qty: 40 TABLET | Refills: 0 | Status: SHIPPED | OUTPATIENT
Start: 2017-11-06 | End: 2018-01-04 | Stop reason: SDUPTHER

## 2017-11-06 NOTE — PROGRESS NOTES
Chief Pain Complaint:  Low-back Pain    Interval History: The patient was last seen 10/23/2017. At that visit the plan was to continue Norco 5/325 mg PO TID PRN and schedule patient for TFESI on 11/17.  The patient reports that she is/was worse following the last visit. Her pain is now 9-10/10 and the Norco 5/325 mg are providing minimal relief. No over sedation, resp depression, N/V.       History of Present Illness:   Miles Bowen is a 57 y.o. female  who is presenting with a chief complaint of Low-back Pain  . The patient began experiencing this problem abruptly, and the pain has been gradually worsening over the past 3 day(s). The pain is described as throbbing, shooting, burning and electrical and is located in the bilateral lumbar spine. Pain is constant and lasts hours. The pain radiates to bilateral lower extremity R>L. The patient rates her pain a 8 out of ten and interferes with activities of daily living a 7 out of ten. Pain is exacerbated by flexion of the lumbar spine, and is improved by rest. Patient reports no prior trauma, no prior spinal surgery     - pertinent negatives: No fever, No chills, No weight loss, No bladder dysfunction, No bowel dysfunction No saddle anesthesia  - pertinent positives: generalized nonspecific Lower Extremity weakness bilaterally    - medications, other therapies tried (physical therapy, injections):     >> NSAIDs, Tylenol, Norco and flexeril Gabapentin (did not tolerate)    >> Has NOT previously undergone Physical Therapy    >> Has previously undergone spinal injection/s      Imaging / Labs / Studies (reviewed on 11/6/2017):    Review of Systems:  CONSTITUTIONAL: patient denies any fever, chills, or weight loss  SKIN: patient denies any rash or itching  RESPIRATORY: patient denies having any shortness of breath  GASTROINTESTINAL: patient denies having any diarrhea, constipation, or bowel incontinence  GENITOURINARY: patient denies having any abnormal bladder  "function    MUSCULOSKELETAL:  - patient complains of the above noted pain/s (see chief pain complaint)    NEUROLOGICAL:   - pain as above  - strength in Lower extremities is decreased, BILATERALLY R>L  - sensation in Lower extremities is intact, BILATERALLY  - patient denies any loss of bowel or bladder control      PSYCHIATRIC: patient denies any change in mood    Other:  All other systems reviewed and are negative      Physical Exam:  BP (!) 145/87 (BP Location: Left arm, Patient Position: Sitting, BP Method: Medium (Automatic))   Pulse 93   Resp 18   Ht 5' 2" (1.575 m)   Wt 94.8 kg (209 lb)   BMI 38.23 kg/m²  (reviewed on 11/6/2017)  General: Alert and oriented, in no apparent distress.  Gait: normal gait.  Skin: No rashes, No discoloration, No obvious lesions  HEENT: Normocephalic, atraumatic. Pupils equal and round.  Cardiovascular: Regular rate and rhythm , no significant peripheral edema present  Respiratory: Without audible wheezing, without use of accessory muscles of respiration.    Musculoskeletal:    Lumbar Spine    - Pain on flexion of lumbar spine Present  - Straight Leg Raise:  Present on right    - Pain on extension of lumbar spine Absent  - TTP over the lumbar facet joints Absent  - Lumbar facet loading Absent    -Pain on palpation over the SI joint  Absent  - PACO: Absent      Neuro:    Strength:    LE R/L: HF: 4/5, HE: 5/5, KF: 4/5; KE: 4/5; FE: 4/5; FF: 4/5    Extremity Reflexes: Brisk and symmetric throughout.      Extremity Sensory: Sensation to pinprick and temperature symmetric. Proprioception intact.      Psych:  Mood and affect is appropriate      Assessment:    Miles Bowen is a 57 y.o. year old female who is presenting with .    Encounter Diagnoses   Name Primary?    Lumbar spondylosis Yes    Lumbar radiculopathy        Plan:    1. Interventional: Patient already schduled for bilateral L5, S1 TFESI on 11/17.     2. Pharmacologic: Continue Flexeril 10 mg PO TID PRN (90 tabs), " Discontinue Norco 5/325 mg PO TID PRN (90 tabs) and start Norco 10/325 mg PO TID PRN (40 tabs) as a bridge to injection.  checked. Patient did not tolerate Gabapentin in the past.      3. Rehabilitative: PT when pain improved.    4. Diagnostic: Lumbar MRI.     5. Follow up: Post injection.     30 minutes were spent in this encounter with more than 50% of the time used for counseling and review of the plan.  Imaging / studies reviewed, detailed above.  I discussed in detail the risks, benefits, and alternatives to any and all potential treatment options.  All questions and concerns were fully addressed today in clinic. Medical decision making moderate.    Thank you for the opportunity to assist in the care of this patient.    Best wishes,    Signed:    Sonny Saravia MD

## 2017-11-08 ENCOUNTER — TELEPHONE (OUTPATIENT)
Dept: RADIOLOGY | Facility: HOSPITAL | Age: 57
End: 2017-11-08

## 2017-11-09 ENCOUNTER — HOSPITAL ENCOUNTER (OUTPATIENT)
Dept: RADIOLOGY | Facility: HOSPITAL | Age: 57
Discharge: HOME | End: 2017-11-09
Attending: ANESTHESIOLOGY
Payer: COMMERCIAL

## 2017-11-09 DIAGNOSIS — M47.816 LUMBAR SPONDYLOSIS: ICD-10-CM

## 2017-11-09 DIAGNOSIS — M54.16 LUMBAR RADICULOPATHY: ICD-10-CM

## 2017-11-12 ENCOUNTER — NURSE TRIAGE (OUTPATIENT)
Dept: ADMINISTRATIVE | Facility: CLINIC | Age: 57
End: 2017-11-12

## 2017-11-13 ENCOUNTER — TELEPHONE (OUTPATIENT)
Dept: RADIOLOGY | Facility: HOSPITAL | Age: 57
End: 2017-11-13

## 2017-11-13 ENCOUNTER — TELEPHONE (OUTPATIENT)
Dept: PAIN MEDICINE | Facility: CLINIC | Age: 57
End: 2017-11-13

## 2017-11-19 DIAGNOSIS — R11.0 NAUSEA: ICD-10-CM

## 2017-11-20 RX ORDER — PROMETHAZINE HYDROCHLORIDE 25 MG/1
TABLET ORAL
Qty: 30 TABLET | Refills: 0 | Status: SHIPPED | OUTPATIENT
Start: 2017-11-20 | End: 2017-12-13 | Stop reason: SDUPTHER

## 2017-11-20 RX ORDER — CYCLOBENZAPRINE HCL 10 MG
TABLET ORAL
Qty: 90 TABLET | Refills: 0 | Status: SHIPPED | OUTPATIENT
Start: 2017-11-20 | End: 2017-12-18 | Stop reason: SDUPTHER

## 2017-11-26 NOTE — TELEPHONE ENCOUNTER
----- Message from Joe Crespo sent at 9/7/2017  9:19 AM CDT -----  Contact: Self- [phone]   Returning call, please call back at [phone].  Thx-[facility]   
Message left for pt to return call to clinic re: Dr. Paul said he would order a chest x-ray for her to complete along with the sinus x-ray ordered by ENT so that she doesn't have to pay a co-pay to see him, and he can determine from x-rays if she will need another antibiotic.  
Pt was very upset when I returned her phone call.  Stated she just left appt with ENT and didn't get any answers as to why she still has cough and congestion.  Stated she feels like she is getting the run-around because her appt with Dr. Paul today will be her fourth doctor's appt in two weeks and she doesn't know how she's going to keep her job if she has to keep leaving to go to appts.  Listened to pt for 14 minutes as she yelled and cried on the phone while stating that nobody is listening to her.  Apologized to pt that I can't help her with her situations with Dr. Fisher, Alicia Martinez, or ENT, but that she can keep her appt with Dr. Paul to be evaluated by him to determine if she needs a new antibiotic.  Pt verbalized understanding.  
normal

## 2017-12-13 DIAGNOSIS — R11.0 NAUSEA: ICD-10-CM

## 2017-12-13 RX ORDER — PROMETHAZINE HYDROCHLORIDE 25 MG/1
TABLET ORAL
Qty: 30 TABLET | Refills: 0 | Status: SHIPPED | OUTPATIENT
Start: 2017-12-13 | End: 2018-01-18 | Stop reason: SDUPTHER

## 2017-12-18 RX ORDER — CYCLOBENZAPRINE HCL 10 MG
TABLET ORAL
Qty: 90 TABLET | Refills: 0 | Status: SHIPPED | OUTPATIENT
Start: 2017-12-18 | End: 2018-01-16 | Stop reason: SDUPTHER

## 2017-12-18 NOTE — TELEPHONE ENCOUNTER
----- Message from Ashish Ascencio sent at 12/18/2017  8:04 AM CST -----  Contact: self [phone]  Would like to consult with nurse regarding recurring back pain.  Please call back at [phone].   Md Андрей

## 2017-12-26 DIAGNOSIS — I10 ESSENTIAL HYPERTENSION: ICD-10-CM

## 2017-12-26 RX ORDER — LOSARTAN POTASSIUM AND HYDROCHLOROTHIAZIDE 12.5; 1 MG/1; MG/1
TABLET ORAL
Qty: 90 TABLET | Refills: 0 | Status: SHIPPED | OUTPATIENT
Start: 2017-12-26 | End: 2018-03-29 | Stop reason: SDUPTHER

## 2018-01-04 ENCOUNTER — HOSPITAL ENCOUNTER (OUTPATIENT)
Dept: RADIOLOGY | Facility: HOSPITAL | Age: 58
Discharge: HOME | End: 2018-01-04
Attending: ANESTHESIOLOGY
Payer: COMMERCIAL

## 2018-01-04 ENCOUNTER — OFFICE VISIT (OUTPATIENT)
Dept: PAIN MEDICINE | Facility: CLINIC | Age: 58
End: 2018-01-04
Payer: COMMERCIAL

## 2018-01-04 VITALS
SYSTOLIC BLOOD PRESSURE: 145 MMHG | HEIGHT: 62 IN | HEART RATE: 102 BPM | DIASTOLIC BLOOD PRESSURE: 98 MMHG | WEIGHT: 209 LBS | BODY MASS INDEX: 38.46 KG/M2

## 2018-01-04 DIAGNOSIS — M54.6 CHRONIC MIDLINE THORACIC BACK PAIN: ICD-10-CM

## 2018-01-04 DIAGNOSIS — G89.29 CHRONIC MIDLINE THORACIC BACK PAIN: ICD-10-CM

## 2018-01-04 DIAGNOSIS — M47.816 LUMBAR SPONDYLOSIS: ICD-10-CM

## 2018-01-04 DIAGNOSIS — M47.816 LUMBAR FACET ARTHROPATHY: ICD-10-CM

## 2018-01-04 DIAGNOSIS — M54.16 LUMBAR RADICULOPATHY: ICD-10-CM

## 2018-01-04 DIAGNOSIS — M47.816 LUMBAR SPONDYLOSIS: Primary | ICD-10-CM

## 2018-01-04 PROCEDURE — 72114 X-RAY EXAM L-S SPINE BENDING: CPT | Mod: 26,FY,, | Performed by: RADIOLOGY

## 2018-01-04 PROCEDURE — 99213 OFFICE O/P EST LOW 20 MIN: CPT | Mod: S$GLB,,, | Performed by: ANESTHESIOLOGY

## 2018-01-04 PROCEDURE — 72114 X-RAY EXAM L-S SPINE BENDING: CPT | Mod: TC,FY,PO

## 2018-01-04 PROCEDURE — 99999 PR PBB SHADOW E&M-EST. PATIENT-LVL III: CPT | Mod: PBBFAC,,, | Performed by: ANESTHESIOLOGY

## 2018-01-04 PROCEDURE — 72070 X-RAY EXAM THORAC SPINE 2VWS: CPT | Mod: TC,FY,PO

## 2018-01-04 PROCEDURE — 72070 X-RAY EXAM THORAC SPINE 2VWS: CPT | Mod: 26,FY,, | Performed by: RADIOLOGY

## 2018-01-04 RX ORDER — HYDROCODONE BITARTRATE AND ACETAMINOPHEN 10; 325 MG/1; MG/1
TABLET ORAL
Qty: 20 TABLET | Refills: 0 | Status: SHIPPED | OUTPATIENT
Start: 2018-01-04 | End: 2018-02-28

## 2018-01-04 NOTE — PROGRESS NOTES
Chief Pain Complaint:  Low-back Pain    Interval History: The patient was last seen 11/6/2017. At that visit the plan was to obtain an lumbar MRI and provide medical management with Flexeril 10 mg PO TID PRN (90 tabs) and increase Norco 5/325 mg PO TID PRN to [place] 10/325 mg PO TID PRN (40 tabs) as a bridge to injection. Patient has not obtained lumbar MRI and cancelled her injections. The patient reports that  she is/was unchanged following the last visit.    Interval History: The patient was last seen 10/23/2017. At that visit the plan was to continue Norco 5/325 mg PO TID PRN and schedule patient for TFESI on 11/17.  The patient reports that she is/was worse following the last visit. Her pain is now 9-10/10 and the Norco 5/325 mg are providing minimal relief. No over sedation, resp depression, N/V.       History of Present Illness:   Miles Bowen is a 57 y.o. female  who is presenting with a chief complaint of Low-back Pain  . The patient began experiencing this problem abruptly, and the pain has been gradually worsening over the past 3 months. The pain is described as throbbing, shooting, burning and electrical and is located in the bilateral lumbar spine. Pain is constant and lasts hours. The pain radiates to bilateral lower extremity R>L. Pain 50% axial and 50% radicular. The patient rates her pain a 8 out of ten and interferes with activities of daily living a 7 out of ten. Pain is exacerbated by flexion and extension of the lumbar spine, and is improved by rest. Patient reports no prior trauma, no prior spinal surgery     - pertinent negatives: No fever, No chills, No weight loss, No bladder dysfunction, No bowel dysfunction No saddle anesthesia  - pertinent positives: generalized nonspecific Lower Extremity weakness bilaterally    - medications, other therapies tried (physical therapy, injections):     >> NSAIDs, Tylenol, Norco and flexeril Gabapentin (did not tolerate)    >> Has NOT previously  "undergone Physical Therapy    >> Has previously undergone spinal injection/s      Imaging / Labs / Studies (reviewed on 1/7/2018):  Comparison: None    Technique: AP and lateral views were obtained of the thoracic spine    Findings: There is a chronic compression fracture deformity involving the T11 vertebral body with evidence of prior vertebroplasty.  There is also mild superior endplate compression deformity of the T12 vertebral body which is likely chronic in nature.  Confirmation could be obtained with MRI as clinically warranted.  Vertebral body heights at the remaining levels are preserved. Mild multilevel spondylosis noted throughout the mid and lower thoracic spine with preserved disc heights. Posterior elements appear grossly intact.   The remaining visualized osseous and soft tissue structures demonstrate no appreciable abnormality.   Impression       As above.           Review of Systems:  CONSTITUTIONAL: patient denies any fever, chills, or weight loss  SKIN: patient denies any rash or itching  RESPIRATORY: patient denies having any shortness of breath  GASTROINTESTINAL: patient denies having any diarrhea, constipation, or bowel incontinence  GENITOURINARY: patient denies having any abnormal bladder function    MUSCULOSKELETAL:  - patient complains of the above noted pain/s (see chief pain complaint)    NEUROLOGICAL:   - pain as above  - strength in Lower extremities is decreased, BILATERALLY R>L  - sensation in Lower extremities is intact, BILATERALLY  - patient denies any loss of bowel or bladder control      PSYCHIATRIC: patient denies any change in mood    Other:  All other systems reviewed and are negative      Physical Exam:  BP (!) 145/98 (BP Location: Right arm, Patient Position: Sitting, BP Method: Medium (Automatic))   Pulse 102   Ht 5' 2" (1.575 m)   Wt 94.8 kg (209 lb)   BMI 38.23 kg/m²  (reviewed on 1/7/2018)  General: Alert and oriented, in no apparent distress.  Gait: normal " gait.  Skin: No rashes, No discoloration, No obvious lesions  HEENT: Normocephalic, atraumatic. Pupils equal and round.  Cardiovascular: Regular rate and rhythm , no significant peripheral edema present  Respiratory: Without audible wheezing, without use of accessory muscles of respiration.    Musculoskeletal:    No pain on palpation of the Thoracic Spine    Lumbar Spine    - Pain on flexion of lumbar spine Present  - Straight Leg Raise:  Present on Right    - Pain on extension of lumbar spine Present  - TTP over the lumbar facet joints Present L5-S1  - Lumbar facet loading Present     -Pain on palpation over the SI joint  Absent  - PACO: Absent      Neuro:    Strength:    LE R/L: HF: 4/5, HE: 5/5, KF: 4/5; KE: 4/5; FE: 4/5; FF: 4/5    Extremity Reflexes: Brisk and symmetric throughout.      Extremity Sensory: Sensation to pinprick and temperature symmetric. Proprioception intact.      Psych:  Mood and affect is appropriate      Assessment:    Miles Bowen is a 57 y.o. year old female who is presenting with     Encounter Diagnoses   Name Primary?    Lumbar spondylosis Yes    Chronic midline thoracic back pain     Lumbar facet arthropathy     Lumbar radiculopathy        Plan:    1. Interventional: Consider bilateral Lumbar L3, L4, L5 MBB or bilateral L5-S1 TFESI depending if patient's pain is more axial or radicular. Patient does not want to undergo any injections at this time.    2. Pharmacologic: Patient with acute exacerbation of back pain will prescribe Norco 10/325 mg PO TID PRN (20 tabs). Discouraged the use of opioids.  checked.  Patient did not tolerate Gabapentin in the past.  Patient with allergy to NSAID's.    3. Rehabilitative: Encouraged PT but patient is not interested as this time.     4. Diagnostic: Lumbar and Thoracic Xray. Patient cancelled lumbar MRI. Encouraged patient to obtain lumbar MRI.     5. Follow up: PRN.     30 minutes were spent in this encounter with more than 50% of the  time used for counseling and review of the plan.  Imaging / studies reviewed, detailed above.  I discussed in detail the risks, benefits, and alternatives to any and all potential treatment options.  All questions and concerns were fully addressed today in clinic. Medical decision making moderate.    Thank you for the opportunity to assist in the care of this patient.    Best wishes,    Signed:    Sonny Saravia MD          Disclaimer:  This note may have been prepared using voice recognition software, it may have not been extensively proofed, as such there could be errors within the text such as sound alike errors.

## 2018-01-15 ENCOUNTER — TELEPHONE (OUTPATIENT)
Dept: INTERNAL MEDICINE | Facility: CLINIC | Age: 58
End: 2018-01-15

## 2018-01-15 NOTE — TELEPHONE ENCOUNTER
Pt stated the packing has come out of her infection and has formed a weird scab.  Advised pt to keep appt for 1/16/18 and to continue antibiotic as prescribed at [facility].  Pt verbalized understanding.

## 2018-01-15 NOTE — TELEPHONE ENCOUNTER
----- Message from Key Aleman sent at 1/15/2018 11:23 AM CST -----  Contact: pt  States she would like to speak to the nurse regarding an ER f/u. States she went to [facility] ER for a staph infection on her face, it was lanced and packed. Still painful & oozing  Puss. Please call pt at [phone]. Thank you

## 2018-01-16 ENCOUNTER — OFFICE VISIT (OUTPATIENT)
Dept: INTERNAL MEDICINE | Facility: CLINIC | Age: 58
End: 2018-01-16
Payer: COMMERCIAL

## 2018-01-16 VITALS
WEIGHT: 208.56 LBS | HEART RATE: 87 BPM | OXYGEN SATURATION: 98 % | DIASTOLIC BLOOD PRESSURE: 86 MMHG | BODY MASS INDEX: 38.38 KG/M2 | TEMPERATURE: 98 F | HEIGHT: 62 IN | SYSTOLIC BLOOD PRESSURE: 146 MMHG

## 2018-01-16 DIAGNOSIS — L02.92 FURUNCLE: Primary | ICD-10-CM

## 2018-01-16 DIAGNOSIS — K21.9 GASTROESOPHAGEAL REFLUX DISEASE, ESOPHAGITIS PRESENCE NOT SPECIFIED: ICD-10-CM

## 2018-01-16 PROCEDURE — 99213 OFFICE O/P EST LOW 20 MIN: CPT | Mod: S$GLB,,, | Performed by: INTERNAL MEDICINE

## 2018-01-16 PROCEDURE — 99999 PR PBB SHADOW E&M-EST. PATIENT-LVL IV: CPT | Mod: PBBFAC,,, | Performed by: INTERNAL MEDICINE

## 2018-01-16 RX ORDER — CYCLOBENZAPRINE HCL 10 MG
TABLET ORAL
Qty: 90 TABLET | Refills: 0 | Status: SHIPPED | OUTPATIENT
Start: 2018-01-16 | End: 2018-02-15 | Stop reason: SDUPTHER

## 2018-01-16 RX ORDER — PANTOPRAZOLE SODIUM 20 MG/1
TABLET, DELAYED RELEASE ORAL
Refills: 0 | COMMUNITY
Start: 2017-12-26 | End: 2018-01-16 | Stop reason: DRUGHIGH

## 2018-01-16 RX ORDER — CLINDAMYCIN HYDROCHLORIDE 300 MG/1
300 CAPSULE ORAL 4 TIMES DAILY
COMMUNITY
End: 2018-02-28

## 2018-01-16 RX ORDER — MUPIROCIN 20 MG/G
OINTMENT TOPICAL 3 TIMES DAILY
COMMUNITY
End: 2018-02-28

## 2018-01-16 RX ORDER — DIAZEPAM 5 MG/1
TABLET ORAL
Refills: 0 | COMMUNITY
Start: 2018-01-08 | End: 2018-08-17

## 2018-01-18 ENCOUNTER — PATIENT MESSAGE (OUTPATIENT)
Dept: PAIN MEDICINE | Facility: CLINIC | Age: 58
End: 2018-01-18

## 2018-01-18 DIAGNOSIS — R11.0 NAUSEA: ICD-10-CM

## 2018-01-19 RX ORDER — PROMETHAZINE HYDROCHLORIDE 25 MG/1
TABLET ORAL
Qty: 30 TABLET | Refills: 0 | Status: SHIPPED | OUTPATIENT
Start: 2018-01-19 | End: 2018-02-26 | Stop reason: SDUPTHER

## 2018-01-21 RX ORDER — PANTOPRAZOLE SODIUM 40 MG/1
40 TABLET, DELAYED RELEASE ORAL DAILY
Qty: 90 TABLET | Refills: 0 | Status: SHIPPED | OUTPATIENT
Start: 2018-01-21 | End: 2018-04-05 | Stop reason: SDUPTHER

## 2018-01-21 NOTE — PROGRESS NOTES
"Subjective:      Patient ID: Miles Bowen is a 57 y.o. female.    Chief Complaint: Recurrent Skin Infections (chin)    56 yo with Patient Active Problem List:     Hypertension     Depression     Anxiety     Hematemesis without nausea     GERD (gastroesophageal reflux disease)     Hiatal hernia     DDD (degenerative disc disease), lumbar     Common migraine     Allergic rhinitis     IBS (irritable bowel syndrome)     Multinodular goiter     MVP (mitral valve prolapse)     Nasal polyps     Obesity    Past Medical History:  No date: Anxiety  No date: Depression  No date: GERD (gastroesophageal reflux disease)  No date: Heart murmur  No date: Hypertension  12/23/2016: Multinodular goiter  No date: MVP (mitral valve prolapse)  No date: Obesity    Here today reporting noticing sig enlargement and ttp to a zit on her left chin. Had I & D Sunday.at [facility]. Packing fell out on yesterday with no sig puss expressed. She is complaint with abx. There has been slight improvement in size and ttp. No d/c today. No fever. Tylenol helping some with pain. She also requests refill no ppi.       Review of Systems   Constitutional: Negative for chills, diaphoresis and fever.   HENT: Negative for congestion.    Respiratory: Negative for cough, shortness of breath and wheezing.    Cardiovascular: Negative for chest pain and palpitations.   Gastrointestinal: Negative for abdominal pain.   Psychiatric/Behavioral: Negative for confusion.     Objective:   BP (!) 146/86 (BP Location: Right arm, Patient Position: Sitting)   Pulse 87   Temp 98 °F (36.7 °C) (Tympanic)   Ht 5' 2" (1.575 m)   Wt 94.6 kg (208 lb 8.9 oz)   SpO2 98%   BMI 38.15 kg/m²     Physical Exam   Constitutional: She appears well-developed and well-nourished. No distress.   HENT:   Head: Atraumatic.       Right Ear: External ear normal.   Eyes: Conjunctivae and EOM are normal.   Neck: Neck supple.   Cardiovascular: Normal rate and regular rhythm.  "   Pulmonary/Chest: Effort normal.   Musculoskeletal: She exhibits no edema.   Lymphadenopathy:     She has no cervical adenopathy.   Neurological: She is alert.   Skin: Skin is warm and dry.   Psychiatric: She has a normal mood and affect. Her behavior is normal.       Assessment:     1. Furuncle    2. Gastroesophageal reflux disease, esophagitis presence not specified      Plan:   Furuncle    Gastroesophageal reflux disease, esophagitis presence not specified  -     pantoprazole (PROTONIX) 40 MG tablet; Take 1 tablet (40 mg total) by mouth once daily.  Dispense: 90 tablet; Refill: 0    complete abx. Stressed warm compresses. See provider if pain or size increase as may need repeat I&D.  ER for any other worsening symptoms.     Lab Frequency Next Occurrence   X-Ray Sinuses Min 3 Views Once 05/18/2017   T3 Once 08/14/2017   US Soft Tissue Head Neck Thyroid Once 08/30/2017   X-Ray Sinuses Min 3 Views Once 09/07/2017   X-Ray Chest PA And Lateral Once 09/07/2017   X-Ray Lumbar Spine Complete 5 View Once 10/16/2017   IR Epidural Transfor Inj Ea Add Lumb BilLumbar Bilat Once 10/30/2017   IR Epidural Transfor 1st Vert Lumbar Ernesto Once 10/30/2017   Mammo Digital Screening Bilat with CAD Once 11/10/2017       Problem List Items Addressed This Visit        GI    GERD (gastroesophageal reflux disease)      Other Visit Diagnoses     Furuncle    -  Primary          No Follow-up on file.

## 2018-01-26 ENCOUNTER — TELEPHONE (OUTPATIENT)
Dept: RADIOLOGY | Facility: HOSPITAL | Age: 58
End: 2018-01-26

## 2018-02-15 RX ORDER — CYCLOBENZAPRINE HCL 10 MG
TABLET ORAL
Qty: 90 TABLET | Refills: 0 | Status: SHIPPED | OUTPATIENT
Start: 2018-02-15 | End: 2018-03-26 | Stop reason: SDUPTHER

## 2018-02-26 DIAGNOSIS — R11.0 NAUSEA: ICD-10-CM

## 2018-02-26 RX ORDER — PROMETHAZINE HYDROCHLORIDE 25 MG/1
TABLET ORAL
Qty: 30 TABLET | Refills: 0 | Status: SHIPPED | OUTPATIENT
Start: 2018-02-26 | End: 2018-04-25 | Stop reason: SDUPTHER

## 2018-02-28 ENCOUNTER — OFFICE VISIT (OUTPATIENT)
Dept: INTERNAL MEDICINE | Facility: CLINIC | Age: 58
End: 2018-02-28
Payer: COMMERCIAL

## 2018-02-28 VITALS
DIASTOLIC BLOOD PRESSURE: 92 MMHG | BODY MASS INDEX: 37.69 KG/M2 | SYSTOLIC BLOOD PRESSURE: 152 MMHG | OXYGEN SATURATION: 97 % | HEIGHT: 62 IN | WEIGHT: 204.81 LBS | TEMPERATURE: 97 F | HEART RATE: 111 BPM

## 2018-02-28 DIAGNOSIS — J32.9 SINUSITIS, UNSPECIFIED CHRONICITY, UNSPECIFIED LOCATION: Primary | ICD-10-CM

## 2018-02-28 DIAGNOSIS — R05.9 COUGH: ICD-10-CM

## 2018-02-28 PROCEDURE — 99999 PR PBB SHADOW E&M-EST. PATIENT-LVL III: CPT | Mod: PBBFAC,,, | Performed by: INTERNAL MEDICINE

## 2018-02-28 PROCEDURE — 99214 OFFICE O/P EST MOD 30 MIN: CPT | Mod: S$GLB,,, | Performed by: INTERNAL MEDICINE

## 2018-02-28 RX ORDER — PROMETHAZINE HYDROCHLORIDE AND CODEINE PHOSPHATE 6.25; 1 MG/5ML; MG/5ML
5 SOLUTION ORAL EVERY 8 HOURS
Qty: 150 ML | Refills: 0 | Status: SHIPPED | OUTPATIENT
Start: 2018-02-28 | End: 2018-08-07 | Stop reason: SDUPTHER

## 2018-02-28 RX ORDER — DOXYCYCLINE HYCLATE 100 MG
100 TABLET ORAL EVERY 12 HOURS
Qty: 14 TABLET | Refills: 0 | Status: SHIPPED | OUTPATIENT
Start: 2018-02-28 | End: 2018-03-07

## 2018-02-28 RX ORDER — MOMETASONE FUROATE 50 UG/1
2 SPRAY, METERED NASAL DAILY
Qty: 17 G | Refills: 2 | Status: SHIPPED | OUTPATIENT
Start: 2018-02-28 | End: 2018-03-30

## 2018-02-28 NOTE — PROGRESS NOTES
"Subjective:      Patient ID: Miles Bowen is a 57 y.o. female.    Chief Complaint: Cough and Headache    56 yo with Patient Active Problem List:     Hypertension     Depression     Anxiety     Hematemesis without nausea     GERD (gastroesophageal reflux disease)     Hiatal hernia     DDD (degenerative disc disease), lumbar     Common migraine     Allergic rhinitis     IBS (irritable bowel syndrome)     Multinodular goiter     MVP (mitral valve prolapse)     Nasal polyps     Obesity    Past Medical History:  No date: Anxiety  No date: Depression  No date: GERD (gastroesophageal reflux disease)  No date: Heart murmur  No date: Hypertension  12/23/2016: Multinodular goiter  No date: MVP (mitral valve prolapse)  No date: Obesity    Here today c/o sinus problem and cough.      Sinusitis   This is a new problem. The current episode started 1 to 4 weeks ago. The problem has been gradually worsening since onset. There has been no fever. The pain is moderate. Associated symptoms include chills, congestion, coughing (green post nasal drip) and sinus pressure. Pertinent negatives include no ear pain, headaches, hoarse voice, shortness of breath, sneezing or sore throat. Treatments tried: saline nasal spray. The treatment provided no relief.     Review of Systems   Constitutional: Positive for chills.   HENT: Positive for congestion and sinus pressure. Negative for ear pain, hoarse voice, sneezing and sore throat.    Respiratory: Positive for cough (green post nasal drip). Negative for shortness of breath.    Neurological: Negative for headaches.     Objective:   BP (!) 152/92 (BP Location: Right arm, Patient Position: Sitting)   Pulse (!) 111   Temp 97.3 °F (36.3 °C) (Tympanic)   Ht 5' 2" (1.575 m)   Wt 92.9 kg (204 lb 12.9 oz)   SpO2 97%   BMI 37.46 kg/m²     Physical Exam   Constitutional: She appears well-developed and well-nourished. No distress.   HENT:   Head: Normocephalic and atraumatic.   Right Ear: " Tympanic membrane normal.   Left Ear: Tympanic membrane normal.   Nose: Mucosal edema and rhinorrhea present. Right sinus exhibits maxillary sinus tenderness. Right sinus exhibits no frontal sinus tenderness. Left sinus exhibits maxillary sinus tenderness. Left sinus exhibits no frontal sinus tenderness.   Mouth/Throat: Posterior oropharyngeal erythema present. No oropharyngeal exudate or posterior oropharyngeal edema.   Eyes: Conjunctivae and EOM are normal. Pupils are equal, round, and reactive to light.   Cardiovascular: Normal rate and regular rhythm.    Pulmonary/Chest: Effort normal.   Musculoskeletal: She exhibits no edema.   Lymphadenopathy:     She has no cervical adenopathy.   Neurological: She is alert.   Skin: Skin is warm and dry.   Psychiatric: She has a normal mood and affect. Her behavior is normal.       Assessment:     1. Sinusitis, unspecified chronicity, unspecified location    2. Cough      Plan:   Sinusitis, unspecified chronicity, unspecified location  -     doxycycline (VIBRA-TABS) 100 MG tablet; Take 1 tablet (100 mg total) by mouth every 12 (twelve) hours.  Dispense: 14 tablet; Refill: 0  -     mometasone (NASONEX) 50 mcg/actuation nasal spray; 2 sprays by Nasal route once daily.  Dispense: 17 g; Refill: 2    Cough  -     promethazine-codeine 6.25-10 mg/5 ml (PHENERGAN WITH CODEINE) 6.25-10 mg/5 mL syrup; Take 5 mLs by mouth every 8 (eight) hours. Prn cough  Dispense: 150 mL; Refill: 0        Lab Frequency Next Occurrence   X-Ray Sinuses Min 3 Views Once 05/18/2017   T3 Once 08/14/2017   US Soft Tissue Head Neck Thyroid Once 08/30/2017   X-Ray Sinuses Min 3 Views Once 09/07/2017   X-Ray Chest PA And Lateral Once 09/07/2017   X-Ray Lumbar Spine Complete 5 View Once 10/16/2017   IR Epidural Transfor Inj Ea Add Lumb BilLumbar Bilat Once 10/30/2017   IR Epidural Transfor 1st Vert Lumbar Ernesto Once 10/30/2017   Mammo Digital Screening Bilat with CAD Once 11/10/2017       Problem List Items  Addressed This Visit     None      Visit Diagnoses     Sinusitis, unspecified chronicity, unspecified location    -  Primary    Relevant Medications    doxycycline (VIBRA-TABS) 100 MG tablet    mometasone (NASONEX) 50 mcg/actuation nasal spray    Cough        Relevant Medications    promethazine-codeine 6.25-10 mg/5 ml (PHENERGAN WITH CODEINE) 6.25-10 mg/5 mL syrup          Follow-up if symptoms worsen or fail to improve.

## 2018-03-13 ENCOUNTER — OFFICE VISIT (OUTPATIENT)
Dept: INTERNAL MEDICINE | Facility: CLINIC | Age: 58
End: 2018-03-13
Payer: COMMERCIAL

## 2018-03-13 ENCOUNTER — TELEPHONE (OUTPATIENT)
Dept: INTERNAL MEDICINE | Facility: CLINIC | Age: 58
End: 2018-03-13

## 2018-03-13 VITALS
DIASTOLIC BLOOD PRESSURE: 88 MMHG | HEIGHT: 62 IN | SYSTOLIC BLOOD PRESSURE: 124 MMHG | TEMPERATURE: 97 F | WEIGHT: 202.81 LBS | OXYGEN SATURATION: 99 % | BODY MASS INDEX: 37.32 KG/M2 | HEART RATE: 88 BPM

## 2018-03-13 DIAGNOSIS — J04.0 LARYNGITIS, ACUTE: ICD-10-CM

## 2018-03-13 DIAGNOSIS — J02.9 SORE THROAT: Primary | ICD-10-CM

## 2018-03-13 DIAGNOSIS — J30.9 CHRONIC ALLERGIC RHINITIS, UNSPECIFIED SEASONALITY, UNSPECIFIED TRIGGER: ICD-10-CM

## 2018-03-13 LAB — DEPRECATED S PYO AG THROAT QL EIA: NEGATIVE

## 2018-03-13 PROCEDURE — 99999 PR PBB SHADOW E&M-EST. PATIENT-LVL IV: CPT | Mod: PBBFAC,,, | Performed by: FAMILY MEDICINE

## 2018-03-13 PROCEDURE — 3079F DIAST BP 80-89 MM HG: CPT | Mod: CPTII,S$GLB,, | Performed by: FAMILY MEDICINE

## 2018-03-13 PROCEDURE — 99214 OFFICE O/P EST MOD 30 MIN: CPT | Mod: S$GLB,,, | Performed by: FAMILY MEDICINE

## 2018-03-13 PROCEDURE — 87880 STREP A ASSAY W/OPTIC: CPT | Mod: PO

## 2018-03-13 PROCEDURE — 87081 CULTURE SCREEN ONLY: CPT

## 2018-03-13 PROCEDURE — 3074F SYST BP LT 130 MM HG: CPT | Mod: CPTII,S$GLB,, | Performed by: FAMILY MEDICINE

## 2018-03-13 NOTE — TELEPHONE ENCOUNTER
----- Message from Vale Bergeron sent at 3/13/2018  1:10 PM CDT -----  Contact: pt  She's calling in regards to speak with nurse concerning medication pls call pt back at [phone] (home)

## 2018-03-13 NOTE — TELEPHONE ENCOUNTER
----- Message from Rhett Cabrera sent at 3/13/2018  8:10 AM CDT -----  Contact: pt  Call pt at [phone] (home)   Pt has possible strep throat wants to see if she can be worked in today. Pt does not want to see anyone but Dr. Paul.

## 2018-03-13 NOTE — TELEPHONE ENCOUNTER
Pt c/o sore throat and loss of voice.  Requested appt with only Dr. Paul.  Notified pt that Dr. Paul does not have any availability today.  Scheduled pt at 11:20 am with Dr. Herrera.

## 2018-03-13 NOTE — PROGRESS NOTES
Subjective:   Patient ID: Miles Bowen is a 57 y.o. female.  Chief Complaint:  Sore Throat      PCP Dr. Paul.  Presents relation sore throat.  One-day duration.  Treated 3 weeks ago with doxycycline for sinusitis.  Symptoms 100% resolved.  Denies any recent sick contacts.  No cough, but has lost voice with this episode.  History ALLERGIC rhinitis.  On nasal steroid.  Unable to tolerate antihistamines or Singulair.      Sore Throat    This is a new problem. The current episode started yesterday. The problem has been unchanged. Neither side of throat is experiencing more pain than the other. There has been no fever. The pain is at a severity of 5/10. The pain is moderate. Associated symptoms include congestion, ear pain, a hoarse voice, swollen glands and trouble swallowing. Pertinent negatives include no abdominal pain, coughing, diarrhea, drooling, ear discharge, headaches, plugged ear sensation, neck pain, shortness of breath, stridor or vomiting. She has had no exposure to strep or mono. She has tried nothing for the symptoms.     Review of Systems   Constitutional: Positive for fatigue. Negative for chills and fever.   HENT: Positive for congestion, ear pain, hoarse voice, rhinorrhea, sore throat and trouble swallowing. Negative for dental problem, drooling, ear discharge, postnasal drip, sinus pressure and sneezing.    Eyes: Negative for visual disturbance.   Respiratory: Negative for cough, chest tightness, shortness of breath, wheezing and stridor.    Cardiovascular: Negative for chest pain.   Gastrointestinal: Negative for abdominal pain, diarrhea, nausea and vomiting.   Genitourinary: Negative for difficulty urinating.   Musculoskeletal: Negative for myalgias and neck pain.   Skin: Negative for rash.   Neurological: Negative for dizziness and headaches.   Hematological: Negative for adenopathy.     Objective:   /88 (BP Location: Right arm, Patient Position: Sitting, BP Method: Large (Manual))   " Pulse 88   Temp 97.1 °F (36.2 °C) (Tympanic)   Ht 5' 2" (1.575 m)   Wt 92 kg (202 lb 13.2 oz)   SpO2 99%   BMI 37.10 kg/m²     Physical Exam   Constitutional: Vital signs are normal. She appears well-developed and well-nourished.  Non-toxic appearance. She does not have a sickly appearance. She does not appear ill. No distress.   Significant loss of voice   HENT:   Right Ear: Hearing, tympanic membrane, external ear and ear canal normal.   Left Ear: Hearing, tympanic membrane, external ear and ear canal normal.   Nose: Mucosal edema and rhinorrhea present. Right sinus exhibits no maxillary sinus tenderness and no frontal sinus tenderness. Left sinus exhibits no maxillary sinus tenderness and no frontal sinus tenderness.   Mouth/Throat: Uvula is midline and mucous membranes are normal. Posterior oropharyngeal edema and posterior oropharyngeal erythema present. No oropharyngeal exudate. No tonsillar exudate.   Eyes: Conjunctivae are normal.   Cardiovascular: Normal rate, regular rhythm and normal heart sounds.    Pulmonary/Chest: Effort normal and breath sounds normal. She has no wheezes. She has no rhonchi. She has no rales.   Lymphadenopathy:     She has no cervical adenopathy.   Skin: No rash noted.   Nursing note and vitals reviewed.    Assessment:     1. Sore throat    2. Laryngitis, acute    3. Chronic allergic rhinitis, unspecified seasonality, unspecified trigger      Plan:   Sore throat  Laryngitis, acute  -     (Magic mouthwash) 1:1:1 Benadryl 12.5mg/5ml liq, aluminum & magnesium hydroxide-simehticone (Maalox), lidocaine viscous 2%; Swish and spit 5 mLs every 4 (four) hours as needed.  Dispense: 90 mL; Refill: 0  -     Throat Screen, Rapid  Discussed viral etiology of laryngitis  Steroid Shot not an option due to previous palpitations from steroids.  Magic mouthwash for pain/discomfort.  Increase Fluids and use hard candy/throat lozenges  Take Tylenol as needed for fever  [place] with antibiotics if " strep positive.    Chronic allergic rhinitis, unspecified seasonality, unspecified trigger  Continue nasal steroid.  No single layer and histamine based on previous adverse affects.    Follow-up Dr. Paul as scheduled.

## 2018-03-14 ENCOUNTER — TELEPHONE (OUTPATIENT)
Dept: INTERNAL MEDICINE | Facility: CLINIC | Age: 58
End: 2018-03-14

## 2018-03-14 NOTE — TELEPHONE ENCOUNTER
Pt was seen by Dr. Herrera on 3/14/18 for sore throat and was prescribed Magic Mouthwash.  Pt stated mouthwash only relieves throat pain for an hour at a time and she was up all night with pain.  Stated she started this morning with nasal congestion and post nasal drip.  Is unable to take antihistamines due to previous adverse reaction.  Stated she cannot afford Nasonex and Flonase doesn't help.  Stated Cepacol doesn't help.  Pt wants a prescription of something to help her feel better.  Notified pt that antibiotic will not be prescribed yet because strep swab was negative and culture is still pending.

## 2018-03-14 NOTE — TELEPHONE ENCOUNTER
----- Message from Mariza Saab sent at 3/14/2018 10:57 AM CDT -----  Contact: Patient  Patient returned call. She can be contacted at [phone].    Thanks,  Mariza

## 2018-03-15 NOTE — TELEPHONE ENCOUNTER
----- Message from Anatoliy Herrera MD sent at 3/15/2018  9:16 AM CDT -----  Throat culture negative. No strep detected.

## 2018-03-16 LAB — BACTERIA THROAT CULT: NORMAL

## 2018-03-26 DIAGNOSIS — R11.0 NAUSEA: ICD-10-CM

## 2018-03-26 RX ORDER — CYCLOBENZAPRINE HCL 10 MG
TABLET ORAL
Qty: 90 TABLET | Refills: 0 | Status: SHIPPED | OUTPATIENT
Start: 2018-03-26 | End: 2018-05-07 | Stop reason: SDUPTHER

## 2018-03-26 RX ORDER — PROMETHAZINE HYDROCHLORIDE 25 MG/1
TABLET ORAL
Qty: 30 TABLET | Refills: 0 | Status: SHIPPED | OUTPATIENT
Start: 2018-03-26 | End: 2018-04-18 | Stop reason: SDUPTHER

## 2018-03-29 DIAGNOSIS — I10 ESSENTIAL HYPERTENSION: ICD-10-CM

## 2018-03-29 RX ORDER — LOSARTAN POTASSIUM AND HYDROCHLOROTHIAZIDE 12.5; 1 MG/1; MG/1
TABLET ORAL
Qty: 90 TABLET | Refills: 0 | Status: SHIPPED | OUTPATIENT
Start: 2018-03-29 | End: 2018-06-28 | Stop reason: SDUPTHER

## 2018-04-05 DIAGNOSIS — K21.9 GASTROESOPHAGEAL REFLUX DISEASE, ESOPHAGITIS PRESENCE NOT SPECIFIED: ICD-10-CM

## 2018-04-05 RX ORDER — PANTOPRAZOLE SODIUM 40 MG/1
40 TABLET, DELAYED RELEASE ORAL DAILY
Qty: 90 TABLET | Refills: 0 | Status: SHIPPED | OUTPATIENT
Start: 2018-04-05 | End: 2018-06-04 | Stop reason: SDUPTHER

## 2018-04-09 ENCOUNTER — TELEPHONE (OUTPATIENT)
Dept: INTERNAL MEDICINE | Facility: CLINIC | Age: 58
End: 2018-04-09

## 2018-04-09 ENCOUNTER — TELEPHONE (OUTPATIENT)
Dept: ALLERGY | Facility: CLINIC | Age: 58
End: 2018-04-09

## 2018-04-09 NOTE — TELEPHONE ENCOUNTER
She sent a message today.  I returned her call as requested on April 9.  In the past there've been many medications which she did not tolerate.  At this time she is experiencing symptoms consistent with ALLERGIC conjunctivitis.  We reviewed guidelines for using artificial tears.  I suggested she consider keeping artificial tears in her refrigerator and using shoulder artificial tears as needed for relief of symptoms.  I reviewed with her the use of cromolyn eyedrops and also in eyedrops such as Zaditor.  Should she not improve or have other questions or concerns she may call.    This note was dictated using voice recognition software and may contain errors

## 2018-04-09 NOTE — TELEPHONE ENCOUNTER
----- Message from Kelly Alonso sent at 4/9/2018  8:48 AM CDT -----  Contact: Patient   Patient would like a call back at regards to some problems that she is having, Please call her at [phone].    Thanks  td

## 2018-04-09 NOTE — TELEPHONE ENCOUNTER
----- Message from Carlitos Samaniego sent at 4/9/2018 11:24 AM CDT -----  Contact: Pt  Please give pt a call at ..[phone] (home) regarding her left eye twitching

## 2018-04-18 ENCOUNTER — TELEPHONE (OUTPATIENT)
Dept: INTERNAL MEDICINE | Facility: CLINIC | Age: 58
End: 2018-04-18

## 2018-04-18 ENCOUNTER — OFFICE VISIT (OUTPATIENT)
Dept: INTERNAL MEDICINE | Facility: CLINIC | Age: 58
End: 2018-04-18
Payer: COMMERCIAL

## 2018-04-18 VITALS
HEART RATE: 92 BPM | DIASTOLIC BLOOD PRESSURE: 84 MMHG | HEIGHT: 62 IN | OXYGEN SATURATION: 97 % | TEMPERATURE: 97 F | BODY MASS INDEX: 38.67 KG/M2 | WEIGHT: 210.13 LBS | SYSTOLIC BLOOD PRESSURE: 138 MMHG

## 2018-04-18 DIAGNOSIS — R05.9 COUGH: ICD-10-CM

## 2018-04-18 DIAGNOSIS — L98.9 SKIN LESION: ICD-10-CM

## 2018-04-18 DIAGNOSIS — J32.9 SINUSITIS, UNSPECIFIED CHRONICITY, UNSPECIFIED LOCATION: Primary | ICD-10-CM

## 2018-04-18 PROCEDURE — 3079F DIAST BP 80-89 MM HG: CPT | Mod: CPTII,S$GLB,, | Performed by: INTERNAL MEDICINE

## 2018-04-18 PROCEDURE — 99214 OFFICE O/P EST MOD 30 MIN: CPT | Mod: S$GLB,,, | Performed by: INTERNAL MEDICINE

## 2018-04-18 PROCEDURE — 3075F SYST BP GE 130 - 139MM HG: CPT | Mod: CPTII,S$GLB,, | Performed by: INTERNAL MEDICINE

## 2018-04-18 PROCEDURE — 99999 PR PBB SHADOW E&M-EST. PATIENT-LVL III: CPT | Mod: PBBFAC,,, | Performed by: INTERNAL MEDICINE

## 2018-04-18 RX ORDER — GUAIFENESIN 600 MG/1
600 TABLET, EXTENDED RELEASE ORAL
COMMUNITY
End: 2018-08-07

## 2018-04-18 RX ORDER — TEMAZEPAM 15 MG/1
1 CAPSULE ORAL NIGHTLY
Refills: 0 | COMMUNITY
Start: 2018-04-08 | End: 2018-09-04

## 2018-04-18 RX ORDER — CLARITHROMYCIN 500 MG/1
500 TABLET, FILM COATED ORAL 2 TIMES DAILY
Qty: 14 TABLET | Refills: 0 | Status: SHIPPED | OUTPATIENT
Start: 2018-04-18 | End: 2018-04-25

## 2018-04-18 RX ORDER — PROMETHAZINE HYDROCHLORIDE AND CODEINE PHOSPHATE 6.25; 1 MG/5ML; MG/5ML
5 SOLUTION ORAL NIGHTLY PRN
Qty: 180 ML | Refills: 0 | Status: SHIPPED | OUTPATIENT
Start: 2018-04-18 | End: 2018-08-07 | Stop reason: ALTCHOICE

## 2018-04-18 NOTE — TELEPHONE ENCOUNTER
----- Message from Mariza Saab sent at 4/18/2018  6:59 AM CDT -----  Contact: Patient  Patient called to speak with the nurse; she would like to be worked in today because she is Coughing a lot at night. She doesn't want to see anyone else. She can be contacted at [phone].    Thanks,  Mariza

## 2018-04-18 NOTE — PROGRESS NOTES
"Subjective:      Patient ID: Miles Bowen is a 57 y.o. female.    Chief Complaint: Cough (productive; green phlegm, all day and night) and Post Nasal Drip    58 yo c/o sneezing, watery eyes, cough starting one to two weeks ago. Now one week of sinus pain, post nasal, yellow nasal d/c and post nasal drip. No wheezing.     Sinus Problem   This is a recurrent problem. The current episode started 1 to 4 weeks ago. The problem has been gradually worsening since onset. There has been no fever. The pain is moderate. Associated symptoms include congestion, coughing and sinus pressure. Pertinent negatives include no headaches, neck pain, shortness of breath, sneezing, sore throat or swollen glands. Past treatments include saline sprays and spray decongestants. The treatment provided no relief.     Review of Systems   HENT: Positive for congestion, postnasal drip, rhinorrhea and sinus pressure. Negative for nosebleeds, sneezing, sore throat and trouble swallowing.    Eyes: Negative for visual disturbance.   Respiratory: Positive for cough. Negative for shortness of breath.    Cardiovascular: Negative for chest pain and palpitations.   Musculoskeletal: Negative for neck pain.   Skin: Negative for rash and wound.   Neurological: Negative for headaches.     Objective:   /84 (BP Location: Right arm, Patient Position: Sitting)   Pulse 92   Temp 96.8 °F (36 °C) (Tympanic)   Ht 5' 2" (1.575 m)   Wt 95.3 kg (210 lb 1.6 oz)   SpO2 97%   BMI 38.43 kg/m²     Physical Exam   Constitutional: She is oriented to person, place, and time. She appears well-developed and well-nourished. No distress.   HENT:   Head: Normocephalic and atraumatic.   Right Ear: Tympanic membrane normal.   Left Ear: Tympanic membrane normal.   Nose: Mucosal edema and rhinorrhea present. Right sinus exhibits maxillary sinus tenderness. Right sinus exhibits no frontal sinus tenderness. Left sinus exhibits maxillary sinus tenderness. Left sinus " exhibits no frontal sinus tenderness.   Mouth/Throat: Posterior oropharyngeal erythema present. No oropharyngeal exudate or posterior oropharyngeal edema.   Eyes: EOM are normal. Pupils are equal, round, and reactive to light.   Neck: Neck supple. No thyromegaly present.   Cardiovascular: Normal rate and regular rhythm.    Pulmonary/Chest: Breath sounds normal. She has no wheezes. She has no rales.   Abdominal: Soft. Bowel sounds are normal. There is no tenderness.   Musculoskeletal: She exhibits no edema.   Lymphadenopathy:     She has no cervical adenopathy.   Neurological: She is alert and oriented to person, place, and time.   Skin: Skin is warm and dry.   Mult hyperpigmented areas to forehead and right face. Some raised. No d/c or ttp.    Psychiatric: She has a normal mood and affect. Her behavior is normal.       Assessment:     1. Sinusitis, unspecified chronicity, unspecified location    2. Skin lesion    3. Cough      Plan:   Sinusitis, unspecified chronicity, unspecified location  -     clarithromycin (BIAXIN) 500 MG tablet; Take 1 tablet (500 mg total) by mouth 2 (two) times daily.  Dispense: 14 tablet; Refill: 0    Skin lesion  -     Ambulatory referral to Dermatology    Cough  -     promethazine-codeine 6.25-10 mg/5 ml (PHENERGAN WITH CODEINE) 6.25-10 mg/5 mL syrup; Take 5 mLs by mouth nightly as needed for Cough.  Dispense: 180 mL; Refill: 0        Lab Frequency Next Occurrence   X-Ray Sinuses Min 3 Views Once 05/18/2017   T3 Once 08/14/2017   US Soft Tissue Head Neck Thyroid Once 08/30/2017   X-Ray Sinuses Min 3 Views Once 09/07/2017   X-Ray Chest PA And Lateral Once 09/07/2017   X-Ray Lumbar Spine Complete 5 View Once 10/16/2017   IR Epidural Transfor Inj Ea Add Lumb BilLumbar Bilat Once 10/30/2017   IR Epidural Transfor 1st Vert Lumbar Ernesto Once 10/30/2017   Mammo Digital Screening Bilat with CAD Once 11/10/2017       Problem List Items Addressed This Visit     None      Visit Diagnoses      Sinusitis, unspecified chronicity, unspecified location    -  Primary    Relevant Medications    clarithromycin (BIAXIN) 500 MG tablet    Skin lesion        Relevant Orders    Ambulatory referral to Dermatology    Cough        Relevant Medications    promethazine-codeine 6.25-10 mg/5 ml (PHENERGAN WITH CODEINE) 6.25-10 mg/5 mL syrup          Follow-up if symptoms worsen or fail to improve.

## 2018-04-25 DIAGNOSIS — R11.0 NAUSEA: ICD-10-CM

## 2018-04-25 RX ORDER — PROMETHAZINE HYDROCHLORIDE 25 MG/1
25 TABLET ORAL EVERY 6 HOURS
Qty: 30 TABLET | Refills: 0 | Status: SHIPPED | OUTPATIENT
Start: 2018-04-25 | End: 2018-06-04 | Stop reason: SDUPTHER

## 2018-05-07 RX ORDER — CYCLOBENZAPRINE HCL 10 MG
10 TABLET ORAL 3 TIMES DAILY PRN
Qty: 90 TABLET | Refills: 0 | Status: SHIPPED | OUTPATIENT
Start: 2018-05-07 | End: 2018-06-07

## 2018-05-07 RX ORDER — CYCLOBENZAPRINE HCL 10 MG
TABLET ORAL
Qty: 90 TABLET | Refills: 0 | Status: SHIPPED | OUTPATIENT
Start: 2018-05-07 | End: 2018-06-07 | Stop reason: SDUPTHER

## 2018-06-04 DIAGNOSIS — K21.9 GASTROESOPHAGEAL REFLUX DISEASE, ESOPHAGITIS PRESENCE NOT SPECIFIED: ICD-10-CM

## 2018-06-04 DIAGNOSIS — R11.0 NAUSEA: ICD-10-CM

## 2018-06-04 RX ORDER — PROMETHAZINE HYDROCHLORIDE 25 MG/1
25 TABLET ORAL EVERY 6 HOURS
Qty: 30 TABLET | Refills: 0 | Status: SHIPPED | OUTPATIENT
Start: 2018-06-04 | End: 2018-07-16 | Stop reason: SDUPTHER

## 2018-06-04 RX ORDER — PANTOPRAZOLE SODIUM 40 MG/1
40 TABLET, DELAYED RELEASE ORAL DAILY
Qty: 90 TABLET | Refills: 0 | Status: SHIPPED | OUTPATIENT
Start: 2018-06-04 | End: 2018-10-01 | Stop reason: SDUPTHER

## 2018-06-07 RX ORDER — CYCLOBENZAPRINE HCL 10 MG
10 TABLET ORAL 3 TIMES DAILY PRN
Qty: 90 TABLET | Refills: 0 | Status: SHIPPED | OUTPATIENT
Start: 2018-06-07 | End: 2018-07-07

## 2018-06-18 DIAGNOSIS — Z12.39 BREAST CANCER SCREENING: ICD-10-CM

## 2018-06-28 DIAGNOSIS — I10 ESSENTIAL HYPERTENSION: ICD-10-CM

## 2018-06-28 RX ORDER — LOSARTAN POTASSIUM AND HYDROCHLOROTHIAZIDE 12.5; 1 MG/1; MG/1
1 TABLET ORAL DAILY
Qty: 90 TABLET | Refills: 0 | Status: SHIPPED | OUTPATIENT
Start: 2018-06-28 | End: 2018-10-01 | Stop reason: SDUPTHER

## 2018-07-16 ENCOUNTER — PATIENT MESSAGE (OUTPATIENT)
Dept: PAIN MEDICINE | Facility: CLINIC | Age: 58
End: 2018-07-16

## 2018-07-16 DIAGNOSIS — R11.0 NAUSEA: ICD-10-CM

## 2018-07-16 RX ORDER — PROMETHAZINE HYDROCHLORIDE 25 MG/1
25 TABLET ORAL EVERY 6 HOURS
Qty: 30 TABLET | Refills: 0 | Status: SHIPPED | OUTPATIENT
Start: 2018-07-16 | End: 2018-08-16 | Stop reason: SDUPTHER

## 2018-07-16 RX ORDER — CYCLOBENZAPRINE HCL 10 MG
10 TABLET ORAL 2 TIMES DAILY PRN
Qty: 60 TABLET | Refills: 0 | Status: SHIPPED | OUTPATIENT
Start: 2018-07-16 | End: 2018-08-15

## 2018-08-07 ENCOUNTER — OFFICE VISIT (OUTPATIENT)
Dept: INTERNAL MEDICINE | Facility: CLINIC | Age: 58
End: 2018-08-07
Payer: COMMERCIAL

## 2018-08-07 ENCOUNTER — TELEPHONE (OUTPATIENT)
Dept: INTERNAL MEDICINE | Facility: CLINIC | Age: 58
End: 2018-08-07

## 2018-08-07 VITALS
SYSTOLIC BLOOD PRESSURE: 136 MMHG | DIASTOLIC BLOOD PRESSURE: 75 MMHG | BODY MASS INDEX: 38.17 KG/M2 | TEMPERATURE: 98 F | HEIGHT: 62 IN | OXYGEN SATURATION: 96 % | WEIGHT: 207.44 LBS | HEART RATE: 103 BPM

## 2018-08-07 DIAGNOSIS — R51.9 RIGHT FACIAL PAIN: ICD-10-CM

## 2018-08-07 DIAGNOSIS — R05.9 COUGH: Primary | ICD-10-CM

## 2018-08-07 DIAGNOSIS — R05.8 UPPER AIRWAY COUGH SYNDROME: ICD-10-CM

## 2018-08-07 PROCEDURE — 3008F BODY MASS INDEX DOCD: CPT | Mod: CPTII,S$GLB,, | Performed by: INTERNAL MEDICINE

## 2018-08-07 PROCEDURE — 3075F SYST BP GE 130 - 139MM HG: CPT | Mod: CPTII,S$GLB,, | Performed by: INTERNAL MEDICINE

## 2018-08-07 PROCEDURE — 3078F DIAST BP <80 MM HG: CPT | Mod: CPTII,S$GLB,, | Performed by: INTERNAL MEDICINE

## 2018-08-07 PROCEDURE — 99213 OFFICE O/P EST LOW 20 MIN: CPT | Mod: S$GLB,,, | Performed by: INTERNAL MEDICINE

## 2018-08-07 PROCEDURE — 99999 PR PBB SHADOW E&M-EST. PATIENT-LVL III: CPT | Mod: PBBFAC,,, | Performed by: INTERNAL MEDICINE

## 2018-08-07 RX ORDER — LORAZEPAM 1 MG/1
1 TABLET ORAL
Refills: 0 | COMMUNITY
Start: 2018-07-13 | End: 2021-12-22 | Stop reason: ALTCHOICE

## 2018-08-07 RX ORDER — OXYCODONE AND ACETAMINOPHEN 5; 325 MG/1; MG/1
TABLET ORAL
Refills: 0 | COMMUNITY
Start: 2018-06-22 | End: 2018-08-07

## 2018-08-07 RX ORDER — HYDROCODONE BITARTRATE AND ACETAMINOPHEN 5; 325 MG/1; MG/1
1 TABLET ORAL EVERY 4 HOURS PRN
Refills: 0 | COMMUNITY
Start: 2018-07-11 | End: 2018-08-07

## 2018-08-07 RX ORDER — CLINDAMYCIN HYDROCHLORIDE 300 MG/1
CAPSULE ORAL
Refills: 0 | COMMUNITY
Start: 2018-06-12 | End: 2018-08-07 | Stop reason: ALTCHOICE

## 2018-08-07 RX ORDER — PROMETHAZINE HYDROCHLORIDE AND CODEINE PHOSPHATE 6.25; 1 MG/5ML; MG/5ML
5 SOLUTION ORAL EVERY 8 HOURS
Qty: 150 ML | Refills: 0 | Status: SHIPPED | OUTPATIENT
Start: 2018-08-07 | End: 2018-08-17 | Stop reason: SDUPTHER

## 2018-08-07 NOTE — PROGRESS NOTES
Subjective:      Patient ID: Miles Bowen is a 57 y.o. female.    Chief Complaint: Cough (x 1 day)    Cough   This is a recurrent problem. Episode onset: 2 days. The problem has been gradually worsening. The problem occurs every few minutes. Cough characteristics: thich whit sputum. Associated symptoms include headaches, nasal congestion and postnasal drip. Pertinent negatives include no chest pain, chills, ear congestion, ear pain, eye redness, fever, hemoptysis, rash, rhinorrhea, sore throat, shortness of breath, sweats, weight loss or wheezing. The symptoms are aggravated by lying down. She has tried nothing for the symptoms. The treatment provided no relief. Her past medical history is significant for environmental allergies.      58 yo with   Patient Active Problem List   Diagnosis    Hypertension    Depression    Anxiety    Hematemesis without nausea    GERD (gastroesophageal reflux disease)    Hiatal hernia    DDD (degenerative disc disease), lumbar    Common migraine    Allergic rhinitis    IBS (irritable bowel syndrome)    Multinodular goiter    MVP (mitral valve prolapse)    Nasal polyps    Obesity     Past Medical History:   Diagnosis Date    Anxiety     Depression     GERD (gastroesophageal reflux disease)     Heart murmur     Hypertension     Multinodular goiter 12/23/2016    MVP (mitral valve prolapse)     Obesity        Here today c/o cough  Heart palpitations with antihistamines.   Review of Systems   Constitutional: Negative for chills, fever and weight loss.   HENT: Positive for congestion (chronic and unchanged) and postnasal drip. Negative for ear discharge, ear pain, nosebleeds, rhinorrhea, sinus pressure, sneezing, sore throat and voice change.    Eyes: Negative for pain, discharge, redness, itching and visual disturbance.   Respiratory: Positive for cough. Negative for hemoptysis, shortness of breath and wheezing.         No orthopnea or pnd   Cardiovascular:  "Negative for chest pain, palpitations and leg swelling.   Skin: Negative for rash.   Allergic/Immunologic: Positive for environmental allergies.   Neurological: Positive for headaches.     Objective:   /75   Pulse 103   Temp 98.1 °F (36.7 °C) (Tympanic)   Ht 5' 2" (1.575 m)   Wt 94.1 kg (207 lb 7.3 oz)   SpO2 96%   BMI 37.94 kg/m²     Physical Exam   Constitutional: She appears well-developed and well-nourished. No distress.   HENT:   Head: Atraumatic.   Right Ear: Tympanic membrane normal.   Left Ear: Tympanic membrane normal.   Nose: Right sinus exhibits no maxillary sinus tenderness and no frontal sinus tenderness. Left sinus exhibits no maxillary sinus tenderness and no frontal sinus tenderness.   Mouth/Throat: Posterior oropharyngeal erythema present. No oropharyngeal exudate or posterior oropharyngeal edema.   Eyes: Conjunctivae and EOM are normal.   Cardiovascular: Normal rate.    Pulmonary/Chest: Effort normal and breath sounds normal. No respiratory distress. She has no wheezes. She has no rales.   Neurological: She is alert.   Skin: Skin is warm and dry.   Psychiatric: She has a normal mood and affect. Her behavior is normal.       Assessment:     1. Cough    2. Right facial pain    3. Upper airway cough syndrome      Plan:   Cough  -     promethazine-codeine 6.25-10 mg/5 ml (PHENERGAN WITH CODEINE) 6.25-10 mg/5 mL syrup; Take 5 mLs by mouth every 8 (eight) hours. Prn cough for 10 days  Dispense: 150 mL; Refill: 0    Right facial pain  Comments:  chronic, no clear dx with dentist and ENT, has been advised against root canal by endodonics  pt concerned could be trigeminal neuralgia  Orders:  -     Ambulatory referral to Neurology    Upper airway cough syndrome    f/u with allergy if no resolution  See pulmonary if develop pulmonary symptoms such as shortness of breath or wheezing.     Work excuse for today please.       Lab Frequency Next Occurrence   T3 Once 08/14/2017   US Soft Tissue Head " Neck Thyroid Once 08/30/2017   X-Ray Sinuses Min 3 Views Once 09/07/2017   X-Ray Chest PA And Lateral Once 09/07/2017   X-Ray Lumbar Spine Complete 5 View Once 10/16/2017   IR Epidural Transfor Inj Ea Add Lumb BilLumbar Bilat Once 10/30/2017   IR Epidural Transfor 1st Vert Lumbar Ernesto Once 10/30/2017   Mammo Digital Screening Bilat with CAD Once 06/18/2018       Problem List Items Addressed This Visit     None      Visit Diagnoses     Cough    -  Primary    Relevant Medications    promethazine-codeine 6.25-10 mg/5 ml (PHENERGAN WITH CODEINE) 6.25-10 mg/5 mL syrup    Right facial pain        chronic, no clear dx with dentist and ENT, has been advised against root canal by endodonics  pt concerned could be trigeminal neuralgia    Relevant Orders    Ambulatory referral to Neurology    Upper airway cough syndrome              Follow-up in about 4 weeks (around 9/4/2018), or if symptoms worsen or fail to improve.

## 2018-08-07 NOTE — TELEPHONE ENCOUNTER
----- Message from Leela Mejia sent at 8/7/2018  7:49 AM CDT -----  Contact: pt  Pt would like to be seen today for a cough that has kept her up at night.

## 2018-08-07 NOTE — TELEPHONE ENCOUNTER
----- Message from Venessa Abbott sent at 8/7/2018  8:28 AM CDT -----  Contact: PATIENT  Returning your call. Please call patient @ [phone]. Thanks, kavita

## 2018-08-07 NOTE — TELEPHONE ENCOUNTER
Pt stated she has been up all night coughing to the point where she urinates on herself.  Stated she needs an appt with Dr. Paul.  Notified pt that Dr. Paul's schedule is completely booked today.  Offered several other appts with other providers.  Pt accepted appt with Dr. Herrera at 11:20 am.

## 2018-08-09 ENCOUNTER — TELEPHONE (OUTPATIENT)
Dept: INTERNAL MEDICINE | Facility: CLINIC | Age: 58
End: 2018-08-09

## 2018-08-09 DIAGNOSIS — R05.9 COUGH: Primary | ICD-10-CM

## 2018-08-09 NOTE — TELEPHONE ENCOUNTER
----- Message from Janina Alonso sent at 8/9/2018 12:05 PM CDT -----  Contact: pt  Pt states that she is coughing up greenish yellow stuff. Pt states that she was told to call the doctor if she started coughing up stuff.   [phone] (home)

## 2018-08-09 NOTE — TELEPHONE ENCOUNTER
Pt stated she is still coughing in fits throughout the day and her sputum has changed from white to green.  Stated she was told to call if her cough changes.  Please advise.

## 2018-08-09 NOTE — TELEPHONE ENCOUNTER
----- Message from Nidia Urbano sent at 8/9/2018  2:46 PM CDT -----  Contact: pt  Pt returning nurse call, please call pt @ [phone].

## 2018-08-10 RX ORDER — AZITHROMYCIN 250 MG/1
TABLET, FILM COATED ORAL
Qty: 6 TABLET | Refills: 0 | Status: SHIPPED | OUTPATIENT
Start: 2018-08-10 | End: 2018-09-04 | Stop reason: ALTCHOICE

## 2018-08-10 NOTE — TELEPHONE ENCOUNTER
----- Message from Christina Mosher sent at 8/10/2018  8:10 AM CDT -----  Contact: Patient  Patient states she is not feeling better, getting worse, sore throat and would like to be advised. Please call her back at [phone]. Thank you

## 2018-08-10 NOTE — TELEPHONE ENCOUNTER
I sent a z pack for her. Take tylenol for pain. Can add emetrol over counter for nausea.  But if condition continues to worsen she should see urgent care.

## 2018-08-10 NOTE — TELEPHONE ENCOUNTER
Spoke with pt, she states that she is not feeling any better. She started with a sore throat, very nauseous, and has an upset stomach. She is taking Tylenol for headache. Please advise?

## 2018-08-16 ENCOUNTER — PATIENT MESSAGE (OUTPATIENT)
Dept: PAIN MEDICINE | Facility: CLINIC | Age: 58
End: 2018-08-16

## 2018-08-16 DIAGNOSIS — R11.0 NAUSEA: ICD-10-CM

## 2018-08-16 RX ORDER — PROMETHAZINE HYDROCHLORIDE 25 MG/1
25 TABLET ORAL EVERY 6 HOURS
Qty: 30 TABLET | Refills: 0 | Status: SHIPPED | OUTPATIENT
Start: 2018-08-16 | End: 2018-10-01 | Stop reason: SDUPTHER

## 2018-08-16 NOTE — TELEPHONE ENCOUNTER
Spoke with pt, she states that she still has a cough, feeling nauseous, and over all doesn't feel well. Patient did state that she finished taking the Z-pack a couple of days ago with no relief. Advised pt that on 8/10, Dr. Paul had also recommended to take Tylenol for pain and Emetrol for nausea. Pt states that she usually takes Promethazine and is requesting a refill. Also informed pt that if her symptoms continue to get worse then she should be seen in clinic. Pt asked for appointment today. Opening with Dr. Paul today at 4 pm. Pt accepted.

## 2018-08-16 NOTE — TELEPHONE ENCOUNTER
----- Message from Christina Mosher sent at 8/16/2018  7:58 AM CDT -----  Contact: Patient  Patient states that she still has a cough and coughing a lot at night and nauseous, patient would like to be advised, please geeta her back at [phone]. Thank you

## 2018-08-17 ENCOUNTER — OFFICE VISIT (OUTPATIENT)
Dept: INTERNAL MEDICINE | Facility: CLINIC | Age: 58
End: 2018-08-17
Payer: COMMERCIAL

## 2018-08-17 ENCOUNTER — TELEPHONE (OUTPATIENT)
Dept: INTERNAL MEDICINE | Facility: CLINIC | Age: 58
End: 2018-08-17

## 2018-08-17 VITALS
DIASTOLIC BLOOD PRESSURE: 86 MMHG | HEART RATE: 108 BPM | OXYGEN SATURATION: 95 % | BODY MASS INDEX: 38.38 KG/M2 | TEMPERATURE: 98 F | SYSTOLIC BLOOD PRESSURE: 134 MMHG | HEIGHT: 62 IN | WEIGHT: 208.56 LBS

## 2018-08-17 DIAGNOSIS — J40 BRONCHITIS: Primary | ICD-10-CM

## 2018-08-17 DIAGNOSIS — R05.9 COUGH: ICD-10-CM

## 2018-08-17 PROCEDURE — 3075F SYST BP GE 130 - 139MM HG: CPT | Mod: CPTII,S$GLB,, | Performed by: FAMILY MEDICINE

## 2018-08-17 PROCEDURE — 99214 OFFICE O/P EST MOD 30 MIN: CPT | Mod: 25,S$GLB,, | Performed by: FAMILY MEDICINE

## 2018-08-17 PROCEDURE — 94640 AIRWAY INHALATION TREATMENT: CPT | Mod: S$GLB,,, | Performed by: FAMILY MEDICINE

## 2018-08-17 PROCEDURE — 3008F BODY MASS INDEX DOCD: CPT | Mod: CPTII,S$GLB,, | Performed by: FAMILY MEDICINE

## 2018-08-17 PROCEDURE — 3079F DIAST BP 80-89 MM HG: CPT | Mod: CPTII,S$GLB,, | Performed by: FAMILY MEDICINE

## 2018-08-17 PROCEDURE — 99999 PR PBB SHADOW E&M-EST. PATIENT-LVL III: CPT | Mod: PBBFAC,,, | Performed by: FAMILY MEDICINE

## 2018-08-17 RX ORDER — CYCLOBENZAPRINE HCL 10 MG
10 TABLET ORAL 3 TIMES DAILY PRN
Qty: 90 TABLET | Refills: 0 | Status: SHIPPED | OUTPATIENT
Start: 2018-08-17 | End: 2018-10-22 | Stop reason: SDUPTHER

## 2018-08-17 RX ORDER — ALBUTEROL SULFATE 0.83 MG/ML
2.5 SOLUTION RESPIRATORY (INHALATION)
Status: COMPLETED | OUTPATIENT
Start: 2018-08-17 | End: 2018-08-17

## 2018-08-17 RX ORDER — CYCLOBENZAPRINE HCL 10 MG
10 TABLET ORAL 3 TIMES DAILY PRN
COMMUNITY
End: 2018-08-17 | Stop reason: SDUPTHER

## 2018-08-17 RX ORDER — ZOLPIDEM TARTRATE 10 MG/1
TABLET ORAL
Refills: 3 | COMMUNITY
Start: 2018-08-13 | End: 2018-10-22

## 2018-08-17 RX ORDER — ALBUTEROL SULFATE 90 UG/1
2 AEROSOL, METERED RESPIRATORY (INHALATION) EVERY 6 HOURS PRN
Qty: 18 G | Refills: 0 | Status: SHIPPED | OUTPATIENT
Start: 2018-08-17 | End: 2018-10-22

## 2018-08-17 RX ORDER — PROMETHAZINE HYDROCHLORIDE AND CODEINE PHOSPHATE 6.25; 1 MG/5ML; MG/5ML
5 SOLUTION ORAL EVERY 8 HOURS
Qty: 150 ML | Refills: 0 | Status: SHIPPED | OUTPATIENT
Start: 2018-08-17 | End: 2018-12-20 | Stop reason: SDUPTHER

## 2018-08-17 RX ADMIN — ALBUTEROL SULFATE 2.5 MG: 0.83 SOLUTION RESPIRATORY (INHALATION) at 11:08

## 2018-08-17 NOTE — TELEPHONE ENCOUNTER
Pt stated that she is still not feeling well; still coughing and now has left-sided back pain.  Notified pt that Dr. Paul is currently booked for today but that I can schedule her with another provider or she can go to Urgent Care.  Pt accepted appt today at 10:40 am with Dr. Blankenship.

## 2018-08-17 NOTE — PROGRESS NOTES
Subjective:       Patient ID: Miles Bowen is a 57 y.o. female.    Chief Complaint: Cough and Back Pain    58 yo female here with cough and back pain. She saw Dr. Paul last week and was treated with azithromycin and cough syrup. She has completed the treatment but has continued cough and not feeling well. She denies wheezing but describes tight chest and coughing fits that are worst at night.     Her back started hurting 3 days ago. It hurts with cough and breathing in on the left. She has taken flexeril and has not had any improvement. Movement also causes pain. Slightly better with lying down.       Cough   Associated symptoms include myalgias and shortness of breath. Pertinent negatives include no chest pain, chills, fever or wheezing.   Back Pain   Pertinent negatives include no chest pain or fever.     does not have any pertinent problems on file.  Past Medical History:   Diagnosis Date    Anxiety     Depression     GERD (gastroesophageal reflux disease)     Heart murmur     Hypertension     Multinodular goiter 12/23/2016    MVP (mitral valve prolapse)     Obesity      Past Surgical History:   Procedure Laterality Date    BACK SURGERY  2016    Bone fusion    COLONOSCOPY  2014    FIXATION KYPHOPLASTY THORACIC SPINE  05/2016    HYSTERECTOMY      NASAL SINUS SURGERY  2015    Polyps removed    SINUS SURGERY  11/2014    TONSILLECTOMY       Family History   Problem Relation Age of Onset    Hypertension Mother     Hypertension Father     Heart attack Father 63    Cancer Maternal Grandmother         Breast    Colon cancer Neg Hx     Stomach cancer Neg Hx      Social History     Socioeconomic History    Marital status:      Spouse name: Not on file    Number of children: Not on file    Years of education: Not on file    Highest education level: Not on file   Social Needs    Financial resource strain: Not on file    Food insecurity - worry: Not on file    Food insecurity -  inability: Not on file    Transportation needs - medical: Not on file    Transportation needs - non-medical: Not on file   Occupational History    Not on file   Tobacco Use    Smoking status: Former Smoker     Packs/day: 0.25     Years: 1.00     Pack years: 0.25     Types: Cigarettes     Last attempt to quit: 2006     Years since quittin.6    Smokeless tobacco: Never Used   Substance and Sexual Activity    Alcohol use: Yes     Comment: rarely    Drug use: No    Sexual activity: Not Currently   Other Topics Concern    Not on file   Social History Narrative     with 1 adult child.      Review of Systems   Constitutional: Positive for activity change and fatigue. Negative for appetite change, chills and fever.   Respiratory: Positive for cough, chest tightness and shortness of breath. Negative for apnea, choking, wheezing and stridor.    Cardiovascular: Negative for chest pain, palpitations and leg swelling.   Musculoskeletal: Positive for back pain and myalgias. Negative for arthralgias and gait problem.   All other systems reviewed and are negative.      Objective:     Vitals:    18 1049   BP: 134/86   Pulse: 108   Temp: 98.3 °F (36.8 °C)        Physical Exam   Constitutional: She is oriented to person, place, and time. She appears well-developed and well-nourished.   HENT:   Head: Normocephalic and atraumatic.   Eyes: EOM are normal. Pupils are equal, round, and reactive to light.   Neck: Normal range of motion. Neck supple.   Cardiovascular: Normal rate and regular rhythm.   Pulmonary/Chest: Effort normal. No respiratory distress. She has wheezes. She has no rales.   Abdominal: Soft. Bowel sounds are normal. She exhibits no mass. There is no tenderness.   Musculoskeletal: Normal range of motion. She exhibits tenderness (lower back muscle tension and tenderness to palpation). She exhibits no deformity.   Neurological: She is alert and oriented to person, place, and time.   Skin: Skin  is warm and dry.   Psychiatric: She has a normal mood and affect. Her behavior is normal.   Nursing note and vitals reviewed.      Assessment:       1. Bronchitis    2. Cough        Plan:           Problem List Items Addressed This Visit     None      Visit Diagnoses     Bronchitis    -  Primary    Relevant Medications    albuterol nebulizer solution 2.5 mg (Start on 8/17/2018 11:15 AM)    albuterol 90 mcg/actuation inhaler    inhalation spacing device (BREATHERITE MDI SPACER)    Cough        Relevant Medications    albuterol nebulizer solution 2.5 mg (Start on 8/17/2018 11:15 AM)    promethazine-codeine 6.25-10 mg/5 ml (PHENERGAN WITH CODEINE) 6.25-10 mg/5 mL syrup      Symptoms improved with clinic neb treatment; improved air movement on auscultation. She can not tolerate steroids. Discussed albuterol use with spacer. RTC if no improvement.

## 2018-08-17 NOTE — TELEPHONE ENCOUNTER
----- Message from Andrea Duran sent at 8/17/2018  6:40 AM CDT -----  Contact: pt  She's calling in regard to being worked into the schedule today, [phone] (home)

## 2018-08-17 NOTE — PATIENT INSTRUCTIONS
What Is Acute Bronchitis?  Acute bronchitis is when the airways in your lungs (bronchial tubes) become red and swollen (inflamed). It is usually caused by a viral infection. But it can also occur because of a bacteria or allergen. Symptoms include a cough that produces yellow or greenish mucus and can last for days or sometimes weeks.  Inside healthy lungs    Air travels in and out of the lungs through the airways. The linings of these airways produce sticky mucus. This mucus traps particles that enter the lungs. Tiny structures called cilia then sweep the particles out of the airways.     Healthy airway: Airways are normally open. Air moves in and out easily.      Healthy cilia: Tiny, hairlike cilia sweep mucus and particles up and out of the airways.   Lungs with bronchitis  Bronchitis often occurs with a cold or the flu virus. The airways become inflamed (red and swollen). There is a deep hacking cough from the extra mucus. Other symptoms may include:  · Wheezing  · Chest discomfort  · Shortness of breath  · Mild fever  A second infection, this time due to bacteria, may then occur. And airways irritated by allergens or smoke are more likely to get infected.        Inflamed airway: Inflammation and extra mucus narrow the airway, causing shortness of breath.      Impaired cilia: Extra mucus impairs cilia, causing congestion and wheezing. Smoking makes the problem worse.   Making a diagnosis  A physical exam, health history, and certain tests help your healthcare provider make the diagnosis.  Health history  Your healthcare provider will ask you about your symptoms.  The exam  Your provider listens to your chest for signs of congestion. He or she may also check your ears, nose, and throat.  Possible tests  · A sputum test for bacteria. This requires a sample of mucus from your lungs.  · A nasal or throat swab. This tests to see if you have a bacterial infection.  · A chest X-ray. This is done if your healthcare  provider thinks you have pneumonia.  · Tests to check for an underlying condition. Other tests may be done to check for things such as allergies, asthma, or COPD (chronic obstructive pulmonary disease). You may need to see a specialist for more lung function testing.  Treating a cough  The main treatment for bronchitis is easing symptoms. Avoiding smoke, allergens, and other things that trigger coughing can often help. If the infection is bacterial, you may be given antibiotics. During the illness, it's important to get plenty of sleep. To ease symptoms:  · Dont smoke. Also avoid secondhand smoke.  · Use a humidifier. Or try breathing in steam from a hot shower. This may help loosen mucus.  · Drink a lot of water and juice. They can soothe the throat and may help thin mucus.  · Sit up or use extra pillows when in bed. This helps to lessen coughing and congestion.  · Ask your provider about using medicine. Ask about using cough medicine, pain and fever medicine, or a decongestant.  Antibiotics  Most cases of bronchitis are caused by cold or flu viruses. They dont need antibiotics to treat them, even if your mucus is thick and green or yellow. Antibiotics dont treat viral illness and antibiotics have not been shown to have any benefit in cases of acute bronchitis. Taking antibiotics when they are not needed increases your risk of getting an infection later that is antibiotic-resistant. Antibiotics can also cause severe cases of diarrhea that require other antibiotics to treat.  It is important that you accept your healthcare provider's opinion to not use antibiotics. Your provider will prescribe antibiotics if the infection is caused by bacteria. If they are prescribed:  · Take all of the medicine. Take the medicine until it is used up, even if symptoms have improved. If you dont, the bronchitis may come back.  · Take the medicines as directed. For instance, some medicines should be taken with food.  · Ask about  side effects. Ask your provider or pharmacist what side effects are common, and what to do about them.  Follow-up care  You should see your provider again in 2 to 3 weeks. By this time, symptoms should have improved. An infection that lasts longer may mean you have a more serious problem.  Prevention  · Avoid tobacco smoke. If you smoke, quit. Stay away from smoky places. Ask friends and family not to smoke around you, or in your home or car.  · Get checked for allergies.  · Ask your provider about getting a yearly flu shot. Also ask about pneumococcal or pneumonia shots.  · Wash your hands often. This helps reduce the chance of picking up viruses that cause colds and flu.  Call your healthcare provider if:  · Symptoms worsen, or you have new symptoms  · Breathing problems worsen or  become severe  · Symptoms dont get better within a week, or within 3 days of taking antibiotics   Date Last Reviewed: 2/1/2017  © 6702-3347 [employer]. [address] [place] [address], [place] [address]. All rights reserved. This information is not intended as a substitute for professional medical care. Always follow your healthcare professional's instructions.

## 2018-08-21 ENCOUNTER — PATIENT OUTREACH (OUTPATIENT)
Dept: ADMINISTRATIVE | Facility: HOSPITAL | Age: 58
End: 2018-08-21

## 2018-09-04 ENCOUNTER — OFFICE VISIT (OUTPATIENT)
Dept: INTERNAL MEDICINE | Facility: CLINIC | Age: 58
End: 2018-09-04
Payer: COMMERCIAL

## 2018-09-04 VITALS
HEART RATE: 92 BPM | WEIGHT: 207 LBS | TEMPERATURE: 96 F | SYSTOLIC BLOOD PRESSURE: 128 MMHG | DIASTOLIC BLOOD PRESSURE: 78 MMHG | HEIGHT: 62 IN | BODY MASS INDEX: 38.09 KG/M2 | OXYGEN SATURATION: 98 %

## 2018-09-04 DIAGNOSIS — I10 ESSENTIAL HYPERTENSION: ICD-10-CM

## 2018-09-04 DIAGNOSIS — R06.2 WHEEZING: ICD-10-CM

## 2018-09-04 DIAGNOSIS — Z11.59 NEED FOR HEPATITIS C SCREENING TEST: ICD-10-CM

## 2018-09-04 DIAGNOSIS — R05.9 COUGH: ICD-10-CM

## 2018-09-04 DIAGNOSIS — Z00.00 ROUTINE GENERAL MEDICAL EXAMINATION AT A HEALTH CARE FACILITY: Primary | ICD-10-CM

## 2018-09-04 DIAGNOSIS — Z23 NEED FOR DIPHTHERIA-TETANUS-PERTUSSIS (TDAP) VACCINE: ICD-10-CM

## 2018-09-04 PROCEDURE — 3074F SYST BP LT 130 MM HG: CPT | Mod: CPTII,S$GLB,, | Performed by: INTERNAL MEDICINE

## 2018-09-04 PROCEDURE — 99396 PREV VISIT EST AGE 40-64: CPT | Mod: 25,S$GLB,, | Performed by: INTERNAL MEDICINE

## 2018-09-04 PROCEDURE — 90471 IMMUNIZATION ADMIN: CPT | Mod: S$GLB,,, | Performed by: INTERNAL MEDICINE

## 2018-09-04 PROCEDURE — 3078F DIAST BP <80 MM HG: CPT | Mod: CPTII,S$GLB,, | Performed by: INTERNAL MEDICINE

## 2018-09-04 PROCEDURE — 90715 TDAP VACCINE 7 YRS/> IM: CPT | Mod: S$GLB,,, | Performed by: INTERNAL MEDICINE

## 2018-09-04 PROCEDURE — 99999 PR PBB SHADOW E&M-EST. PATIENT-LVL IV: CPT | Mod: PBBFAC,,, | Performed by: INTERNAL MEDICINE

## 2018-09-04 NOTE — PROGRESS NOTES
"Subjective:      Patient ID: Miles Bowen is a 58 y.o. female.    Chief Complaint: Follow-up    59 yo with Patient Active Problem List:     Hypertension     Depression     Anxiety     Hematemesis without nausea     GERD (gastroesophageal reflux disease)     Hiatal hernia     DDD (degenerative disc disease), lumbar     Common migraine     Allergic rhinitis     IBS (irritable bowel syndrome)     Multinodular goiter     MVP (mitral valve prolapse)     Nasal polyps     Obesity    Past Medical History:  No date: Anxiety  No date: Depression  No date: GERD (gastroesophageal reflux disease)  No date: Heart murmur  No date: Hypertension  12/23/2016: Multinodular goiter  No date: MVP (mitral valve prolapse)  No date: Obesity    Here today for annual prev exam.  Compliant with meds without significant side effects. Energy and appetite are good. Recently treated for cough and wheezing successfully with albuterol. Continues with mild improved dry cough.       Review of Systems   Constitutional: Negative for chills and fever.   HENT: Negative for ear pain and sore throat.    Respiratory: Positive for cough. Negative for wheezing.    Cardiovascular: Negative for chest pain and palpitations.   Gastrointestinal: Negative for abdominal pain and blood in stool.   Genitourinary: Negative for dysuria and hematuria.   Neurological: Negative for seizures and syncope.     Objective:   /78 (BP Location: Right arm, Patient Position: Sitting)   Pulse 92   Temp 96.4 °F (35.8 °C) (Tympanic)   Ht 5' 2" (1.575 m)   Wt 93.9 kg (207 lb 0.2 oz)   SpO2 98%   BMI 37.86 kg/m²     Physical Exam   Constitutional: She is oriented to person, place, and time. She appears well-developed and well-nourished. No distress.   HENT:   Head: Normocephalic and atraumatic.   Mouth/Throat: Oropharynx is clear and moist.   Eyes: EOM are normal. Pupils are equal, round, and reactive to light.   Neck: Neck supple. No thyromegaly present. "   Cardiovascular: Normal rate and regular rhythm.   Pulmonary/Chest: Breath sounds normal. She has no wheezes. She has no rales.   Abdominal: Soft. Bowel sounds are normal. There is no tenderness.   Musculoskeletal: She exhibits no edema.   Lymphadenopathy:     She has no cervical adenopathy.   Neurological: She is alert and oriented to person, place, and time.   Skin: Skin is warm and dry.   Psychiatric: She has a normal mood and affect. Her behavior is normal.       Assessment:     1. Routine general medical examination at a health care facility    2. Essential hypertension    3. Need for hepatitis C screening test    4. Cough    5. Wheezing    6. Need for diphtheria-tetanus-pertussis (Tdap) vaccine      Plan:   Routine general medical examination at a health care facility  -     Lipid panel; Future; Expected date: 09/04/2018  Heart healthy diet and reg exercise  HM reviewed    Essential hypertension  controlled  -     Lipid panel; Future; Expected date: 09/04/2018    Need for hepatitis C screening test  -     Hepatitis C antibody; Future; Expected date: 09/04/2018    Cough  -     Ambulatory referral to Pulmonology    Wheezing  -     Ambulatory referral to Pulmonology    Need for diphtheria-tetanus-pertussis (Tdap) vaccine  -     Tdap Vaccine    Remember flu vaccine should be available in September or October.   Check with your pharmacy regarding new shingles vaccine.         Lab Frequency Next Occurrence   T3 Once 08/14/2017   X-Ray Sinuses Min 3 Views Once 09/07/2017   X-Ray Chest PA And Lateral Once 09/07/2017   X-Ray Lumbar Spine Complete 5 View Once 10/16/2017   IR Epidural Transfor Inj Ea Add Lumb BilLumbar Bilat Once 10/30/2017   IR Epidural Transfor 1st Vert Lumbar Ernesto Once 10/30/2017   Mammo Digital Screening Bilat with CAD Once 06/18/2018   Lipid panel Once 09/04/2018   Hepatitis C antibody Once 09/04/2018       Problem List Items Addressed This Visit        Cardiac/Vascular    Hypertension    Relevant  Orders    Lipid panel      Other Visit Diagnoses     Routine general medical examination at a health care facility    -  Primary    Relevant Orders    Lipid panel    Need for hepatitis C screening test        Relevant Orders    Hepatitis C antibody    Cough        Relevant Orders    Ambulatory referral to Pulmonology    Wheezing        Relevant Orders    Ambulatory referral to Pulmonology    Need for diphtheria-tetanus-pertussis (Tdap) vaccine        Relevant Orders    Tdap Vaccine (Completed)          Follow-up in about 1 year (around 9/4/2019), or if symptoms worsen or fail to improve.

## 2018-09-04 NOTE — PATIENT INSTRUCTIONS
Remember flu vaccine should be available in September or October.   Check with your pharmacy regarding new shingles vaccine.

## 2018-10-01 DIAGNOSIS — R11.0 NAUSEA: ICD-10-CM

## 2018-10-01 DIAGNOSIS — K21.9 GASTROESOPHAGEAL REFLUX DISEASE, ESOPHAGITIS PRESENCE NOT SPECIFIED: ICD-10-CM

## 2018-10-01 DIAGNOSIS — I10 ESSENTIAL HYPERTENSION: ICD-10-CM

## 2018-10-01 RX ORDER — PROMETHAZINE HYDROCHLORIDE 25 MG/1
25 TABLET ORAL EVERY 6 HOURS
Qty: 30 TABLET | Refills: 0 | Status: SHIPPED | OUTPATIENT
Start: 2018-10-01 | End: 2018-10-11 | Stop reason: SDUPTHER

## 2018-10-01 RX ORDER — PANTOPRAZOLE SODIUM 40 MG/1
40 TABLET, DELAYED RELEASE ORAL DAILY
Qty: 90 TABLET | Refills: 0 | Status: SHIPPED | OUTPATIENT
Start: 2018-10-01 | End: 2018-12-31 | Stop reason: SDUPTHER

## 2018-10-01 RX ORDER — LOSARTAN POTASSIUM AND HYDROCHLOROTHIAZIDE 12.5; 1 MG/1; MG/1
1 TABLET ORAL DAILY
Qty: 90 TABLET | Refills: 0 | Status: SHIPPED | OUTPATIENT
Start: 2018-10-01 | End: 2018-10-11 | Stop reason: SDUPTHER

## 2018-10-11 DIAGNOSIS — I10 ESSENTIAL HYPERTENSION: ICD-10-CM

## 2018-10-11 DIAGNOSIS — R11.0 NAUSEA: ICD-10-CM

## 2018-10-12 RX ORDER — PROMETHAZINE HYDROCHLORIDE 25 MG/1
25 TABLET ORAL EVERY 6 HOURS
Qty: 30 TABLET | Refills: 0 | Status: SHIPPED | OUTPATIENT
Start: 2018-10-12 | End: 2019-01-14 | Stop reason: SDUPTHER

## 2018-10-12 RX ORDER — LOSARTAN POTASSIUM AND HYDROCHLOROTHIAZIDE 12.5; 1 MG/1; MG/1
1 TABLET ORAL DAILY
Qty: 90 TABLET | Refills: 0 | Status: SHIPPED | OUTPATIENT
Start: 2018-10-12 | End: 2019-03-24 | Stop reason: SDUPTHER

## 2018-10-22 ENCOUNTER — NURSE TRIAGE (OUTPATIENT)
Dept: ADMINISTRATIVE | Facility: CLINIC | Age: 58
End: 2018-10-22

## 2018-10-22 ENCOUNTER — LAB VISIT (OUTPATIENT)
Dept: LAB | Facility: HOSPITAL | Age: 58
End: 2018-10-22
Attending: FAMILY MEDICINE
Payer: COMMERCIAL

## 2018-10-22 ENCOUNTER — OFFICE VISIT (OUTPATIENT)
Dept: INTERNAL MEDICINE | Facility: CLINIC | Age: 58
End: 2018-10-22
Payer: COMMERCIAL

## 2018-10-22 VITALS
WEIGHT: 201.06 LBS | TEMPERATURE: 97 F | DIASTOLIC BLOOD PRESSURE: 82 MMHG | HEIGHT: 62 IN | HEART RATE: 86 BPM | BODY MASS INDEX: 37 KG/M2 | SYSTOLIC BLOOD PRESSURE: 116 MMHG | OXYGEN SATURATION: 99 %

## 2018-10-22 DIAGNOSIS — R10.13 EPIGASTRIC PAIN: Primary | ICD-10-CM

## 2018-10-22 DIAGNOSIS — R10.13 EPIGASTRIC PAIN: ICD-10-CM

## 2018-10-22 DIAGNOSIS — R11.0 NAUSEA: ICD-10-CM

## 2018-10-22 DIAGNOSIS — R19.7 DIARRHEA, UNSPECIFIED TYPE: ICD-10-CM

## 2018-10-22 LAB
AMYLASE SERPL-CCNC: 61 U/L
ANION GAP SERPL CALC-SCNC: 9 MMOL/L
BASOPHILS # BLD AUTO: 0.07 K/UL
BASOPHILS NFR BLD: 0.7 %
BUN SERPL-MCNC: 7 MG/DL
CALCIUM SERPL-MCNC: 9.6 MG/DL
CHLORIDE SERPL-SCNC: 101 MMOL/L
CO2 SERPL-SCNC: 27 MMOL/L
CREAT SERPL-MCNC: 0.8 MG/DL
DIFFERENTIAL METHOD: ABNORMAL
EOSINOPHIL # BLD AUTO: 0.1 K/UL
EOSINOPHIL NFR BLD: 1 %
ERYTHROCYTE [DISTWIDTH] IN BLOOD BY AUTOMATED COUNT: 16.8 %
EST. GFR  (AFRICAN AMERICAN): >60 ML/MIN/1.73 M^2
EST. GFR  (NON AFRICAN AMERICAN): >60 ML/MIN/1.73 M^2
GLUCOSE SERPL-MCNC: 91 MG/DL
HCT VFR BLD AUTO: 35.7 %
HGB BLD-MCNC: 10.9 G/DL
IMM GRANULOCYTES # BLD AUTO: 0.03 K/UL
IMM GRANULOCYTES NFR BLD AUTO: 0.3 %
LIPASE SERPL-CCNC: 17 U/L
LYMPHOCYTES # BLD AUTO: 2.7 K/UL
LYMPHOCYTES NFR BLD: 27.3 %
MCH RBC QN AUTO: 24.4 PG
MCHC RBC AUTO-ENTMCNC: 30.5 G/DL
MCV RBC AUTO: 80 FL
MONOCYTES # BLD AUTO: 0.5 K/UL
MONOCYTES NFR BLD: 4.8 %
NEUTROPHILS # BLD AUTO: 6.5 K/UL
NEUTROPHILS NFR BLD: 65.9 %
NRBC BLD-RTO: 0 /100 WBC
PLATELET # BLD AUTO: 476 K/UL
PMV BLD AUTO: 9.5 FL
POTASSIUM SERPL-SCNC: 3.2 MMOL/L
RBC # BLD AUTO: 4.47 M/UL
SODIUM SERPL-SCNC: 137 MMOL/L
WBC # BLD AUTO: 9.79 K/UL

## 2018-10-22 PROCEDURE — 83690 ASSAY OF LIPASE: CPT

## 2018-10-22 PROCEDURE — 36415 COLL VENOUS BLD VENIPUNCTURE: CPT | Mod: PO

## 2018-10-22 PROCEDURE — 3079F DIAST BP 80-89 MM HG: CPT | Mod: CPTII,S$GLB,, | Performed by: FAMILY MEDICINE

## 2018-10-22 PROCEDURE — 99999 PR PBB SHADOW E&M-EST. PATIENT-LVL III: CPT | Mod: PBBFAC,,, | Performed by: FAMILY MEDICINE

## 2018-10-22 PROCEDURE — 3008F BODY MASS INDEX DOCD: CPT | Mod: CPTII,S$GLB,, | Performed by: FAMILY MEDICINE

## 2018-10-22 PROCEDURE — 82150 ASSAY OF AMYLASE: CPT

## 2018-10-22 PROCEDURE — 80048 BASIC METABOLIC PNL TOTAL CA: CPT

## 2018-10-22 PROCEDURE — 99214 OFFICE O/P EST MOD 30 MIN: CPT | Mod: S$GLB,,, | Performed by: FAMILY MEDICINE

## 2018-10-22 PROCEDURE — 85025 COMPLETE CBC W/AUTO DIFF WBC: CPT

## 2018-10-22 PROCEDURE — 3074F SYST BP LT 130 MM HG: CPT | Mod: CPTII,S$GLB,, | Performed by: FAMILY MEDICINE

## 2018-10-22 RX ORDER — ZOLPIDEM TARTRATE 12.5 MG/1
12.5 TABLET, FILM COATED, EXTENDED RELEASE ORAL NIGHTLY
Refills: 0 | COMMUNITY
Start: 2018-10-09 | End: 2019-02-04

## 2018-10-22 RX ORDER — ZALEPLON 10 MG/1
CAPSULE ORAL
Refills: 2 | COMMUNITY
Start: 2018-09-21 | End: 2018-10-22 | Stop reason: ALTCHOICE

## 2018-10-22 RX ORDER — CYCLOBENZAPRINE HCL 10 MG
TABLET ORAL
Qty: 90 TABLET | Refills: 0 | Status: SHIPPED | OUTPATIENT
Start: 2018-10-22 | End: 2018-10-22 | Stop reason: SDUPTHER

## 2018-10-22 RX ORDER — ESZOPICLONE 2 MG/1
TABLET, FILM COATED ORAL
Refills: 2 | COMMUNITY
Start: 2018-09-26 | End: 2018-10-22 | Stop reason: ALTCHOICE

## 2018-10-22 RX ORDER — CYCLOBENZAPRINE HCL 10 MG
10 TABLET ORAL NIGHTLY PRN
Qty: 90 TABLET | Refills: 1 | Status: SHIPPED | OUTPATIENT
Start: 2018-10-22 | End: 2019-05-16 | Stop reason: SDUPTHER

## 2018-10-22 NOTE — PROGRESS NOTES
Subjective:       Patient ID: Miles Bowen is a 58 y.o. female.    Chief Complaint: Abdominal Pain; Nausea; Diarrhea; and Emesis    59 yo female with IBS here with abdominal pain that started in early October with what she believes was a viral gastroenteritis.  She reports multiple episodes of vomiting accompanied by diarrhea which lasted 24-48 hours.  (10/5/18).  Since then, she has persistent abdominal pain and diarrhea (4-6 times per day of loose stool--often described as very pale in color).  Pain is constant, non colicky, epigastric location, associated symptom of nausea.  Pain scale 6/10.  Has not been eating much and states she feels drained.  Denies fever or chills.  Denies myalgias or arthralgias.  Denies hematochezia or melena.  Small amount of weight loss noted.      does not have any pertinent problems on file.  Past Medical History:   Diagnosis Date    Anxiety     Depression     GERD (gastroesophageal reflux disease)     Heart murmur     Hypertension     Multinodular goiter 12/23/2016    MVP (mitral valve prolapse)     Obesity      Past Surgical History:   Procedure Laterality Date    BACK SURGERY  2016    Bone fusion    COLONOSCOPY  2014    ESOPHAGOGASTRODUODENOSCOPY (EGD) Left 11/4/2016    [facility]    FIXATION KYPHOPLASTY THORACIC SPINE  05/2016    HYSTERECTOMY      NASAL SINUS SURGERY  2015    Polyps removed    SINUS SURGERY  11/2014    TONSILLECTOMY       Family History   Problem Relation Age of Onset    Hypertension Mother     Hypertension Father     Heart attack Father 63    Cancer Maternal Grandmother         Breast    Colon cancer Neg Hx     Stomach cancer Neg Hx      Social History     Socioeconomic History    Marital status:      Spouse name: Not on file    Number of children: Not on file    Years of education: Not on file    Highest education level: Not on file   Social Needs    Financial resource strain: Not on file     Food insecurity - worry: Not on file    Food insecurity - inability: Not on file    Transportation needs - medical: Not on file    Transportation needs - non-medical: Not on file   Occupational History    Not on file   Tobacco Use    Smoking status: Former Smoker     Packs/day: 0.25     Years: 1.00     Pack years: 0.25     Types: Cigarettes     Last attempt to quit: 2006     Years since quittin.8    Smokeless tobacco: Never Used   Substance and Sexual Activity    Alcohol use: Yes     Comment: rarely    Drug use: No    Sexual activity: Not Currently   Other Topics Concern    Not on file   Social History Narrative     with 1 adult child.      Review of Systems   Constitutional: Positive for activity change, appetite change and fatigue. Negative for unexpected weight change.   HENT: Negative for hearing loss and sore throat.    Eyes: Negative for pain and visual disturbance.   Respiratory: Negative for cough and shortness of breath.    Cardiovascular: Negative for chest pain and palpitations.   Gastrointestinal: Positive for abdominal pain, diarrhea, nausea and vomiting. Negative for abdominal distention, anal bleeding, blood in stool and constipation.   Genitourinary: Negative for difficulty urinating, dysuria and vaginal discharge.   Musculoskeletal: Negative for arthralgias and myalgias.   Skin: Negative for rash and wound.   Neurological: Negative for light-headedness and headaches.   Hematological: Negative.    Psychiatric/Behavioral: Negative.        Objective:     Vitals:    10/22/18 1333   BP: 116/82   Pulse: 86   Temp: 96.9 °F (36.1 °C)        Physical Exam   Constitutional: She is oriented to person, place, and time. She appears well-developed and well-nourished.   Weight change of 6 lb in past 6 weeks noted on vitals flow sheet.   HENT:   Head: Normocephalic and atraumatic.   Eyes: EOM are normal. Pupils are equal, round, and reactive to light.   Neck: Normal range of motion.  Neck supple.   Cardiovascular: Normal rate and regular rhythm.   Pulmonary/Chest: Effort normal and breath sounds normal. She has no wheezes. She has no rales.   Abdominal: Soft. Bowel sounds are normal. She exhibits no distension and no mass. There is tenderness (Generalized tenderness worse in the epigastric region.  No palpable mass no palpable organomegaly.). There is no rebound and no guarding. No hernia.   Musculoskeletal: Normal range of motion. She exhibits no deformity.   Neurological: She is alert and oriented to person, place, and time.   Skin: Skin is warm and dry.   Psychiatric: She has a normal mood and affect. Her behavior is normal.   Nursing note and vitals reviewed.      Assessment:       1. Epigastric pain    2. Diarrhea, unspecified type    3. Nausea        Plan:           Problem List Items Addressed This Visit     None      Visit Diagnoses     Epigastric pain    -  Primary    Relevant Orders    US Abdomen Complete    Amylase    Lipase    Basic metabolic panel    CBC auto differential    Diarrhea, unspecified type        Relevant Orders    Occult blood x 1, stool    WBC, Stool    Giardia / Cryptosporidum, EIA    Stool Exam-Ova,Cysts,Parasites    Stool culture    Basic metabolic panel    CBC auto differential    Nausea        Relevant Orders    Basic metabolic panel    CBC auto differential       Differential diagnosis includes gallbladder and pancreatic pathology.  Will check labs as well as stool studies to rule out bacterial or parasitic infection.  Will check pancreatic enzymes and abdominal ultrasound to assess pancreas and liver.  Advised to focus on fluid intake, advanced her diet as tolerated.  She has a prescription for promethazine which she is taking as needed for nausea.  Offered Zofran which she declined.  Phone call follow-up with results.

## 2018-10-22 NOTE — TELEPHONE ENCOUNTER
"Pt has had IBS for years. Couple weeks ago caught a stomache bug, and was vomiting for about a day, but since then she hasn't been able to eat or drink without vomiting or diarrhea, and stomache hurts constantly.    Sometimes bm's are dark, almost black, and sometimes they are almost white in color.  She will try the virtual visit, walked her through the bobby set up process.    Reason for Disposition   [1] MILD-MODERATE pain AND [2] constant AND [3] present > 2 hours    Answer Assessment - Initial Assessment Questions  1. LOCATION: "Where does it hurt?"       Mid abdomen  2. RADIATION: "Does the pain shoot anywhere else?" (e.g., chest, back)      Can radiate into her chest a little, intermittent  3. ONSET: "When did the pain begin?" (e.g., minutes, hours or days ago)       Unsure, estimates 4 weeks ago, increasing in severity  4. SUDDEN: "Gradual or sudden onset?"      gradual  5. PATTERN "Does the pain come and go, or is it constant?"     - If constant: "Is it getting better, staying the same, or worsening?"       (Note: Constant means the pain never goes away completely; most serious pain is constant and it progresses)      - If intermittent: "How long does it last?" "Do you have pain now?"      (Note: Intermittent means the pain goes away completely between bouts)      constant  6. SEVERITY: "How bad is the pain?"  (e.g., Scale 1-10; mild, moderate, or severe)    - MILD (1-3): doesn't interfere with normal activities, abdomen soft and not tender to touch     - MODERATE (4-7): interferes with normal activities or awakens from sleep, tender to touch     - SEVERE (8-10): excruciating pain, doubled over, unable to do any normal activities       7/10, sometimes worse  7. RECURRENT SYMPTOM: "Have you ever had this type of abdominal pain before?" If so, ask: "When was the last time?" and "What happened that time?"       Not really, with the IBS she has had abdominal pain, but this is different  8. CAUSE: "What do you " "think is causing the abdominal pain?"      IBS?  9. RELIEVING/AGGRAVATING FACTORS: "What makes it better or worse?" (e.g., movement, antacids, bowel movement)      Nothing makes it better, eating maked it worse  10. OTHER SYMPTOMS: "Has there been any vomiting, diarrhea, constipation, or urine problems?"        Nausea/vomiting/diarrhea  11. PREGNANCY: "Is there any chance you are pregnant?" "When was your last menstrual period?"        No, hysterectomy    Protocols used: ST ABDOMINAL PAIN - FEMALE-A-AH      "

## 2018-10-23 DIAGNOSIS — R19.7 DIARRHEA, UNSPECIFIED TYPE: Primary | ICD-10-CM

## 2018-10-23 DIAGNOSIS — J40 BRONCHITIS: ICD-10-CM

## 2018-10-23 DIAGNOSIS — E87.6 HYPOKALEMIA: ICD-10-CM

## 2018-10-23 DIAGNOSIS — R10.9 ABDOMINAL PAIN, UNSPECIFIED ABDOMINAL LOCATION: ICD-10-CM

## 2018-10-23 DIAGNOSIS — R05.9 COUGH: ICD-10-CM

## 2018-10-23 RX ORDER — ALBUTEROL SULFATE 90 UG/1
AEROSOL, METERED RESPIRATORY (INHALATION)
Qty: 18 G | Refills: 0 | Status: SHIPPED | OUTPATIENT
Start: 2018-10-23 | End: 2018-11-02

## 2018-10-23 RX ORDER — DIPHENOXYLATE HYDROCHLORIDE AND ATROPINE SULFATE 2.5; .025 MG/1; MG/1
1 TABLET ORAL 4 TIMES DAILY PRN
Qty: 20 TABLET | Refills: 0 | Status: SHIPPED | OUTPATIENT
Start: 2018-10-23 | End: 2018-11-02

## 2018-10-23 RX ORDER — ONDANSETRON HYDROCHLORIDE 8 MG/1
8 TABLET, FILM COATED ORAL EVERY 8 HOURS PRN
Qty: 20 TABLET | Refills: 1 | Status: SHIPPED | OUTPATIENT
Start: 2018-10-23 | End: 2018-11-02

## 2018-10-23 RX ORDER — AZITHROMYCIN 250 MG/1
TABLET, FILM COATED ORAL
Qty: 6 TABLET | Refills: 0 | OUTPATIENT
Start: 2018-10-23

## 2018-10-23 RX ORDER — POTASSIUM CHLORIDE 20 MEQ/1
20 TABLET, EXTENDED RELEASE ORAL ONCE
Qty: 1 TABLET | Refills: 0 | Status: SHIPPED | OUTPATIENT
Start: 2018-10-23 | End: 2018-10-23

## 2018-10-23 RX ORDER — SUCRALFATE 1 G/1
1 TABLET ORAL
Qty: 120 TABLET | Refills: 1 | Status: SHIPPED | OUTPATIENT
Start: 2018-10-23 | End: 2019-02-04

## 2018-10-23 NOTE — TELEPHONE ENCOUNTER
Patient advised shes still very nauseated despite the phenergan, also still having diarrhea and severe abdominal pain. She advised she wants to go to work but wants to know what she can do from here. She took the imodium and it didn't work she advised. Please advise

## 2018-10-23 NOTE — TELEPHONE ENCOUNTER
----- Message from Carlitos Samaniego sent at 10/23/2018  7:55 AM CDT -----  Contact: Pt  Please give pt a call at ..[phone] (home) regarding some type of relief because she's in severe pain.

## 2018-10-23 NOTE — TELEPHONE ENCOUNTER
----- Message from Ashish Ascencio sent at 10/23/2018 10:38 AM CDT -----  Contact: self [phone]  Would like to follow-up with nurse regarding medications that were sent to pharmacy; pt would like to know why medications were ordered.  Please call back at [phone].  Md Андрей

## 2018-10-23 NOTE — TELEPHONE ENCOUNTER
Spoke with patient and advised her that on her lab results she had a low potassium so potassium was called in as well as medications for nausea/abdominal pain and diarrhea. Patient verbalized understanding.

## 2018-10-24 NOTE — TELEPHONE ENCOUNTER
----- Message from Marichuy Godoy sent at 10/24/2018  8:24 AM CDT -----  Contact: pt  Pt stated she needs a call back about having a ultrasound done, she having a problem with insurance, she can be reached at [phone] Thanks

## 2018-10-29 ENCOUNTER — TELEPHONE (OUTPATIENT)
Dept: RADIOLOGY | Facility: HOSPITAL | Age: 58
End: 2018-10-29

## 2018-10-31 ENCOUNTER — TELEPHONE (OUTPATIENT)
Dept: RADIOLOGY | Facility: HOSPITAL | Age: 58
End: 2018-10-31

## 2018-11-02 ENCOUNTER — LAB VISIT (OUTPATIENT)
Dept: LAB | Facility: HOSPITAL | Age: 58
End: 2018-11-02
Attending: INTERNAL MEDICINE
Payer: COMMERCIAL

## 2018-11-02 ENCOUNTER — OFFICE VISIT (OUTPATIENT)
Dept: INTERNAL MEDICINE | Facility: CLINIC | Age: 58
End: 2018-11-02
Payer: COMMERCIAL

## 2018-11-02 VITALS
HEART RATE: 76 BPM | TEMPERATURE: 97 F | WEIGHT: 201.75 LBS | SYSTOLIC BLOOD PRESSURE: 126 MMHG | HEIGHT: 62 IN | BODY MASS INDEX: 37.13 KG/M2 | OXYGEN SATURATION: 99 % | DIASTOLIC BLOOD PRESSURE: 86 MMHG

## 2018-11-02 DIAGNOSIS — Z23 NEED FOR INFLUENZA VACCINATION: ICD-10-CM

## 2018-11-02 DIAGNOSIS — R30.0 BURNING WITH URINATION: ICD-10-CM

## 2018-11-02 DIAGNOSIS — R30.0 BURNING WITH URINATION: Primary | ICD-10-CM

## 2018-11-02 LAB
BACTERIA #/AREA URNS HPF: NORMAL /HPF
BILIRUB UR QL STRIP: NEGATIVE
CLARITY UR: CLEAR
COLOR UR: YELLOW
GLUCOSE UR QL STRIP: NEGATIVE
HGB UR QL STRIP: ABNORMAL
KETONES UR QL STRIP: NEGATIVE
LEUKOCYTE ESTERASE UR QL STRIP: NEGATIVE
MICROSCOPIC COMMENT: NORMAL
NITRITE UR QL STRIP: NEGATIVE
PH UR STRIP: 6 [PH] (ref 5–8)
PROT UR QL STRIP: NEGATIVE
RBC #/AREA URNS HPF: 3 /HPF (ref 0–4)
SP GR UR STRIP: <=1.005 (ref 1–1.03)
SQUAMOUS #/AREA URNS HPF: 2 /HPF
URN SPEC COLLECT METH UR: ABNORMAL
WBC #/AREA URNS HPF: 4 /HPF (ref 0–5)

## 2018-11-02 PROCEDURE — 3074F SYST BP LT 130 MM HG: CPT | Mod: CPTII,S$GLB,, | Performed by: INTERNAL MEDICINE

## 2018-11-02 PROCEDURE — 90471 IMMUNIZATION ADMIN: CPT | Mod: S$GLB,,, | Performed by: INTERNAL MEDICINE

## 2018-11-02 PROCEDURE — 99213 OFFICE O/P EST LOW 20 MIN: CPT | Mod: 25,S$GLB,, | Performed by: INTERNAL MEDICINE

## 2018-11-02 PROCEDURE — 99999 PR PBB SHADOW E&M-EST. PATIENT-LVL III: CPT | Mod: PBBFAC,,, | Performed by: INTERNAL MEDICINE

## 2018-11-02 PROCEDURE — 3079F DIAST BP 80-89 MM HG: CPT | Mod: CPTII,S$GLB,, | Performed by: INTERNAL MEDICINE

## 2018-11-02 PROCEDURE — 87086 URINE CULTURE/COLONY COUNT: CPT

## 2018-11-02 PROCEDURE — 90686 IIV4 VACC NO PRSV 0.5 ML IM: CPT | Mod: S$GLB,,, | Performed by: INTERNAL MEDICINE

## 2018-11-02 PROCEDURE — 3008F BODY MASS INDEX DOCD: CPT | Mod: CPTII,S$GLB,, | Performed by: INTERNAL MEDICINE

## 2018-11-02 PROCEDURE — 81000 URINALYSIS NONAUTO W/SCOPE: CPT | Mod: PO

## 2018-11-02 PROCEDURE — 87491 CHLMYD TRACH DNA AMP PROBE: CPT

## 2018-11-02 RX ORDER — NITROFURANTOIN 25; 75 MG/1; MG/1
100 CAPSULE ORAL 2 TIMES DAILY
Qty: 14 CAPSULE | Refills: 0 | Status: SHIPPED | OUTPATIENT
Start: 2018-11-02 | End: 2019-02-04

## 2018-11-02 RX ORDER — POTASSIUM CHLORIDE 20 MEQ/1
TABLET, EXTENDED RELEASE ORAL
Refills: 0 | COMMUNITY
Start: 2018-10-23 | End: 2019-02-04

## 2018-11-03 LAB
C TRACH DNA SPEC QL NAA+PROBE: NOT DETECTED
N GONORRHOEA DNA SPEC QL NAA+PROBE: NOT DETECTED

## 2018-11-03 NOTE — PROGRESS NOTES
"Subjective:      Patient ID: Miels Bowen is a 58 y.o. female.    Chief Complaint: Urinary Tract Infection    Urinary Tract Infection    This is a new problem. The current episode started acute onset. The problem occurs every urination. The problem has been unchanged. The quality of the pain is described as burning (frequency, urgency). The pain is mild. There has been no fever. There is no history of pyelonephritis. Associated symptoms include frequency. Pertinent negatives include no behavior changes, chills, discharge, flank pain, hematuria, nausea, sweats, constipation or rash. She has tried nothing for the symptoms. The treatment provided no relief. There is no history of diabetes insipidus or a urological procedure.     Review of Systems   Constitutional: Negative for chills.   Gastrointestinal: Negative for constipation and nausea.   Genitourinary: Positive for frequency. Negative for flank pain and hematuria.   Skin: Negative for rash.     Objective:   /86 (BP Location: Right arm, Patient Position: Sitting)   Pulse 76   Temp 97.4 °F (36.3 °C) (Tympanic)   Ht 5' 2" (1.575 m)   Wt 91.5 kg (201 lb 11.5 oz)   SpO2 99%   BMI 36.90 kg/m²     Physical Exam    Assessment:     1. Burning with urination    2. Need for influenza vaccination      Plan:   Burning with urination  -     Urinalysis; Future; Expected date: 11/02/2018  -     nitrofurantoin, macrocrystal-monohydrate, (MACROBID) 100 MG capsule; Take 1 capsule (100 mg total) by mouth 2 (two) times daily.  Dispense: 14 capsule; Refill: 0  -     Urine culture; Future; Expected date: 11/02/2018  -     C. trachomatis/N. gonorrhoeae by AMP DNA; Future; Expected date: 11/02/2018    Need for influenza vaccination  -     Influenza - Quadrivalent (3 years & older) (PF)        Lab Frequency Next Occurrence   Mammo Digital Screening Bilat with CAD Once 06/18/2018   Lipid panel Once 09/04/2018   Hepatitis C antibody Once 09/04/2018   US Abdomen Complete " Once 10/22/2018   Occult blood x 1, stool Once 10/22/2018   WBC, Stool Once 10/22/2018   Giardia / Cryptosporidum, EIA Once 10/22/2018   Stool Exam-Ova,Cysts,Parasites Once 10/22/2018   Stool culture Once 10/22/2018       Problem List Items Addressed This Visit     None      Visit Diagnoses     Burning with urination    -  Primary    Relevant Medications    nitrofurantoin, macrocrystal-monohydrate, (MACROBID) 100 MG capsule    Other Relevant Orders    Urinalysis (Completed)    Urine culture    C. trachomatis/N. gonorrhoeae by AMP DNA (Completed)    Need for influenza vaccination        Relevant Orders    Influenza - Quadrivalent (3 years & older) (PF) (Completed)          Follow-up if symptoms worsen or fail to improve.

## 2018-11-04 LAB
BACTERIA UR CULT: NORMAL
BACTERIA UR CULT: NORMAL

## 2018-11-05 ENCOUNTER — TELEPHONE (OUTPATIENT)
Dept: RADIOLOGY | Facility: HOSPITAL | Age: 58
End: 2018-11-05

## 2018-11-08 ENCOUNTER — TELEPHONE (OUTPATIENT)
Dept: INTERNAL MEDICINE | Facility: CLINIC | Age: 58
End: 2018-11-08

## 2018-11-08 DIAGNOSIS — R31.9 HEMATURIA, UNSPECIFIED TYPE: Primary | ICD-10-CM

## 2018-11-08 NOTE — TELEPHONE ENCOUNTER
Pt was prescribed Macrobid on 11/2/18; stated she will complete antibiotic today.  Stated she noticed blood in urine this morning.  No longer has a menstrual cycle.  Please advise.

## 2018-11-08 NOTE — TELEPHONE ENCOUNTER
Notified pt that Dr. Paul has ordered another UA for her to complete at her convenience.  Pt requested to complete UA on 11/9/18.  UA scheduled.

## 2018-11-08 NOTE — TELEPHONE ENCOUNTER
----- Message from Nelly Goldman sent at 11/8/2018  1:26 PM CST -----  Contact: Pt   Pt called and stated she was returning a call to the nurse. She can be reached at [phone].    Thanks,  TF

## 2018-11-08 NOTE — TELEPHONE ENCOUNTER
----- Message from Mariza Saab sent at 11/8/2018  7:33 AM CST -----  Contact: Patient  Patient called and stated she was in for a UTI last week and now she is bleeding; she would like to speak with the nurse. She can be contacted at [phone].    Thanks,  Mariza

## 2018-11-09 ENCOUNTER — TELEPHONE (OUTPATIENT)
Dept: INTERNAL MEDICINE | Facility: CLINIC | Age: 58
End: 2018-11-09

## 2018-11-09 ENCOUNTER — LAB VISIT (OUTPATIENT)
Dept: LAB | Facility: HOSPITAL | Age: 58
End: 2018-11-09
Attending: INTERNAL MEDICINE
Payer: COMMERCIAL

## 2018-11-09 DIAGNOSIS — R31.9 HEMATURIA, UNSPECIFIED TYPE: ICD-10-CM

## 2018-11-09 LAB
BACTERIA #/AREA URNS HPF: NORMAL /HPF
BILIRUB UR QL STRIP: NEGATIVE
CLARITY UR: CLEAR
COLOR UR: YELLOW
GLUCOSE UR QL STRIP: NEGATIVE
HGB UR QL STRIP: ABNORMAL
KETONES UR QL STRIP: NEGATIVE
LEUKOCYTE ESTERASE UR QL STRIP: ABNORMAL
MICROSCOPIC COMMENT: NORMAL
NITRITE UR QL STRIP: NEGATIVE
PH UR STRIP: 6 [PH] (ref 5–8)
PROT UR QL STRIP: NEGATIVE
RBC #/AREA URNS HPF: 3 /HPF (ref 0–4)
SP GR UR STRIP: <=1.005 (ref 1–1.03)
SQUAMOUS #/AREA URNS HPF: 2 /HPF
URN SPEC COLLECT METH UR: ABNORMAL
WBC #/AREA URNS HPF: 1 /HPF (ref 0–5)

## 2018-11-09 PROCEDURE — 81000 URINALYSIS NONAUTO W/SCOPE: CPT | Mod: PO

## 2018-11-09 NOTE — TELEPHONE ENCOUNTER
----- Message from Roberto Campo sent at 11/9/2018  8:17 AM CST -----  Contact: pt   Pt needs  Notes for recent visits in October and November.     ..[phone] (home)

## 2018-11-09 NOTE — TELEPHONE ENCOUNTER
Pt requesting excuses for when she was in the clinic during appts on 10/22/18, 11/2/18, and in the lab on 11/9/18.  Notified pt that letter will be left at first floor registration desk to  at her convenience.  Pt verbalized understanding.

## 2018-11-21 ENCOUNTER — TELEPHONE (OUTPATIENT)
Dept: INTERNAL MEDICINE | Facility: CLINIC | Age: 58
End: 2018-11-21

## 2018-11-21 DIAGNOSIS — R31.9 HEMATURIA, UNSPECIFIED TYPE: Primary | ICD-10-CM

## 2018-12-06 ENCOUNTER — TELEPHONE (OUTPATIENT)
Dept: INTERNAL MEDICINE | Facility: CLINIC | Age: 58
End: 2018-12-06

## 2018-12-06 NOTE — TELEPHONE ENCOUNTER
Pt c/o worsening headache, blurry double vision, and jumbling her words.  Wanted to know what to do.  Advised pt that she needs to have someone drive her to the ER for appropriate evaluation.  Pt verbalized understanding.

## 2018-12-20 ENCOUNTER — TELEPHONE (OUTPATIENT)
Dept: INTERNAL MEDICINE | Facility: CLINIC | Age: 58
End: 2018-12-20

## 2018-12-20 DIAGNOSIS — R05.9 COUGH: ICD-10-CM

## 2018-12-20 NOTE — TELEPHONE ENCOUNTER
Pt c/o dry cough x a couple days.  Stated she has tried Robitussin but it has not helped.  Stated otc meds usually do not ever help her cough.  Requesting script of Phenergan with codeine be sent to [facility].

## 2018-12-20 NOTE — TELEPHONE ENCOUNTER
----- Message from Christina Mosher sent at 12/20/2018 12:17 PM CST -----  Contact: Patient  Patient states she having sinus issues that is causing a chronic cough, especially at [facility], and would like to have a cough medicine called in. Please call her back if needed at [phone] Thank you      [id number] - [place] [address] [facility]  [address] [address]  Phone: [phone] Fax: [phone]

## 2018-12-21 RX ORDER — PROMETHAZINE HYDROCHLORIDE AND CODEINE PHOSPHATE 6.25; 1 MG/5ML; MG/5ML
5 SOLUTION ORAL EVERY 8 HOURS
Qty: 150 ML | Refills: 0 | Status: SHIPPED | OUTPATIENT
Start: 2018-12-21 | End: 2018-12-31

## 2018-12-31 DIAGNOSIS — K21.9 GASTROESOPHAGEAL REFLUX DISEASE, ESOPHAGITIS PRESENCE NOT SPECIFIED: ICD-10-CM

## 2018-12-31 RX ORDER — PANTOPRAZOLE SODIUM 40 MG/1
40 TABLET, DELAYED RELEASE ORAL DAILY
Qty: 90 TABLET | Refills: 0 | Status: SHIPPED | OUTPATIENT
Start: 2018-12-31 | End: 2019-03-24 | Stop reason: SDUPTHER

## 2019-01-14 DIAGNOSIS — R11.0 NAUSEA: ICD-10-CM

## 2019-01-14 RX ORDER — PROMETHAZINE HYDROCHLORIDE 25 MG/1
25 TABLET ORAL EVERY 6 HOURS
Qty: 30 TABLET | Refills: 0 | Status: SHIPPED | OUTPATIENT
Start: 2019-01-14 | End: 2019-02-04 | Stop reason: SDUPTHER

## 2019-02-04 ENCOUNTER — OFFICE VISIT (OUTPATIENT)
Dept: INTERNAL MEDICINE | Facility: CLINIC | Age: 59
End: 2019-02-04
Payer: COMMERCIAL

## 2019-02-04 ENCOUNTER — LAB VISIT (OUTPATIENT)
Dept: LAB | Facility: HOSPITAL | Age: 59
End: 2019-02-04
Attending: FAMILY MEDICINE
Payer: COMMERCIAL

## 2019-02-04 VITALS
WEIGHT: 198.88 LBS | TEMPERATURE: 98 F | BODY MASS INDEX: 36.6 KG/M2 | SYSTOLIC BLOOD PRESSURE: 126 MMHG | HEART RATE: 80 BPM | OXYGEN SATURATION: 97 % | HEIGHT: 62 IN | DIASTOLIC BLOOD PRESSURE: 72 MMHG

## 2019-02-04 DIAGNOSIS — E87.6 HYPOKALEMIA: ICD-10-CM

## 2019-02-04 DIAGNOSIS — D50.9 MICROCYTIC ANEMIA: Primary | ICD-10-CM

## 2019-02-04 DIAGNOSIS — Z11.59 ENCOUNTER FOR HEPATITIS C SCREENING TEST FOR LOW RISK PATIENT: ICD-10-CM

## 2019-02-04 DIAGNOSIS — R11.0 NAUSEA: ICD-10-CM

## 2019-02-04 DIAGNOSIS — K52.9 GASTROENTERITIS: ICD-10-CM

## 2019-02-04 DIAGNOSIS — R05.9 COUGH: ICD-10-CM

## 2019-02-04 DIAGNOSIS — K52.9 GASTROENTERITIS: Primary | ICD-10-CM

## 2019-02-04 LAB
ANION GAP SERPL CALC-SCNC: 11 MMOL/L
BASOPHILS # BLD AUTO: 0.05 K/UL
BASOPHILS NFR BLD: 0.6 %
BUN SERPL-MCNC: 13 MG/DL
CALCIUM SERPL-MCNC: 9.8 MG/DL
CHLORIDE SERPL-SCNC: 102 MMOL/L
CO2 SERPL-SCNC: 24 MMOL/L
CREAT SERPL-MCNC: 0.8 MG/DL
DIFFERENTIAL METHOD: ABNORMAL
EOSINOPHIL # BLD AUTO: 0.1 K/UL
EOSINOPHIL NFR BLD: 1.4 %
ERYTHROCYTE [DISTWIDTH] IN BLOOD BY AUTOMATED COUNT: 15.3 %
EST. GFR  (AFRICAN AMERICAN): >60 ML/MIN/1.73 M^2
EST. GFR  (NON AFRICAN AMERICAN): >60 ML/MIN/1.73 M^2
GLUCOSE SERPL-MCNC: 97 MG/DL
HCT VFR BLD AUTO: 31.1 %
HGB BLD-MCNC: 9.7 G/DL
IMM GRANULOCYTES # BLD AUTO: 0.02 K/UL
IMM GRANULOCYTES NFR BLD AUTO: 0.2 %
LYMPHOCYTES # BLD AUTO: 2.3 K/UL
LYMPHOCYTES NFR BLD: 27.7 %
MCH RBC QN AUTO: 24.5 PG
MCHC RBC AUTO-ENTMCNC: 31.2 G/DL
MCV RBC AUTO: 79 FL
MONOCYTES # BLD AUTO: 0.4 K/UL
MONOCYTES NFR BLD: 5.4 %
NEUTROPHILS # BLD AUTO: 5.3 K/UL
NEUTROPHILS NFR BLD: 64.9 %
NRBC BLD-RTO: 0 /100 WBC
PLATELET # BLD AUTO: 569 K/UL
PMV BLD AUTO: 8.4 FL
POTASSIUM SERPL-SCNC: 3.2 MMOL/L
RBC # BLD AUTO: 3.96 M/UL
SODIUM SERPL-SCNC: 137 MMOL/L
WBC # BLD AUTO: 8.12 K/UL

## 2019-02-04 PROCEDURE — 86803 HEPATITIS C AB TEST: CPT

## 2019-02-04 PROCEDURE — 3074F SYST BP LT 130 MM HG: CPT | Mod: CPTII,S$GLB,, | Performed by: FAMILY MEDICINE

## 2019-02-04 PROCEDURE — 3078F DIAST BP <80 MM HG: CPT | Mod: CPTII,S$GLB,, | Performed by: FAMILY MEDICINE

## 2019-02-04 PROCEDURE — 3008F BODY MASS INDEX DOCD: CPT | Mod: CPTII,S$GLB,, | Performed by: FAMILY MEDICINE

## 2019-02-04 PROCEDURE — 80048 BASIC METABOLIC PNL TOTAL CA: CPT

## 2019-02-04 PROCEDURE — 85025 COMPLETE CBC W/AUTO DIFF WBC: CPT

## 2019-02-04 PROCEDURE — 99999 PR PBB SHADOW E&M-EST. PATIENT-LVL IV: CPT | Mod: PBBFAC,,, | Performed by: FAMILY MEDICINE

## 2019-02-04 PROCEDURE — 99999 PR PBB SHADOW E&M-EST. PATIENT-LVL IV: ICD-10-PCS | Mod: PBBFAC,,, | Performed by: FAMILY MEDICINE

## 2019-02-04 PROCEDURE — 99214 OFFICE O/P EST MOD 30 MIN: CPT | Mod: S$GLB,,, | Performed by: FAMILY MEDICINE

## 2019-02-04 PROCEDURE — 3078F PR MOST RECENT DIASTOLIC BLOOD PRESSURE < 80 MM HG: ICD-10-PCS | Mod: CPTII,S$GLB,, | Performed by: FAMILY MEDICINE

## 2019-02-04 PROCEDURE — 3008F PR BODY MASS INDEX (BMI) DOCUMENTED: ICD-10-PCS | Mod: CPTII,S$GLB,, | Performed by: FAMILY MEDICINE

## 2019-02-04 PROCEDURE — 3074F PR MOST RECENT SYSTOLIC BLOOD PRESSURE < 130 MM HG: ICD-10-PCS | Mod: CPTII,S$GLB,, | Performed by: FAMILY MEDICINE

## 2019-02-04 PROCEDURE — 99214 PR OFFICE/OUTPT VISIT, EST, LEVL IV, 30-39 MIN: ICD-10-PCS | Mod: S$GLB,,, | Performed by: FAMILY MEDICINE

## 2019-02-04 PROCEDURE — 36415 COLL VENOUS BLD VENIPUNCTURE: CPT

## 2019-02-04 RX ORDER — PROMETHAZINE HYDROCHLORIDE AND CODEINE PHOSPHATE 6.25; 1 MG/5ML; MG/5ML
5 SOLUTION ORAL EVERY 4 HOURS PRN
Qty: 118 ML | Refills: 0 | Status: SHIPPED | OUTPATIENT
Start: 2019-02-04 | End: 2020-02-10 | Stop reason: SDUPTHER

## 2019-02-04 RX ORDER — HYDROCODONE BITARTRATE AND ACETAMINOPHEN 10; 325 MG/1; MG/1
1 TABLET ORAL EVERY 6 HOURS PRN
Refills: 0 | COMMUNITY
Start: 2019-01-22 | End: 2019-05-16

## 2019-02-04 RX ORDER — PROMETHAZINE HYDROCHLORIDE 25 MG/1
25 TABLET ORAL EVERY 6 HOURS
Qty: 30 TABLET | Refills: 0 | Status: SHIPPED | OUTPATIENT
Start: 2019-02-04 | End: 2019-02-07 | Stop reason: SDUPTHER

## 2019-02-04 RX ORDER — ESTAZOLAM 2 MG/1
TABLET ORAL
Refills: 3 | COMMUNITY
Start: 2019-01-15 | End: 2019-07-25

## 2019-02-04 RX ORDER — DIPHENOXYLATE HYDROCHLORIDE AND ATROPINE SULFATE 2.5; .025 MG/1; MG/1
1 TABLET ORAL 4 TIMES DAILY PRN
Qty: 30 TABLET | Refills: 0 | Status: SHIPPED | OUTPATIENT
Start: 2019-02-04 | End: 2019-02-14

## 2019-02-04 RX ORDER — POTASSIUM CHLORIDE 750 MG/1
20 TABLET, EXTENDED RELEASE ORAL 2 TIMES DAILY
Qty: 6 TABLET | Refills: 0 | Status: SHIPPED | OUTPATIENT
Start: 2019-02-04 | End: 2019-05-16

## 2019-02-04 RX ORDER — ALPRAZOLAM 2 MG/1
2 TABLET ORAL DAILY PRN
Refills: 0 | COMMUNITY
Start: 2019-01-30 | End: 2019-05-16

## 2019-02-04 NOTE — PATIENT INSTRUCTIONS

## 2019-02-04 NOTE — PROGRESS NOTES
Subjective:       Patient ID: Miles Bowen is a 58 y.o. female.    Chief Complaint: Nausea; Cough; Diarrhea; Emesis; and Fatigue    57 yo female here with 4 day h/o nausea, vomiting, fatigue, and diarrhea. Denies fever, chills. Denies melena or hematochezia. No associated symptoms. No respiratory symptoms other than longstanding cough for which she requests refill on phenergan-codeine syrup. She has longstanding history of GI complaints and has had extensive work up in the past. I saw her last with similar complaints in 10/22/18. She has low level anemia with microcytosis. Last cscope 2014. Dx with IBS. She has chronic opiate rx from her neurologist but no patterns of abuse evident on .   PCP: Carlos Paul MD      does not have any pertinent problems on file.  Past Medical History:   Diagnosis Date    Anxiety     Depression     GERD (gastroesophageal reflux disease)     Heart murmur     Hypertension     Multinodular goiter 12/23/2016    MVP (mitral valve prolapse)     Obesity      Past Surgical History:   Procedure Laterality Date    BACK SURGERY  2016    Bone fusion    COLONOSCOPY  2014    ESOPHAGOGASTRODUODENOSCOPY (EGD) Left 11/4/2016    [facility]    FIXATION KYPHOPLASTY THORACIC SPINE  05/2016    HYSTERECTOMY      NASAL SINUS SURGERY  2015    Polyps removed    SINUS SURGERY  11/2014    TONSILLECTOMY       Family History   Problem Relation Age of Onset    Hypertension Mother     Hypertension Father     Heart attack Father 63    Cancer Maternal Grandmother         Breast    Colon cancer Neg Hx     Stomach cancer Neg Hx      Social History     Socioeconomic History    Marital status:      Spouse name: Not on file    Number of children: Not on file    Years of education: Not on file    Highest education level: Not on file   Social Needs    Financial resource strain: Not on file    Food insecurity - worry: Not on file    Food insecurity  - inability: Not on file    Transportation needs - medical: Not on file    Transportation needs - non-medical: Not on file   Occupational History    Not on file   Tobacco Use    Smoking status: Former Smoker     Packs/day: 0.25     Years: 1.00     Pack years: 0.25     Types: Cigarettes     Last attempt to quit: 2006     Years since quittin.1    Smokeless tobacco: Never Used   Substance and Sexual Activity    Alcohol use: Yes     Comment: rarely    Drug use: No    Sexual activity: Not Currently   Other Topics Concern    Not on file   Social History Narrative     with 1 adult child.      Review of Systems   Constitutional: Positive for appetite change and fatigue. Negative for activity change, chills, fever and unexpected weight change.   HENT: Negative for hearing loss and sore throat.    Eyes: Negative for pain and visual disturbance.   Respiratory: Positive for cough. Negative for apnea, chest tightness and shortness of breath.    Cardiovascular: Negative for chest pain and palpitations.   Gastrointestinal: Positive for abdominal pain, diarrhea, nausea and vomiting. Negative for constipation.   Genitourinary: Negative for difficulty urinating, dysuria and vaginal discharge.   Musculoskeletal: Negative for arthralgias and myalgias.   Skin: Negative for rash and wound.   Neurological: Negative for light-headedness and headaches.   Hematological: Negative.        Objective:     Vitals:    19 1414   BP: 126/72   Pulse: 80   Temp: 98.3 °F (36.8 °C)        Physical Exam   Constitutional: She is oriented to person, place, and time. She appears well-developed and well-nourished.   HENT:   Head: Normocephalic and atraumatic.   Eyes: EOM are normal. Pupils are equal, round, and reactive to light.   Neck: Normal range of motion. Neck supple.   Cardiovascular: Normal rate and regular rhythm.   Pulmonary/Chest: Effort normal and breath sounds normal. No respiratory distress. She has no wheezes.  She has no rales.   Abdominal: Soft. Bowel sounds are normal. There is tenderness (mild diffuse abdominal tenderness).   Musculoskeletal: Normal range of motion. She exhibits no deformity.   Neurological: She is alert and oriented to person, place, and time.   Skin: Skin is warm and dry.   Psychiatric: She has a normal mood and affect. Her behavior is normal.   Nursing note and vitals reviewed.      Assessment:       1. Gastroenteritis    2. Nausea    3. Encounter for hepatitis C screening test for low risk patient    4. Cough        Plan:           Problem List Items Addressed This Visit     None      Visit Diagnoses     Gastroenteritis    -  Primary    Relevant Medications    diphenoxylate-atropine 2.5-0.025 mg (LOMOTIL) 2.5-0.025 mg per tablet    Other Relevant Orders    CBC auto differential (Completed)    Basic metabolic panel (Completed)    Nausea        Relevant Medications    promethazine (PHENERGAN) 25 MG tablet    Encounter for hepatitis C screening test for low risk patient        Relevant Orders    Hepatitis C antibody    Cough        Relevant Medications    promethazine-codeine 6.25-10 mg/5 ml (PHENERGAN WITH CODEINE) 6.25-10 mg/5 mL syrup      Worsening anemia identified on today's labs; GI consultation entered; may benefit from repeat cscope. Frequent episodes of nausea, vomiting, diarrhea, and vague abdominal pain. No recent imaging; u/s ordered at our October visit done. Will defer to GI.

## 2019-02-05 LAB — HCV AB SERPL QL IA: NEGATIVE

## 2019-02-06 ENCOUNTER — TELEPHONE (OUTPATIENT)
Dept: INTERNAL MEDICINE | Facility: CLINIC | Age: 59
End: 2019-02-06

## 2019-02-06 NOTE — TELEPHONE ENCOUNTER
----- Message from Jonna Funk sent at 2/6/2019  1:06 PM CST -----  Contact: self  Returning phone call. Please call back at [phone].    Thanks,  Jonna Funk

## 2019-02-07 DIAGNOSIS — R11.0 NAUSEA: ICD-10-CM

## 2019-02-07 RX ORDER — PROMETHAZINE HYDROCHLORIDE 25 MG/1
25 TABLET ORAL EVERY 6 HOURS
Qty: 30 TABLET | Refills: 0 | Status: SHIPPED | OUTPATIENT
Start: 2019-02-07 | End: 2019-04-16 | Stop reason: SDUPTHER

## 2019-03-14 ENCOUNTER — TELEPHONE (OUTPATIENT)
Dept: INTERNAL MEDICINE | Facility: CLINIC | Age: 59
End: 2019-03-14

## 2019-03-14 NOTE — TELEPHONE ENCOUNTER
Pt stated she hurt her back this morning and would like to see an orthopedist.  Notified pt that orthopedics doesn't typically see pt's for back pain and that she should will probably need to see physiatry, which she had been referred to and seen in [phone].  Pt stated the physiatry appt turned into a pain mgmt appt and referral to PT and she doesn't want to do that again.  Notified pt that Dr. Paul's first available appt is not until 3/18/19.  Pt stated she would rather see someone who deals with backs.  Notified pt that Dr. Clement's first available appt is also on 3/18/19.  Pt stated she would like to see someone today and will set up an appt herself.

## 2019-03-14 NOTE — TELEPHONE ENCOUNTER
----- Message from Jennifer Hadley sent at 3/14/2019  9:24 AM CDT -----  Contact: self  Patient requesting a call back regarding back pain. States her back locked up on her this morning. She would like to know if she should come in to see Dr. Paul or go to another Dr. Please call back at [phone]      Thanks,  Jennifer Hadley

## 2019-03-24 DIAGNOSIS — K21.9 GASTROESOPHAGEAL REFLUX DISEASE, ESOPHAGITIS PRESENCE NOT SPECIFIED: ICD-10-CM

## 2019-03-24 DIAGNOSIS — I10 ESSENTIAL HYPERTENSION: ICD-10-CM

## 2019-03-24 RX ORDER — LOSARTAN POTASSIUM AND HYDROCHLOROTHIAZIDE 12.5; 1 MG/1; MG/1
1 TABLET ORAL DAILY
Qty: 90 TABLET | Refills: 0 | Status: SHIPPED | OUTPATIENT
Start: 2019-03-24 | End: 2019-06-21 | Stop reason: SDUPTHER

## 2019-03-24 RX ORDER — PANTOPRAZOLE SODIUM 40 MG/1
40 TABLET, DELAYED RELEASE ORAL DAILY
Qty: 90 TABLET | Refills: 0 | Status: SHIPPED | OUTPATIENT
Start: 2019-03-24 | End: 2019-06-02 | Stop reason: SDUPTHER

## 2019-04-16 DIAGNOSIS — R11.0 NAUSEA: ICD-10-CM

## 2019-04-16 RX ORDER — PROMETHAZINE HYDROCHLORIDE 25 MG/1
25 TABLET ORAL EVERY 6 HOURS
Qty: 30 TABLET | Refills: 0 | Status: SHIPPED | OUTPATIENT
Start: 2019-04-16 | End: 2019-05-16 | Stop reason: SDUPTHER

## 2019-05-16 ENCOUNTER — OFFICE VISIT (OUTPATIENT)
Dept: INTERNAL MEDICINE | Facility: CLINIC | Age: 59
End: 2019-05-16
Payer: COMMERCIAL

## 2019-05-16 VITALS
DIASTOLIC BLOOD PRESSURE: 84 MMHG | WEIGHT: 194 LBS | SYSTOLIC BLOOD PRESSURE: 122 MMHG | HEART RATE: 78 BPM | OXYGEN SATURATION: 97 % | BODY MASS INDEX: 35.48 KG/M2 | TEMPERATURE: 98 F

## 2019-05-16 DIAGNOSIS — R21 RASH: Primary | ICD-10-CM

## 2019-05-16 DIAGNOSIS — R11.0 NAUSEA: ICD-10-CM

## 2019-05-16 PROCEDURE — 99999 PR PBB SHADOW E&M-EST. PATIENT-LVL III: ICD-10-PCS | Mod: PBBFAC,,, | Performed by: INTERNAL MEDICINE

## 2019-05-16 PROCEDURE — 3079F DIAST BP 80-89 MM HG: CPT | Mod: CPTII,S$GLB,, | Performed by: INTERNAL MEDICINE

## 2019-05-16 PROCEDURE — 99999 PR PBB SHADOW E&M-EST. PATIENT-LVL III: CPT | Mod: PBBFAC,,, | Performed by: INTERNAL MEDICINE

## 2019-05-16 PROCEDURE — 3074F PR MOST RECENT SYSTOLIC BLOOD PRESSURE < 130 MM HG: ICD-10-PCS | Mod: CPTII,S$GLB,, | Performed by: INTERNAL MEDICINE

## 2019-05-16 PROCEDURE — 3079F PR MOST RECENT DIASTOLIC BLOOD PRESSURE 80-89 MM HG: ICD-10-PCS | Mod: CPTII,S$GLB,, | Performed by: INTERNAL MEDICINE

## 2019-05-16 PROCEDURE — 3008F PR BODY MASS INDEX (BMI) DOCUMENTED: ICD-10-PCS | Mod: CPTII,S$GLB,, | Performed by: INTERNAL MEDICINE

## 2019-05-16 PROCEDURE — 3074F SYST BP LT 130 MM HG: CPT | Mod: CPTII,S$GLB,, | Performed by: INTERNAL MEDICINE

## 2019-05-16 PROCEDURE — 99214 OFFICE O/P EST MOD 30 MIN: CPT | Mod: S$GLB,,, | Performed by: INTERNAL MEDICINE

## 2019-05-16 PROCEDURE — 3008F BODY MASS INDEX DOCD: CPT | Mod: CPTII,S$GLB,, | Performed by: INTERNAL MEDICINE

## 2019-05-16 PROCEDURE — 99214 PR OFFICE/OUTPT VISIT, EST, LEVL IV, 30-39 MIN: ICD-10-PCS | Mod: S$GLB,,, | Performed by: INTERNAL MEDICINE

## 2019-05-16 RX ORDER — DIAZEPAM 5 MG/1
5 TABLET ORAL
Refills: 3 | COMMUNITY
Start: 2019-04-02 | End: 2021-09-02

## 2019-05-16 RX ORDER — CLOTRIMAZOLE AND BETAMETHASONE DIPROPIONATE 10; .64 MG/G; MG/G
CREAM TOPICAL 2 TIMES DAILY
Qty: 15 G | Refills: 0 | Status: SHIPPED | OUTPATIENT
Start: 2019-05-16 | End: 2019-07-25

## 2019-05-16 RX ORDER — PROMETHAZINE HYDROCHLORIDE 25 MG/1
25 TABLET ORAL EVERY 6 HOURS
Qty: 30 TABLET | Refills: 0 | Status: SHIPPED | OUTPATIENT
Start: 2019-05-16 | End: 2019-05-19 | Stop reason: SDUPTHER

## 2019-05-16 RX ORDER — TOPIRAMATE 50 MG/1
TABLET, FILM COATED ORAL DAILY
COMMUNITY
Start: 2019-05-06 | End: 2022-10-10 | Stop reason: SDUPTHER

## 2019-05-16 RX ORDER — VALACYCLOVIR HYDROCHLORIDE 1 G/1
1000 TABLET, FILM COATED ORAL 3 TIMES DAILY
Qty: 21 TABLET | Refills: 0 | Status: SHIPPED | OUTPATIENT
Start: 2019-05-16 | End: 2019-07-25

## 2019-05-16 RX ORDER — CYCLOBENZAPRINE HCL 10 MG
10 TABLET ORAL NIGHTLY PRN
Qty: 90 TABLET | Refills: 1 | Status: SHIPPED | OUTPATIENT
Start: 2019-05-16 | End: 2019-09-24

## 2019-05-17 NOTE — PROGRESS NOTES
Subjective:      Patient ID: iMles Bowen is a 58 y.o. female.    Chief Complaint: Rash    Rash   This is a new problem. The current episode started today. The problem is unchanged. The affected locations include the chest. The rash is characterized by redness and itchiness. She was exposed to nothing. Pertinent negatives include no anorexia, congestion, cough, eye pain, facial edema, fatigue, fever, rhinorrhea or shortness of breath. Past treatments include nothing. The treatment provided no relief. Her past medical history is significant for varicella. There is no history of asthma.      59 yo with   Patient Active Problem List   Diagnosis    Hypertension    Depression    Anxiety    Hematemesis without nausea    GERD (gastroesophageal reflux disease)    Hiatal hernia    DDD (degenerative disc disease), lumbar    Common migraine    Allergic rhinitis    IBS (irritable bowel syndrome)    Multinodular goiter    MVP (mitral valve prolapse)    Nasal polyps    Obesity     Past Medical History:   Diagnosis Date    Anxiety     Depression     GERD (gastroesophageal reflux disease)     Heart murmur     Hypertension     Multinodular goiter 12/23/2016    MVP (mitral valve prolapse)     Obesity      Here today c/o rash.   Review of Systems   Constitutional: Negative for fatigue and fever.   HENT: Negative for congestion and rhinorrhea.    Eyes: Negative for pain.   Respiratory: Negative for cough and shortness of breath.    Gastrointestinal: Negative for anorexia.   Skin: Positive for rash. Negative for wound.   Neurological: Negative for weakness.     Objective:   /84 (BP Location: Left arm, Patient Position: Sitting, BP Method: Medium (Manual))   Pulse 78   Temp 97.7 °F (36.5 °C) (Tympanic)   Wt 88 kg (194 lb 0.1 oz)   SpO2 97%   BMI 35.48 kg/m²     Physical Exam   Constitutional: She appears well-developed and well-nourished. No distress.   HENT:   Head: Normocephalic and atraumatic.    Eyes: Pupils are equal, round, and reactive to light. EOM are normal.   Cardiovascular: Normal rate and regular rhythm.   Pulmonary/Chest: Effort normal and breath sounds normal.       Musculoskeletal: She exhibits no edema.   Neurological: She is alert.   Skin: Skin is warm and dry.   Psychiatric: She has a normal mood and affect. Her behavior is normal.       Assessment:     1. Rash      Plan:   Rash  -     clotrimazole-betamethasone 1-0.05% (LOTRISONE) cream; Apply topically 2 (two) times daily.  Dispense: 15 g; Refill: 0  -     valACYclovir (VALTREX) 1000 MG tablet; Take 1 tablet (1,000 mg total) by mouth 3 (three) times daily. for 7 days  Dispense: 21 tablet; Refill: 0    c/w contact vs fungal. pt concerned she may have shingles. Scared about what to do if has shingles.     rx valtrex and advised pt to start valtrex as soon as notice spreading or vesicles.     Lab Frequency Next Occurrence   Mammo Digital Screening Bilat with CAD Once 06/18/2018   Lipid panel Once 09/04/2018   Hepatitis C antibody Once 09/04/2018   US Abdomen Complete Once 10/22/2018   Occult blood x 1, stool Once 10/22/2018   WBC, Stool Once 10/22/2018   Giardia / Cryptosporidum, EIA Once 10/22/2018   Stool Exam-Ova,Cysts,Parasites Once 10/22/2018   Stool culture Once 10/22/2018   CBC auto differential Once 02/04/2019   Potassium Once 02/04/2019       Problem List Items Addressed This Visit     None      Visit Diagnoses     Rash    -  Primary    Relevant Medications    clotrimazole-betamethasone 1-0.05% (LOTRISONE) cream    valACYclovir (VALTREX) 1000 MG tablet          Follow up if symptoms worsen or fail to improve.

## 2019-05-19 DIAGNOSIS — R11.0 NAUSEA: ICD-10-CM

## 2019-05-19 DIAGNOSIS — R21 RASH: Primary | ICD-10-CM

## 2019-05-20 RX ORDER — PROMETHAZINE HYDROCHLORIDE 25 MG/1
25 TABLET ORAL EVERY 6 HOURS
Qty: 30 TABLET | Refills: 0 | Status: SHIPPED | OUTPATIENT
Start: 2019-05-20 | End: 2020-03-27 | Stop reason: SDUPTHER

## 2019-05-20 RX ORDER — DOXYCYCLINE HYCLATE 100 MG
100 TABLET ORAL EVERY 12 HOURS
Qty: 14 TABLET | Refills: 0 | Status: SHIPPED | OUTPATIENT
Start: 2019-05-20 | End: 2019-05-27

## 2019-05-20 NOTE — TELEPHONE ENCOUNTER
Pt stated her rash has spread to both sides of her chest.  Started Valtrex tid on 5/17/19.  Stated pain is 8/10 so she has been putting cold compresses on the rash.  Wants to know what else she can do/take for shingles pain.  Please advise.

## 2019-05-20 NOTE — TELEPHONE ENCOUNTER
Unusual for shingles to be present on both sides. I have sent in an antibiotic in case it is a bacterial infection of skin. But rec pt see derm with [facility] or another clinic asap.

## 2019-05-20 NOTE — TELEPHONE ENCOUNTER
Notified pt that an antibiotic has been prescribed to cover for any bacterial skin infection and that she should be seen by derm ASAP.  Pt verbalized understanding but declined first available appt with Ashleigh Demarco and instead scheduled for a later appt with Dr. Damian.

## 2019-05-20 NOTE — TELEPHONE ENCOUNTER
----- Message from Marichuy Godoy sent at 5/20/2019  8:04 AM CDT -----  Contact: pt  Type:  Needs Medical Advice    Who Called: pt  Symptoms (please be specific):  Shingles  How long has patient had these symptoms:    Pharmacy name and phone #:   Would the patient rather a call back or a response via [facility]? Call back  Best Call Back Number: [phone]  Additional Information: rash has changed to shingles taking medication and is very uncomfortable

## 2019-06-02 DIAGNOSIS — K21.9 GASTROESOPHAGEAL REFLUX DISEASE, ESOPHAGITIS PRESENCE NOT SPECIFIED: ICD-10-CM

## 2019-06-03 RX ORDER — PANTOPRAZOLE SODIUM 40 MG/1
40 TABLET, DELAYED RELEASE ORAL DAILY
Qty: 90 TABLET | Refills: 0 | Status: SHIPPED | OUTPATIENT
Start: 2019-06-03 | End: 2019-08-31 | Stop reason: SDUPTHER

## 2019-06-21 DIAGNOSIS — I10 ESSENTIAL HYPERTENSION: ICD-10-CM

## 2019-06-21 RX ORDER — LOSARTAN POTASSIUM AND HYDROCHLOROTHIAZIDE 12.5; 1 MG/1; MG/1
1 TABLET ORAL DAILY
Qty: 90 TABLET | Refills: 0 | Status: SHIPPED | OUTPATIENT
Start: 2019-06-21 | End: 2019-09-30 | Stop reason: SDUPTHER

## 2019-07-25 ENCOUNTER — OFFICE VISIT (OUTPATIENT)
Dept: INTERNAL MEDICINE | Facility: CLINIC | Age: 59
End: 2019-07-25
Payer: COMMERCIAL

## 2019-07-25 ENCOUNTER — LAB VISIT (OUTPATIENT)
Dept: LAB | Facility: HOSPITAL | Age: 59
End: 2019-07-25
Attending: INTERNAL MEDICINE
Payer: COMMERCIAL

## 2019-07-25 ENCOUNTER — TELEPHONE (OUTPATIENT)
Dept: INTERNAL MEDICINE | Facility: CLINIC | Age: 59
End: 2019-07-25

## 2019-07-25 VITALS
HEART RATE: 108 BPM | OXYGEN SATURATION: 96 % | SYSTOLIC BLOOD PRESSURE: 120 MMHG | TEMPERATURE: 96 F | DIASTOLIC BLOOD PRESSURE: 76 MMHG | WEIGHT: 188.25 LBS | BODY MASS INDEX: 34.44 KG/M2

## 2019-07-25 DIAGNOSIS — D50.9 MICROCYTIC ANEMIA: ICD-10-CM

## 2019-07-25 DIAGNOSIS — I10 ESSENTIAL HYPERTENSION: ICD-10-CM

## 2019-07-25 DIAGNOSIS — J06.9 UPPER RESPIRATORY TRACT INFECTION, UNSPECIFIED TYPE: ICD-10-CM

## 2019-07-25 DIAGNOSIS — Z91.09 ENVIRONMENTAL ALLERGIES: Primary | ICD-10-CM

## 2019-07-25 DIAGNOSIS — J06.9 UPPER RESPIRATORY TRACT INFECTION, UNSPECIFIED TYPE: Primary | ICD-10-CM

## 2019-07-25 DIAGNOSIS — E05.90 SUBCLINICAL HYPERTHYROIDISM: ICD-10-CM

## 2019-07-25 DIAGNOSIS — R05.9 COUGH: ICD-10-CM

## 2019-07-25 DIAGNOSIS — E87.6 HYPOKALEMIA: ICD-10-CM

## 2019-07-25 LAB
ALBUMIN SERPL BCP-MCNC: 4.1 G/DL (ref 3.5–5.2)
ALP SERPL-CCNC: 61 U/L (ref 55–135)
ALT SERPL W/O P-5'-P-CCNC: 9 U/L (ref 10–44)
ANION GAP SERPL CALC-SCNC: 13 MMOL/L (ref 8–16)
AST SERPL-CCNC: 10 U/L (ref 10–40)
BASOPHILS # BLD AUTO: 0.07 K/UL (ref 0–0.2)
BASOPHILS NFR BLD: 0.8 % (ref 0–1.9)
BILIRUB SERPL-MCNC: 0.3 MG/DL (ref 0.1–1)
BUN SERPL-MCNC: 17 MG/DL (ref 6–20)
CALCIUM SERPL-MCNC: 10.2 MG/DL (ref 8.7–10.5)
CHLORIDE SERPL-SCNC: 102 MMOL/L (ref 95–110)
CO2 SERPL-SCNC: 24 MMOL/L (ref 23–29)
CREAT SERPL-MCNC: 0.8 MG/DL (ref 0.5–1.4)
DIFFERENTIAL METHOD: ABNORMAL
EOSINOPHIL # BLD AUTO: 0.1 K/UL (ref 0–0.5)
EOSINOPHIL NFR BLD: 0.6 % (ref 0–8)
ERYTHROCYTE [DISTWIDTH] IN BLOOD BY AUTOMATED COUNT: 17.7 % (ref 11.5–14.5)
EST. GFR  (AFRICAN AMERICAN): >60 ML/MIN/1.73 M^2
EST. GFR  (NON AFRICAN AMERICAN): >60 ML/MIN/1.73 M^2
FERRITIN SERPL-MCNC: 4 NG/ML (ref 20–300)
GLUCOSE SERPL-MCNC: 89 MG/DL (ref 70–110)
HCT VFR BLD AUTO: 34.6 % (ref 37–48.5)
HGB BLD-MCNC: 10.5 G/DL (ref 12–16)
IMM GRANULOCYTES # BLD AUTO: 0.02 K/UL (ref 0–0.04)
IMM GRANULOCYTES NFR BLD AUTO: 0.2 % (ref 0–0.5)
IRON SERPL-MCNC: 22 UG/DL (ref 30–160)
LYMPHOCYTES # BLD AUTO: 2.6 K/UL (ref 1–4.8)
LYMPHOCYTES NFR BLD: 30.1 % (ref 18–48)
MCH RBC QN AUTO: 23.6 PG (ref 27–31)
MCHC RBC AUTO-ENTMCNC: 30.3 G/DL (ref 32–36)
MCV RBC AUTO: 78 FL (ref 82–98)
MONOCYTES # BLD AUTO: 0.5 K/UL (ref 0.3–1)
MONOCYTES NFR BLD: 5.3 % (ref 4–15)
NEUTROPHILS # BLD AUTO: 5.4 K/UL (ref 1.8–7.7)
NEUTROPHILS NFR BLD: 63 % (ref 38–73)
NRBC BLD-RTO: 0 /100 WBC
PLATELET # BLD AUTO: 473 K/UL (ref 150–350)
PMV BLD AUTO: 9.6 FL (ref 9.2–12.9)
POTASSIUM SERPL-SCNC: 2.8 MMOL/L (ref 3.5–5.1)
PROT SERPL-MCNC: 7.6 G/DL (ref 6–8.4)
RBC # BLD AUTO: 4.45 M/UL (ref 4–5.4)
SATURATED IRON: 5 % (ref 20–50)
SODIUM SERPL-SCNC: 139 MMOL/L (ref 136–145)
T4 FREE SERPL-MCNC: 1.06 NG/DL (ref 0.71–1.51)
TOTAL IRON BINDING CAPACITY: 481 UG/DL (ref 250–450)
TRANSFERRIN SERPL-MCNC: 325 MG/DL (ref 200–375)
TSH SERPL DL<=0.005 MIU/L-ACNC: 0.53 UIU/ML (ref 0.4–4)
WBC # BLD AUTO: 8.51 K/UL (ref 3.9–12.7)

## 2019-07-25 PROCEDURE — 83540 ASSAY OF IRON: CPT

## 2019-07-25 PROCEDURE — 3078F PR MOST RECENT DIASTOLIC BLOOD PRESSURE < 80 MM HG: ICD-10-PCS | Mod: CPTII,S$GLB,, | Performed by: INTERNAL MEDICINE

## 2019-07-25 PROCEDURE — 3008F PR BODY MASS INDEX (BMI) DOCUMENTED: ICD-10-PCS | Mod: CPTII,S$GLB,, | Performed by: INTERNAL MEDICINE

## 2019-07-25 PROCEDURE — 99999 PR PBB SHADOW E&M-EST. PATIENT-LVL III: CPT | Mod: PBBFAC,,, | Performed by: INTERNAL MEDICINE

## 2019-07-25 PROCEDURE — 3074F SYST BP LT 130 MM HG: CPT | Mod: CPTII,S$GLB,, | Performed by: INTERNAL MEDICINE

## 2019-07-25 PROCEDURE — 82607 VITAMIN B-12: CPT

## 2019-07-25 PROCEDURE — 84439 ASSAY OF FREE THYROXINE: CPT

## 2019-07-25 PROCEDURE — 99214 PR OFFICE/OUTPT VISIT, EST, LEVL IV, 30-39 MIN: ICD-10-PCS | Mod: S$GLB,,, | Performed by: INTERNAL MEDICINE

## 2019-07-25 PROCEDURE — 84443 ASSAY THYROID STIM HORMONE: CPT

## 2019-07-25 PROCEDURE — 3078F DIAST BP <80 MM HG: CPT | Mod: CPTII,S$GLB,, | Performed by: INTERNAL MEDICINE

## 2019-07-25 PROCEDURE — 99214 OFFICE O/P EST MOD 30 MIN: CPT | Mod: S$GLB,,, | Performed by: INTERNAL MEDICINE

## 2019-07-25 PROCEDURE — 82728 ASSAY OF FERRITIN: CPT

## 2019-07-25 PROCEDURE — 36415 COLL VENOUS BLD VENIPUNCTURE: CPT

## 2019-07-25 PROCEDURE — 3074F PR MOST RECENT SYSTOLIC BLOOD PRESSURE < 130 MM HG: ICD-10-PCS | Mod: CPTII,S$GLB,, | Performed by: INTERNAL MEDICINE

## 2019-07-25 PROCEDURE — 80053 COMPREHEN METABOLIC PANEL: CPT

## 2019-07-25 PROCEDURE — 99999 PR PBB SHADOW E&M-EST. PATIENT-LVL III: ICD-10-PCS | Mod: PBBFAC,,, | Performed by: INTERNAL MEDICINE

## 2019-07-25 PROCEDURE — 3008F BODY MASS INDEX DOCD: CPT | Mod: CPTII,S$GLB,, | Performed by: INTERNAL MEDICINE

## 2019-07-25 PROCEDURE — 85025 COMPLETE CBC W/AUTO DIFF WBC: CPT

## 2019-07-25 RX ORDER — PROMETHAZINE HYDROCHLORIDE AND CODEINE PHOSPHATE 6.25; 1 MG/5ML; MG/5ML
5 SOLUTION ORAL NIGHTLY PRN
Qty: 180 ML | Refills: 0 | Status: SHIPPED | OUTPATIENT
Start: 2019-07-25 | End: 2019-09-05 | Stop reason: SDUPTHER

## 2019-07-25 RX ORDER — DOXYCYCLINE HYCLATE 100 MG
100 TABLET ORAL EVERY 12 HOURS
Qty: 14 TABLET | Refills: 0 | Status: CANCELLED | OUTPATIENT
Start: 2019-07-25 | End: 2019-08-01

## 2019-07-25 RX ORDER — ZOLPIDEM TARTRATE 12.5 MG/1
TABLET, FILM COATED, EXTENDED RELEASE ORAL
Refills: 2 | COMMUNITY
Start: 2019-07-21 | End: 2020-06-04

## 2019-07-25 RX ORDER — IPRATROPIUM BROMIDE 21 UG/1
2 SPRAY, METERED NASAL 2 TIMES DAILY
Qty: 30 ML | Refills: 0 | Status: SHIPPED | OUTPATIENT
Start: 2019-07-25 | End: 2020-06-04

## 2019-07-25 NOTE — TELEPHONE ENCOUNTER
Insurance won't cover QNASL, but will cover flunisolide, azelastine, or ipratropium bromide.  Are you willing to prescribe an alternative?

## 2019-07-25 NOTE — TELEPHONE ENCOUNTER
----- Message from Jennifer Jomar sent at 7/25/2019  1:58 PM CDT -----  Contact: [facility]  Requesting a call back regarding prescription for beclomethasone dipropionate (QNASL) 80 mcg/actuation HFAA. He states that the patient's insurnace is not willing to pay for and would like to know if any of the following would be ok to fill instead. Patient's insurance is willing to pay for one of the following: Flunisolide, Ipratropiumbromide, Azelastine. Please call pharmacy back at [phone] option 4

## 2019-07-25 NOTE — PROGRESS NOTES
Subjective:      Patient ID: Miles Bowen is a 58 y.o. female.    Chief Complaint: Nasal Congestion (cough, sore throat, ear pain)    URI    This is a new problem. The current episode started yesterday. The problem has been waxing and waning. Associated symptoms include congestion, coughing, rhinorrhea and sneezing. Pertinent negatives include no abdominal pain, chest pain, diarrhea, dysuria, ear pain, headaches, joint swelling, neck pain, plugged ear sensation, rash, sinus pain, sore throat, vomiting or wheezing. She has tried nothing for the symptoms. The treatment provided no relief.      57 yo with   Patient Active Problem List   Diagnosis    Hypertension    Depression    Anxiety    Hematemesis without nausea    GERD (gastroesophageal reflux disease)    Hiatal hernia    DDD (degenerative disc disease), lumbar    Common migraine    Allergic rhinitis    IBS (irritable bowel syndrome)    Multinodular goiter    MVP (mitral valve prolapse)    Nasal polyps    Obesity     Past Medical History:   Diagnosis Date    Anxiety     Depression     GERD (gastroesophageal reflux disease)     Heart murmur     Hypertension     Multinodular goiter 12/23/2016    MVP (mitral valve prolapse)     Obesity      Here today c/o uri symptoms.   Cough keeping her awake.   Cough mostly from throat. occ productive.   Review of Systems   Constitutional: Positive for fatigue. Negative for chills and fever.   HENT: Positive for congestion, rhinorrhea and sneezing. Negative for ear pain, sinus pain and sore throat.    Respiratory: Positive for cough. Negative for shortness of breath and wheezing.    Cardiovascular: Negative for chest pain, palpitations and leg swelling.   Gastrointestinal: Negative for abdominal pain, blood in stool, diarrhea and vomiting.   Genitourinary: Negative for dysuria and hematuria.   Musculoskeletal: Negative for neck pain.   Skin: Negative for rash.   Neurological: Negative for seizures,  syncope and headaches.     Objective:   /76 (BP Location: Left arm, Patient Position: Sitting, BP Method: Medium (Manual))   Pulse 108   Temp 96.4 °F (35.8 °C) (Tympanic)   Wt 85.4 kg (188 lb 4.4 oz)   SpO2 96%   BMI 34.44 kg/m²     Physical Exam   Constitutional: She is oriented to person, place, and time. She appears well-developed and well-nourished. No distress.   HENT:   Head: Normocephalic and atraumatic.   Right Ear: Tympanic membrane normal.   Left Ear: Tympanic membrane normal.   Nose: Mucosal edema present. No rhinorrhea. Right sinus exhibits no maxillary sinus tenderness and no frontal sinus tenderness. Left sinus exhibits no maxillary sinus tenderness and no frontal sinus tenderness.   Mouth/Throat: Posterior oropharyngeal erythema present. No oropharyngeal exudate or posterior oropharyngeal edema.   Eyes: Pupils are equal, round, and reactive to light. EOM are normal.   Neck: Neck supple. No thyromegaly present.   Cardiovascular: Normal rate and regular rhythm.   Pulmonary/Chest: Breath sounds normal. She has no wheezes. She has no rales.   Abdominal: Soft. Bowel sounds are normal. There is no tenderness.   Lymphadenopathy:     She has no cervical adenopathy.   Neurological: She is alert and oriented to person, place, and time.   Skin: Skin is warm and dry.   Psychiatric: She has a normal mood and affect. Her behavior is normal.       Assessment:     1. Upper respiratory tract infection, unspecified type    2. Microcytic anemia    3. Cough    4. Hypokalemia    5. Subclinical hyperthyroidism    6. Essential hypertension      Plan:   Upper respiratory tract infection, unspecified type  -     promethazine-codeine 6.25-10 mg/5 ml (PHENERGAN WITH CODEINE) 6.25-10 mg/5 mL syrup; Take 5 mLs by mouth nightly as needed for Cough.  Dispense: 180 mL; Refill: 0  -     beclomethasone dipropionate (QNASL) 80 mcg/actuation HFAA; 160 mcg by Nasal route once daily.  Dispense: 1 Inhaler; Refill: 2  -     CBC  auto differential; Future; Expected date: 07/25/2019  -     Cancel: Basic metabolic panel; Future; Expected date: 07/25/2019    Microcytic anemia  -     CBC auto differential; Future; Expected date: 07/25/2019  -     Iron and TIBC; Future; Expected date: 07/25/2019  -     Ferritin; Future; Expected date: 07/25/2019  -     Vitamin B12; Future; Expected date: 07/25/2019    Cough  -     promethazine-codeine 6.25-10 mg/5 ml (PHENERGAN WITH CODEINE) 6.25-10 mg/5 mL syrup; Take 5 mLs by mouth nightly as needed for Cough.  Dispense: 180 mL; Refill: 0    Hypokalemia  -     Cancel: Basic metabolic panel; Future; Expected date: 07/25/2019    Subclinical hyperthyroidism  -     TSH; Future; Expected date: 07/25/2019  -     T4, free; Future; Expected date: 07/25/2019    Essential hypertension  -     Comprehensive metabolic panel; Future; Expected date: 07/25/2019        Lab Frequency Next Occurrence   Mammo Digital Screening Bilat with CAD Once 06/18/2018   Lipid panel Once 09/04/2018   Hepatitis C antibody Once 09/04/2018   US Abdomen Complete Once 10/22/2018   Occult blood x 1, stool Once 10/22/2018   WBC, Stool Once 10/22/2018   Giardia / Cryptosporidum, EIA Once 10/22/2018   Stool Exam-Ova,Cysts,Parasites Once 10/22/2018   Stool culture Once 10/22/2018   CBC auto differential Once 02/04/2019   Potassium Once 02/04/2019       Problem List Items Addressed This Visit        Cardiac/Vascular    Hypertension    Relevant Orders    Comprehensive metabolic panel      Other Visit Diagnoses     Upper respiratory tract infection, unspecified type    -  Primary    Relevant Medications    promethazine-codeine 6.25-10 mg/5 ml (PHENERGAN WITH CODEINE) 6.25-10 mg/5 mL syrup    beclomethasone dipropionate (QNASL) 80 mcg/actuation HFAA    Other Relevant Orders    CBC auto differential    Microcytic anemia        Relevant Orders    CBC auto differential    Iron and TIBC    Ferritin    Vitamin B12    Cough        Relevant Medications     promethazine-codeine 6.25-10 mg/5 ml (PHENERGAN WITH CODEINE) 6.25-10 mg/5 mL syrup    Hypokalemia        Subclinical hyperthyroidism        Relevant Orders    TSH    T4, free          Follow up if symptoms worsen or fail to improve.

## 2019-07-26 LAB — VIT B12 SERPL-MCNC: 192 PG/ML (ref 210–950)

## 2019-07-26 NOTE — TELEPHONE ENCOUNTER
Notified pt that a prescription for Atrovent nasal spray has been sent to pharmacy to replace the QNASL spray that insurance won't cover.  Pt verbalized understanding.

## 2019-07-29 ENCOUNTER — PATIENT MESSAGE (OUTPATIENT)
Dept: INTERNAL MEDICINE | Facility: CLINIC | Age: 59
End: 2019-07-29

## 2019-07-29 ENCOUNTER — DOCUMENTATION ONLY (OUTPATIENT)
Dept: INTERNAL MEDICINE | Facility: CLINIC | Age: 59
End: 2019-07-29

## 2019-07-29 ENCOUNTER — TELEPHONE (OUTPATIENT)
Dept: INTERNAL MEDICINE | Facility: CLINIC | Age: 59
End: 2019-07-29

## 2019-07-29 DIAGNOSIS — E87.6 HYPOKALEMIA: Primary | ICD-10-CM

## 2019-07-29 RX ORDER — POTASSIUM CHLORIDE 20 MEQ/1
20 TABLET, EXTENDED RELEASE ORAL 2 TIMES DAILY
Qty: 30 TABLET | Refills: 0 | Status: SHIPPED | OUTPATIENT
Start: 2019-07-29 | End: 2019-10-25 | Stop reason: SDUPTHER

## 2019-07-29 NOTE — TELEPHONE ENCOUNTER
Please notify pt that potassium and vitamin b12 are low.  Iron is mildly low with anemia similar to previous anemia levels.  Thyroid is ok. Recommend potassium supplement twice daily (prescription sent to pharmacy) .  Also needs to take over-the-counter vitamin B12 at 1000 mcg daily.  Needs to have BMP completed in the morning.  Needs to go to emergency room if any chest pain, shortness of breath, significant  fatigue, confusion, or feeling like could pass out. Take 20 meq of potassium this evening two times. Each dose  by a few hours.

## 2019-07-29 NOTE — TELEPHONE ENCOUNTER
----- Message from Maricruz Watson sent at 7/29/2019  7:37 AM CDT -----  Contact: [phone]  Would like to consult with the nurse, patient states  That she faint and hit her face on the floor, also patient would like to go over some test result, please call back at [phone], thanks sj

## 2019-07-29 NOTE — TELEPHONE ENCOUNTER
Conveyed all results and recommendations to pt who verbalized understanding, stated she will  potassium prescription this evening and take one tablet now and one tablet in a few hours and then resume normal directions of one tablet twice daily.  Stated she will come to clinic in morning to complete blood work.  Stated she feels better now, although still feels weak.  Pt verbalized understanding of going to ER for symptoms chest pain, SOB, confusion, feeling, sever, fatigue, or feeling like she will pass out.  Pt denied any of those symptoms at this time.

## 2019-07-29 NOTE — TELEPHONE ENCOUNTER
----- Message from Venessa Abbott sent at 7/29/2019  8:57 AM CDT -----  Contact: patient  Type:  Patient Returning Call    Who Called:patient  Who Left Message for Patient:nurse  Does the patient know what this is regarding?:test results  Would the patient rather a call back or a response via [facility]? call  Best Call Back Number:[phone]  Additional Information: please call patient ASAP.

## 2019-07-29 NOTE — PROGRESS NOTES
Subjective:      Patient ID: Miles Bowen is a 58 y.o. female.    Chief Complaint: No chief complaint on file.    HPI   Spoke to pt by phone.  Patient reported that she had had some muscle tiredness, feeling shaky, dyspnea on and off for 3-4 months.  No significant worsening over the last week.  Yesterday she became nauseous and felt off balance.  She walked to her restroom and apparently passed out in her restroom.  She denies any vomiting or diarrhea.  She does not know for how long.  She did not seek medical attention.  She reports that she hit her face as she has some swelling to her lips and a cut on the inside of her lip.  No other signs of trauma to her head or face.  No recurrence syncope or presyncope.  No associated chest pain shortness of breath or palpitations.  Feeling better today than yesterday but continues with these chronic symptoms mentioned above.  I advised her to go to an emergency room now and in report that she has had low potassium and has passed out and that she is likely to need repeat potassium and an EKG.  She verbalized understanding but is unsure if she will go due to cost.  She reported that she was at the pharmacy and I advised her to take her potassium pills as directed this evening and also to complete her labs as ordered for in the morning [facility]  I also asked her to wait in the clinic tomorrow and not to leave until spoken to by my nurse or myself.  Patient verbalized understanding.  Review of Systems  Objective:   There were no vitals taken for this visit.    Physical Exam    Assessment:     No diagnosis found.  Plan:   There are no diagnoses linked to this encounter.    Lab Frequency Next Occurrence   Mammo Digital Screening Bilat with CAD Once 06/18/2018   Lipid panel Once 09/04/2018   Hepatitis C antibody Once 09/04/2018   US Abdomen Complete Once 10/22/2018   Occult blood x 1, stool Once 10/22/2018   WBC, Stool Once  10/22/2018   Giardia / Cryptosporidum, EIA Once 10/22/2018   Stool Exam-Ova,Cysts,Parasites Once 10/22/2018   Stool culture Once 10/22/2018   CBC auto differential Once 02/04/2019   Potassium Once 02/04/2019   Basic metabolic panel Once 07/29/2019   Magnesium Once 07/29/2019       Problem List Items Addressed This Visit     None          No follow-ups on file.

## 2019-07-29 NOTE — TELEPHONE ENCOUNTER
Pt requested results of lab work from 7/25/19.  Please review.    Pt also stated she fainted yesterday at home while she was by herself.  Stated she woke up on the ground and hit the side of her face.  Stated she did not call EMS.

## 2019-07-29 NOTE — PROGRESS NOTES
Subjective:      Patient ID: Miles Bowen is a 58 y.o. female.    Chief Complaint: No chief complaint on file.    HPI  Review of Systems  Objective:   There were no vitals taken for this visit.    Physical Exam    Assessment:     No diagnosis found.  Plan:   There are no diagnoses linked to this encounter.    Lab Frequency Next Occurrence   Mammo Digital Screening Bilat with CAD Once 06/18/2018   Lipid panel Once 09/04/2018   Hepatitis C antibody Once 09/04/2018   US Abdomen Complete Once 10/22/2018   Occult blood x 1, stool Once 10/22/2018   WBC, Stool Once 10/22/2018   Giardia / Cryptosporidum, EIA Once 10/22/2018   Stool Exam-Ova,Cysts,Parasites Once 10/22/2018   Stool culture Once 10/22/2018   CBC auto differential Once 02/04/2019   Potassium Once 02/04/2019   Basic metabolic panel Once 07/29/2019       Problem List Items Addressed This Visit     None          No follow-ups on file.

## 2019-07-30 ENCOUNTER — TELEPHONE (OUTPATIENT)
Dept: INTERNAL MEDICINE | Facility: CLINIC | Age: 59
End: 2019-07-30

## 2019-07-30 ENCOUNTER — CLINICAL SUPPORT (OUTPATIENT)
Dept: INTERNAL MEDICINE | Facility: CLINIC | Age: 59
End: 2019-07-30
Payer: COMMERCIAL

## 2019-07-30 ENCOUNTER — LAB VISIT (OUTPATIENT)
Dept: LAB | Facility: HOSPITAL | Age: 59
End: 2019-07-30
Attending: INTERNAL MEDICINE
Payer: COMMERCIAL

## 2019-07-30 DIAGNOSIS — E87.6 HYPOKALEMIA: ICD-10-CM

## 2019-07-30 DIAGNOSIS — D64.9 ANEMIA, UNSPECIFIED TYPE: Primary | ICD-10-CM

## 2019-07-30 DIAGNOSIS — E53.8 B12 DEFICIENCY: ICD-10-CM

## 2019-07-30 DIAGNOSIS — E87.6 HYPOPOTASSEMIA: Primary | ICD-10-CM

## 2019-07-30 LAB
ANION GAP SERPL CALC-SCNC: 11 MMOL/L (ref 8–16)
BUN SERPL-MCNC: 16 MG/DL (ref 6–20)
CALCIUM SERPL-MCNC: 9.5 MG/DL (ref 8.7–10.5)
CHLORIDE SERPL-SCNC: 107 MMOL/L (ref 95–110)
CO2 SERPL-SCNC: 23 MMOL/L (ref 23–29)
CREAT SERPL-MCNC: 0.8 MG/DL (ref 0.5–1.4)
EST. GFR  (AFRICAN AMERICAN): >60 ML/MIN/1.73 M^2
EST. GFR  (NON AFRICAN AMERICAN): >60 ML/MIN/1.73 M^2
GLUCOSE SERPL-MCNC: 106 MG/DL (ref 70–110)
MAGNESIUM SERPL-MCNC: 2.5 MG/DL (ref 1.6–2.6)
POTASSIUM SERPL-SCNC: 3.2 MMOL/L (ref 3.5–5.1)
SODIUM SERPL-SCNC: 141 MMOL/L (ref 136–145)

## 2019-07-30 PROCEDURE — 36415 COLL VENOUS BLD VENIPUNCTURE: CPT

## 2019-07-30 PROCEDURE — 96372 THER/PROPH/DIAG INJ SC/IM: CPT | Mod: S$GLB,,, | Performed by: INTERNAL MEDICINE

## 2019-07-30 PROCEDURE — 99999 PR PBB SHADOW E&M-EST. PATIENT-LVL II: CPT | Mod: PBBFAC,,,

## 2019-07-30 PROCEDURE — 83735 ASSAY OF MAGNESIUM: CPT

## 2019-07-30 PROCEDURE — 80048 BASIC METABOLIC PNL TOTAL CA: CPT

## 2019-07-30 PROCEDURE — 96372 PR INJECTION,THERAP/PROPH/DIAG2ST, IM OR SUBCUT: ICD-10-PCS | Mod: S$GLB,,, | Performed by: INTERNAL MEDICINE

## 2019-07-30 PROCEDURE — 99999 PR PBB SHADOW E&M-EST. PATIENT-LVL II: ICD-10-PCS | Mod: PBBFAC,,,

## 2019-07-30 RX ORDER — CYANOCOBALAMIN 1000 UG/ML
1000 INJECTION, SOLUTION INTRAMUSCULAR; SUBCUTANEOUS ONCE
Status: COMPLETED | OUTPATIENT
Start: 2019-07-30 | End: 2019-07-30

## 2019-07-30 RX ADMIN — CYANOCOBALAMIN 1000 MCG: 1000 INJECTION, SOLUTION INTRAMUSCULAR; SUBCUTANEOUS at 11:07

## 2019-07-30 NOTE — PROGRESS NOTES
Patient here for vitamin B12 injection.  After verifying patient's allergies and medications, patient received cyanocobalamin 1000 mcg to left deltoid.  Instructed patient to remain in lobby for 15 minutes following injection and to immediately report any adverse reactions.  Patient verbalized understanding.

## 2019-07-30 NOTE — TELEPHONE ENCOUNTER
Pt received cyanocobalamin 1000 mcg IM in nurse visit today.  Pt was given written instructions to take otc vitamin B12 1000 mcg daily as well as Rx potassium supplement once daily and to return to clinic on 8/13/19 to complete lab work and have an appt with Dr. Paul.  Pt verbalized understanding.   CC:  Sepsis     HPI:    78 y/o male with advance dementia and AF on coumadin admitted on 4/22 to SDU for possible GIB and UTI sepsis--found to have R non obst renal stone and pyelo/possible Aspiration PNA and colitis.  He received 3L IVF while in SD--remain hypotensive--Tx to ICU for pressors.    4/23:  ICU D # 1.  Pt seen and examined in ICU--lethargic--arousable--On Santana 1.  PPI gtt.  .  Na 159.  INR 3.1  Hb 11.      4/24:  ICU D # 2.  Pt seen and examined in ICU--seen by lavon sanchez--not safe to eat.  Remains on phenyl gtt 0.7  Na 148.  Stable H/H    4/25:  ICU D # 3.  Pt seen and examined in ICU--ESBL in UCx--very confused and unable to marin PO.  Off pressors at this time.  Na 148    4/26:  ICU D # 4.  Pt seen and examined in ICU--Off pressors--Marin TF--Na 148.  Remains very confused     4/27:  ICU D # 5.  Pt seen and examined in ICU--Remains off pressors--mentation not better at all--Marin TF.  Na 148    4/28:  ICU D # 6.  Pt seen and examined in ICU--no pressors--still dependent on TF--Na 145    4/29: Patient seen and examined.  Patient remains confused and not following commands.  H & H stable.      4/30: Patient seen and no events over night.  Remains confused         PAST MEDICAL & SURGICAL HISTORY:  Osteomyelitis of left leg  Osteoarthritis  HTN (hypertension)  DVT (deep venous thrombosis)  PVD (peripheral vascular disease)  DM (diabetes mellitus)  COPD (chronic obstructive pulmonary disease)  Anemia  Dementia  No significant past surgical history      FAMILY HISTORY:      Social Hx:    Allergies    No Known Allergies    Intolerances            ICU Vital Signs Last 24 Hrs  T(C): 37.4 (30 Apr 2019 04:00), Max: 37.6 (29 Apr 2019 18:00)  T(F): 99.3 (30 Apr 2019 04:00), Max: 99.6 (29 Apr 2019 18:00)  HR: 68 (30 Apr 2019 10:00) (63 - 84)  BP: 116/91 (30 Apr 2019 10:00) (75/56 - 150/136)  BP(mean): 99 (30 Apr 2019 10:00) (57 - 142)  ABP: --  ABP(mean): --  RR: 27 (30 Apr 2019 10:00) (12 - 30)  SpO2: 95% (30 Apr 2019 10:00) (93% - 100%)          I&O's Summary    29 Apr 2019 07:01  -  30 Apr 2019 07:00  --------------------------------------------------------  IN: 3603 mL / OUT: 2200 mL / NET: 1403 mL    30 Apr 2019 07:01  -  30 Apr 2019 10:51  --------------------------------------------------------  IN: 228 mL / OUT: 0 mL / NET: 228 mL                              10.4   8.33  )-----------( 251      ( 30 Apr 2019 06:23 )             35.5       04-30    145  |  110<H>  |  15  ----------------------------<  104<H>  4.7   |  31  |  0.74    Ca    8.2<L>      30 Apr 2019 06:23  Phos  2.7     04-30  Mg     2.7     04-30        MEDICATIONS  (STANDING):  calcium carbonate    500 mG (Tums) Chewable 1 Tablet(s) Chew daily  cholecalciferol Oral Tab/Cap - Peds 2000 Unit(s) Oral daily  Dakins Solution - 1/2 Strength 1 Application(s) Topical daily  enoxaparin Injectable 75 milliGRAM(s) SubCutaneous every 12 hours  ferrous    sulfate Liquid 300 milliGRAM(s) Oral daily  nystatin Powder 1 Application(s) Topical two times a day  pantoprazole  Injectable 40 milliGRAM(s) IV Push two times a day  polyethylene glycol 3350 17 Gram(s) Oral daily  senna 2 Tablet(s) Oral at bedtime  zinc oxide 20% Ointment 1 Application(s) Topical daily    MEDICATIONS  (PRN):  acetaminophen   Tablet .. 650 milliGRAM(s) Oral every 6 hours PRN Temp greater or equal to 38.5C (101.3F), Moderate Pain (4 - 6)  glucagon  Injectable 1 milliGRAM(s) IntraMuscular once PRN Glucose LESS THAN 70 milligrams/deciliter      DVT Prophylaxis: Lovenox    Advanced Directives:  Discussed with:    Visit Information:    ** Time is exclusive of billed procedures and/or teaching and/or routine family updates.

## 2019-07-30 NOTE — TELEPHONE ENCOUNTER
Spoke to pt in clinic. Discussed potassium of 3.2 today. Took 20meq kcl last night. Continue 20meq potassium daily. Coughing has resolved. Continues with fatigue waxing and waning for months to years. Has h/o numbness and tingling of hands. Had recent b12 of 192. Pt prefers oral replacement. Advised b12 im today. Also start otc b12 at 1000mcg daily. Recheck b12 and bmp in 2 weeks. appt with me in 2 weeks.

## 2019-07-31 ENCOUNTER — PATIENT OUTREACH (OUTPATIENT)
Dept: ADMINISTRATIVE | Facility: HOSPITAL | Age: 59
End: 2019-07-31

## 2019-08-02 ENCOUNTER — PATIENT OUTREACH (OUTPATIENT)
Dept: ADMINISTRATIVE | Facility: HOSPITAL | Age: 59
End: 2019-08-02

## 2019-08-31 DIAGNOSIS — K21.9 GASTROESOPHAGEAL REFLUX DISEASE, ESOPHAGITIS PRESENCE NOT SPECIFIED: ICD-10-CM

## 2019-08-31 RX ORDER — PANTOPRAZOLE SODIUM 40 MG/1
40 TABLET, DELAYED RELEASE ORAL DAILY
Qty: 90 TABLET | Refills: 0 | Status: SHIPPED | OUTPATIENT
Start: 2019-08-31 | End: 2019-11-18 | Stop reason: SDUPTHER

## 2019-09-05 ENCOUNTER — OFFICE VISIT (OUTPATIENT)
Dept: INTERNAL MEDICINE | Facility: CLINIC | Age: 59
End: 2019-09-05
Payer: COMMERCIAL

## 2019-09-05 VITALS
SYSTOLIC BLOOD PRESSURE: 126 MMHG | BODY MASS INDEX: 35.3 KG/M2 | HEART RATE: 89 BPM | OXYGEN SATURATION: 98 % | WEIGHT: 191.81 LBS | HEIGHT: 62 IN | DIASTOLIC BLOOD PRESSURE: 72 MMHG | TEMPERATURE: 97 F

## 2019-09-05 DIAGNOSIS — K21.9 GASTROESOPHAGEAL REFLUX DISEASE, ESOPHAGITIS PRESENCE NOT SPECIFIED: ICD-10-CM

## 2019-09-05 DIAGNOSIS — R05.9 COUGH: ICD-10-CM

## 2019-09-05 DIAGNOSIS — J30.9 ALLERGIC RHINITIS, UNSPECIFIED SEASONALITY, UNSPECIFIED TRIGGER: Primary | ICD-10-CM

## 2019-09-05 DIAGNOSIS — J33.9 NASAL POLYPS: ICD-10-CM

## 2019-09-05 DIAGNOSIS — J06.9 UPPER RESPIRATORY TRACT INFECTION, UNSPECIFIED TYPE: ICD-10-CM

## 2019-09-05 DIAGNOSIS — K58.9 IRRITABLE BOWEL SYNDROME, UNSPECIFIED TYPE: ICD-10-CM

## 2019-09-05 PROCEDURE — 99214 PR OFFICE/OUTPT VISIT, EST, LEVL IV, 30-39 MIN: ICD-10-PCS | Mod: S$GLB,,, | Performed by: NURSE PRACTITIONER

## 2019-09-05 PROCEDURE — 99999 PR PBB SHADOW E&M-EST. PATIENT-LVL IV: ICD-10-PCS | Mod: PBBFAC,,, | Performed by: NURSE PRACTITIONER

## 2019-09-05 PROCEDURE — 3008F BODY MASS INDEX DOCD: CPT | Mod: CPTII,S$GLB,, | Performed by: NURSE PRACTITIONER

## 2019-09-05 PROCEDURE — 99999 PR PBB SHADOW E&M-EST. PATIENT-LVL IV: CPT | Mod: PBBFAC,,, | Performed by: NURSE PRACTITIONER

## 2019-09-05 PROCEDURE — 3074F SYST BP LT 130 MM HG: CPT | Mod: CPTII,S$GLB,, | Performed by: NURSE PRACTITIONER

## 2019-09-05 PROCEDURE — 3078F PR MOST RECENT DIASTOLIC BLOOD PRESSURE < 80 MM HG: ICD-10-PCS | Mod: CPTII,S$GLB,, | Performed by: NURSE PRACTITIONER

## 2019-09-05 PROCEDURE — 99214 OFFICE O/P EST MOD 30 MIN: CPT | Mod: S$GLB,,, | Performed by: NURSE PRACTITIONER

## 2019-09-05 PROCEDURE — 3008F PR BODY MASS INDEX (BMI) DOCUMENTED: ICD-10-PCS | Mod: CPTII,S$GLB,, | Performed by: NURSE PRACTITIONER

## 2019-09-05 PROCEDURE — 3074F PR MOST RECENT SYSTOLIC BLOOD PRESSURE < 130 MM HG: ICD-10-PCS | Mod: CPTII,S$GLB,, | Performed by: NURSE PRACTITIONER

## 2019-09-05 PROCEDURE — 3078F DIAST BP <80 MM HG: CPT | Mod: CPTII,S$GLB,, | Performed by: NURSE PRACTITIONER

## 2019-09-05 RX ORDER — CLARITHROMYCIN 500 MG/1
500 TABLET, FILM COATED ORAL EVERY 12 HOURS
Qty: 20 TABLET | Refills: 0 | Status: SHIPPED | OUTPATIENT
Start: 2019-09-05 | End: 2019-09-15

## 2019-09-05 RX ORDER — PROMETHAZINE HYDROCHLORIDE AND CODEINE PHOSPHATE 6.25; 1 MG/5ML; MG/5ML
5 SOLUTION ORAL NIGHTLY PRN
Qty: 180 ML | Refills: 0 | Status: SHIPPED | OUTPATIENT
Start: 2019-09-05 | End: 2019-12-02 | Stop reason: SDUPTHER

## 2019-09-05 RX ORDER — BENZOCAINE .13; .15; .5; 2 G/100G; G/100G; G/100G; G/100G
2 GEL ORAL DAILY
Qty: 8.43 ML | Refills: 0 | Status: SHIPPED | OUTPATIENT
Start: 2019-09-05 | End: 2020-06-04

## 2019-09-09 ENCOUNTER — TELEPHONE (OUTPATIENT)
Dept: INTERNAL MEDICINE | Facility: CLINIC | Age: 59
End: 2019-09-09

## 2019-09-09 NOTE — TELEPHONE ENCOUNTER
Pt states she doesn't feel like the problem is sinus related because of her falling asleep at random times

## 2019-09-09 NOTE — TELEPHONE ENCOUNTER
----- Message from Mallika Alonzo sent at 9/9/2019 12:05 PM CDT -----  Patient seen NP last week and still not feeling well.Please call back at [phone].      Thanks,  Mallika Alonzo

## 2019-09-09 NOTE — TELEPHONE ENCOUNTER
Relayed message to pt who verbalized understanding and stated she will see how she feels overnight and call back in the morning.

## 2019-09-09 NOTE — TELEPHONE ENCOUNTER
Pt saw srinivasa watson on 9/5/19 for sinus problems.   She is having pain where her sinus polyp used to be, lots of coughing, sore throat. She states she is very exhausted, having body aches. She states she fell asleep while driving, while at work, etc. She is still currently taking the antibiotics she was prescribed.

## 2019-09-11 ENCOUNTER — TELEPHONE (OUTPATIENT)
Dept: INTERNAL MEDICINE | Facility: CLINIC | Age: 59
End: 2019-09-11

## 2019-09-11 ENCOUNTER — PATIENT MESSAGE (OUTPATIENT)
Dept: INTERNAL MEDICINE | Facility: CLINIC | Age: 59
End: 2019-09-11

## 2019-09-11 NOTE — TELEPHONE ENCOUNTER
----- Message from Marita Blair sent at 9/11/2019 11:30 AM CDT -----  Contact: Pt  Pt would like nurse to contact her regarding getting an appointment as soon as possible regarding her sore thorat and not being able to sleep. Pt states she will not be able to go into urgent care. ([phone])

## 2019-09-12 ENCOUNTER — TELEPHONE (OUTPATIENT)
Dept: INTERNAL MEDICINE | Facility: CLINIC | Age: 59
End: 2019-09-12

## 2019-09-12 NOTE — TELEPHONE ENCOUNTER
Spoke with patient about her getting a sooner appointment . She only wants to see  or Dr. aPul .

## 2019-09-12 NOTE — TELEPHONE ENCOUNTER
----- Message from Marita Blair sent at 9/11/2019 11:30 AM CDT -----  Contact: Pt  Pt would like nurse to contact her regarding getting an appointment as soon as possible regarding her sore thorat and not being able to sleep. Pt states she will not be able to go into urgent care. ([phone])

## 2019-09-24 ENCOUNTER — OFFICE VISIT (OUTPATIENT)
Dept: PAIN MEDICINE | Facility: CLINIC | Age: 59
End: 2019-09-24
Payer: COMMERCIAL

## 2019-09-24 VITALS
BODY MASS INDEX: 35.15 KG/M2 | WEIGHT: 191 LBS | SYSTOLIC BLOOD PRESSURE: 133 MMHG | HEART RATE: 55 BPM | HEIGHT: 62 IN | DIASTOLIC BLOOD PRESSURE: 79 MMHG | RESPIRATION RATE: 18 BRPM

## 2019-09-24 DIAGNOSIS — M54.6 CHRONIC MIDLINE THORACIC BACK PAIN: ICD-10-CM

## 2019-09-24 DIAGNOSIS — M54.16 LUMBAR RADICULOPATHY: ICD-10-CM

## 2019-09-24 DIAGNOSIS — M54.59 LUMBAR FACET JOINT PAIN: ICD-10-CM

## 2019-09-24 DIAGNOSIS — M47.816 LUMBAR SPONDYLOSIS: Primary | ICD-10-CM

## 2019-09-24 DIAGNOSIS — G89.29 CHRONIC MIDLINE THORACIC BACK PAIN: ICD-10-CM

## 2019-09-24 PROCEDURE — 96372 PR INJECTION,THERAP/PROPH/DIAG2ST, IM OR SUBCUT: ICD-10-PCS | Mod: S$GLB,,, | Performed by: PHYSICIAN ASSISTANT

## 2019-09-24 PROCEDURE — 3078F DIAST BP <80 MM HG: CPT | Mod: CPTII,S$GLB,, | Performed by: PHYSICIAN ASSISTANT

## 2019-09-24 PROCEDURE — 99214 PR OFFICE/OUTPT VISIT, EST, LEVL IV, 30-39 MIN: ICD-10-PCS | Mod: 25,S$GLB,, | Performed by: PHYSICIAN ASSISTANT

## 2019-09-24 PROCEDURE — 99999 PR PBB SHADOW E&M-EST. PATIENT-LVL IV: CPT | Mod: PBBFAC,,, | Performed by: PHYSICIAN ASSISTANT

## 2019-09-24 PROCEDURE — 3075F SYST BP GE 130 - 139MM HG: CPT | Mod: CPTII,S$GLB,, | Performed by: PHYSICIAN ASSISTANT

## 2019-09-24 PROCEDURE — 3078F PR MOST RECENT DIASTOLIC BLOOD PRESSURE < 80 MM HG: ICD-10-PCS | Mod: CPTII,S$GLB,, | Performed by: PHYSICIAN ASSISTANT

## 2019-09-24 PROCEDURE — 3008F BODY MASS INDEX DOCD: CPT | Mod: CPTII,S$GLB,, | Performed by: PHYSICIAN ASSISTANT

## 2019-09-24 PROCEDURE — 96372 THER/PROPH/DIAG INJ SC/IM: CPT | Mod: S$GLB,,, | Performed by: PHYSICIAN ASSISTANT

## 2019-09-24 PROCEDURE — 99214 OFFICE O/P EST MOD 30 MIN: CPT | Mod: 25,S$GLB,, | Performed by: PHYSICIAN ASSISTANT

## 2019-09-24 PROCEDURE — 3075F PR MOST RECENT SYSTOLIC BLOOD PRESS GE 130-139MM HG: ICD-10-PCS | Mod: CPTII,S$GLB,, | Performed by: PHYSICIAN ASSISTANT

## 2019-09-24 PROCEDURE — 99999 PR PBB SHADOW E&M-EST. PATIENT-LVL IV: ICD-10-PCS | Mod: PBBFAC,,, | Performed by: PHYSICIAN ASSISTANT

## 2019-09-24 PROCEDURE — 3008F PR BODY MASS INDEX (BMI) DOCUMENTED: ICD-10-PCS | Mod: CPTII,S$GLB,, | Performed by: PHYSICIAN ASSISTANT

## 2019-09-24 RX ORDER — METHOCARBAMOL 500 MG/1
500 TABLET, FILM COATED ORAL 2 TIMES DAILY PRN
Qty: 60 TABLET | Refills: 0 | Status: SHIPPED | OUTPATIENT
Start: 2019-09-24 | End: 2020-06-04

## 2019-09-24 RX ORDER — KETOROLAC TROMETHAMINE 30 MG/ML
30 INJECTION, SOLUTION INTRAMUSCULAR; INTRAVENOUS
Status: COMPLETED | OUTPATIENT
Start: 2019-09-24 | End: 2019-09-24

## 2019-09-24 RX ADMIN — KETOROLAC TROMETHAMINE 30 MG: 30 INJECTION, SOLUTION INTRAMUSCULAR; INTRAVENOUS at 02:09

## 2019-09-24 NOTE — PROGRESS NOTES
Chief Pain Complaint:  Mid-back Pain    Interval History: Since last visit on 1-4-18 (almost 2 years ago) with Dr. Saravia, she continues to have mid back pain.  She has been planning her son's wedding which has worsened her pain.  Flexeril has not been helping.     Interval History: The patient was last seen 11/6/2017. At that visit the plan was to obtain an lumbar MRI and provide medical management with Flexeril 10 mg PO TID PRN (90 tabs) and increase Norco 5/325 mg PO TID PRN to [place] 10/325 mg PO TID PRN (40 tabs) as a bridge to injection. Patient has not obtained lumbar MRI and cancelled her injections. The patient reports that  she is/was unchanged following the last visit.    Interval History: The patient was last seen 10/23/2017. At that visit the plan was to continue Norco 5/325 mg PO TID PRN and schedule patient for TFESI on 11/17.  The patient reports that she is/was worse following the last visit. Her pain is now 9-10/10 and the Norco 5/325 mg are providing minimal relief. No over sedation, resp depression, N/V.       History of Present Illness:   Miles Bowen is a 59 y.o. female  who is presenting with a chief complaint of Mid-back Pain. The patient began experiencing this problem abruptly, and the pain has been gradually worsening over the past 3 months. The pain is described as throbbing, shooting, burning and electrical and is located in the bilateral lumbar spine. Pain is constant and lasts hours. The pain radiates to bilateral lower extremity R>L. Pain 50% axial and 50% radicular. The patient rates her pain a 8 out of ten and interferes with activities of daily living a 7 out of ten. Pain is exacerbated by flexion and extension of the lumbar spine, and is improved by rest. Patient reports no prior trauma, no prior spinal surgery     - pertinent negatives: No fever, No chills, No weight loss, No bladder dysfunction, No bowel dysfunction No saddle anesthesia  - pertinent positives: generalized  nonspecific Lower Extremity weakness bilaterally    - medications, other therapies tried (physical therapy, injections):     >> NSAIDs, Tylenol, Norco and flexeril Gabapentin (did not tolerate)    >> Has NOT previously undergone Physical Therapy    >> Has previously undergone spinal injection/s      Imaging / Labs / Studies (reviewed on 9/24/2019):    Results for orders placed during the hospital encounter of 01/04/18   X-Ray Thoracic Spine AP Lateral    Narrative Comparison: None  Technique: AP and lateral views were obtained of the thoracic spine  Findings: There is a chronic compression fracture deformity involving the T11 vertebral body with evidence of prior vertebroplasty.  There is also mild superior endplate compression deformity of the T12 vertebral body which is likely chronic in nature.  Confirmation could be obtained with MRI as clinically warranted.  Vertebral body heights at the remaining levels are preserved. Mild multilevel spondylosis noted throughout the mid and lower thoracic spine with preserved disc heights. Posterior elements appear grossly intact.   The remaining visualized osseous and soft tissue structures demonstrate no appreciable abnormality.     Results for orders placed during the hospital encounter of 01/04/18   X-Ray Lumbar Complete With Flex And Ext    Narrative Comparison: None  Technique: AP, lateral, lateral flexion, lateral extension, bilateral oblique, and lumbosacral coned down views were obtained of the lumbar spine  Findings: There is mild grade 1 anterolisthesis of L4 on L5.  Chronic T11 compression fracture deformity noted with evidence of prior vertebroplasty.  There is also slight superior endplate compression deformity at T12 which is likely chronic in nature.  Confirmation could be obtained with MRI as clinically warranted.  Lumbar vertebral body heights are well-maintained.  No change in spinal alignment with flexion or extension to suggest instability. There is  "moderate disc height loss L4-L5.  Bilateral facet arthropathy noted at L4-L5 and L5-S1.  No pars defects visualized.  Posterior elements appear grossly intact. No acute fractures or subluxations are demonstrated.  The remaining visualized osseous and soft tissue structures demonstrate no appreciable abnormality.         Review of Systems:  CONSTITUTIONAL: patient denies any fever, chills, or weight loss  SKIN: patient denies any rash or itching  RESPIRATORY: patient denies having any shortness of breath  GASTROINTESTINAL: patient denies having any diarrhea, constipation, or bowel incontinence  GENITOURINARY: patient denies having any abnormal bladder function    MUSCULOSKELETAL:  - patient complains of the above noted pain/s (see chief pain complaint)    NEUROLOGICAL:   - pain as above  - strength in Lower extremities is decreased, BILATERALLY R>L  - sensation in Lower extremities is intact, BILATERALLY  - patient denies any loss of bowel or bladder control      PSYCHIATRIC: patient denies any change in mood    Other:  All other systems reviewed and are negative      Physical Exam:  Vitals:  /79 (BP Location: Right arm, Patient Position: Sitting, BP Method: Medium (Automatic))   Pulse (!) 55   Resp 18   Ht 5' 2" (1.575 m)   Wt 86.6 kg (191 lb)   BMI 34.93 kg/m²   (reviewed on 9/24/2019)    General: alert and oriented, in no apparent distress.  Gait: normal gait.  Skin: no rashes, no discoloration, no obvious lesions  HEENT: normocephalic, atraumatic. Pupils equal and round.  Cardiovascular: no significant peripheral edema present.  Respiratory: without use of accessory muscles of respiration.    Musculoskeletal - Spine:  - Pain on flexion of spine: Present  - Pain on extension of spine: Present  - Thoracic facet loading: Present  - TTP over the Thoracic facet joints: Present  - TTP over the Thoracic paraspinals: Present, more so than over facets    Neuro - Lower Extremities:  - BLE Strength: R/L: HF: 4/5, " HE: 5/5, KF: 4/5; KE: 4/5; FE: 4/5; FF: 4/5  - Extremity Reflexes: Brisk and symmetric throughout  - Sensory: Sensation to light touch intact bilaterally      Psych:  Mood and affect is appropriate            Assessment:  Miles Bowen is a 59 y.o. year old female who is presenting with     Encounter Diagnoses   Name Primary?    Lumbar spondylosis Yes    Chronic midline thoracic back pain     Lumbar facet joint pain     Lumbar radiculopathy        Plan:  1. Interventional:   - Consider bilateral Lumbar L3, L4, L5 MBB or bilateral L5-S1 TFESI depending if patient's pain is more axial or radicular. Patient does not want to undergo any injections at this time.  Will also consider thoracic facet treatment, as that is presenting pain today.   - Toradol given today in clinic. Procedure note below.    2. Pharmacologic:  - Patient wanted another pain rx for acute pain flare. I asked if she has had any other pain meds since last seeing Dr. Saravia on 1-4-18; she states only one for dental procedure. There are several opioid Rx listed on ; will not prescribe any opioids.          - Start Robaxin 500mg to take 1/2 to 1 tab BID PRN muscle spasms.  she understands this could cause drowsiness.  D/c Flexeril - as it is not helping.     3. Rehabilitative: Encouraged PT but patient is not interested as this time.     4. Diagnostic: Consider MRI.     5. Follow up: PRN.     - I discussed the risks, benefits, and alternatives to potential treatment options. All questions and concerns were fully addressed today in clinic. Dr. Saravia was consulted regarding the patient plan and agrees.              Procedure note:  An IM injection of ketolorac 30mg/1mL injection was administered during clinic visit.  This was well tolerated.

## 2019-09-25 ENCOUNTER — TELEPHONE (OUTPATIENT)
Dept: INTERNAL MEDICINE | Facility: CLINIC | Age: 59
End: 2019-09-25

## 2019-09-25 ENCOUNTER — TELEPHONE (OUTPATIENT)
Dept: PAIN MEDICINE | Facility: CLINIC | Age: 59
End: 2019-09-25

## 2019-09-25 NOTE — TELEPHONE ENCOUNTER
----- Message from Ellen Ryan MA sent at 9/25/2019 11:13 AM CDT -----  Contact: self/[phone]      ----- Message -----  From: Janett Johnson  Sent: 9/25/2019  11:05 AM CDT  To: [facility] Staff    Would like to consult with nurse regarding patient appt with Hayde Tenoroi, patient is waiting on a call back at [phone]. Thanks/ar

## 2019-09-25 NOTE — TELEPHONE ENCOUNTER
Pt calling, states she got a toradol shot yesterday, but there is no change from yesterday and she can hardly move. I informed her that I would send this message to the pain management staff to get further recommendations.

## 2019-09-25 NOTE — TELEPHONE ENCOUNTER
----- Message from Venessa Abbott sent at 9/25/2019  8:16 AM CDT -----  Contact: patient  Type:  Needs Medical Advice    Who Called: PATIENT  Symptoms (please be specific): BACK PAIN   How long has patient had these symptoms:  YESTERDAY POST   Pharmacy name and phone #:    [id number] - [place] - [address]  [address]  [place] [address]  Phone: [phone] Fax: [phone]    Would the patient rather a call back or a response via [facility]? MY  Best Call Back Number:[phone]  Additional Information:PLEASE CALL ASAP TODAY URGENT!!

## 2019-09-30 DIAGNOSIS — I10 ESSENTIAL HYPERTENSION: ICD-10-CM

## 2019-09-30 RX ORDER — LOSARTAN POTASSIUM AND HYDROCHLOROTHIAZIDE 12.5; 1 MG/1; MG/1
1 TABLET ORAL DAILY
Qty: 90 TABLET | Refills: 0 | Status: SHIPPED | OUTPATIENT
Start: 2019-09-30 | End: 2019-10-25 | Stop reason: SDUPTHER

## 2019-10-01 ENCOUNTER — PATIENT MESSAGE (OUTPATIENT)
Dept: INTERNAL MEDICINE | Facility: CLINIC | Age: 59
End: 2019-10-01

## 2019-10-25 ENCOUNTER — OFFICE VISIT (OUTPATIENT)
Dept: INTERNAL MEDICINE | Facility: CLINIC | Age: 59
End: 2019-10-25
Payer: COMMERCIAL

## 2019-10-25 ENCOUNTER — TELEPHONE (OUTPATIENT)
Dept: SPORTS MEDICINE | Facility: CLINIC | Age: 59
End: 2019-10-25

## 2019-10-25 ENCOUNTER — LAB VISIT (OUTPATIENT)
Dept: LAB | Facility: HOSPITAL | Age: 59
End: 2019-10-25
Attending: INTERNAL MEDICINE
Payer: COMMERCIAL

## 2019-10-25 ENCOUNTER — HOSPITAL ENCOUNTER (OUTPATIENT)
Dept: RADIOLOGY | Facility: HOSPITAL | Age: 59
Discharge: HOME | End: 2019-10-25
Attending: INTERNAL MEDICINE
Payer: COMMERCIAL

## 2019-10-25 VITALS
WEIGHT: 186.06 LBS | BODY MASS INDEX: 34.24 KG/M2 | HEART RATE: 82 BPM | OXYGEN SATURATION: 97 % | HEIGHT: 62 IN | DIASTOLIC BLOOD PRESSURE: 84 MMHG | SYSTOLIC BLOOD PRESSURE: 124 MMHG | TEMPERATURE: 97 F

## 2019-10-25 DIAGNOSIS — E53.8 B12 DEFICIENCY: ICD-10-CM

## 2019-10-25 DIAGNOSIS — M25.561 PAIN AND SWELLING OF RIGHT KNEE: ICD-10-CM

## 2019-10-25 DIAGNOSIS — M25.461 PAIN AND SWELLING OF RIGHT KNEE: ICD-10-CM

## 2019-10-25 DIAGNOSIS — D64.9 ANEMIA, UNSPECIFIED TYPE: ICD-10-CM

## 2019-10-25 DIAGNOSIS — K58.9 IRRITABLE BOWEL SYNDROME, UNSPECIFIED TYPE: ICD-10-CM

## 2019-10-25 DIAGNOSIS — E04.2 MULTINODULAR GOITER: ICD-10-CM

## 2019-10-25 DIAGNOSIS — S86.911A KNEE STRAIN, RIGHT, INITIAL ENCOUNTER: ICD-10-CM

## 2019-10-25 DIAGNOSIS — I10 ESSENTIAL HYPERTENSION: ICD-10-CM

## 2019-10-25 DIAGNOSIS — Z23 NEED FOR INFLUENZA VACCINATION: ICD-10-CM

## 2019-10-25 DIAGNOSIS — Z00.00 ROUTINE GENERAL MEDICAL EXAMINATION AT A HEALTH CARE FACILITY: Primary | ICD-10-CM

## 2019-10-25 DIAGNOSIS — E87.6 HYPOPOTASSEMIA: ICD-10-CM

## 2019-10-25 DIAGNOSIS — E87.6 HYPOKALEMIA: ICD-10-CM

## 2019-10-25 LAB
ANION GAP SERPL CALC-SCNC: 11 MMOL/L (ref 8–16)
BASOPHILS # BLD AUTO: 0.04 K/UL (ref 0–0.2)
BASOPHILS NFR BLD: 0.5 % (ref 0–1.9)
BUN SERPL-MCNC: 14 MG/DL (ref 6–20)
CALCIUM SERPL-MCNC: 9.7 MG/DL (ref 8.7–10.5)
CHLORIDE SERPL-SCNC: 105 MMOL/L (ref 95–110)
CO2 SERPL-SCNC: 23 MMOL/L (ref 23–29)
CREAT SERPL-MCNC: 0.8 MG/DL (ref 0.5–1.4)
DIFFERENTIAL METHOD: ABNORMAL
EOSINOPHIL # BLD AUTO: 0.1 K/UL (ref 0–0.5)
EOSINOPHIL NFR BLD: 1.3 % (ref 0–8)
ERYTHROCYTE [DISTWIDTH] IN BLOOD BY AUTOMATED COUNT: 18.2 % (ref 11.5–14.5)
EST. GFR  (AFRICAN AMERICAN): >60 ML/MIN/1.73 M^2
EST. GFR  (NON AFRICAN AMERICAN): >60 ML/MIN/1.73 M^2
FOLATE SERPL-MCNC: 3.7 NG/ML (ref 4–24)
GLUCOSE SERPL-MCNC: 99 MG/DL (ref 70–110)
HCT VFR BLD AUTO: 33.3 % (ref 37–48.5)
HGB BLD-MCNC: 10.4 G/DL (ref 12–16)
IMM GRANULOCYTES # BLD AUTO: 0.03 K/UL (ref 0–0.04)
IMM GRANULOCYTES NFR BLD AUTO: 0.4 % (ref 0–0.5)
LYMPHOCYTES # BLD AUTO: 2.2 K/UL (ref 1–4.8)
LYMPHOCYTES NFR BLD: 29 % (ref 18–48)
MCH RBC QN AUTO: 24 PG (ref 27–31)
MCHC RBC AUTO-ENTMCNC: 31.2 G/DL (ref 32–36)
MCV RBC AUTO: 77 FL (ref 82–98)
MONOCYTES # BLD AUTO: 0.4 K/UL (ref 0.3–1)
MONOCYTES NFR BLD: 4.6 % (ref 4–15)
NEUTROPHILS # BLD AUTO: 4.9 K/UL (ref 1.8–7.7)
NEUTROPHILS NFR BLD: 64.6 % (ref 38–73)
NRBC BLD-RTO: 0 /100 WBC
PLATELET # BLD AUTO: 461 K/UL (ref 150–350)
PMV BLD AUTO: 8.8 FL (ref 9.2–12.9)
POTASSIUM SERPL-SCNC: 3.2 MMOL/L (ref 3.5–5.1)
RBC # BLD AUTO: 4.34 M/UL (ref 4–5.4)
SODIUM SERPL-SCNC: 139 MMOL/L (ref 136–145)
VIT B12 SERPL-MCNC: 193 PG/ML (ref 210–950)
WBC # BLD AUTO: 7.63 K/UL (ref 3.9–12.7)

## 2019-10-25 PROCEDURE — 99999 PR PBB SHADOW E&M-EST. PATIENT-LVL V: ICD-10-PCS | Mod: PBBFAC,,, | Performed by: INTERNAL MEDICINE

## 2019-10-25 PROCEDURE — 90686 FLU VACCINE (QUAD) GREATER THAN OR EQUAL TO 3YO PRESERVATIVE FREE IM: ICD-10-PCS | Mod: S$GLB,,, | Performed by: INTERNAL MEDICINE

## 2019-10-25 PROCEDURE — 3074F SYST BP LT 130 MM HG: CPT | Mod: CPTII,S$GLB,, | Performed by: INTERNAL MEDICINE

## 2019-10-25 PROCEDURE — 36415 COLL VENOUS BLD VENIPUNCTURE: CPT

## 2019-10-25 PROCEDURE — 73562 X-RAY EXAM OF KNEE 3: CPT | Mod: TC,RT

## 2019-10-25 PROCEDURE — 3008F BODY MASS INDEX DOCD: CPT | Mod: CPTII,S$GLB,, | Performed by: INTERNAL MEDICINE

## 2019-10-25 PROCEDURE — 82746 ASSAY OF FOLIC ACID SERUM: CPT

## 2019-10-25 PROCEDURE — 90686 IIV4 VACC NO PRSV 0.5 ML IM: CPT | Mod: S$GLB,,, | Performed by: INTERNAL MEDICINE

## 2019-10-25 PROCEDURE — 99213 OFFICE O/P EST LOW 20 MIN: CPT | Mod: 25,S$GLB,, | Performed by: INTERNAL MEDICINE

## 2019-10-25 PROCEDURE — 73562 XR KNEE ORTHO RIGHT: ICD-10-PCS | Mod: 26,RT,, | Performed by: RADIOLOGY

## 2019-10-25 PROCEDURE — 73560 X-RAY EXAM OF KNEE 1 OR 2: CPT | Mod: TC,LT

## 2019-10-25 PROCEDURE — 99213 PR OFFICE/OUTPT VISIT, EST, LEVL III, 20-29 MIN: ICD-10-PCS | Mod: 25,S$GLB,, | Performed by: INTERNAL MEDICINE

## 2019-10-25 PROCEDURE — 3079F DIAST BP 80-89 MM HG: CPT | Mod: CPTII,S$GLB,, | Performed by: INTERNAL MEDICINE

## 2019-10-25 PROCEDURE — 73560 X-RAY EXAM OF KNEE 1 OR 2: CPT | Mod: 26,59,LT, | Performed by: RADIOLOGY

## 2019-10-25 PROCEDURE — 99396 PREV VISIT EST AGE 40-64: CPT | Mod: 25,S$GLB,, | Performed by: INTERNAL MEDICINE

## 2019-10-25 PROCEDURE — 99396 PR PREVENTIVE VISIT,EST,40-64: ICD-10-PCS | Mod: 25,S$GLB,, | Performed by: INTERNAL MEDICINE

## 2019-10-25 PROCEDURE — 73560 XR KNEE ORTHO RIGHT: ICD-10-PCS | Mod: 26,59,LT, | Performed by: RADIOLOGY

## 2019-10-25 PROCEDURE — 90471 IMMUNIZATION ADMIN: CPT | Mod: S$GLB,,, | Performed by: INTERNAL MEDICINE

## 2019-10-25 PROCEDURE — 80048 BASIC METABOLIC PNL TOTAL CA: CPT

## 2019-10-25 PROCEDURE — 90471 FLU VACCINE (QUAD) GREATER THAN OR EQUAL TO 3YO PRESERVATIVE FREE IM: ICD-10-PCS | Mod: S$GLB,,, | Performed by: INTERNAL MEDICINE

## 2019-10-25 PROCEDURE — 73562 X-RAY EXAM OF KNEE 3: CPT | Mod: 26,RT,, | Performed by: RADIOLOGY

## 2019-10-25 PROCEDURE — 3079F PR MOST RECENT DIASTOLIC BLOOD PRESSURE 80-89 MM HG: ICD-10-PCS | Mod: CPTII,S$GLB,, | Performed by: INTERNAL MEDICINE

## 2019-10-25 PROCEDURE — 3008F PR BODY MASS INDEX (BMI) DOCUMENTED: ICD-10-PCS | Mod: CPTII,S$GLB,, | Performed by: INTERNAL MEDICINE

## 2019-10-25 PROCEDURE — 85025 COMPLETE CBC W/AUTO DIFF WBC: CPT

## 2019-10-25 PROCEDURE — 3074F PR MOST RECENT SYSTOLIC BLOOD PRESSURE < 130 MM HG: ICD-10-PCS | Mod: CPTII,S$GLB,, | Performed by: INTERNAL MEDICINE

## 2019-10-25 PROCEDURE — 82607 VITAMIN B-12: CPT

## 2019-10-25 PROCEDURE — 99999 PR PBB SHADOW E&M-EST. PATIENT-LVL V: CPT | Mod: PBBFAC,,, | Performed by: INTERNAL MEDICINE

## 2019-10-25 RX ORDER — POTASSIUM CHLORIDE 20 MEQ/1
20 TABLET, EXTENDED RELEASE ORAL 2 TIMES DAILY
Qty: 90 TABLET | Refills: 3 | Status: SHIPPED | OUTPATIENT
Start: 2019-10-25 | End: 2020-11-11

## 2019-10-25 RX ORDER — LOSARTAN POTASSIUM AND HYDROCHLOROTHIAZIDE 12.5; 1 MG/1; MG/1
1 TABLET ORAL DAILY
Qty: 90 TABLET | Refills: 3 | Status: SHIPPED | OUTPATIENT
Start: 2019-10-25 | End: 2019-12-26

## 2019-10-25 NOTE — PROGRESS NOTES
Subjective:      Patient ID: Miles Bowen is a 59 y.o. female.    Chief Complaint: Leg Pain (right )    Knee Pain    The incident occurred 5 to 7 days ago. There was no injury mechanism. The pain is present in the right knee. The quality of the pain is described as aching. The pain is moderate. The pain has been fluctuating since onset. Pertinent negatives include no loss of motion, loss of sensation, numbness or tingling. She reports no foreign bodies present. The symptoms are aggravated by movement. She has tried nothing for the symptoms. The treatment provided no relief.   no redness or warmth     Physical Exam   Constitutional: She is oriented to person, place, and time. She appears well-developed and well-nourished. No distress.   HENT:   Head: Normocephalic and atraumatic.   Mouth/Throat: Oropharynx is clear and moist.   Eyes: Pupils are equal, round, and reactive to light. EOM are normal.   Neck: Neck supple. No thyromegaly present.   Cardiovascular: Normal rate and regular rhythm.   Pulmonary/Chest: Effort normal and breath sounds normal. She has no wheezes. She has no rales.   Abdominal: Soft. Bowel sounds are normal. There is no tenderness.   Musculoskeletal:        Right knee: She exhibits swelling. She exhibits normal range of motion, no ecchymosis, no deformity, no laceration and normal alignment. Tenderness found.   Lymphadenopathy:     She has no cervical adenopathy.   Neurological: She is alert and oriented to person, place, and time.   Skin: Skin is warm and dry.   Psychiatric: She has a normal mood and affect. Her behavior is normal.       60 yo with   Patient Active Problem List   Diagnosis    Hypertension    Depression    Anxiety    Hematemesis without nausea    GERD (gastroesophageal reflux disease)    Hiatal hernia    DDD (degenerative disc disease), lumbar    Common migraine    Allergic rhinitis    IBS (irritable bowel syndrome)    Multinodular goiter    MVP (mitral valve  "prolapse)    Nasal polyps    Obesity     Past Medical History:   Diagnosis Date    Anxiety     Depression     GERD (gastroesophageal reflux disease)     Heart murmur     Hypertension     Multinodular goiter 12/23/2016    MVP (mitral valve prolapse)     Obesity      Here today for annual prev exam.  Admits to some medication non compliance. Especially with potassium.  Energy and appetite are good    Also c/o knee pain.   . Review of Systems   Constitutional: Negative for chills and fever.   HENT: Negative for ear pain and sore throat.    Respiratory: Negative for cough.    Cardiovascular: Negative for chest pain.   Gastrointestinal: Negative for abdominal pain and blood in stool.   Genitourinary: Negative for dysuria and hematuria.   Neurological: Negative for tingling, seizures, syncope and numbness.     Objective:   /84   Pulse 82   Temp 97.2 °F (36.2 °C) (Tympanic)   Ht 5' 2" (1.575 m)   Wt 84.4 kg (186 lb 1.1 oz)   SpO2 97%   BMI 34.03 kg/m²     Physical Exam   Constitutional: She is oriented to person, place, and time. She appears well-developed and well-nourished. No distress.   HENT:   Head: Normocephalic and atraumatic.   Mouth/Throat: Oropharynx is clear and moist.   Eyes: Pupils are equal, round, and reactive to light. EOM are normal.   Neck: Neck supple. No thyromegaly present.   Cardiovascular: Normal rate and regular rhythm.   Pulmonary/Chest: Effort normal and breath sounds normal. She has no wheezes. She has no rales.   Abdominal: Soft. Bowel sounds are normal. There is no tenderness.   Musculoskeletal:        Right knee: She exhibits swelling. She exhibits normal range of motion, no ecchymosis, no deformity, no laceration and normal alignment. Tenderness found.        Legs:  Lymphadenopathy:     She has no cervical adenopathy.   Neurological: She is alert and oriented to person, place, and time.   Skin: Skin is warm and dry.   Psychiatric: She has a normal mood and affect. Her " behavior is normal.       Assessment:     1. Routine general medical examination at a health care facility    2. Essential hypertension    3. Multinodular goiter    4. Irritable bowel syndrome, unspecified type    5. Hypokalemia    6. Need for influenza vaccination    7. Knee strain, right, initial encounter    8. Pain and swelling of right knee      Plan:   Routine general medical examination at a health care facility  Heart healthy diet and reg exercise  HM reviewed    Essential hypertension  Controlled    -     losartan-hydrochlorothiazide 100-12.5 mg (HYZAAR) 100-12.5 mg Tab; Take 1 tablet by mouth once daily.  Dispense: 90 tablet; Refill: 3    Multinodular goiter  Did not complete bx as previously recommended  -     Ambulatory referral/consult to Endocrinology; Future; Expected date: 10/25/2019  -      Soft Tissue Head Neck Thyroid; Future; Expected date: 10/25/2019    Irritable bowel syndrome, unspecified type  Stable    Hypokalemia  -     potassium chloride SA (K-DUR,KLOR-CON) 20 MEQ tablet; Take 1 tablet (20 mEq total) by mouth 2 (two) times daily.  Dispense: 90 tablet; Refill: 3    Need for influenza vaccination    Knee strain, right, initial encounter  -     Ambulatory referral/consult to Orthopedics; Future; Expected date: 10/25/2019  Tylenol prn as direded  RICE as discussed.     Pain and swelling of right knee  -     Cancel: X-Ray Knee Complete 4 Or More Views Right; Future; Expected date: 10/25/2019    Other orders  -     Influenza - Quadrivalent (PF)      Lab Frequency Next Occurrence   Hepatitis C antibody Once 09/04/2018   Giardia / Cryptosporidum, EIA Once 10/22/2018   CBC auto differential Once 02/04/2019   Potassium Once 02/04/2019   Folate Once 08/13/2019   CBC auto differential Once 08/13/2019   Vitamin B12 Once 08/13/2019   Basic metabolic panel Once 08/13/2019   CT Sinuses without Contrast Once 09/05/2019       Problem List Items Addressed This Visit        Cardiac/Vascular    Hypertension     Relevant Medications    losartan-hydrochlorothiazide 100-12.5 mg (HYZAAR) 100-12.5 mg Tab       Endocrine    Multinodular goiter    Relevant Orders    Ambulatory referral/consult to Endocrinology    US Soft Tissue Head Neck Thyroid       GI    IBS (irritable bowel syndrome)      Other Visit Diagnoses     Routine general medical examination at a health care facility    -  Primary    Hypokalemia        Relevant Medications    potassium chloride SA (K-DUR,KLOR-CON) 20 MEQ tablet    Need for influenza vaccination        Knee strain, right, initial encounter        Relevant Orders    Ambulatory referral/consult to Orthopedics    Pain and swelling of right knee              Follow up in about 1 year (around 10/25/2020), or if symptoms worsen or fail to improve.

## 2019-10-25 NOTE — PATIENT INSTRUCTIONS
Check with your pharmacy regarding new shingles vaccine.     Knee Sprain    A sprain is an injury to the ligaments or capsule that holds a joint together. There are no broken bones. Most sprains take 3 to 6 weeks to heal. If it a severe sprain where the ligament is completely torn, it can take months to recover.  Most knee sprains are treated with a splint, knee immobilizer brace, or elastic wrap for support. Severe sprains may require surgery.  Home care  · Stay off the injured leg as much as possible until you can walk on it without pain. If you have a lot of pain with walking, crutches or a walker may be prescribed. (These can be rented or purchased at many pharmacies and surgical or orthopedic supply stores). Follow your healthcare provider's advice about when to begin putting weight on that leg.  · Keep your leg elevated to reduce pain and swelling. When sleeping, place a pillow under the injured leg. When sitting, support the injured leg so it is level with your waist. This is very important during the first 48 hours.  · Apply an ice pack over the injured area for 15 to 20 minutes every 3 to 6 hours. You should do this for the first 24 to 48 hours. You can make an ice pack by filling a plastic bag that seals at the top with ice cubes and then wrapping it with a thin towel. Continue to use ice packs for relief of pain and swelling as needed. As the ice melts, be careful to avoid getting your wrap, splint, or cast wet. After 48 hours, apply heat (warm shower or warm bath) for 15 to 20 minutes several times a day, or alternate ice and heat. You can place the ice pack directly over the splint. If you have to wear a hook-and-loop knee brace, you can open it to apply the ice pack, or heat, directly to the knee. Never put ice directly on the skin. Always wrap the ice in a towel or other type of cloth.  · You may use over-the-counter pain medicine to control pain, unless another pain medicine was prescribed.If you  have chronic liver or kidney disease or ever had a stomach ulcer or GI bleeding, talk with your healthcare provider before using these medicines.  · If you were given a splint, keep it completely dry at all times. Bathe with your splint out of the water, protected with 2 large plastic bags, rubber-banded at the top end. If a fiberglass splint gets wet, you can dry it with a hair dryer. If you have a hook-and-loop knee brace, you can remove this to bathe, unless told otherwise.  Follow-up care  Follow up with your doctor as advised. Any X-rays you had today dont show any broken bones, breaks, or fractures. Sometimes fractures dont show up on the first X-ray. Bruises and sprains can sometimes hurt as much as a fracture. These injuries can take time to heal completely. If your symptoms dont improve or they get worse, talk with your doctor. You may need a repeat X-ray. If X-rays were taken, you will be told of any new findings that may affect your care.  Call 911  Call 911 if you have:  ·  Shortness of breath  ·  Chest pain  When to seek medical advice  Call your healthcare provider right away if any of these occur:  · The splint or knee immobilizer brace becomes wet or soft  · The fiberglass cast or splint remains wet for more than 24 hours  · Pain or swelling increases  · The injured leg or toes become cold, blue, numb, or tingly  Date Last Reviewed: 11/20/2015  © 7929-8509 [employer]. [address] [place] [address], [place] [address]. All rights reserved. This information is not intended as a substitute for professional medical care. Always follow your healthcare professional's instructions.

## 2019-10-28 ENCOUNTER — TELEPHONE (OUTPATIENT)
Dept: SPORTS MEDICINE | Facility: CLINIC | Age: 59
End: 2019-10-28

## 2019-10-28 ENCOUNTER — TELEPHONE (OUTPATIENT)
Dept: INTERNAL MEDICINE | Facility: CLINIC | Age: 59
End: 2019-10-28

## 2019-10-28 DIAGNOSIS — E87.6 HYPOKALEMIA: Primary | ICD-10-CM

## 2019-10-28 DIAGNOSIS — E53.8 B12 DEFICIENCY: ICD-10-CM

## 2019-10-28 DIAGNOSIS — E53.8 FOLATE DEFICIENCY: ICD-10-CM

## 2019-10-29 ENCOUNTER — TELEPHONE (OUTPATIENT)
Dept: SPORTS MEDICINE | Facility: CLINIC | Age: 59
End: 2019-10-29

## 2019-10-30 NOTE — TELEPHONE ENCOUNTER
She has not been taking any of these. Is shed supposed to get these over the counter? If so what miligram?

## 2019-11-06 NOTE — TELEPHONE ENCOUNTER
b12 over counter at 1000mcg daily, folic acid 1mg over counter daily.  Potassium script was sent to pharmacy 10/25/19. Recheck potassium in 2 weeks. Also check potassium, b12 and folate in one month. See me in clinic in one month

## 2019-11-18 DIAGNOSIS — K21.9 GASTROESOPHAGEAL REFLUX DISEASE, ESOPHAGITIS PRESENCE NOT SPECIFIED: ICD-10-CM

## 2019-11-19 ENCOUNTER — PATIENT MESSAGE (OUTPATIENT)
Dept: INTERNAL MEDICINE | Facility: CLINIC | Age: 59
End: 2019-11-19

## 2019-11-19 DIAGNOSIS — K21.9 GASTROESOPHAGEAL REFLUX DISEASE, ESOPHAGITIS PRESENCE NOT SPECIFIED: ICD-10-CM

## 2019-11-19 RX ORDER — PANTOPRAZOLE SODIUM 40 MG/1
40 TABLET, DELAYED RELEASE ORAL DAILY
Qty: 90 TABLET | Refills: 0 | Status: SHIPPED | OUTPATIENT
Start: 2019-11-19 | End: 2020-01-23 | Stop reason: SDUPTHER

## 2019-11-20 RX ORDER — PANTOPRAZOLE SODIUM 40 MG/1
TABLET, DELAYED RELEASE ORAL
Qty: 90 TABLET | Refills: 0 | Status: SHIPPED | OUTPATIENT
Start: 2019-11-20 | End: 2020-03-27

## 2019-11-27 ENCOUNTER — TELEPHONE (OUTPATIENT)
Dept: RADIOLOGY | Facility: HOSPITAL | Age: 59
End: 2019-11-27

## 2019-12-02 ENCOUNTER — TELEPHONE (OUTPATIENT)
Dept: INTERNAL MEDICINE | Facility: CLINIC | Age: 59
End: 2019-12-02

## 2019-12-02 ENCOUNTER — PATIENT MESSAGE (OUTPATIENT)
Dept: INTERNAL MEDICINE | Facility: CLINIC | Age: 59
End: 2019-12-02

## 2019-12-02 ENCOUNTER — OFFICE VISIT (OUTPATIENT)
Dept: INTERNAL MEDICINE | Facility: CLINIC | Age: 59
End: 2019-12-02
Payer: COMMERCIAL

## 2019-12-02 VITALS
DIASTOLIC BLOOD PRESSURE: 78 MMHG | BODY MASS INDEX: 34.37 KG/M2 | HEIGHT: 62 IN | SYSTOLIC BLOOD PRESSURE: 124 MMHG | OXYGEN SATURATION: 99 % | HEART RATE: 105 BPM | WEIGHT: 186.75 LBS | TEMPERATURE: 99 F

## 2019-12-02 DIAGNOSIS — J06.9 UPPER RESPIRATORY TRACT INFECTION, UNSPECIFIED TYPE: Primary | ICD-10-CM

## 2019-12-02 DIAGNOSIS — R05.9 COUGH: ICD-10-CM

## 2019-12-02 LAB
INFLUENZA A, MOLECULAR: NEGATIVE
INFLUENZA B, MOLECULAR: NEGATIVE
SPECIMEN SOURCE: NORMAL

## 2019-12-02 PROCEDURE — 3008F PR BODY MASS INDEX (BMI) DOCUMENTED: ICD-10-PCS | Mod: CPTII,S$GLB,, | Performed by: FAMILY MEDICINE

## 2019-12-02 PROCEDURE — 3078F DIAST BP <80 MM HG: CPT | Mod: CPTII,S$GLB,, | Performed by: FAMILY MEDICINE

## 2019-12-02 PROCEDURE — 99214 PR OFFICE/OUTPT VISIT, EST, LEVL IV, 30-39 MIN: ICD-10-PCS | Mod: S$GLB,,, | Performed by: FAMILY MEDICINE

## 2019-12-02 PROCEDURE — 99214 OFFICE O/P EST MOD 30 MIN: CPT | Mod: S$GLB,,, | Performed by: FAMILY MEDICINE

## 2019-12-02 PROCEDURE — 87502 INFLUENZA DNA AMP PROBE: CPT

## 2019-12-02 PROCEDURE — 3078F PR MOST RECENT DIASTOLIC BLOOD PRESSURE < 80 MM HG: ICD-10-PCS | Mod: CPTII,S$GLB,, | Performed by: FAMILY MEDICINE

## 2019-12-02 PROCEDURE — 3008F BODY MASS INDEX DOCD: CPT | Mod: CPTII,S$GLB,, | Performed by: FAMILY MEDICINE

## 2019-12-02 PROCEDURE — 3074F PR MOST RECENT SYSTOLIC BLOOD PRESSURE < 130 MM HG: ICD-10-PCS | Mod: CPTII,S$GLB,, | Performed by: FAMILY MEDICINE

## 2019-12-02 PROCEDURE — 99999 PR PBB SHADOW E&M-EST. PATIENT-LVL III: CPT | Mod: PBBFAC,,, | Performed by: FAMILY MEDICINE

## 2019-12-02 PROCEDURE — 99999 PR PBB SHADOW E&M-EST. PATIENT-LVL III: ICD-10-PCS | Mod: PBBFAC,,, | Performed by: FAMILY MEDICINE

## 2019-12-02 PROCEDURE — 3074F SYST BP LT 130 MM HG: CPT | Mod: CPTII,S$GLB,, | Performed by: FAMILY MEDICINE

## 2019-12-02 RX ORDER — ACETAZOLAMIDE 500 MG/1
CAPSULE, EXTENDED RELEASE ORAL
COMMUNITY
Start: 2019-01-15 | End: 2020-06-04

## 2019-12-02 RX ORDER — LOSARTAN POTASSIUM AND HYDROCHLOROTHIAZIDE 12.5; 5 MG/1; MG/1
TABLET ORAL
COMMUNITY
End: 2019-12-26

## 2019-12-02 RX ORDER — PROMETHAZINE HYDROCHLORIDE AND CODEINE PHOSPHATE 6.25; 1 MG/5ML; MG/5ML
5 SOLUTION ORAL NIGHTLY PRN
Qty: 180 ML | Refills: 0 | Status: SHIPPED | OUTPATIENT
Start: 2019-12-02 | End: 2020-02-10 | Stop reason: SDUPTHER

## 2019-12-02 RX ORDER — HYDROCODONE BITARTRATE AND ACETAMINOPHEN 7.5; 325 MG/1; MG/1
TABLET ORAL
COMMUNITY
End: 2020-07-30

## 2019-12-02 RX ORDER — CYCLOBENZAPRINE HCL 10 MG
10 TABLET ORAL
COMMUNITY
Start: 2019-10-21 | End: 2020-11-04

## 2019-12-02 NOTE — TELEPHONE ENCOUNTER
----- Message from Marita Blair sent at 12/2/2019  2:46 PM CST -----  Contact: Pt  Pt asked that nurse return her call regarding personal info. Please contact pt at ([phone])

## 2019-12-02 NOTE — PATIENT INSTRUCTIONS
The Flu (Influenza)     The virus that causes the flu spreads through the air in droplets when someone who has the flu coughs, sneezes, laughs, or talks.   The flu (influenza) is an infection that affects your respiratory tract. This tract is made up of your mouth, nose, and lungs, and the passages between them. Unlike a cold, the flu can make you very ill. And it can lead to pneumonia, a serious lung infection. The flu can have serious complications and even cause death.  Who is at risk for the flu?  Anyone can get the flu. But you are more likely to become infected if you:  · Have a weakened immune system  · Work in a healthcare setting where you may be exposed to flu germs  · Live or work with someone who has the flu  · Havent had an annual flu shot  How does the flu spread?  The flu is caused by a virus. The virus spreads through the air in droplets when someone who has the flu coughs, sneezes, laughs, or talks. You can become infected when you inhale these viruses directly. You can also become infected when you touch a surface on which the droplets have landed and then transfer the germs to your eyes, nose, or mouth. Touching used tissues, or sharing utensils, drinking glasses, or a toothbrush from an infected person can expose you to flu viruses, too.  What are the symptoms of the flu?  Flu symptoms tend to come on quickly and may last a few days to a few weeks. They include:  · Fever usually higher than 100.4°F  (38°C) and chills  · Sore throat and headache  · Dry cough  · Runny nose  · Tiredness and weakness  · Muscle aches  Who is at risk for flu complications?  For some people, the flu can be very serious. The risk for complications is greater for:  · Children younger than age 5  · Adults ages 65 and older  · People with a chronic illness such as diabetes or heart, kidney, or lung disease  · People who live in a nursing home or long-term care facility   How is the flu treated?  The flu usually gets  better after 7 days or so. In some cases, your healthcare provider may prescribe an antiviral medicine. This may help you get well a little sooner. For the medicine to help, you need to take it as soon as possible (ideally within 48 hours) after your symptoms start. If you develop pneumonia or other serious illness, you may need to stay in the hospital.  Easing flu symptoms  · Drink lots of fluids such as water, juice, and warm soup. A good rule is to drink enough so that you urinate your normal amount.  · Get plenty of rest.  · Ask your healthcare provider what to take for fever and pain.  · Call your provider if your fever is 100.4°F (38°C) or higher, or you become dizzy, lightheaded, or short of breath.  Taking steps to protect others  · Wash your hands often, especially after coughing or sneezing. Or clean your hands with an alcohol-based hand  containing at least 60% alcohol.  · Cough or sneeze into a tissue. Then throw the tissue away and wash your hands. If you dont have a tissue, cough and sneeze into your elbow.  · Stay home until at least 24 hours after you no longer have a fever or chills. Be sure the fever isnt being hidden by fever-reducing medicine.  · Dont share food, utensils, drinking glasses, or a toothbrush with others.  · Ask your healthcare provider if others in your household should get antiviral medicine to help them avoid infection.  How can the flu be prevented?  · One of the best ways to avoid the flu is to get a flu vaccine each year. The virus that causes the flu changes from year to year. For that reason, healthcare providers recommend getting the flu vaccine each year, as soon as it's available in your area. The vaccine is given as a shot. Your healthcare provider can tell you which vaccine is right for you. A nasal spray is also available but is not recommended for the 6987-9387 flu season. The CDC says this is because the nasal spray did not seem to protect against the flu  over the last several flu seasons. In the past, it was meant for people ages 2 to 49.  · Wash your hands often. Frequent handwashing is a proven way to help prevent infection.  · Carry an alcohol-based hand gel containing at least 60% alcohol. Use it when you can't use soap and water. Then wash your hands as soon as you can.  · Avoid touching your eyes, nose, and mouth.  · At home and work, clean phones, computer keyboards, and toys often with disinfectant wipes.  · If possible, avoid close contact with others who have the flu or symptoms of the flu.  Handwashing tips  Handwashing is one of the best ways to prevent many common infections. If you are caring for or visiting someone with the flu, wash your hands each time you enter and leave the room. Follow these steps:  · Use warm water and plenty of soap. Rub your hands together well.  · Clean the whole hand, including under your nails, between your fingers, and up the wrists.  · Wash for at least 15 seconds.  · Rinse, letting the water run down your fingers, not up your wrists.  · Dry your hands well. Use a paper towel to turn off the faucet and open the door.  Using alcohol-based hand   Alcohol-based hand  are also a good choice. Use them when you can't use soap and water. Follow these steps:  · Squeeze about a tablespoon of gel into the palm of one hand.  · Rub your hands together briskly, cleaning the backs of your hands, the palms, between your fingers, and up the wrists.  · Rub until the gel is gone and your hands are completely dry.  Preventing the flu in healthcare settings  The flu is a special concern for people in hospitals and long-term care facilities. To help prevent the spread of flu, many hospitals and nursing homes take these steps:  · Healthcare providers wash their hands or use an alcohol-based hand  before and after treating each patient.  · People with the flu have private rooms and bathrooms or share a room with someone  with the same infection.  · People who are at high risk for the flu but don't have it are encouraged to get the flu and pneumonia vaccines.  · All healthcare workers are encouraged or required to get flu shots.   Date Last Reviewed: 12/1/2016  © 0100-2427 [employer]. [address] [place] [address]. All rights reserved. This information is not intended as a substitute for professional medical care. Always follow your healthcare professional's instructions.

## 2019-12-02 NOTE — PROGRESS NOTES
Subjective:       Patient ID: Miles Bowen is a 59 y.o. female.    Chief Complaint: Cough and Sore Throat    60 yo female here with URI symptoms since yesterday. Subjective fevers/chills. +sick contacts at work    URI    This is a new problem. The current episode started yesterday. The problem has been gradually worsening. The fever has been present for 1 to 2 days. Associated symptoms include coughing, headaches and nausea. Pertinent negatives include no chest pain, rhinorrhea or vomiting. She has tried antihistamine for the symptoms. The treatment provided moderate relief.     does not have any pertinent problems on file.  Past Medical History:   Diagnosis Date    Anxiety     Depression     GERD (gastroesophageal reflux disease)     Heart murmur     Hypertension     Multinodular goiter 12/23/2016    MVP (mitral valve prolapse)     Obesity      Past Surgical History:   Procedure Laterality Date    BACK SURGERY  2016    Bone fusion    COLONOSCOPY  2014    FIXATION KYPHOPLASTY THORACIC SPINE  05/2016    HYSTERECTOMY      NASAL SINUS SURGERY  2015    Polyps removed    SINUS SURGERY  11/2014    TONSILLECTOMY       Family History   Problem Relation Age of Onset    Hypertension Mother     Hypertension Father     Heart attack Father 63    Cancer Maternal Grandmother         Breast    Colon cancer Neg Hx     Stomach cancer Neg Hx      Social History     Socioeconomic History    Marital status:      Spouse name: Not on file    Number of children: Not on file    Years of education: Not on file    Highest education level: Not on file   Occupational History    Not on file   Social Needs    Financial resource strain: Not on file    Food insecurity:     Worry: Not on file     Inability: Not on file    Transportation needs:     Medical: Not on file     Non-medical: Not on file   Tobacco Use    Smoking status: Former Smoker     Packs/day: 0.25     Years: 1.00     Pack years: 0.25      Types: Cigarettes     Last attempt to quit: 2006     Years since quittin.9    Smokeless tobacco: Never Used   Substance and Sexual Activity    Alcohol use: Yes     Comment: rarely    Drug use: No    Sexual activity: Not Currently   Lifestyle    Physical activity:     Days per week: Not on file     Minutes per session: Not on file    Stress: Not on file   Relationships    Social connections:     Talks on phone: Not on file     Gets together: Not on file     Attends Zoroastrianism service: Not on file     Active member of club or organization: Not on file     Attends meetings of clubs or organizations: Not on file     Relationship status: Not on file   Other Topics Concern    Not on file   Social History Narrative     with 1 adult child.      Review of Systems   HENT: Negative for rhinorrhea.    Respiratory: Positive for cough.    Cardiovascular: Negative for chest pain.   Gastrointestinal: Positive for nausea. Negative for vomiting.   Neurological: Positive for headaches.       Objective:     Vitals:    19 1210   BP: 124/78   Pulse: 105   Temp: 98.7 °F (37.1 °C)        Physical Exam   Constitutional: She is oriented to person, place, and time. She appears well-developed and well-nourished. No distress.   HENT:   Head: Normocephalic and atraumatic.   Right Ear: Tympanic membrane normal.   Left Ear: Tympanic membrane normal.   Nose: Mucosal edema and rhinorrhea present. Right sinus exhibits maxillary sinus tenderness. Right sinus exhibits no frontal sinus tenderness. Left sinus exhibits maxillary sinus tenderness. Left sinus exhibits no frontal sinus tenderness.   Mouth/Throat: Posterior oropharyngeal erythema present. No oropharyngeal exudate, posterior oropharyngeal edema or tonsillar abscesses.   Eyes: Pupils are equal, round, and reactive to light. EOM are normal.   Neck: Normal range of motion. Neck supple.   Cardiovascular: Normal rate, regular rhythm, normal heart sounds and intact  distal pulses.   No murmur heard.  Pulmonary/Chest: Effort normal and breath sounds normal. No stridor. No respiratory distress.   Musculoskeletal: Normal range of motion. She exhibits no edema or tenderness.   Neurological: She is alert and oriented to person, place, and time.   Skin: Skin is warm and dry. No pallor.   Psychiatric: She has a normal mood and affect. Her behavior is normal.       Assessment:       1. Upper respiratory tract infection, unspecified type    2. Cough        Plan:           Problem List Items Addressed This Visit     None      Visit Diagnoses     Upper respiratory tract infection, unspecified type    -  Primary    Relevant Medications    promethazine-codeine 6.25-10 mg/5 ml (PHENERGAN WITH CODEINE) 6.25-10 mg/5 mL syrup    Other Relevant Orders    Influenza A & B by Molecular    Cough        Relevant Medications    promethazine-codeine 6.25-10 mg/5 ml (PHENERGAN WITH CODEINE) 6.25-10 mg/5 mL syrup    Other Relevant Orders    Influenza A & B by Molecular

## 2019-12-26 DIAGNOSIS — I10 ESSENTIAL HYPERTENSION: Primary | ICD-10-CM

## 2019-12-26 RX ORDER — LOSARTAN POTASSIUM 100 MG/1
100 TABLET ORAL DAILY
Qty: 90 TABLET | Refills: 3 | Status: SHIPPED | OUTPATIENT
Start: 2019-12-26 | End: 2020-12-08

## 2019-12-26 RX ORDER — HYDROCHLOROTHIAZIDE 12.5 MG/1
12.5 TABLET ORAL DAILY
Qty: 30 TABLET | Refills: 11 | Status: SHIPPED | OUTPATIENT
Start: 2019-12-26 | End: 2020-12-03

## 2019-12-26 NOTE — TELEPHONE ENCOUNTER
----- Message from Adenike Guzman sent at 12/26/2019  8:23 AM CST -----  Contact: [facility]  States the pt Losartan medication is on back orders and wants to know if two separate prescription can be given to equal that one tab, can be reached at [phone]///[id number]

## 2019-12-26 NOTE — TELEPHONE ENCOUNTER
Pharmacy called and said the losartan-HCTZ was on back order and was requesting to separate the medications to equal the dose.

## 2020-01-23 DIAGNOSIS — K21.9 GASTROESOPHAGEAL REFLUX DISEASE, ESOPHAGITIS PRESENCE NOT SPECIFIED: ICD-10-CM

## 2020-01-23 RX ORDER — PANTOPRAZOLE SODIUM 40 MG/1
40 TABLET, DELAYED RELEASE ORAL DAILY
Qty: 90 TABLET | Refills: 0 | Status: SHIPPED | OUTPATIENT
Start: 2020-01-23 | End: 2020-03-27 | Stop reason: SDUPTHER

## 2020-02-10 ENCOUNTER — TELEPHONE (OUTPATIENT)
Dept: INTERNAL MEDICINE | Facility: CLINIC | Age: 60
End: 2020-02-10

## 2020-02-10 ENCOUNTER — OFFICE VISIT (OUTPATIENT)
Dept: INTERNAL MEDICINE | Facility: CLINIC | Age: 60
End: 2020-02-10
Payer: COMMERCIAL

## 2020-02-10 ENCOUNTER — LAB VISIT (OUTPATIENT)
Dept: LAB | Facility: HOSPITAL | Age: 60
End: 2020-02-10
Attending: INTERNAL MEDICINE
Payer: COMMERCIAL

## 2020-02-10 VITALS
HEIGHT: 62 IN | DIASTOLIC BLOOD PRESSURE: 82 MMHG | WEIGHT: 191.38 LBS | TEMPERATURE: 99 F | SYSTOLIC BLOOD PRESSURE: 124 MMHG | BODY MASS INDEX: 35.22 KG/M2 | HEART RATE: 95 BPM

## 2020-02-10 DIAGNOSIS — E87.6 HYPOKALEMIA: ICD-10-CM

## 2020-02-10 DIAGNOSIS — J02.9 SORE THROAT: ICD-10-CM

## 2020-02-10 DIAGNOSIS — I10 ESSENTIAL HYPERTENSION: ICD-10-CM

## 2020-02-10 DIAGNOSIS — R05.9 COUGH: Primary | ICD-10-CM

## 2020-02-10 DIAGNOSIS — R50.9 FEVER, UNSPECIFIED FEVER CAUSE: ICD-10-CM

## 2020-02-10 DIAGNOSIS — K58.9 IRRITABLE BOWEL SYNDROME, UNSPECIFIED TYPE: ICD-10-CM

## 2020-02-10 LAB
ALBUMIN SERPL BCP-MCNC: 3.8 G/DL (ref 3.5–5.2)
ALP SERPL-CCNC: 52 U/L (ref 55–135)
ALT SERPL W/O P-5'-P-CCNC: 10 U/L (ref 10–44)
ANION GAP SERPL CALC-SCNC: 9 MMOL/L (ref 8–16)
AST SERPL-CCNC: 10 U/L (ref 10–40)
BASOPHILS # BLD AUTO: 0.07 K/UL (ref 0–0.2)
BASOPHILS NFR BLD: 1 % (ref 0–1.9)
BILIRUB SERPL-MCNC: 0.3 MG/DL (ref 0.1–1)
BUN SERPL-MCNC: 18 MG/DL (ref 6–20)
CALCIUM SERPL-MCNC: 9.3 MG/DL (ref 8.7–10.5)
CHLORIDE SERPL-SCNC: 107 MMOL/L (ref 95–110)
CO2 SERPL-SCNC: 25 MMOL/L (ref 23–29)
CREAT SERPL-MCNC: 0.8 MG/DL (ref 0.5–1.4)
DEPRECATED S PYO AG THROAT QL EIA: NEGATIVE
DIFFERENTIAL METHOD: ABNORMAL
EOSINOPHIL # BLD AUTO: 0.2 K/UL (ref 0–0.5)
EOSINOPHIL NFR BLD: 2.1 % (ref 0–8)
ERYTHROCYTE [DISTWIDTH] IN BLOOD BY AUTOMATED COUNT: 17.2 % (ref 11.5–14.5)
EST. GFR  (AFRICAN AMERICAN): >60 ML/MIN/1.73 M^2
EST. GFR  (NON AFRICAN AMERICAN): >60 ML/MIN/1.73 M^2
GLUCOSE SERPL-MCNC: 83 MG/DL (ref 70–110)
HCT VFR BLD AUTO: 33.6 % (ref 37–48.5)
HGB BLD-MCNC: 10.1 G/DL (ref 12–16)
IMM GRANULOCYTES # BLD AUTO: 0.02 K/UL (ref 0–0.04)
IMM GRANULOCYTES NFR BLD AUTO: 0.3 % (ref 0–0.5)
INFLUENZA A, MOLECULAR: NEGATIVE
INFLUENZA B, MOLECULAR: NEGATIVE
LYMPHOCYTES # BLD AUTO: 2.7 K/UL (ref 1–4.8)
LYMPHOCYTES NFR BLD: 36.7 % (ref 18–48)
MCH RBC QN AUTO: 24.2 PG (ref 27–31)
MCHC RBC AUTO-ENTMCNC: 30.1 G/DL (ref 32–36)
MCV RBC AUTO: 80 FL (ref 82–98)
MONOCYTES # BLD AUTO: 0.5 K/UL (ref 0.3–1)
MONOCYTES NFR BLD: 6.7 % (ref 4–15)
NEUTROPHILS # BLD AUTO: 3.9 K/UL (ref 1.8–7.7)
NEUTROPHILS NFR BLD: 53.2 % (ref 38–73)
NRBC BLD-RTO: 0 /100 WBC
PLATELET # BLD AUTO: 419 K/UL (ref 150–350)
PMV BLD AUTO: 9.7 FL (ref 9.2–12.9)
POTASSIUM SERPL-SCNC: 3.8 MMOL/L (ref 3.5–5.1)
PROT SERPL-MCNC: 7 G/DL (ref 6–8.4)
RBC # BLD AUTO: 4.18 M/UL (ref 4–5.4)
SODIUM SERPL-SCNC: 141 MMOL/L (ref 136–145)
SPECIMEN SOURCE: NORMAL
WBC # BLD AUTO: 7.3 K/UL (ref 3.9–12.7)

## 2020-02-10 PROCEDURE — 99999 PR PBB SHADOW E&M-EST. PATIENT-LVL III: CPT | Mod: PBBFAC,,, | Performed by: INTERNAL MEDICINE

## 2020-02-10 PROCEDURE — 3008F BODY MASS INDEX DOCD: CPT | Mod: CPTII,S$GLB,, | Performed by: INTERNAL MEDICINE

## 2020-02-10 PROCEDURE — 87081 CULTURE SCREEN ONLY: CPT

## 2020-02-10 PROCEDURE — 99214 OFFICE O/P EST MOD 30 MIN: CPT | Mod: S$GLB,,, | Performed by: INTERNAL MEDICINE

## 2020-02-10 PROCEDURE — 85025 COMPLETE CBC W/AUTO DIFF WBC: CPT

## 2020-02-10 PROCEDURE — 80053 COMPREHEN METABOLIC PANEL: CPT

## 2020-02-10 PROCEDURE — 3079F DIAST BP 80-89 MM HG: CPT | Mod: CPTII,S$GLB,, | Performed by: INTERNAL MEDICINE

## 2020-02-10 PROCEDURE — 99999 PR PBB SHADOW E&M-EST. PATIENT-LVL III: ICD-10-PCS | Mod: PBBFAC,,, | Performed by: INTERNAL MEDICINE

## 2020-02-10 PROCEDURE — 87502 INFLUENZA DNA AMP PROBE: CPT

## 2020-02-10 PROCEDURE — 99214 PR OFFICE/OUTPT VISIT, EST, LEVL IV, 30-39 MIN: ICD-10-PCS | Mod: S$GLB,,, | Performed by: INTERNAL MEDICINE

## 2020-02-10 PROCEDURE — 3008F PR BODY MASS INDEX (BMI) DOCUMENTED: ICD-10-PCS | Mod: CPTII,S$GLB,, | Performed by: INTERNAL MEDICINE

## 2020-02-10 PROCEDURE — 3074F PR MOST RECENT SYSTOLIC BLOOD PRESSURE < 130 MM HG: ICD-10-PCS | Mod: CPTII,S$GLB,, | Performed by: INTERNAL MEDICINE

## 2020-02-10 PROCEDURE — 87880 STREP A ASSAY W/OPTIC: CPT

## 2020-02-10 PROCEDURE — 3079F PR MOST RECENT DIASTOLIC BLOOD PRESSURE 80-89 MM HG: ICD-10-PCS | Mod: CPTII,S$GLB,, | Performed by: INTERNAL MEDICINE

## 2020-02-10 PROCEDURE — 3074F SYST BP LT 130 MM HG: CPT | Mod: CPTII,S$GLB,, | Performed by: INTERNAL MEDICINE

## 2020-02-10 PROCEDURE — 36415 COLL VENOUS BLD VENIPUNCTURE: CPT

## 2020-02-10 RX ORDER — OSELTAMIVIR PHOSPHATE 75 MG/1
75 CAPSULE ORAL 2 TIMES DAILY
Qty: 10 CAPSULE | Refills: 0 | Status: SHIPPED | OUTPATIENT
Start: 2020-02-10 | End: 2020-02-15

## 2020-02-10 RX ORDER — ESZOPICLONE 2 MG/1
TABLET, FILM COATED ORAL
COMMUNITY
Start: 2020-02-07 | End: 2020-11-04

## 2020-02-10 RX ORDER — PROMETHAZINE HYDROCHLORIDE AND CODEINE PHOSPHATE 6.25; 1 MG/5ML; MG/5ML
5 SOLUTION ORAL EVERY 4 HOURS PRN
Qty: 118 ML | Refills: 0 | Status: SHIPPED | OUTPATIENT
Start: 2020-02-10 | End: 2020-02-20

## 2020-02-10 NOTE — TELEPHONE ENCOUNTER
----- Message from Janett Johnson sent at 2/10/2020 11:32 AM CST -----  Contact: [facility][phone]  Type:  Pharmacy Calling to Clarify an RX    Name of Caller:Fan  Pharmacy Name:[facility]  Prescription Name:Promethazine with codeine  What do they need to clarify?:possible drug interaction  Best Call Back Number:[phone]  Additional Information:

## 2020-02-10 NOTE — TELEPHONE ENCOUNTER
----- Message from Lidia Dotson sent at 2/10/2020  1:40 PM CST -----  Contact: Pt  Pt is requesting call back in regards to pharmacy waiting on call back in regards to medication.      Pls call back at [phone]

## 2020-02-11 ENCOUNTER — TELEPHONE (OUTPATIENT)
Dept: INTERNAL MEDICINE | Facility: CLINIC | Age: 60
End: 2020-02-11

## 2020-02-11 NOTE — TELEPHONE ENCOUNTER
Spoke with tech from [facility] and needed a verbal approval that pts phenergan with codeine could be taking with diazepam. Dr. Paul gave verbal approval that medications could be taken together. Katina verbalized understanding.

## 2020-02-12 LAB — BACTERIA THROAT CULT: NORMAL

## 2020-02-16 NOTE — PROGRESS NOTES
Subjective:      Patient ID: Miles Bowen is a 59 y.o. female.    Chief Complaint: Sore Throat (x 2 days ); Cough; and Fever    Cough   This is a new problem. Episode onset: 3 days. The problem has been waxing and waning. The problem occurs hourly. The cough is non-productive. Associated symptoms include chills, a fever (tmax 101.2) and a sore throat. Pertinent negatives include no chest pain, hemoptysis, postnasal drip, rash, rhinorrhea, shortness of breath, sweats or wheezing. Nothing aggravates the symptoms. She has tried nothing for the symptoms. The treatment provided no relief. There is no history of emphysema.      58 yo with   Patient Active Problem List   Diagnosis    Hypertension    Depression    Anxiety    Hematemesis without nausea    GERD (gastroesophageal reflux disease)    Hiatal hernia    DDD (degenerative disc disease), lumbar    Common migraine    Allergic rhinitis    IBS (irritable bowel syndrome)    Multinodular goiter    MVP (mitral valve prolapse)    Nasal polyps    Obesity     Past Medical History:   Diagnosis Date    Anxiety     Depression     GERD (gastroesophageal reflux disease)     Heart murmur     Hypertension     Multinodular goiter 12/23/2016    MVP (mitral valve prolapse)     Obesity      Here today c/o cough and fever.   Review of Systems   Constitutional: Positive for chills and fever (tmax 101.2). Negative for diaphoresis and fatigue.   HENT: Positive for sore throat. Negative for postnasal drip, rhinorrhea, sinus pressure, sinus pain and voice change.    Eyes: Negative for visual disturbance.   Respiratory: Positive for cough. Negative for hemoptysis, shortness of breath and wheezing.    Cardiovascular: Negative for chest pain, palpitations and leg swelling.   Gastrointestinal: Negative for abdominal pain.   Skin: Negative for rash.     Objective:   /82 (BP Location: Left arm, Patient Position: Sitting, BP Method: Medium (Manual))   Pulse 95    "Temp 98.6 °F (37 °C) (Tympanic)   Ht 5' 2" (1.575 m)   Wt 86.8 kg (191 lb 5.8 oz)   BMI 35.00 kg/m²     Physical Exam   Constitutional: She appears well-developed and well-nourished. No distress.   HENT:   Right Ear: Tympanic membrane normal.   Left Ear: Tympanic membrane normal.   Nose: Mucosal edema and rhinorrhea present. Right sinus exhibits no maxillary sinus tenderness and no frontal sinus tenderness. Left sinus exhibits no maxillary sinus tenderness and no frontal sinus tenderness.   Mouth/Throat: Posterior oropharyngeal erythema present. No oropharyngeal exudate or posterior oropharyngeal edema.   Cardiovascular: Normal rate.   Pulmonary/Chest: Effort normal.   Musculoskeletal: She exhibits no edema.   Neurological: She is alert.   Skin: Skin is warm and dry.   Psychiatric: She has a normal mood and affect. Her behavior is normal.       Assessment:     1. Cough    2. Fever, unspecified fever cause    3. Sore throat    4. Essential hypertension    5. Hypokalemia    6. Irritable bowel syndrome, unspecified type      Plan:   Cough  -     Influenza A & B by Molecular; Future; Expected date: 02/10/2020  -     oseltamivir (TAMIFLU) 75 MG capsule; Take 1 capsule (75 mg total) by mouth 2 (two) times daily. for 5 days  Dispense: 10 capsule; Refill: 0  -     promethazine-codeine 6.25-10 mg/5 ml (PHENERGAN WITH CODEINE) 6.25-10 mg/5 mL syrup; Take 5 mLs by mouth every 4 (four) hours as needed.  Dispense: 118 mL; Refill: 0    Fever, unspecified fever cause  -     Influenza A & B by Molecular; Future; Expected date: 02/10/2020  -     oseltamivir (TAMIFLU) 75 MG capsule; Take 1 capsule (75 mg total) by mouth 2 (two) times daily. for 5 days  Dispense: 10 capsule; Refill: 0    Sore throat  -     Throat Screen, Rapid    Essential hypertension  -     CBC auto differential; Future; Expected date: 02/10/2020  -     Comprehensive metabolic panel; Future; Expected date: 02/10/2020    Hypokalemia  -     Comprehensive metabolic " panel; Future; Expected date: 02/10/2020    Irritable bowel syndrome, unspecified type  -     CBC auto differential; Future; Expected date: 02/10/2020  -     Comprehensive metabolic panel; Future; Expected date: 02/10/2020    Other orders  -     Strep A culture, throat        Lab Frequency Next Occurrence   CBC auto differential Once 02/04/2019   Potassium Once 02/04/2019   CT Sinuses without Contrast Once 09/05/2019   Ambulatory referral/consult to Endocrinology Once 10/25/2019    Soft Tissue Head Neck Thyroid Once 10/25/2019   Ambulatory referral/consult to Orthopedics Once 10/25/2019   Basic metabolic panel Once 11/20/2019   Basic metabolic panel Once 12/06/2019       Problem List Items Addressed This Visit        Cardiac/Vascular    Hypertension    Relevant Orders    CBC auto differential (Completed)    Comprehensive metabolic panel (Completed)       GI    IBS (irritable bowel syndrome)    Relevant Orders    CBC auto differential (Completed)    Comprehensive metabolic panel (Completed)      Other Visit Diagnoses     Cough    -  Primary    Relevant Medications    oseltamivir (TAMIFLU) 75 MG capsule    promethazine-codeine 6.25-10 mg/5 ml (PHENERGAN WITH CODEINE) 6.25-10 mg/5 mL syrup    Other Relevant Orders    Influenza A & B by Molecular (Completed)    Fever, unspecified fever cause        Relevant Medications    oseltamivir (TAMIFLU) 75 MG capsule    Other Relevant Orders    Influenza A & B by Molecular (Completed)    Sore throat        Relevant Orders    Throat Screen, Rapid (Completed)    Hypokalemia        Relevant Orders    Comprehensive metabolic panel (Completed)          Follow up if symptoms worsen or fail to improve.

## 2020-03-27 DIAGNOSIS — K21.9 GASTROESOPHAGEAL REFLUX DISEASE, ESOPHAGITIS PRESENCE NOT SPECIFIED: ICD-10-CM

## 2020-03-27 DIAGNOSIS — R11.0 NAUSEA: ICD-10-CM

## 2020-03-27 RX ORDER — PANTOPRAZOLE SODIUM 40 MG/1
40 TABLET, DELAYED RELEASE ORAL DAILY
Qty: 90 TABLET | Refills: 0 | Status: SHIPPED | OUTPATIENT
Start: 2020-03-27 | End: 2020-06-06 | Stop reason: SDUPTHER

## 2020-03-27 RX ORDER — PROMETHAZINE HYDROCHLORIDE 25 MG/1
25 TABLET ORAL EVERY 6 HOURS
Qty: 30 TABLET | Refills: 0 | Status: SHIPPED | OUTPATIENT
Start: 2020-03-27 | End: 2020-06-24 | Stop reason: SDUPTHER

## 2020-03-31 ENCOUNTER — OFFICE VISIT (OUTPATIENT)
Dept: INTERNAL MEDICINE | Facility: CLINIC | Age: 60
End: 2020-03-31
Payer: COMMERCIAL

## 2020-03-31 DIAGNOSIS — K52.9 GASTROENTERITIS: Primary | ICD-10-CM

## 2020-03-31 PROCEDURE — 99214 OFFICE O/P EST MOD 30 MIN: CPT | Mod: 95,,, | Performed by: FAMILY MEDICINE

## 2020-03-31 PROCEDURE — 99214 PR OFFICE/OUTPT VISIT, EST, LEVL IV, 30-39 MIN: ICD-10-PCS | Mod: 95,,, | Performed by: FAMILY MEDICINE

## 2020-03-31 RX ORDER — DIPHENOXYLATE HYDROCHLORIDE AND ATROPINE SULFATE 2.5; .025 MG/1; MG/1
1 TABLET ORAL 4 TIMES DAILY PRN
Qty: 30 TABLET | Refills: 0 | Status: SHIPPED | OUTPATIENT
Start: 2020-03-31 | End: 2020-04-10

## 2020-03-31 RX ORDER — ONDANSETRON 8 MG/1
8 TABLET, ORALLY DISINTEGRATING ORAL EVERY 6 HOURS PRN
Qty: 20 TABLET | Refills: 1 | Status: SHIPPED | OUTPATIENT
Start: 2020-03-31 | End: 2020-06-04

## 2020-03-31 RX ORDER — METRONIDAZOLE 500 MG/1
500 TABLET ORAL EVERY 8 HOURS
Qty: 21 TABLET | Refills: 0 | Status: SHIPPED | OUTPATIENT
Start: 2020-03-31 | End: 2020-04-07

## 2020-03-31 NOTE — PROGRESS NOTES
Subjective:       Patient ID: Miles Bowen is a 59 y.o. female.    Chief Complaint: N/V/diarrhea  60 yo female with h/o IBS and frequent GI upset seen via video visit with 2 day history of nausea, vomiting, and diarrhea. Tolerating liquids but does feel fairly weak. Had EGD scheduled but cancelled due to COVID pandemic. Denies any fever, cough, sore throat, myalgias or body aches. No focal abdominal pain.    does not have any pertinent problems on file.  Past Medical History:   Diagnosis Date    Anxiety     Depression     GERD (gastroesophageal reflux disease)     Heart murmur     Hypertension     Multinodular goiter 2016    MVP (mitral valve prolapse)     Obesity      Past Surgical History:   Procedure Laterality Date    BACK SURGERY      Bone fusion    COLONOSCOPY      FIXATION KYPHOPLASTY THORACIC SPINE  2016    HYSTERECTOMY      NASAL SINUS SURGERY      Polyps removed    SINUS SURGERY  2014    TONSILLECTOMY       Family History   Problem Relation Age of Onset    Hypertension Mother     Hypertension Father     Heart attack Father 63    Cancer Maternal Grandmother         Breast    Colon cancer Neg Hx     Stomach cancer Neg Hx      Social History     Socioeconomic History    Marital status:      Spouse name: Not on file    Number of children: Not on file    Years of education: Not on file    Highest education level: Not on file   Occupational History    Not on file   Social Needs    Financial resource strain: Not on file    Food insecurity:     Worry: Not on file     Inability: Not on file    Transportation needs:     Medical: Not on file     Non-medical: Not on file   Tobacco Use    Smoking status: Former Smoker     Packs/day: 0.25     Years: 1.00     Pack years: 0.25     Types: Cigarettes     Last attempt to quit: 2006     Years since quittin.2    Smokeless tobacco: Never Used   Substance and Sexual Activity    Alcohol use: Yes      Comment: rarely    Drug use: No    Sexual activity: Not Currently   Lifestyle    Physical activity:     Days per week: Not on file     Minutes per session: Not on file    Stress: Not on file   Relationships    Social connections:     Talks on phone: Not on file     Gets together: Not on file     Attends Yarsani service: Not on file     Active member of club or organization: Not on file     Attends meetings of clubs or organizations: Not on file     Relationship status: Not on file   Other Topics Concern    Not on file   Social History Narrative     with 1 adult child.      Review of Systems   A comprehensive 14-point review of systems was reviewed with patient and was negative other than as specified above.     Objective:   There were no vitals filed for this visit.     Physical Exam   Constitutional: She is oriented to person, place, and time. She appears well-developed and well-nourished. No distress.   HENT:   Head: Normocephalic and atraumatic.   Eyes: EOM are normal.   Neck: Normal range of motion. No thyromegaly present.   Pulmonary/Chest: Effort normal.   Musculoskeletal: Normal range of motion.   Neurological: She is alert and oriented to person, place, and time.   Skin: No rash noted.   Psychiatric: She has a normal mood and affect. Her behavior is normal. Thought content normal.       Assessment:       1. Gastroenteritis        Plan:           Problem List Items Addressed This Visit     None      Visit Diagnoses     Gastroenteritis    -  Primary    Relevant Medications    ondansetron (ZOFRAN-ODT) 8 MG TbDL    diphenoxylate-atropine 2.5-0.025 mg (LOMOTIL) 2.5-0.025 mg per tablet    metroNIDAZOLE (FLAGYL) 500 MG tablet    sodium-potas-chloride-dextrose (PEDIALYTE) 10.6-4.7 mEq/8.5 gram PwPk

## 2020-04-03 ENCOUNTER — PATIENT MESSAGE (OUTPATIENT)
Dept: INTERNAL MEDICINE | Facility: CLINIC | Age: 60
End: 2020-04-03

## 2020-04-03 NOTE — PATIENT INSTRUCTIONS
Gastritis (Adult)    Gastritis is inflammation and irritation of the stomach lining. It can be present for a short time (acute) or be long lasting (chronic). Gastritis is often caused by infection with bacteria called H pylori. More than a third of people in the [place] have this bacteria in their bodies. In many cases, H pylori causes no problems or symptoms. In some people, though, the infection irritates the stomach lining and causes gastritis. Other causes of stomach irritation include drinking alcohol or taking pain-relieving medicines called NSAIDs (such as aspirin or ibuprofen).   Symptoms of gastritis can include:  · Abdominal pain or bloating  · Loss of appetite  · Nausea or vomiting  · Vomiting blood or having black stools  · Feeling more tired than usual  An inflamed and irritated stomach lining is more likely to develop a sore called an ulcer. To help prevent this, gastritis should be treated.  Home care  If needed, medicines may be prescribed. If you have H pylori infection, treating it will likely relieve your symptoms. Other changes can help reduce stomach irritation and help it heal.  · If you have been prescribed medicines for H pylori infection, take them as directed. Take all of the medicine until it is finished or your healthcare provider tells you to stop, even if you feel better.  · Your healthcare provider may recommend avoiding NSAIDs. If you take daily aspirin for your heart or other medical reasons, do not stop without talking to your healthcare provider first.  · Avoid drinking alcohol.  · Stop smoking. Smoking can irritate the stomach and delay healing. As much as possible, stay away from second hand smoke.  Follow-up care  Follow up with your healthcare provider, or as advised by our staff. Testing may be needed to check for inflammation or an ulcer.  When to seek medical advice  Call your healthcare provider for any of the following:  · Stomach pain that gets worse or moves to the lower  right abdomen (appendix area)  · Chest pain that appears or gets worse, or spreads to the back, neck, shoulder, or arm  · Frequent vomiting (cant keep down liquids)  · Blood in the stool or vomit (red or black in color)  · Feeling weak or dizzy  · Fever of 100.4ºF (38ºC) or higher, or as directed by your healthcare provider  Date Last Reviewed: 6/22/2015  © 1074-2039 [employer]. [address] [place] [address]. All rights reserved. This information is not intended as a substitute for professional medical care. Always follow your healthcare professional's instructions.

## 2020-04-06 ENCOUNTER — TELEPHONE (OUTPATIENT)
Dept: INTERNAL MEDICINE | Facility: CLINIC | Age: 60
End: 2020-04-06

## 2020-04-06 ENCOUNTER — OFFICE VISIT (OUTPATIENT)
Dept: INTERNAL MEDICINE | Facility: CLINIC | Age: 60
End: 2020-04-06
Payer: COMMERCIAL

## 2020-04-06 DIAGNOSIS — Z20.822 SUSPECTED SEVERE ACUTE RESPIRATORY SYNDROME CORONAVIRUS 2 (SARS-COV-2) INFECTION: Primary | ICD-10-CM

## 2020-04-06 PROCEDURE — 99213 PR OFFICE/OUTPT VISIT, EST, LEVL III, 20-29 MIN: ICD-10-PCS | Mod: 95,,, | Performed by: FAMILY MEDICINE

## 2020-04-06 PROCEDURE — 99213 OFFICE O/P EST LOW 20 MIN: CPT | Mod: 95,,, | Performed by: FAMILY MEDICINE

## 2020-04-06 NOTE — TELEPHONE ENCOUNTER
----- Message from Chris Fonseca sent at 4/6/2020  9:54 AM CDT -----  Contact: pt   Pt is calling rg wanting to discuss being tested / states the Dr diagnosed her this morning and pt is receiving feedback from her Dr and needing something stating that she was diagnosed with the Covid 19 / carona virus and can be reached at [phone]//[email or web address]

## 2020-04-06 NOTE — TELEPHONE ENCOUNTER
----- Message from Jonna Funk sent at 4/6/2020  1:46 PM CDT -----  Contact: self  Requesting call back regarding pt states its imperative she gets scheduled for covid testing due to her job has to know if she's been diagnosed. Please call back at [phone]

## 2020-04-06 NOTE — PROGRESS NOTES
Subjective:       Patient ID: Miles Bowen is a 59 y.o. female.    Chief Complaint: cough, chills, GI symptoms    58 yo seen last with week GI symptoms; now with cough and chills. Denies dyspnea. Has not taken her temp but does have febrile symptoms. Also with anosmia and poor appetite. Has learned of +known COVID exposure at work and has been going into work but in contact with a very small group of people.     does not have any pertinent problems on file.  Past Medical History:   Diagnosis Date    Anxiety     Depression     GERD (gastroesophageal reflux disease)     Heart murmur     Hypertension     Multinodular goiter 2016    MVP (mitral valve prolapse)     Obesity      Past Surgical History:   Procedure Laterality Date    BACK SURGERY      Bone fusion    COLONOSCOPY      FIXATION KYPHOPLASTY THORACIC SPINE  2016    HYSTERECTOMY      NASAL SINUS SURGERY      Polyps removed    SINUS SURGERY  2014    TONSILLECTOMY       Family History   Problem Relation Age of Onset    Hypertension Mother     Hypertension Father     Heart attack Father 63    Cancer Maternal Grandmother         Breast    Colon cancer Neg Hx     Stomach cancer Neg Hx      Social History     Socioeconomic History    Marital status:      Spouse name: Not on file    Number of children: Not on file    Years of education: Not on file    Highest education level: Not on file   Occupational History    Not on file   Social Needs    Financial resource strain: Not on file    Food insecurity:     Worry: Not on file     Inability: Not on file    Transportation needs:     Medical: Not on file     Non-medical: Not on file   Tobacco Use    Smoking status: Former Smoker     Packs/day: 0.25     Years: 1.00     Pack years: 0.25     Types: Cigarettes     Last attempt to quit: 2006     Years since quittin.2    Smokeless tobacco: Never Used   Substance and Sexual Activity    Alcohol use: Yes      Comment: rarely    Drug use: No    Sexual activity: Not Currently   Lifestyle    Physical activity:     Days per week: Not on file     Minutes per session: Not on file    Stress: Not on file   Relationships    Social connections:     Talks on phone: Not on file     Gets together: Not on file     Attends Worship service: Not on file     Active member of club or organization: Not on file     Attends meetings of clubs or organizations: Not on file     Relationship status: Not on file   Other Topics Concern    Not on file   Social History Narrative     with 1 adult child.      Review of Systems  A comprehensive 14-point review of systems was reviewed with patient and was negative other than as specified above.       Objective:   There were no vitals filed for this visit.     Physical Exam   Constitutional: She is oriented to person, place, and time. She appears well-developed and well-nourished. No distress.   Fatigued appearance   HENT:   Head: Normocephalic and atraumatic.   Eyes: EOM are normal.   Neck: Normal range of motion.   No gross thyromegaly   Pulmonary/Chest: Effort normal. No respiratory distress.   Dry sounding cough noted   Musculoskeletal: Normal range of motion.   Neurological: She is alert and oriented to person, place, and time.   Skin: No rash noted.   Psychiatric: She has a normal mood and affect. Her behavior is normal. Thought content normal.       Assessment:       1. Suspected Severe Acute Respiratory Syndrome Coronavirus 2 (SARS-Cov-2) Infection        Plan:           Problem List Items Addressed This Visit     None      Visit Diagnoses     Suspected Severe Acute Respiratory Syndrome Coronavirus 2 (SARS-Cov-2) Infection    -  Primary      ER precautions given. Return to work note provided (7 days and fever free 72 hours)

## 2020-04-06 NOTE — TELEPHONE ENCOUNTER
----- Message from Venessa Abbott sent at 4/6/2020  8:33 AM CDT -----  Contact: patient  States she is waiting now since 8:AM for visit and no one have called her. PLEASE RESPOND. THANKS

## 2020-04-06 NOTE — TELEPHONE ENCOUNTER
Spoke to pt about getting a test done for covid, she said she had a virtual visit about symptoms and Dr Blankenship stated she suspected she have the symptoms of covid, she would like to be tested for covid because her employee will not let her come back to work unless she is tested, is this something you would recommend her get?

## 2020-04-06 NOTE — TELEPHONE ENCOUNTER
Looks like dr. Blankenship ordered covid testing. Pt needs to follow instructions of Dr. Blankenship.

## 2020-04-06 NOTE — TELEPHONE ENCOUNTER
----- Message from Jannet Blankenship MD sent at 4/6/2020  8:50 AM CDT -----  Will you get her return to work letter to her via [employer] or fax to her employer?

## 2020-04-06 NOTE — PATIENT INSTRUCTIONS
Prevention steps for patients with confirmed or suspected COVID-19       Stay home and stay away from family members and friends. The CDC says, you can leave home after these three things have happened: 1) You have had no fever for at least 72 hours (that is three full days of no fever without the use of medicine that reduces fevers) 2) AND other symptoms have improved (for example, when your cough or shortness of breath have improved) 3) AND at least 7 days have passed since your symptoms first appeared.   Separate yourself from other people and animals in your home.   Call ahead before visiting your doctor.   Wear a facemask.   Cover your coughs and sneezes.   Wash your hands often with soap and water; hand  can be used, too.   Avoid sharing personal household items.   Wipe down surfaces used daily.   Monitor your symptoms. Seek prompt medical attention if your illness is worsening (e.g., difficulty breathing).    Before seeking care, call your healthcare provider.   If you have a medical emergency and need to call 911, notify the dispatch personnel that you have, or are being evaluated for COVID-19. If possible, put on a facemask before emergency medical services arrive.        Recommended precautions for household members, intimate partners, and caregivers in a home setting of a patient with symptomatic laboratory-confirmed COVID-19 or a patient under investigation.  Household members, intimate partners, and caregivers in the home setting awaiting tests results have close contact with a person with symptomatic, laboratory-confirmed COVID-19 or a person under investigation. Close contacts should monitor their health; they should call their provider right away if they develop symptoms suggestive of COVID-19 (e.g., fever, cough, shortness of breath).    Close contacts should also follow these recommendations:   Make sure that you understand and can help the patient follow their provider's  instructions for medication(s) and care. You should help the patient with basic needs in the home and provide support for getting groceries, prescriptions, and other personal needs.   Monitor the patient's symptoms. If the patient is getting sicker, call his or her healthcare provider and tell them that the patient has laboratory-confirmed COVID-19. If the patient has a medical emergency and you need to call 911, notify the dispatch personnel that the patient has, or is being evaluated for COVID-19.   Household members should stay in another room or be  from the patient. Household members should use a separate bedroom and bathroom, if available.   Prohibit visitors.   Household members should care for any pets in the home.   Make sure that shared spaces in the home have good air flow, such as by an air conditioner or an opened window, weather permitting.   Perform hand hygiene frequently. Wash your hands often with soap and water for at least 20 seconds or use an alcohol-based hand  (that contains > 60% alcohol) covering all surfaces of your hands and rubbing them together until they feel dry. Soap and water should be used preferentially.   Avoid touching your eyes, nose, and mouth.   The patient should wear a facemask. If the patient is not able to wear a facemask (for example, because it causes trouble breathing), caregivers should wear a mask when they are in the same room as the patient.   Wear a disposable facemask and gloves when you touch or have contact with the patient's blood, stool, or body fluids, such as saliva, sputum, nasal mucus, vomit, urine.  o Throw out disposable facemasks and gloves after using them. Do not reuse.  o When removing personal protective equipment, first remove and dispose of gloves. Then, immediately clean your hands with soap and water or alcohol-based hand . Next, remove and dispose of facemask, and immediately clean your hands again with soap  and water or alcohol-based hand .   You should not share dishes, drinking glasses, cups, eating utensils, towels, bedding, or other items with the patient. After the patient uses these items, you should wash them thoroughly (see below Wash laundry thoroughly).   Clean all high-touch surfaces, such as counters, tabletops, doorknobs, bathroom fixtures, toilets, phones, keyboards, tablets, and bedside tables, every day. Also, clean any surfaces that may have blood, stool, or body fluids on them.   Use a household cleaning spray or wipe, according to the label instructions. Labels contain instructions for safe and effective use of the cleaning product including precautions you should take when applying the product, such as wearing gloves and making sure you have good ventilation during use of the product.   Wash laundry thoroughly.  o Immediately remove and wash clothes or bedding that have blood, stool, or body fluids on them.  o Wear disposable gloves while handling soiled items and keep soiled items away from your body. Clean your hands (with soap and water or an alcohol-based hand ) immediately after removing your gloves.  o Read and follow directions on labels of laundry or clothing items and detergent. In general, using a normal laundry detergent according to washing machine instructions and dry thoroughly using the warmest temperatures recommended on the clothing label.   Place all used disposable gloves, facemasks, and other contaminated items in a lined container before disposing of them with other household waste. Clean your hands (with soap and water or an alcohol-based hand ) immediately after handling these items. Soap and water should be used preferentially if hands are visibly dirty.   Discuss any additional questions with your state or local health department or healthcare provider. Check available hours when contacting your local health department.    For more  information see CDC link below.      https://www.cdc.gov/coronavirus/2019-ncov/hcp/guidance-prevent-spread.html#precautions        Sources:  [facility], [place] [facility]

## 2020-04-06 NOTE — TELEPHONE ENCOUNTER
----- Message from Chris Fonseca sent at 4/6/2020  9:54 AM CDT -----  Contact: pt   Pt is calling rg wanting to discuss being tested / states the Dr diagnosed her this morning and pt is receiving feedback from her Dr and needing something stating that she was diagnosed with the Covid 19 / carona virus and can be reached at [phone]//[email or web address]

## 2020-04-06 NOTE — TELEPHONE ENCOUNTER
----- Message from Jef Munoz sent at 4/6/2020  8:56 AM CDT -----  Contact: Pt   Pt called in regards to speaking with the staff in regards to recent VS. Pt stated that  diagnosed her with Cov-19 and she has not been to a testing site. Pt stated that her job is having issues with this matter. Pt wanted to know what she should do. Pt can be reached at [phone] ([email or web address]).

## 2020-04-06 NOTE — TELEPHONE ENCOUNTER
I just wrote an additional letter to her employer explaining that she does not meet testing criteria. Please get this to her or fax to her employer.

## 2020-04-06 NOTE — TELEPHONE ENCOUNTER
A work letter has already been generated for her. Please send to patient via [employer] and/or fax to her employer

## 2020-04-07 ENCOUNTER — TELEPHONE (OUTPATIENT)
Dept: INTERNAL MEDICINE | Facility: CLINIC | Age: 60
End: 2020-04-07

## 2020-04-07 ENCOUNTER — PATIENT MESSAGE (OUTPATIENT)
Dept: INTERNAL MEDICINE | Facility: CLINIC | Age: 60
End: 2020-04-07

## 2020-04-14 ENCOUNTER — PATIENT MESSAGE (OUTPATIENT)
Dept: INTERNAL MEDICINE | Facility: CLINIC | Age: 60
End: 2020-04-14

## 2020-04-14 DIAGNOSIS — Z20.822 SUSPECTED SEVERE ACUTE RESPIRATORY SYNDROME CORONAVIRUS 2 (SARS-COV-2) INFECTION: Primary | ICD-10-CM

## 2020-04-14 RX ORDER — PROMETHAZINE HYDROCHLORIDE AND CODEINE PHOSPHATE 6.25; 1 MG/5ML; MG/5ML
5 SOLUTION ORAL EVERY 4 HOURS PRN
Qty: 118 ML | Refills: 0 | Status: SHIPPED | OUTPATIENT
Start: 2020-04-14 | End: 2020-06-04 | Stop reason: SDUPTHER

## 2020-05-06 ENCOUNTER — PATIENT MESSAGE (OUTPATIENT)
Dept: INTERNAL MEDICINE | Facility: CLINIC | Age: 60
End: 2020-05-06

## 2020-05-21 ENCOUNTER — PATIENT MESSAGE (OUTPATIENT)
Dept: INTERNAL MEDICINE | Facility: CLINIC | Age: 60
End: 2020-05-21

## 2020-06-04 ENCOUNTER — TELEPHONE (OUTPATIENT)
Dept: INTERNAL MEDICINE | Facility: CLINIC | Age: 60
End: 2020-06-04

## 2020-06-04 ENCOUNTER — OFFICE VISIT (OUTPATIENT)
Dept: INTERNAL MEDICINE | Facility: CLINIC | Age: 60
End: 2020-06-04
Payer: COMMERCIAL

## 2020-06-04 VITALS
BODY MASS INDEX: 35.44 KG/M2 | TEMPERATURE: 97 F | WEIGHT: 193.81 LBS | HEART RATE: 84 BPM | SYSTOLIC BLOOD PRESSURE: 112 MMHG | DIASTOLIC BLOOD PRESSURE: 80 MMHG | OXYGEN SATURATION: 95 %

## 2020-06-04 DIAGNOSIS — R05.9 COUGH: Primary | ICD-10-CM

## 2020-06-04 DIAGNOSIS — R05.9 COUGH: ICD-10-CM

## 2020-06-04 PROCEDURE — 3079F PR MOST RECENT DIASTOLIC BLOOD PRESSURE 80-89 MM HG: ICD-10-PCS | Mod: CPTII,S$GLB,, | Performed by: INTERNAL MEDICINE

## 2020-06-04 PROCEDURE — 3008F BODY MASS INDEX DOCD: CPT | Mod: CPTII,S$GLB,, | Performed by: INTERNAL MEDICINE

## 2020-06-04 PROCEDURE — 3074F SYST BP LT 130 MM HG: CPT | Mod: CPTII,S$GLB,, | Performed by: INTERNAL MEDICINE

## 2020-06-04 PROCEDURE — 99214 PR OFFICE/OUTPT VISIT, EST, LEVL IV, 30-39 MIN: ICD-10-PCS | Mod: S$GLB,,, | Performed by: INTERNAL MEDICINE

## 2020-06-04 PROCEDURE — 3079F DIAST BP 80-89 MM HG: CPT | Mod: CPTII,S$GLB,, | Performed by: INTERNAL MEDICINE

## 2020-06-04 PROCEDURE — 3008F PR BODY MASS INDEX (BMI) DOCUMENTED: ICD-10-PCS | Mod: CPTII,S$GLB,, | Performed by: INTERNAL MEDICINE

## 2020-06-04 PROCEDURE — 99999 PR PBB SHADOW E&M-EST. PATIENT-LVL III: CPT | Mod: PBBFAC,,, | Performed by: INTERNAL MEDICINE

## 2020-06-04 PROCEDURE — 99999 PR PBB SHADOW E&M-EST. PATIENT-LVL III: ICD-10-PCS | Mod: PBBFAC,,, | Performed by: INTERNAL MEDICINE

## 2020-06-04 PROCEDURE — 3074F PR MOST RECENT SYSTOLIC BLOOD PRESSURE < 130 MM HG: ICD-10-PCS | Mod: CPTII,S$GLB,, | Performed by: INTERNAL MEDICINE

## 2020-06-04 PROCEDURE — 99214 OFFICE O/P EST MOD 30 MIN: CPT | Mod: S$GLB,,, | Performed by: INTERNAL MEDICINE

## 2020-06-04 RX ORDER — PROMETHAZINE HYDROCHLORIDE AND CODEINE PHOSPHATE 6.25; 1 MG/5ML; MG/5ML
5 SOLUTION ORAL EVERY 4 HOURS PRN
Qty: 118 ML | Refills: 0 | Status: SHIPPED | OUTPATIENT
Start: 2020-06-04 | End: 2020-07-30 | Stop reason: SDUPTHER

## 2020-06-04 NOTE — PROGRESS NOTES
Subjective:      Patient ID: Miles Bowen is a 59 y.o. female.    Chief Complaint: Cough and Fatigue  58 yo with   Patient Active Problem List   Diagnosis    Hypertension    Depression    Anxiety    Hematemesis without nausea    GERD (gastroesophageal reflux disease)    Hiatal hernia    DDD (degenerative disc disease), lumbar    Common migraine    Allergic rhinitis    IBS (irritable bowel syndrome)    Multinodular goiter    MVP (mitral valve prolapse)    Nasal polyps    Obesity    Suspected Severe Acute Respiratory Syndrome Coronavirus 2 (SARS-Cov-2) Infection     Past Medical History:   Diagnosis Date    Anxiety     Depression     GERD (gastroesophageal reflux disease)     Heart murmur     Hypertension     Multinodular goiter 12/23/2016    MVP (mitral valve prolapse)     Obesity      Here today c/o cough  Cough   This is a new problem. The current episode started 1 to 4 weeks ago. The problem has been gradually worsening. The problem occurs every few minutes. The cough is non-productive (tickling in throught). Associated symptoms include heartburn, postnasal drip and rhinorrhea. Pertinent negatives include no chest pain, chills, ear congestion, ear pain, fever, hemoptysis, nasal congestion, rash, sore throat, shortness of breath, sweats, weight loss or wheezing. Associated symptoms comments: Cough keeping her awake. . The symptoms are aggravated by lying down. She has tried OTC cough suppressant (cough meds) for the symptoms. The treatment provided no relief. Her past medical history is significant for environmental allergies. There is no history of asthma, bronchiectasis, bronchitis, COPD, emphysema or pneumonia.     Review of Systems   Constitutional: Negative for chills, fever and weight loss.   HENT: Positive for postnasal drip and rhinorrhea. Negative for ear pain and sore throat.    Respiratory: Positive for cough. Negative for hemoptysis, chest tightness, shortness of breath and  wheezing.    Cardiovascular: Negative for chest pain and palpitations.   Gastrointestinal: Positive for heartburn.   Skin: Negative for rash.   Allergic/Immunologic: Positive for environmental allergies.     Objective:   /80 (BP Location: Left arm, Patient Position: Sitting, BP Method: Large (Manual))   Pulse 84   Temp 97 °F (36.1 °C) (Tympanic)   Wt 87.9 kg (193 lb 12.6 oz)   SpO2 95%   BMI 35.44 kg/m²     Physical Exam   Constitutional: She appears well-developed and well-nourished. No distress.   HENT:   Head: Normocephalic and atraumatic.   Right Ear: Tympanic membrane normal.   Left Ear: Tympanic membrane normal.   Nose: Right sinus exhibits no maxillary sinus tenderness and no frontal sinus tenderness. Left sinus exhibits no maxillary sinus tenderness and no frontal sinus tenderness.   Mouth/Throat: No oropharyngeal exudate, posterior oropharyngeal edema or posterior oropharyngeal erythema.   Eyes: Conjunctivae and EOM are normal.   Neck: Normal range of motion.   Cardiovascular: Normal rate.   Pulmonary/Chest: Effort normal and breath sounds normal. No stridor. No respiratory distress. She has no wheezes. She has no rales.   Neurological: She is alert.   Skin: Skin is warm and dry.   Psychiatric: She has a normal mood and affect. Her behavior is normal.       Assessment:     1. Cough      Plan:   Cough  Bronchitis  Allergic to mult meds  COVID-19 Routine Screening; Future; Expected date: 06/04/2020  -     promethazine-codeine 6.25-10 mg/5 ml (PHENERGAN WITH CODEINE) 6.25-10 mg/5 mL syrup; Take 5 mLs by mouth every 4 (four) hours as needed.  Dispense: 118 mL; Refill: 0        Lab Frequency Next Occurrence   CT Sinuses without Contrast Once 09/05/2019   Ambulatory referral/consult to Endocrinology Once 10/25/2019    Soft Tissue Head Neck Thyroid Once 10/25/2019   Ambulatory referral/consult to Orthopedics Once 10/25/2019   Basic metabolic panel Once 12/06/2019       Problem List Items Addressed  This Visit        Other    Suspected Severe Acute Respiratory Syndrome Coronavirus 2 (SARS-Cov-2) Infection    Relevant Medications    promethazine-codeine 6.25-10 mg/5 ml (PHENERGAN WITH CODEINE) 6.25-10 mg/5 mL syrup          Follow up if symptoms worsen or fail to improve.

## 2020-06-05 ENCOUNTER — TELEPHONE (OUTPATIENT)
Dept: INTERNAL MEDICINE | Facility: CLINIC | Age: 60
End: 2020-06-05

## 2020-06-05 NOTE — TELEPHONE ENCOUNTER
----- Message from Venessa Abbott sent at 6/5/2020  1:39 PM CDT -----  Contact: PATIENT  CALLING CONCERNING HER APPOINTMENT AND WANT TO SPEAK WITH NURSE FOR IT. PLEASE CALL PATIENT @ [phone]. THANKS

## 2020-06-24 ENCOUNTER — OFFICE VISIT (OUTPATIENT)
Dept: INTERNAL MEDICINE | Facility: CLINIC | Age: 60
End: 2020-06-24
Payer: COMMERCIAL

## 2020-06-24 VITALS
HEIGHT: 62 IN | BODY MASS INDEX: 34.93 KG/M2 | DIASTOLIC BLOOD PRESSURE: 78 MMHG | SYSTOLIC BLOOD PRESSURE: 127 MMHG | WEIGHT: 189.81 LBS | TEMPERATURE: 96 F | OXYGEN SATURATION: 98 % | HEART RATE: 114 BPM

## 2020-06-24 DIAGNOSIS — M54.81 OCCIPITAL NEURALGIA OF RIGHT SIDE: Primary | ICD-10-CM

## 2020-06-24 DIAGNOSIS — R11.0 NAUSEA: ICD-10-CM

## 2020-06-24 PROCEDURE — 3074F PR MOST RECENT SYSTOLIC BLOOD PRESSURE < 130 MM HG: ICD-10-PCS | Mod: CPTII,S$GLB,, | Performed by: INTERNAL MEDICINE

## 2020-06-24 PROCEDURE — 3078F PR MOST RECENT DIASTOLIC BLOOD PRESSURE < 80 MM HG: ICD-10-PCS | Mod: CPTII,S$GLB,, | Performed by: INTERNAL MEDICINE

## 2020-06-24 PROCEDURE — 3074F SYST BP LT 130 MM HG: CPT | Mod: CPTII,S$GLB,, | Performed by: INTERNAL MEDICINE

## 2020-06-24 PROCEDURE — 99999 PR PBB SHADOW E&M-EST. PATIENT-LVL III: ICD-10-PCS | Mod: PBBFAC,,, | Performed by: INTERNAL MEDICINE

## 2020-06-24 PROCEDURE — 3008F BODY MASS INDEX DOCD: CPT | Mod: CPTII,S$GLB,, | Performed by: INTERNAL MEDICINE

## 2020-06-24 PROCEDURE — 3078F DIAST BP <80 MM HG: CPT | Mod: CPTII,S$GLB,, | Performed by: INTERNAL MEDICINE

## 2020-06-24 PROCEDURE — 99214 PR OFFICE/OUTPT VISIT, EST, LEVL IV, 30-39 MIN: ICD-10-PCS | Mod: S$GLB,,, | Performed by: INTERNAL MEDICINE

## 2020-06-24 PROCEDURE — 99214 OFFICE O/P EST MOD 30 MIN: CPT | Mod: S$GLB,,, | Performed by: INTERNAL MEDICINE

## 2020-06-24 PROCEDURE — 3008F PR BODY MASS INDEX (BMI) DOCUMENTED: ICD-10-PCS | Mod: CPTII,S$GLB,, | Performed by: INTERNAL MEDICINE

## 2020-06-24 PROCEDURE — 99999 PR PBB SHADOW E&M-EST. PATIENT-LVL III: CPT | Mod: PBBFAC,,, | Performed by: INTERNAL MEDICINE

## 2020-06-24 RX ORDER — BACLOFEN 10 MG/1
10 TABLET ORAL 2 TIMES DAILY
Qty: 30 TABLET | Refills: 0 | Status: SHIPPED | OUTPATIENT
Start: 2020-06-24 | End: 2020-11-04

## 2020-06-24 RX ORDER — PROMETHAZINE HYDROCHLORIDE 25 MG/1
25 TABLET ORAL EVERY 8 HOURS PRN
Qty: 30 TABLET | Refills: 0 | Status: SHIPPED | OUTPATIENT
Start: 2020-06-24 | End: 2020-11-04

## 2020-06-24 NOTE — PROGRESS NOTES
"Subjective:      Patient ID: Miles Bowen is a 59 y.o. female.    Chief Complaint: Headache (Migranes)    HPI     60 yo with   Patient Active Problem List   Diagnosis    Hypertension    Depression    Anxiety    Hematemesis without nausea    GERD (gastroesophageal reflux disease)    Hiatal hernia    DDD (degenerative disc disease), lumbar    Common migraine    Allergic rhinitis    IBS (irritable bowel syndrome)    Multinodular goiter    MVP (mitral valve prolapse)    Nasal polyps    Obesity    Suspected Severe Acute Respiratory Syndrome Coronavirus 2 (SARS-Cov-2) Infection     Past Medical History:   Diagnosis Date    Anxiety     Depression     GERD (gastroesophageal reflux disease)     Heart murmur     Hypertension     Multinodular goiter 12/23/2016    MVP (mitral valve prolapse)     Obesity      Here today reporting history of occipital neuralgia. First episode was 3.5 years ago.  Resolved with valium in the past. Has not had one in about 2 to 3 years. Current episode started 3 days ago. Valium did not help.   Pain radiates from right occipital area radiating to right parietal region. Vision becomes tunnel and blurred. Diplopia with further objects. Symptoms constant waxing and waning. No trauma. Pain is stabbing and funny feeling.     Review of Systems   Constitutional: Negative for chills, fatigue and fever.   Respiratory: Negative for cough, shortness of breath and wheezing.    Cardiovascular: Negative for chest pain and palpitations.   Gastrointestinal: Negative for abdominal pain.   Skin: Negative for rash and wound.     Objective:   /78 (BP Location: Left arm, Patient Position: Sitting, BP Method: Medium (Manual))   Pulse (!) 114   Temp (!) 95.6 °F (35.3 °C) (Tympanic)   Ht 5' 2" (1.575 m)   Wt 86.1 kg (189 lb 13.1 oz)   SpO2 98%   BMI 34.72 kg/m²     Physical Exam  Constitutional:       General: She is not in acute distress.     Appearance: She is well-developed. "   HENT:      Head: Normocephalic and atraumatic.      Right Ear: External ear normal.      Left Ear: External ear normal.   Eyes:      Pupils: Pupils are equal, round, and reactive to light.   Neck:      Musculoskeletal: Neck supple.      Thyroid: No thyromegaly.   Cardiovascular:      Rate and Rhythm: Normal rate and regular rhythm.   Pulmonary:      Breath sounds: Normal breath sounds. No wheezing or rales.   Abdominal:      General: Bowel sounds are normal.      Palpations: Abdomen is soft.      Tenderness: There is no abdominal tenderness.   Lymphadenopathy:      Cervical: No cervical adenopathy.   Skin:     General: Skin is warm and dry.   Neurological:      General: No focal deficit present.      Mental Status: She is alert and oriented to person, place, and time.      Cranial Nerves: No cranial nerve deficit.      Sensory: No sensory deficit.      Motor: No weakness.      Coordination: Coordination normal.      Gait: Gait normal.      Deep Tendon Reflexes: Reflexes normal.   Psychiatric:         Mood and Affect: Mood normal.         Behavior: Behavior normal.         Thought Content: Thought content normal.         Judgment: Judgment normal.         Assessment:     1. Occipital neuralgia of right side    2. Nausea      Plan:   Occipital neuralgia of right side  try  -     baclofen (LIORESAL) 10 MG tablet; Take 1 tablet (10 mg total) by mouth 2 (two) times daily. Prn muscle spasm/ occipital headache  Dispense: 30 tablet; Refill: 0  -     Ambulatory referral/consult to Neurology; Future; Expected date: 07/01/2020    Nausea  -     promethazine (PHENERGAN) 25 MG tablet; Take 1 tablet (25 mg total) by mouth every 8 (eight) hours as needed for Nausea.  Dispense: 30 tablet; Refill: 0        Lab Frequency Next Occurrence   CT Sinuses without Contrast Once 09/05/2019   Ambulatory referral/consult to Endocrinology Once 10/25/2019    Soft Tissue Head Neck Thyroid Once 10/25/2019   Ambulatory referral/consult to  Orthopedics Once 10/25/2019   Basic metabolic panel Once 12/06/2019   COVID-19 Routine Screening Once 06/04/2020       Problem List Items Addressed This Visit     None      Visit Diagnoses     Occipital neuralgia of right side    -  Primary    Relevant Medications    baclofen (LIORESAL) 10 MG tablet    Other Relevant Orders    Ambulatory referral/consult to Neurology    Nausea        Relevant Medications    promethazine (PHENERGAN) 25 MG tablet          Follow up if symptoms worsen or fail to improve.

## 2020-07-24 ENCOUNTER — TELEPHONE (OUTPATIENT)
Dept: INTERNAL MEDICINE | Facility: CLINIC | Age: 60
End: 2020-07-24

## 2020-07-24 NOTE — TELEPHONE ENCOUNTER
----- Message from Amanda Carter sent at 7/24/2020  8:54 AM CDT -----  Type:  Sooner Apoointment Request    Caller is requesting a sooner appointment.  Caller declined first available appointment listed below.  Caller will not accept being placed on the waitlist and is requesting a message be sent to doctor.  Name of Caller:Miles  When is the first available appointment?07/27  Symptoms:Cough  Would the patient rather a call back or a response via [employer]? call  Best Call Back Number:[phone]    Additional Information: If the pt can not be seen today she would like an prescription to be called in for her cough

## 2020-07-24 NOTE — TELEPHONE ENCOUNTER
Pt was contacted and informed that I would need to change her appt to a virtual until COVID results are back she declined and asked to cancel appt. I did . //kah

## 2020-07-24 NOTE — TELEPHONE ENCOUNTER
Pt stated she is having a cough. I advised her to schedule a virtual visit, or go to urgent care. She verbalized understanding.

## 2020-07-30 ENCOUNTER — TELEPHONE (OUTPATIENT)
Dept: INTERNAL MEDICINE | Facility: CLINIC | Age: 60
End: 2020-07-30

## 2020-07-30 ENCOUNTER — OFFICE VISIT (OUTPATIENT)
Dept: INTERNAL MEDICINE | Facility: CLINIC | Age: 60
End: 2020-07-30
Payer: COMMERCIAL

## 2020-07-30 DIAGNOSIS — R05.9 COUGH: ICD-10-CM

## 2020-07-30 DIAGNOSIS — J32.9 SINUSITIS, UNSPECIFIED CHRONICITY, UNSPECIFIED LOCATION: Primary | ICD-10-CM

## 2020-07-30 PROBLEM — Z20.822 SUSPECTED SEVERE ACUTE RESPIRATORY SYNDROME CORONAVIRUS 2 (SARS-COV-2) INFECTION: Status: RESOLVED | Noted: 2020-04-06 | Resolved: 2020-07-30

## 2020-07-30 PROCEDURE — 99214 OFFICE O/P EST MOD 30 MIN: CPT | Mod: 95,,, | Performed by: INTERNAL MEDICINE

## 2020-07-30 PROCEDURE — 99214 PR OFFICE/OUTPT VISIT, EST, LEVL IV, 30-39 MIN: ICD-10-PCS | Mod: 95,,, | Performed by: INTERNAL MEDICINE

## 2020-07-30 RX ORDER — AMOXICILLIN AND CLAVULANATE POTASSIUM 875; 125 MG/1; MG/1
1 TABLET, FILM COATED ORAL 2 TIMES DAILY
Qty: 20 TABLET | Refills: 0 | Status: SHIPPED | OUTPATIENT
Start: 2020-07-30 | End: 2021-07-12 | Stop reason: SDUPTHER

## 2020-07-30 RX ORDER — PROMETHAZINE HYDROCHLORIDE AND CODEINE PHOSPHATE 6.25; 1 MG/5ML; MG/5ML
5 SOLUTION ORAL EVERY 4 HOURS PRN
Qty: 118 ML | Refills: 0 | Status: SHIPPED | OUTPATIENT
Start: 2020-07-30 | End: 2021-02-01 | Stop reason: SDUPTHER

## 2020-07-30 NOTE — TELEPHONE ENCOUNTER
----- Message from Santos Casey sent at 7/30/2020  4:46 PM CDT -----  Regarding: medication  Contact: Annie  Type:  Pharmacy Calling to Clarify an RX    Name of Caller:Annie  Pharmacy Name:  [id number] - [place] - [address]  [address]  [place] [address]  Phone: [phone] Fax: [phone]  Prescription Name:promethazine-codeine 6.25-10 mg/5 ml (PHENERGAN WITH CODEINE) 6.25-10 mg/5 mL syrup  What do they need to clarify?:pharmacy states medication will have an reaction to the other meds pt is currently taking   Best Call Back Number:[phone] option 4  Additional Information: pt is coming back in today to  medication

## 2020-07-30 NOTE — PROGRESS NOTES
Subjective:      Patient ID: Miles Bowen is a 59 y.o. female.    Chief Complaint: Cough      The patient location is: work  The chief complaint leading to consultation is: sinus and cough    Visit type: audiovisual    Face to Face time with patient:   20  minutes of total time spent on the encounter, which includes face to face time and non-face to face time preparing to see the patient (eg, review of tests), Obtaining and/or reviewing separately obtained history, Documenting clinical information in the electronic or other health record, Independently interpreting results (not separately reported) and communicating results to the patient/family/caregiver, or Care coordination (not separately reported).         Each patient to whom he or she provides medical services by telemedicine is:  (1) informed of the relationship between the physician and patient and the respective role of any other health care provider with respect to management of the patient; and (2) notified that he or she may decline to receive medical services by telemedicine and may withdraw from such care at any time.    Notes:     Elevated temp 99 over past 2 days. Right sided facial pain. Right sided yellow d/c.  No significant chest congestion.  Cough is worse at night and feels like it is coming from postnasal drip.  No loss of taste or smell.      Cough  This is a recurrent problem. The current episode started in the past 7 days. The problem has been waxing and waning. Episode frequency: mostly at night. The cough is non-productive. Associated symptoms include chills, headaches, nasal congestion, postnasal drip and rhinorrhea. Pertinent negatives include no chest pain, ear congestion, ear pain, fever, heartburn, hemoptysis, myalgias, rash, sore throat, shortness of breath, sweats, weight loss or wheezing. The symptoms are aggravated by lying down. Treatments tried: saline nasal spray. There is no history of asthma, bronchiectasis,  bronchitis, COPD, emphysema, environmental allergies or pneumonia.   Sinus Problem  This is a new problem. The current episode started 1 to 4 weeks ago. The problem has been gradually worsening since onset. Maximum temperature: temp 99.? The pain is moderate. Associated symptoms include chills, congestion (face, nose, throat), coughing, headaches, sinus pressure and sneezing. Pertinent negatives include no diaphoresis, ear pain, shortness of breath or sore throat. Treatments tried: saline nasal spray. The treatment provided mild relief.     Review of Systems   Constitutional: Positive for chills. Negative for diaphoresis, fever and weight loss.   HENT: Positive for congestion (face, nose, throat), postnasal drip, rhinorrhea, sinus pressure, sinus pain (r>l) and sneezing. Negative for ear discharge, ear pain, nosebleeds, sore throat and trouble swallowing.    Eyes: Negative for visual disturbance.   Respiratory: Positive for cough. Negative for hemoptysis, shortness of breath and wheezing.    Cardiovascular: Negative for chest pain.   Gastrointestinal: Negative for abdominal pain and heartburn.   Musculoskeletal: Negative for myalgias.   Skin: Negative for rash.   Allergic/Immunologic: Negative for environmental allergies.   Neurological: Positive for headaches.     Objective:   There were no vitals taken for this visit.    Physical Exam  Constitutional:       General: She is not in acute distress.     Appearance: Normal appearance. She is not ill-appearing.   HENT:      Nose:      Right Sinus: Maxillary sinus tenderness present.      Left Sinus: Maxillary sinus tenderness present.   Eyes:      Conjunctiva/sclera: Conjunctivae normal.   Neck:      Musculoskeletal: Normal range of motion.   Pulmonary:      Effort: Pulmonary effort is normal.   Neurological:      Mental Status: She is alert and oriented to person, place, and time.   Psychiatric:         Mood and Affect: Mood normal.         Behavior: Behavior normal.          Assessment:     1. Sinusitis, unspecified chronicity, unspecified location    2. Cough      Plan:   Sinusitis, unspecified chronicity, unspecified location  -     amoxicillin-clavulanate 875-125mg (AUGMENTIN) 875-125 mg per tablet; Take 1 tablet by mouth 2 (two) times daily. for 10 days  Dispense: 20 tablet; Refill: 0    Cough  -     promethazine-codeine 6.25-10 mg/5 ml (PHENERGAN WITH CODEINE) 6.25-10 mg/5 mL syrup; Take 5 mLs by mouth every 4 (four) hours as needed.  Dispense: 118 mL; Refill: 0        Lab Frequency Next Occurrence   CT Sinuses without Contrast Once 09/05/2019   Ambulatory referral/consult to Endocrinology Once 10/25/2019    Soft Tissue Head Neck Thyroid Once 10/25/2019   Ambulatory referral/consult to Orthopedics Once 10/25/2019   Basic metabolic panel Once 12/06/2019   COVID-19 Routine Screening Once 06/04/2020   Ambulatory referral/consult to Neurology Once 07/01/2020       Problem List Items Addressed This Visit     None      Visit Diagnoses     Sinusitis, unspecified chronicity, unspecified location    -  Primary    Relevant Medications    amoxicillin-clavulanate 875-125mg (AUGMENTIN) 875-125 mg per tablet    Cough        Relevant Medications    promethazine-codeine 6.25-10 mg/5 ml (PHENERGAN WITH CODEINE) 6.25-10 mg/5 mL syrup          No follow-ups on file.

## 2020-07-30 NOTE — TELEPHONE ENCOUNTER
I returned call to Annie with [facility] in regards to promethazine-codeine having reaction to other medication pt takes. I informed her that Dr. Paul stated that it would be okay for her to take. //BJ

## 2020-08-07 ENCOUNTER — LAB VISIT (OUTPATIENT)
Dept: LAB | Facility: HOSPITAL | Age: 60
End: 2020-08-07
Payer: COMMERCIAL

## 2020-08-07 ENCOUNTER — OFFICE VISIT (OUTPATIENT)
Dept: INTERNAL MEDICINE | Facility: CLINIC | Age: 60
End: 2020-08-07
Payer: COMMERCIAL

## 2020-08-07 VITALS
HEIGHT: 62 IN | DIASTOLIC BLOOD PRESSURE: 70 MMHG | BODY MASS INDEX: 35.3 KG/M2 | SYSTOLIC BLOOD PRESSURE: 125 MMHG | OXYGEN SATURATION: 95 % | TEMPERATURE: 97 F | HEART RATE: 115 BPM | WEIGHT: 191.81 LBS

## 2020-08-07 DIAGNOSIS — R35.0 URINARY FREQUENCY: Primary | ICD-10-CM

## 2020-08-07 DIAGNOSIS — R35.0 URINARY FREQUENCY: ICD-10-CM

## 2020-08-07 LAB
BILIRUB UR QL STRIP: NEGATIVE
CLARITY UR: CLEAR
COLOR UR: ABNORMAL
GLUCOSE UR QL STRIP: NEGATIVE
HGB UR QL STRIP: ABNORMAL
KETONES UR QL STRIP: NEGATIVE
LEUKOCYTE ESTERASE UR QL STRIP: NEGATIVE
NITRITE UR QL STRIP: NEGATIVE
PH UR STRIP: 6 [PH] (ref 5–8)
PROT UR QL STRIP: NEGATIVE
SP GR UR STRIP: <=1.005 (ref 1–1.03)
URN SPEC COLLECT METH UR: ABNORMAL

## 2020-08-07 PROCEDURE — 3074F PR MOST RECENT SYSTOLIC BLOOD PRESSURE < 130 MM HG: ICD-10-PCS | Mod: CPTII,S$GLB,, | Performed by: PHYSICIAN ASSISTANT

## 2020-08-07 PROCEDURE — 3008F BODY MASS INDEX DOCD: CPT | Mod: CPTII,S$GLB,, | Performed by: PHYSICIAN ASSISTANT

## 2020-08-07 PROCEDURE — 99214 OFFICE O/P EST MOD 30 MIN: CPT | Mod: S$GLB,,, | Performed by: PHYSICIAN ASSISTANT

## 2020-08-07 PROCEDURE — 3078F DIAST BP <80 MM HG: CPT | Mod: CPTII,S$GLB,, | Performed by: PHYSICIAN ASSISTANT

## 2020-08-07 PROCEDURE — 99214 PR OFFICE/OUTPT VISIT, EST, LEVL IV, 30-39 MIN: ICD-10-PCS | Mod: S$GLB,,, | Performed by: PHYSICIAN ASSISTANT

## 2020-08-07 PROCEDURE — 3078F PR MOST RECENT DIASTOLIC BLOOD PRESSURE < 80 MM HG: ICD-10-PCS | Mod: CPTII,S$GLB,, | Performed by: PHYSICIAN ASSISTANT

## 2020-08-07 PROCEDURE — 3008F PR BODY MASS INDEX (BMI) DOCUMENTED: ICD-10-PCS | Mod: CPTII,S$GLB,, | Performed by: PHYSICIAN ASSISTANT

## 2020-08-07 PROCEDURE — 99999 PR PBB SHADOW E&M-EST. PATIENT-LVL IV: ICD-10-PCS | Mod: PBBFAC,,, | Performed by: PHYSICIAN ASSISTANT

## 2020-08-07 PROCEDURE — 99999 PR PBB SHADOW E&M-EST. PATIENT-LVL IV: CPT | Mod: PBBFAC,,, | Performed by: PHYSICIAN ASSISTANT

## 2020-08-07 PROCEDURE — 3074F SYST BP LT 130 MM HG: CPT | Mod: CPTII,S$GLB,, | Performed by: PHYSICIAN ASSISTANT

## 2020-08-07 PROCEDURE — 81003 URINALYSIS AUTO W/O SCOPE: CPT

## 2020-08-07 RX ORDER — NITROFURANTOIN 25; 75 MG/1; MG/1
100 CAPSULE ORAL 2 TIMES DAILY
Qty: 14 CAPSULE | Refills: 0 | Status: SHIPPED | OUTPATIENT
Start: 2020-08-07 | End: 2020-08-14

## 2020-08-07 NOTE — PROGRESS NOTES
Subjective:      Patient ID: Miles Bowen is a 59 y.o. female.    Chief Complaint: Urinary Frequency    Urinary Frequency   This is a new problem. The current episode started yesterday. The problem has been gradually worsening. The quality of the pain is described as shooting. There has been no fever. She is not sexually active. There is no history of pyelonephritis. Associated symptoms include frequency and urgency. Pertinent negatives include no behavior changes, chills, discharge, flank pain, hematuria, hesitancy, nausea, possible pregnancy, sweats, vomiting, weight loss, bubble bath use, constipation, rash or withholding. She has tried nothing for the symptoms. There is no history of catheterization, diabetes insipidus, diabetes mellitus, genitourinary reflux, hypertension, kidney stones, recurrent UTIs, a single kidney, STD, urinary stasis or a urological procedure.   Denies any diabetes. Prior labs, glucose has been within normal limits.    Patient Active Problem List   Diagnosis    Hypertension    Depression    Anxiety    Hematemesis without nausea    GERD (gastroesophageal reflux disease)    Hiatal hernia    DDD (degenerative disc disease), lumbar    Common migraine    Allergic rhinitis    IBS (irritable bowel syndrome)    Multinodular goiter    MVP (mitral valve prolapse)    Nasal polyps    Obesity       Review of Systems   Constitutional: Positive for fatigue. Negative for activity change, appetite change, chills, diaphoresis, fever, unexpected weight change and weight loss.   HENT: Negative.    Eyes: Negative.    Respiratory: Negative for cough, chest tightness and shortness of breath.    Cardiovascular: Negative for chest pain, palpitations and leg swelling.   Gastrointestinal: Positive for abdominal pain. Negative for abdominal distention, anal bleeding, blood in stool, constipation, nausea, rectal pain and vomiting.   Endocrine: Positive for polydipsia. Negative for cold  "intolerance, heat intolerance, polyphagia and polyuria.   Genitourinary: Positive for frequency, pelvic pain and urgency. Negative for decreased urine volume, difficulty urinating, dyspareunia, dysuria, enuresis, flank pain, genital sores, hematuria, hesitancy, menstrual problem, vaginal bleeding, vaginal discharge and vaginal pain.   Musculoskeletal: Negative for back pain.   Skin: Negative for color change, pallor, rash and wound.   Neurological: Negative for dizziness and weakness.     Objective:   /70   Pulse (!) 115   Temp 97 °F (36.1 °C)   Ht 5' 2" (1.575 m)   Wt 87 kg (191 lb 12.8 oz)   SpO2 95%   BMI 35.08 kg/m²     Physical Exam  Vitals signs reviewed.   Constitutional:       General: She is not in acute distress.     Appearance: She is well-developed. She is not ill-appearing, toxic-appearing or diaphoretic.   HENT:      Head: Normocephalic and atraumatic.      Right Ear: External ear normal.      Left Ear: External ear normal.      Nose: Nose normal.   Eyes:      Conjunctiva/sclera: Conjunctivae normal.      Pupils: Pupils are equal, round, and reactive to light.   Neck:      Musculoskeletal: Normal range of motion.   Cardiovascular:      Rate and Rhythm: Normal rate and regular rhythm.      Heart sounds: Normal heart sounds. No murmur. No friction rub. No gallop.    Pulmonary:      Effort: Pulmonary effort is normal. No respiratory distress.      Breath sounds: Normal breath sounds. No wheezing or rales.   Chest:      Chest wall: No tenderness.   Abdominal:      General: Bowel sounds are normal. There is no distension.      Palpations: Abdomen is soft. Abdomen is not rigid. There is no fluid wave, hepatomegaly, splenomegaly, mass or pulsatile mass.      Tenderness: There is abdominal tenderness in the suprapubic area. There is no guarding or rebound.   Musculoskeletal: Normal range of motion.   Skin:     General: Skin is warm.      Findings: No rash.   Neurological:      Mental Status: She is " alert and oriented to person, place, and time.   Psychiatric:         Behavior: Behavior normal.         Thought Content: Thought content normal.         Judgment: Judgment normal.         Assessment:     1. Urinary frequency      Plan:   Urinary frequency  -     Urinalysis; Future; Expected date: 08/07/2020  -     Urine culture; Future  -     nitrofurantoin, macrocrystal-monohydrate, (MACROBID) 100 MG capsule; Take 1 capsule (100 mg total) by mouth 2 (two) times daily. for 7 days  Dispense: 14 capsule; Refill: 0    -increase fluids    Follow up if symptoms worsen or fail to improve.         year(s)

## 2020-08-10 ENCOUNTER — TELEPHONE (OUTPATIENT)
Dept: INTERNAL MEDICINE | Facility: CLINIC | Age: 60
End: 2020-08-10

## 2020-08-10 NOTE — TELEPHONE ENCOUNTER
----- Message from Jorge Templeton sent at 8/10/2020  2:08 PM CDT -----  Regarding: Patient  The patient would like to consult with nurse regarding her previous appt on 08/07/2020. She stated that she is still having lower back pain and frequent urination. Please call back at [phone] (home)

## 2020-08-12 ENCOUNTER — OFFICE VISIT (OUTPATIENT)
Dept: INTERNAL MEDICINE | Facility: CLINIC | Age: 60
End: 2020-08-12
Payer: COMMERCIAL

## 2020-08-12 ENCOUNTER — TELEPHONE (OUTPATIENT)
Dept: INTERNAL MEDICINE | Facility: CLINIC | Age: 60
End: 2020-08-12

## 2020-08-12 DIAGNOSIS — R10.9 RIGHT FLANK PAIN: ICD-10-CM

## 2020-08-12 DIAGNOSIS — R50.9 FEVER, UNSPECIFIED FEVER CAUSE: ICD-10-CM

## 2020-08-12 DIAGNOSIS — R10.31 RLQ ABDOMINAL PAIN: Primary | ICD-10-CM

## 2020-08-12 PROCEDURE — 99213 OFFICE O/P EST LOW 20 MIN: CPT | Mod: 95,,, | Performed by: NURSE PRACTITIONER

## 2020-08-12 PROCEDURE — 99213 PR OFFICE/OUTPT VISIT, EST, LEVL III, 20-29 MIN: ICD-10-PCS | Mod: 95,,, | Performed by: NURSE PRACTITIONER

## 2020-08-12 NOTE — TELEPHONE ENCOUNTER
----- Message from Nidia Urbano sent at 8/12/2020  9:37 AM CDT -----  Type:  Patient Returning Call    Who Called:Patient  Who Left Message for Patient:Ellen  Does the patient know what this is regarding?:na  Would the patient rather a call back or a response via [facility]? Call back  Best Call Back Number:[phone]  Additional Information: na

## 2020-08-12 NOTE — TELEPHONE ENCOUNTER
----- Message from Zeferino Mccallum sent at 8/12/2020  8:46 AM CDT -----  Regarding: Pt advice  Type:  Needs Medical Advice    Who Called: Pt   Symptoms (please be specific):  UTI infection getting worse causing RT side &lower back pain, frequent urination, and can barely stand up to walk    How long has patient had these symptoms:  last few days   Pharmacy name and phone #:   [id number] - [place] - [address]  [address]  [place] [address]  Phone: [phone] Fax: [phone]  Would the patient rather a call back or a response via [facility]? Call back   Best Call Back Number: [phone] (home)   Additional Information: She states that she is at work and is feeling a low grade fever & weakness, please advise.

## 2020-08-12 NOTE — TELEPHONE ENCOUNTER
Pt called stating her UTI is getting worse, she is having side and back pain and a rising fever. I scheduled her a virtual visit with an available provider.

## 2020-08-12 NOTE — PROGRESS NOTES
TELEMEDICINE VIRTUAL VISIT      Visit Details: This visit was a telemedicine virtual visit with synchronous audio and video. Miles reported that her location at the time of this visit was in the state [facility]. Miles had the choice to come into office to receive these medical services. Miles chose and consented to receive these medical services by telemedicine.  Subjective:       Patient ID: Miles Bowen is a 59 y.o. female.    Chief Complaint:     HPI    Pt seen 5 days ago with c/o of UTI symptoms- started on macrobid, but urine only shows trace of blood and no WBC as well as culture did not grow bacteria  Pt today with complaints of RLQ  Pain and flank pain that is getting worse as well as temp in 100s and frequent urination.  She was wondering if maybe UTI is worse now.   No n/v, pain is moderate to severe.   Hx of hysterectomy no other abd surgery        Review of Systems   Constitutional: Positive for fever. Negative for activity change, appetite change, chills, diaphoresis, fatigue and unexpected weight change.   HENT: Negative for congestion, ear pain, postnasal drip, rhinorrhea, sinus pressure, sinus pain, sneezing, sore throat, tinnitus, trouble swallowing and voice change.    Eyes: Negative for photophobia, pain and visual disturbance.   Respiratory: Negative for cough, chest tightness, shortness of breath and wheezing.    Cardiovascular: Negative for chest pain, palpitations and leg swelling.   Gastrointestinal: Positive for abdominal pain. Negative for abdominal distention, constipation, diarrhea, nausea and vomiting.   Genitourinary: Positive for flank pain, frequency and urgency. Negative for decreased urine volume, difficulty urinating, dysuria and hematuria.   Musculoskeletal: Positive for myalgias. Negative for arthralgias, back pain, joint swelling, neck pain and neck stiffness.   Allergic/Immunologic: Negative for immunocompromised state.   Neurological: Negative for dizziness, tremors,  seizures, syncope, facial asymmetry, speech difficulty, weakness, light-headedness, numbness and headaches.   Hematological: Negative for adenopathy. Does not bruise/bleed easily.   Psychiatric/Behavioral: Negative for confusion and sleep disturbance.       Objective:      Physical Exam        CONSTITUTIONAL: No apparent distress. Does not appear acutely ill or septic. Appears adequately hydrated.  PULM: Breathing unlabored.  PSYCHIATRIC: Alert and conversant and grossly oriented. Mood is grossly neutral. Affect appropriate. Judgment and insight grossly intact.    Assessment:   There were no vitals filed for this visit.      1. RLQ abdominal pain    2. Right flank pain    3. Fever, unspecified fever cause        Plan:   RLQ abdominal pain    Right flank pain    Fever, unspecified fever cause        As above  Discussed multiple etiologies with patient based on complaints  Explained that she would need labs, CT to r/o other acute findings  Urine collected 5 days ago only showed trace of occult blood- no infection and neg culture  Pt recommended to go to ER to have immediate testing with results/treatment  Pt really does not want to go to ER as recommended due to covid scare, but is considering going to ER at [facility].

## 2020-08-21 ENCOUNTER — OFFICE VISIT (OUTPATIENT)
Dept: INTERNAL MEDICINE | Facility: CLINIC | Age: 60
End: 2020-08-21
Payer: COMMERCIAL

## 2020-08-21 DIAGNOSIS — R19.7 DIARRHEA, UNSPECIFIED TYPE: ICD-10-CM

## 2020-08-21 DIAGNOSIS — R11.2 NAUSEA AND VOMITING, INTRACTABILITY OF VOMITING NOT SPECIFIED, UNSPECIFIED VOMITING TYPE: Primary | ICD-10-CM

## 2020-08-21 PROCEDURE — 99214 OFFICE O/P EST MOD 30 MIN: CPT | Mod: 95,,, | Performed by: PHYSICIAN ASSISTANT

## 2020-08-21 PROCEDURE — 99214 PR OFFICE/OUTPT VISIT, EST, LEVL IV, 30-39 MIN: ICD-10-PCS | Mod: 95,,, | Performed by: PHYSICIAN ASSISTANT

## 2020-08-21 RX ORDER — ONDANSETRON 4 MG/1
4 TABLET, ORALLY DISINTEGRATING ORAL EVERY 8 HOURS PRN
Qty: 20 TABLET | Refills: 0 | Status: SHIPPED | OUTPATIENT
Start: 2020-08-21 | End: 2020-11-04

## 2020-08-21 NOTE — LETTER
August 21, 2020      [facility]  [id number] [place] [address]  Phone: [phone]  Fax: [phone]       Patient: Miles Bowen   YOB: 1960  Date of Visit: 08/21/2020    To Whom It May Concern:    Valerie Bowen  was at [facility] on 08/21/2020. She may return to work/school on 8/24/20 with no restrictions. If you have any questions or concerns, or if I can be of further assistance, please do not hesitate to contact me.    Sincerely,    Mary Boucher PA-C

## 2020-08-21 NOTE — PROGRESS NOTES
Subjective:      Patient ID: Miles Bowen is a 59 y.o. female.    Chief Complaint: No chief complaint on file.    The patient location is: [place]  The chief complaint leading to consultation is: nausea/vomiting    Visit type: audiovisual    Face to Face time with patient: 8:23-8:36  13 minutes of total time spent on the encounter, which includes face to face time and non-face to face time preparing to see the patient (eg, review of tests), Obtaining and/or reviewing separately obtained history, Documenting clinical information in the electronic or other health record, Independently interpreting results (not separately reported) and communicating results to the patient/family/caregiver, or Care coordination (not separately reported).     Each patient to whom he or she provides medical services by telemedicine is:  (1) informed of the relationship between the physician and patient and the respective role of any other health care provider with respect to management of the patient; and (2) notified that he or she may decline to receive medical services by telemedicine and may withdraw from such care at any time.    Notes: Patient is new to me, being seen today for vomiting and diarrhea x2days.    Seen on 8/12 for RLQ abdominal pain and flank pain.  Had been previously diagnosed with UTI and started on antibiotics (Macrobid).  Those symptoms have since resolved.      Diarrhea   This is a new problem. The current episode started in the past 7 days (2days). The problem has been gradually worsening. Associated symptoms include abdominal pain, coughing (chronic, attributes to sinus issues) and vomiting. Pertinent negatives include no arthralgias, chills, fever or headaches. Risk factors include recent antibiotic use (antibiotics 2wks ago).     Review of Systems   Constitutional: Negative for activity change, chills, diaphoresis, fever and unexpected weight change.   HENT: Negative for congestion, hearing loss,  rhinorrhea and trouble swallowing.    Eyes: Negative for discharge and visual disturbance.   Respiratory: Positive for cough (chronic, attributes to sinus issues). Negative for chest tightness, shortness of breath and wheezing.    Cardiovascular: Negative for chest pain and palpitations.   Gastrointestinal: Positive for abdominal pain, diarrhea, nausea and vomiting. Negative for blood in stool and constipation.        Dark stools (unsure whether it is black)   Endocrine: Negative for polyuria.   Genitourinary: Positive for hematuria (treated for UTI, resolved). Negative for difficulty urinating, dysuria and menstrual problem.   Musculoskeletal: Negative for arthralgias, joint swelling and neck pain.   Neurological: Negative for dizziness, weakness, light-headedness and headaches.   Psychiatric/Behavioral: Negative for confusion and dysphoric mood.       Objective:   There were no vitals taken for this visit.  Physical Exam  Constitutional:       General: She is not in acute distress.     Appearance: She is well-developed. She is not ill-appearing or diaphoretic.   HENT:      Head: Normocephalic and atraumatic.      Right Ear: External ear normal.      Left Ear: External ear normal.   Eyes:      General: Lids are normal.         Right eye: No discharge.         Left eye: No discharge.      Conjunctiva/sclera: Conjunctivae normal.      Right eye: Right conjunctiva is not injected.      Left eye: Left conjunctiva is not injected.   Pulmonary:      Effort: Pulmonary effort is normal. No respiratory distress.   Skin:     General: Skin is warm and dry.      Findings: No rash.   Neurological:      Mental Status: She is alert and oriented to person, place, and time.   Psychiatric:         Speech: Speech normal.         Behavior: Behavior normal.         Thought Content: Thought content normal.         Judgment: Judgment normal.       Assessment:      1. Nausea and vomiting, intractability of vomiting not specified,  unspecified vomiting type    2. Diarrhea, unspecified type       Plan:   Nausea and vomiting, intractability of vomiting not specified, unspecified vomiting type  -     ondansetron (ZOFRAN-ODT) 4 MG TbDL; Take 1 tablet (4 mg total) by mouth every 8 (eight) hours as needed (nausea).  Dispense: 20 tablet; Refill: 0    Diarrhea, unspecified type    Ensure adequate hydration and bland diet   Monitor for bloody/black stools  F/u Monday if symptoms have not improved     Discussed worsening signs/symptoms and when to return to clinic or go to ED.   Patient expresses understanding and agrees with treatment plan.

## 2020-11-02 ENCOUNTER — PATIENT MESSAGE (OUTPATIENT)
Dept: ADMINISTRATIVE | Facility: OTHER | Age: 60
End: 2020-11-02

## 2020-11-04 ENCOUNTER — OFFICE VISIT (OUTPATIENT)
Dept: INTERNAL MEDICINE | Facility: CLINIC | Age: 60
End: 2020-11-04
Payer: COMMERCIAL

## 2020-11-04 VITALS
DIASTOLIC BLOOD PRESSURE: 82 MMHG | HEART RATE: 102 BPM | OXYGEN SATURATION: 99 % | SYSTOLIC BLOOD PRESSURE: 118 MMHG | TEMPERATURE: 98 F | BODY MASS INDEX: 33.39 KG/M2 | WEIGHT: 182.56 LBS

## 2020-11-04 DIAGNOSIS — I10 ESSENTIAL HYPERTENSION: ICD-10-CM

## 2020-11-04 DIAGNOSIS — Z01.818 PREOP EXAMINATION: ICD-10-CM

## 2020-11-04 DIAGNOSIS — G89.29 CHRONIC PAIN OF RIGHT KNEE: Primary | ICD-10-CM

## 2020-11-04 DIAGNOSIS — K58.9 IRRITABLE BOWEL SYNDROME, UNSPECIFIED TYPE: ICD-10-CM

## 2020-11-04 DIAGNOSIS — M25.561 CHRONIC PAIN OF RIGHT KNEE: Primary | ICD-10-CM

## 2020-11-04 DIAGNOSIS — F32.A DEPRESSION, UNSPECIFIED DEPRESSION TYPE: ICD-10-CM

## 2020-11-04 DIAGNOSIS — E87.6 HYPOKALEMIA: ICD-10-CM

## 2020-11-04 DIAGNOSIS — Z23 NEED FOR INFLUENZA VACCINATION: ICD-10-CM

## 2020-11-04 DIAGNOSIS — I34.1 MVP (MITRAL VALVE PROLAPSE): ICD-10-CM

## 2020-11-04 DIAGNOSIS — E04.2 MULTINODULAR GOITER: ICD-10-CM

## 2020-11-04 DIAGNOSIS — F41.9 ANXIETY: ICD-10-CM

## 2020-11-04 DIAGNOSIS — Z11.4 ENCOUNTER FOR SCREENING FOR HIV: ICD-10-CM

## 2020-11-04 PROCEDURE — 3008F BODY MASS INDEX DOCD: CPT | Mod: CPTII,S$GLB,, | Performed by: INTERNAL MEDICINE

## 2020-11-04 PROCEDURE — 90471 FLU VACCINE (QUAD) GREATER THAN OR EQUAL TO 3YO PRESERVATIVE FREE IM: ICD-10-PCS | Mod: S$GLB,,, | Performed by: INTERNAL MEDICINE

## 2020-11-04 PROCEDURE — 3079F DIAST BP 80-89 MM HG: CPT | Mod: CPTII,S$GLB,, | Performed by: INTERNAL MEDICINE

## 2020-11-04 PROCEDURE — 3008F PR BODY MASS INDEX (BMI) DOCUMENTED: ICD-10-PCS | Mod: CPTII,S$GLB,, | Performed by: INTERNAL MEDICINE

## 2020-11-04 PROCEDURE — 99214 PR OFFICE/OUTPT VISIT, EST, LEVL IV, 30-39 MIN: ICD-10-PCS | Mod: 25,S$GLB,, | Performed by: INTERNAL MEDICINE

## 2020-11-04 PROCEDURE — 99999 PR PBB SHADOW E&M-EST. PATIENT-LVL V: CPT | Mod: PBBFAC,,, | Performed by: INTERNAL MEDICINE

## 2020-11-04 PROCEDURE — 3074F PR MOST RECENT SYSTOLIC BLOOD PRESSURE < 130 MM HG: ICD-10-PCS | Mod: CPTII,S$GLB,, | Performed by: INTERNAL MEDICINE

## 2020-11-04 PROCEDURE — 90686 IIV4 VACC NO PRSV 0.5 ML IM: CPT | Mod: S$GLB,,, | Performed by: INTERNAL MEDICINE

## 2020-11-04 PROCEDURE — 90686 FLU VACCINE (QUAD) GREATER THAN OR EQUAL TO 3YO PRESERVATIVE FREE IM: ICD-10-PCS | Mod: S$GLB,,, | Performed by: INTERNAL MEDICINE

## 2020-11-04 PROCEDURE — 90471 IMMUNIZATION ADMIN: CPT | Mod: S$GLB,,, | Performed by: INTERNAL MEDICINE

## 2020-11-04 PROCEDURE — 3079F PR MOST RECENT DIASTOLIC BLOOD PRESSURE 80-89 MM HG: ICD-10-PCS | Mod: CPTII,S$GLB,, | Performed by: INTERNAL MEDICINE

## 2020-11-04 PROCEDURE — 3074F SYST BP LT 130 MM HG: CPT | Mod: CPTII,S$GLB,, | Performed by: INTERNAL MEDICINE

## 2020-11-04 PROCEDURE — 99999 PR PBB SHADOW E&M-EST. PATIENT-LVL V: ICD-10-PCS | Mod: PBBFAC,,, | Performed by: INTERNAL MEDICINE

## 2020-11-04 PROCEDURE — 99214 OFFICE O/P EST MOD 30 MIN: CPT | Mod: 25,S$GLB,, | Performed by: INTERNAL MEDICINE

## 2020-11-04 RX ORDER — BUPRENORPHINE HYDROCHLORIDE 300 UG/1
FILM, SOLUBLE BUCCAL
COMMUNITY
Start: 2020-09-23 | End: 2020-11-04

## 2020-11-04 RX ORDER — ZOLPIDEM TARTRATE 12.5 MG/1
12.5 TABLET, FILM COATED, EXTENDED RELEASE ORAL NIGHTLY
COMMUNITY
Start: 2020-10-30

## 2020-11-04 RX ORDER — HYDROCODONE BITARTRATE AND ACETAMINOPHEN 10; 325 MG/1; MG/1
TABLET ORAL
COMMUNITY
Start: 2020-10-05 | End: 2021-03-11

## 2020-11-04 NOTE — PROGRESS NOTES
Subjective:      Patient ID: Miles Bowen is a 60 y.o. female.    Chief Complaint: Pre-op Exam    HPI     61 yo with   Patient Active Problem List   Diagnosis    Hypertension    Depression    Anxiety    Hematemesis without nausea    GERD (gastroesophageal reflux disease)    Hiatal hernia    DDD (degenerative disc disease), lumbar    Common migraine    Allergic rhinitis    IBS (irritable bowel syndrome)    Multinodular goiter    MVP (mitral valve prolapse)    Nasal polyps    Obesity     Past Medical History:   Diagnosis Date    Anxiety     Depression     GERD (gastroesophageal reflux disease)     Heart murmur     Hypertension     Multinodular goiter 12/23/2016    MVP (mitral valve prolapse)     Obesity        Here today for preop for right knee surgery. Sweeps and mops without dyspnea/chest pain.  She is able to walk up at least 2 flights of stairs without dyspnea or CP.  No h/o cad, chf, stroke, ra, ckd, dm.   She admits some non compliance with potassium.     Past Surgical History:   Procedure Laterality Date    BACK SURGERY  2016    Bone fusion    COLONOSCOPY  2014    FIXATION KYPHOPLASTY THORACIC SPINE  05/2016    HYSTERECTOMY      NASAL SINUS SURGERY  2015    Polyps removed    SINUS SURGERY  11/2014    TONSILLECTOMY       family history includes Cancer in her maternal grandmother; Heart attack (age of onset: 63) in her father; Hypertension in her father and mother.  No f/h of bleeding or clotting d/o.     Social History     Socioeconomic History    Marital status:      Spouse name: Not on file    Number of children: Not on file    Years of education: Not on file    Highest education level: Not on file   Occupational History    Not on file   Social Needs    Financial resource strain: Not on file    Food insecurity     Worry: Not on file     Inability: Not on file    Transportation needs     Medical: Not on file     Non-medical: Not on file   Tobacco Use     Smoking status: Former Smoker     Packs/day: 0.25     Years: 1.00     Pack years: 0.25     Types: Cigarettes     Quit date: 2006     Years since quittin.8    Smokeless tobacco: Never Used   Substance and Sexual Activity    Alcohol use: Yes     Frequency: Monthly or less     Drinks per session: 1 or 2     Binge frequency: Never     Comment: rarely    Drug use: No    Sexual activity: Not Currently   Lifestyle    Physical activity     Days per week: 0 days     Minutes per session: 0 min    Stress: Very much   Relationships    Social connections     Talks on phone: More than three times a week     Gets together: Patient refused     Attends Buddhism service: Not on file     Active member of club or organization: Patient refused     Attends meetings of clubs or organizations: Patient refused     Relationship status: Patient refused   Other Topics Concern    Not on file   Social History Narrative     with 1 adult child.        Review of Systems   Constitutional: Negative for chills and fever.   HENT: Negative for ear pain and sore throat.    Respiratory: Negative for cough.    Cardiovascular: Negative for chest pain.   Gastrointestinal: Negative for abdominal pain and blood in stool.   Genitourinary: Negative for dysuria and hematuria.   Neurological: Negative for seizures and syncope.       [facility]16/[facility] is without ischemic changes.  Chest x-ray revealed clear lungs with mildly tortuous aorta.  Mild compression fractures with kyphoplasty change at T12 .  Evidence of mild compression fracture superior endplate of L1 (age indeterminate)      Lab results in epic dated 10/16/2020    CBC with mild anemia H&H 11.5 in 34.1.  WBC and platelets normal  PTT normal.  INR normal  Hemoglobin A1c 6.0  Hepatic function panel normal  CMP normal except potassium 3.2.     Objective:   /82 (BP Location: Right arm, Patient Position: Sitting, BP Method: Medium (Manual))   Pulse  102   Temp 98 °F (36.7 °C) (Tympanic)   Wt 82.8 kg (182 lb 8.7 oz)   SpO2 99%   BMI 33.39 kg/m²     Physical Exam  Constitutional:       General: She is not in acute distress.     Appearance: She is well-developed.   HENT:      Head: Normocephalic and atraumatic.   Eyes:      Pupils: Pupils are equal, round, and reactive to light.   Neck:      Musculoskeletal: Neck supple.      Thyroid: No thyromegaly.   Cardiovascular:      Rate and Rhythm: Normal rate and regular rhythm.   Pulmonary:      Breath sounds: Normal breath sounds. No wheezing or rales.   Abdominal:      General: Bowel sounds are normal.      Palpations: Abdomen is soft.      Tenderness: There is no abdominal tenderness.   Lymphadenopathy:      Cervical: No cervical adenopathy.   Skin:     General: Skin is warm and dry.   Neurological:      Mental Status: She is alert and oriented to person, place, and time.   Psychiatric:         Behavior: Behavior normal.             Assessment:     1. Chronic pain of right knee    2. Need for influenza vaccination    3. MVP (mitral valve prolapse)    4. Multinodular goiter    5. Essential hypertension    6. Depression, unspecified depression type    7. Anxiety    8. Irritable bowel syndrome, unspecified type    9. Encounter for screening for HIV    10. Hypokalemia    11. Preop examination      Plan:   Chronic pain of right knee    Need for influenza vaccination  -     Influenza - Quadrivalent *Preferred* (6 months+) (PF)    MVP (mitral valve prolapse)    Multinodular goiter  -     TSH; Future; Expected date: 11/04/2020  -     T4, Free; Future; Expected date: 11/04/2020  -     Ambulatory referral/consult to ENT; Future; Expected date: 11/11/2020  -     US Soft Tissue Head Neck Thyroid; Future; Expected date: 11/04/2020    Essential hypertension    Depression, unspecified depression type    Anxiety    Irritable bowel syndrome, unspecified type    Encounter for screening for HIV  -     HIV 1/2 Ag/Ab (4th Gen);  Future; Expected date: 11/04/2020    Hypokalemia  Stressed compliance with potassium tablets.     Preop examination    Ms. Bowen is at low risk for a perioperative cardiac event for her knee surgery.     Lab Frequency Next Occurrence   Ambulatory referral/consult to Endocrinology Once 10/25/2019   Ambulatory referral/consult to Orthopedics Once 10/25/2019   Basic metabolic panel Once 12/06/2019   COVID-19 Routine Screening Once 06/04/2020   Ambulatory referral/consult to Neurology Once 07/01/2020       Problem List Items Addressed This Visit        Psychiatric    Depression    Anxiety       Cardiac/Vascular    Hypertension    MVP (mitral valve prolapse)       Endocrine    Multinodular goiter    Relevant Orders    TSH    T4, Free    Ambulatory referral/consult to ENT    US Soft Tissue Head Neck Thyroid       GI    IBS (irritable bowel syndrome)      Other Visit Diagnoses     Chronic pain of right knee    -  Primary    Need for influenza vaccination        Relevant Orders    Influenza - Quadrivalent *Preferred* (6 months+) (PF) (Completed)    Encounter for screening for HIV        Relevant Orders    HIV 1/2 Ag/Ab (4th Gen)    Hypokalemia        Preop examination              No follow-ups on file.

## 2020-11-05 ENCOUNTER — TELEPHONE (OUTPATIENT)
Dept: INTERNAL MEDICINE | Facility: CLINIC | Age: 60
End: 2020-11-05

## 2020-11-05 NOTE — TELEPHONE ENCOUNTER
----- Message from Kelly Alonso sent at 11/5/2020  8:37 AM CST -----  Contact: Miles Aquino is calling in regards to paperwork for pre op clearance for ortho. May you please give her a call back at [phone].    Thanks  KT

## 2020-11-06 ENCOUNTER — TELEPHONE (OUTPATIENT)
Dept: RADIOLOGY | Facility: HOSPITAL | Age: 60
End: 2020-11-06

## 2020-11-06 ENCOUNTER — PATIENT MESSAGE (OUTPATIENT)
Dept: INTERNAL MEDICINE | Facility: CLINIC | Age: 60
End: 2020-11-06

## 2020-11-09 ENCOUNTER — TELEPHONE (OUTPATIENT)
Dept: INTERNAL MEDICINE | Facility: CLINIC | Age: 60
End: 2020-11-09

## 2020-11-10 NOTE — TELEPHONE ENCOUNTER
Please send my last PN to surgeon at [facility] at fax [phone].  Dr. Kiran jensen.  Please notify patient that information has been faxed.  Please also notify compression fractures and history of kyphoplasty was noted on her chest x-ray. Unless fractures or from a known trauma then bone density scan should be considered to assess bone density if not done so in the recent past.  Please let me know if patient is following with a no other provider regarding this or if the fractures are from a known trauma or if she would like for me to order a bone density scanning.

## 2020-11-10 NOTE — TELEPHONE ENCOUNTER
Attempted to contact and inform pt that her last pn note was faxed to surgeon at [facility] at fax [phone].  Dr. Kiran jensen.Also to notify pt that compression fractures and history of kyphoplasty was noted on her chest x-ray. Unless fractures or from a known trauma then bone density scan should be considered to assess bone density if not done so in the recent past. And so Dr. Paul could order a bone density scanning test in needed. Unable to reach pt.    EVARISTO Chavez

## 2021-01-07 ENCOUNTER — TELEPHONE (OUTPATIENT)
Dept: INTERNAL MEDICINE | Facility: CLINIC | Age: 61
End: 2021-01-07

## 2021-01-14 ENCOUNTER — PATIENT OUTREACH (OUTPATIENT)
Dept: ADMINISTRATIVE | Facility: OTHER | Age: 61
End: 2021-01-14

## 2021-01-31 ENCOUNTER — PATIENT MESSAGE (OUTPATIENT)
Dept: INTERNAL MEDICINE | Facility: CLINIC | Age: 61
End: 2021-01-31

## 2021-02-01 ENCOUNTER — OFFICE VISIT (OUTPATIENT)
Dept: INTERNAL MEDICINE | Facility: CLINIC | Age: 61
End: 2021-02-01
Payer: COMMERCIAL

## 2021-02-01 VITALS
DIASTOLIC BLOOD PRESSURE: 88 MMHG | OXYGEN SATURATION: 98 % | SYSTOLIC BLOOD PRESSURE: 108 MMHG | WEIGHT: 181.69 LBS | TEMPERATURE: 98 F | HEART RATE: 86 BPM | BODY MASS INDEX: 33.23 KG/M2

## 2021-02-01 DIAGNOSIS — F32.A DEPRESSION, UNSPECIFIED DEPRESSION TYPE: ICD-10-CM

## 2021-02-01 DIAGNOSIS — F41.9 ANXIETY: ICD-10-CM

## 2021-02-01 DIAGNOSIS — R05.9 COUGH: ICD-10-CM

## 2021-02-01 DIAGNOSIS — E04.2 MULTINODULAR GOITER: ICD-10-CM

## 2021-02-01 DIAGNOSIS — I10 ESSENTIAL HYPERTENSION: ICD-10-CM

## 2021-02-01 DIAGNOSIS — Z23 NEED FOR DIPHTHERIA-TETANUS-PERTUSSIS (TDAP) VACCINE: ICD-10-CM

## 2021-02-01 DIAGNOSIS — Z00.00 ROUTINE GENERAL MEDICAL EXAMINATION AT A HEALTH CARE FACILITY: Primary | ICD-10-CM

## 2021-02-01 DIAGNOSIS — J32.9 SINUSITIS, UNSPECIFIED CHRONICITY, UNSPECIFIED LOCATION: ICD-10-CM

## 2021-02-01 PROCEDURE — 3008F PR BODY MASS INDEX (BMI) DOCUMENTED: ICD-10-PCS | Mod: CPTII,S$GLB,, | Performed by: INTERNAL MEDICINE

## 2021-02-01 PROCEDURE — 90471 TDAP VACCINE GREATER THAN OR EQUAL TO 7YO IM: ICD-10-PCS | Mod: S$GLB,,, | Performed by: INTERNAL MEDICINE

## 2021-02-01 PROCEDURE — 1126F AMNT PAIN NOTED NONE PRSNT: CPT | Mod: S$GLB,,, | Performed by: INTERNAL MEDICINE

## 2021-02-01 PROCEDURE — 99396 PREV VISIT EST AGE 40-64: CPT | Mod: 25,S$GLB,, | Performed by: INTERNAL MEDICINE

## 2021-02-01 PROCEDURE — 3074F PR MOST RECENT SYSTOLIC BLOOD PRESSURE < 130 MM HG: ICD-10-PCS | Mod: CPTII,S$GLB,, | Performed by: INTERNAL MEDICINE

## 2021-02-01 PROCEDURE — 3008F BODY MASS INDEX DOCD: CPT | Mod: CPTII,S$GLB,, | Performed by: INTERNAL MEDICINE

## 2021-02-01 PROCEDURE — 1126F PR PAIN SEVERITY QUANTIFIED, NO PAIN PRESENT: ICD-10-PCS | Mod: S$GLB,,, | Performed by: INTERNAL MEDICINE

## 2021-02-01 PROCEDURE — 90715 TDAP VACCINE 7 YRS/> IM: CPT | Mod: S$GLB,,, | Performed by: INTERNAL MEDICINE

## 2021-02-01 PROCEDURE — 3079F PR MOST RECENT DIASTOLIC BLOOD PRESSURE 80-89 MM HG: ICD-10-PCS | Mod: CPTII,S$GLB,, | Performed by: INTERNAL MEDICINE

## 2021-02-01 PROCEDURE — 99396 PR PREVENTIVE VISIT,EST,40-64: ICD-10-PCS | Mod: 25,S$GLB,, | Performed by: INTERNAL MEDICINE

## 2021-02-01 PROCEDURE — 99999 PR PBB SHADOW E&M-EST. PATIENT-LVL V: CPT | Mod: PBBFAC,,, | Performed by: INTERNAL MEDICINE

## 2021-02-01 PROCEDURE — 3074F SYST BP LT 130 MM HG: CPT | Mod: CPTII,S$GLB,, | Performed by: INTERNAL MEDICINE

## 2021-02-01 PROCEDURE — 99999 PR PBB SHADOW E&M-EST. PATIENT-LVL V: ICD-10-PCS | Mod: PBBFAC,,, | Performed by: INTERNAL MEDICINE

## 2021-02-01 PROCEDURE — 90471 IMMUNIZATION ADMIN: CPT | Mod: S$GLB,,, | Performed by: INTERNAL MEDICINE

## 2021-02-01 PROCEDURE — 3079F DIAST BP 80-89 MM HG: CPT | Mod: CPTII,S$GLB,, | Performed by: INTERNAL MEDICINE

## 2021-02-01 PROCEDURE — 90715 TDAP VACCINE GREATER THAN OR EQUAL TO 7YO IM: ICD-10-PCS | Mod: S$GLB,,, | Performed by: INTERNAL MEDICINE

## 2021-02-01 RX ORDER — AMOXICILLIN AND CLAVULANATE POTASSIUM 875; 125 MG/1; MG/1
1 TABLET, FILM COATED ORAL 2 TIMES DAILY
Qty: 20 TABLET | Refills: 0 | Status: SHIPPED | OUTPATIENT
Start: 2021-02-01 | End: 2021-02-11

## 2021-02-01 RX ORDER — PROMETHAZINE HYDROCHLORIDE AND CODEINE PHOSPHATE 6.25; 1 MG/5ML; MG/5ML
5 SOLUTION ORAL EVERY 4 HOURS PRN
Qty: 118 ML | Refills: 0 | Status: SHIPPED | OUTPATIENT
Start: 2021-02-01 | End: 2021-02-11

## 2021-02-01 RX ORDER — HYDROCHLOROTHIAZIDE 12.5 MG/1
12.5 TABLET ORAL DAILY
Qty: 90 TABLET | Refills: 1 | Status: SHIPPED | OUTPATIENT
Start: 2021-02-01 | End: 2021-02-03

## 2021-02-01 RX ORDER — LOSARTAN POTASSIUM 100 MG/1
100 TABLET ORAL DAILY
Qty: 90 TABLET | Refills: 1 | Status: SHIPPED | OUTPATIENT
Start: 2021-02-01 | End: 2021-05-25 | Stop reason: SDUPTHER

## 2021-02-10 ENCOUNTER — PATIENT MESSAGE (OUTPATIENT)
Dept: INTERNAL MEDICINE | Facility: CLINIC | Age: 61
End: 2021-02-10

## 2021-02-11 ENCOUNTER — OFFICE VISIT (OUTPATIENT)
Dept: INTERNAL MEDICINE | Facility: CLINIC | Age: 61
End: 2021-02-11
Payer: COMMERCIAL

## 2021-02-11 ENCOUNTER — PATIENT MESSAGE (OUTPATIENT)
Dept: INTERNAL MEDICINE | Facility: CLINIC | Age: 61
End: 2021-02-11

## 2021-02-11 DIAGNOSIS — R10.9 ABDOMINAL PAIN, ACUTE: ICD-10-CM

## 2021-02-11 DIAGNOSIS — K58.2 IRRITABLE BOWEL SYNDROME WITH BOTH CONSTIPATION AND DIARRHEA: ICD-10-CM

## 2021-02-11 DIAGNOSIS — R19.7 DIARRHEA, UNSPECIFIED TYPE: Primary | ICD-10-CM

## 2021-02-11 PROCEDURE — 99214 OFFICE O/P EST MOD 30 MIN: CPT | Mod: 95,,, | Performed by: FAMILY MEDICINE

## 2021-02-11 PROCEDURE — 99214 PR OFFICE/OUTPT VISIT, EST, LEVL IV, 30-39 MIN: ICD-10-PCS | Mod: 95,,, | Performed by: FAMILY MEDICINE

## 2021-02-11 RX ORDER — DIPHENOXYLATE HYDROCHLORIDE AND ATROPINE SULFATE 2.5; .025 MG/1; MG/1
1 TABLET ORAL 3 TIMES DAILY PRN
Qty: 30 TABLET | Refills: 0 | Status: SHIPPED | OUTPATIENT
Start: 2021-02-11 | End: 2021-02-22

## 2021-02-11 RX ORDER — DICYCLOMINE HYDROCHLORIDE 10 MG/1
10 CAPSULE ORAL
Qty: 40 CAPSULE | Refills: 0 | Status: SHIPPED | OUTPATIENT
Start: 2021-02-11 | End: 2021-03-24

## 2021-02-15 ENCOUNTER — PATIENT MESSAGE (OUTPATIENT)
Dept: INTERNAL MEDICINE | Facility: CLINIC | Age: 61
End: 2021-02-15

## 2021-02-25 ENCOUNTER — PATIENT OUTREACH (OUTPATIENT)
Dept: ADMINISTRATIVE | Facility: HOSPITAL | Age: 61
End: 2021-02-25

## 2021-03-03 ENCOUNTER — PATIENT MESSAGE (OUTPATIENT)
Dept: INTERNAL MEDICINE | Facility: CLINIC | Age: 61
End: 2021-03-03

## 2021-03-04 RX ORDER — PROMETHAZINE HYDROCHLORIDE 25 MG/1
25 TABLET ORAL EVERY 8 HOURS PRN
Qty: 30 TABLET | Refills: 0 | Status: SHIPPED | OUTPATIENT
Start: 2021-03-04 | End: 2021-05-25

## 2021-03-11 ENCOUNTER — OFFICE VISIT (OUTPATIENT)
Dept: OPHTHALMOLOGY | Facility: CLINIC | Age: 61
End: 2021-03-11
Payer: COMMERCIAL

## 2021-03-11 ENCOUNTER — PATIENT OUTREACH (OUTPATIENT)
Dept: ADMINISTRATIVE | Facility: OTHER | Age: 61
End: 2021-03-11

## 2021-03-11 ENCOUNTER — TELEPHONE (OUTPATIENT)
Dept: INTERNAL MEDICINE | Facility: CLINIC | Age: 61
End: 2021-03-11

## 2021-03-11 ENCOUNTER — PATIENT MESSAGE (OUTPATIENT)
Dept: INTERNAL MEDICINE | Facility: CLINIC | Age: 61
End: 2021-03-11

## 2021-03-11 DIAGNOSIS — H01.00A BLEPHARITIS OF UPPER AND LOWER EYELIDS OF BOTH EYES, UNSPECIFIED TYPE: Primary | ICD-10-CM

## 2021-03-11 DIAGNOSIS — H01.00B BLEPHARITIS OF UPPER AND LOWER EYELIDS OF BOTH EYES, UNSPECIFIED TYPE: Primary | ICD-10-CM

## 2021-03-11 PROCEDURE — 92002 PR EYE EXAM, NEW PATIENT,INTERMED: ICD-10-PCS | Mod: S$GLB,,, | Performed by: OPTOMETRIST

## 2021-03-11 PROCEDURE — 99999 PR PBB SHADOW E&M-EST. PATIENT-LVL III: CPT | Mod: PBBFAC,,, | Performed by: OPTOMETRIST

## 2021-03-11 PROCEDURE — 92002 INTRM OPH EXAM NEW PATIENT: CPT | Mod: S$GLB,,, | Performed by: OPTOMETRIST

## 2021-03-11 PROCEDURE — 99999 PR PBB SHADOW E&M-EST. PATIENT-LVL III: ICD-10-PCS | Mod: PBBFAC,,, | Performed by: OPTOMETRIST

## 2021-03-11 RX ORDER — MOXIFLOXACIN 5 MG/ML
1 SOLUTION/ DROPS OPHTHALMIC 3 TIMES DAILY
Qty: 3 ML | Refills: 0 | Status: SHIPPED | OUTPATIENT
Start: 2021-03-11 | End: 2021-03-26

## 2021-03-12 ENCOUNTER — IMMUNIZATION (OUTPATIENT)
Dept: INTERNAL MEDICINE | Facility: CLINIC | Age: 61
End: 2021-03-12
Payer: COMMERCIAL

## 2021-03-12 DIAGNOSIS — Z23 NEED FOR VACCINATION: Primary | ICD-10-CM

## 2021-03-12 PROCEDURE — 91300 COVID-19, MRNA, LNP-S, PF, 30 MCG/0.3 ML DOSE VACCINE: CPT | Mod: PBBFAC | Performed by: FAMILY MEDICINE

## 2021-03-16 ENCOUNTER — PATIENT MESSAGE (OUTPATIENT)
Dept: INTERNAL MEDICINE | Facility: CLINIC | Age: 61
End: 2021-03-16

## 2021-03-22 DIAGNOSIS — K21.9 GASTROESOPHAGEAL REFLUX DISEASE: ICD-10-CM

## 2021-03-22 RX ORDER — PANTOPRAZOLE SODIUM 40 MG/1
40 TABLET, DELAYED RELEASE ORAL DAILY
Qty: 90 TABLET | Refills: 0 | Status: CANCELLED | OUTPATIENT
Start: 2021-03-22 | End: 2022-03-22

## 2021-04-02 ENCOUNTER — IMMUNIZATION (OUTPATIENT)
Dept: INTERNAL MEDICINE | Facility: CLINIC | Age: 61
End: 2021-04-02
Payer: COMMERCIAL

## 2021-04-02 DIAGNOSIS — Z23 NEED FOR VACCINATION: Primary | ICD-10-CM

## 2021-04-02 PROCEDURE — 0002A COVID-19, MRNA, LNP-S, PF, 30 MCG/0.3 ML DOSE VACCINE: CPT | Mod: PBBFAC | Performed by: FAMILY MEDICINE

## 2021-04-02 PROCEDURE — 91300 COVID-19, MRNA, LNP-S, PF, 30 MCG/0.3 ML DOSE VACCINE: CPT | Mod: PBBFAC | Performed by: FAMILY MEDICINE

## 2021-04-09 ENCOUNTER — OFFICE VISIT (OUTPATIENT)
Dept: INTERNAL MEDICINE | Facility: CLINIC | Age: 61
End: 2021-04-09
Payer: COMMERCIAL

## 2021-04-09 ENCOUNTER — LAB VISIT (OUTPATIENT)
Dept: LAB | Facility: HOSPITAL | Age: 61
End: 2021-04-09
Attending: INTERNAL MEDICINE
Payer: COMMERCIAL

## 2021-04-09 VITALS
DIASTOLIC BLOOD PRESSURE: 84 MMHG | SYSTOLIC BLOOD PRESSURE: 124 MMHG | OXYGEN SATURATION: 98 % | HEIGHT: 62 IN | WEIGHT: 181.88 LBS | TEMPERATURE: 97 F | RESPIRATION RATE: 18 BRPM | HEART RATE: 91 BPM | BODY MASS INDEX: 33.47 KG/M2

## 2021-04-09 DIAGNOSIS — R19.7 DIARRHEA, UNSPECIFIED TYPE: ICD-10-CM

## 2021-04-09 DIAGNOSIS — J01.90 ACUTE SINUSITIS, RECURRENCE NOT SPECIFIED, UNSPECIFIED LOCATION: Primary | ICD-10-CM

## 2021-04-09 DIAGNOSIS — R05.9 COUGH: ICD-10-CM

## 2021-04-09 DIAGNOSIS — E04.2 MULTINODULAR GOITER: ICD-10-CM

## 2021-04-09 DIAGNOSIS — Z00.00 ROUTINE GENERAL MEDICAL EXAMINATION AT A HEALTH CARE FACILITY: ICD-10-CM

## 2021-04-09 LAB
BASOPHILS # BLD AUTO: 0.05 K/UL (ref 0–0.2)
BASOPHILS NFR BLD: 0.6 % (ref 0–1.9)
DIFFERENTIAL METHOD: ABNORMAL
EOSINOPHIL # BLD AUTO: 0.1 K/UL (ref 0–0.5)
EOSINOPHIL NFR BLD: 1.2 % (ref 0–8)
ERYTHROCYTE [DISTWIDTH] IN BLOOD BY AUTOMATED COUNT: 18.7 % (ref 11.5–14.5)
HCT VFR BLD AUTO: 36.8 % (ref 37–48.5)
HGB BLD-MCNC: 11 G/DL (ref 12–16)
IMM GRANULOCYTES # BLD AUTO: 0.01 K/UL (ref 0–0.04)
IMM GRANULOCYTES NFR BLD AUTO: 0.1 % (ref 0–0.5)
LYMPHOCYTES # BLD AUTO: 2.2 K/UL (ref 1–4.8)
LYMPHOCYTES NFR BLD: 25.8 % (ref 18–48)
MCH RBC QN AUTO: 23.5 PG (ref 27–31)
MCHC RBC AUTO-ENTMCNC: 29.9 G/DL (ref 32–36)
MCV RBC AUTO: 79 FL (ref 82–98)
MONOCYTES # BLD AUTO: 0.4 K/UL (ref 0.3–1)
MONOCYTES NFR BLD: 4.1 % (ref 4–15)
NEUTROPHILS # BLD AUTO: 5.9 K/UL (ref 1.8–7.7)
NEUTROPHILS NFR BLD: 68.2 % (ref 38–73)
NRBC BLD-RTO: 0 /100 WBC
PLATELET # BLD AUTO: 488 K/UL (ref 150–450)
PMV BLD AUTO: 9.5 FL (ref 9.2–12.9)
RBC # BLD AUTO: 4.68 M/UL (ref 4–5.4)
WBC # BLD AUTO: 8.57 K/UL (ref 3.9–12.7)

## 2021-04-09 PROCEDURE — 99999 PR PBB SHADOW E&M-EST. PATIENT-LVL V: ICD-10-PCS | Mod: PBBFAC,,, | Performed by: INTERNAL MEDICINE

## 2021-04-09 PROCEDURE — 1125F AMNT PAIN NOTED PAIN PRSNT: CPT | Mod: S$GLB,,, | Performed by: INTERNAL MEDICINE

## 2021-04-09 PROCEDURE — 3008F BODY MASS INDEX DOCD: CPT | Mod: CPTII,S$GLB,, | Performed by: INTERNAL MEDICINE

## 2021-04-09 PROCEDURE — 1125F PR PAIN SEVERITY QUANTIFIED, PAIN PRESENT: ICD-10-PCS | Mod: S$GLB,,, | Performed by: INTERNAL MEDICINE

## 2021-04-09 PROCEDURE — 99214 PR OFFICE/OUTPT VISIT, EST, LEVL IV, 30-39 MIN: ICD-10-PCS | Mod: S$GLB,,, | Performed by: INTERNAL MEDICINE

## 2021-04-09 PROCEDURE — 80053 COMPREHEN METABOLIC PANEL: CPT | Performed by: INTERNAL MEDICINE

## 2021-04-09 PROCEDURE — 99999 PR PBB SHADOW E&M-EST. PATIENT-LVL V: CPT | Mod: PBBFAC,,, | Performed by: INTERNAL MEDICINE

## 2021-04-09 PROCEDURE — 36415 COLL VENOUS BLD VENIPUNCTURE: CPT | Performed by: INTERNAL MEDICINE

## 2021-04-09 PROCEDURE — 99214 OFFICE O/P EST MOD 30 MIN: CPT | Mod: S$GLB,,, | Performed by: INTERNAL MEDICINE

## 2021-04-09 PROCEDURE — 84439 ASSAY OF FREE THYROXINE: CPT | Performed by: INTERNAL MEDICINE

## 2021-04-09 PROCEDURE — 85025 COMPLETE CBC W/AUTO DIFF WBC: CPT | Performed by: INTERNAL MEDICINE

## 2021-04-09 PROCEDURE — 3008F PR BODY MASS INDEX (BMI) DOCUMENTED: ICD-10-PCS | Mod: CPTII,S$GLB,, | Performed by: INTERNAL MEDICINE

## 2021-04-09 PROCEDURE — 86703 HIV-1/HIV-2 1 RESULT ANTBDY: CPT | Performed by: INTERNAL MEDICINE

## 2021-04-09 PROCEDURE — 83036 HEMOGLOBIN GLYCOSYLATED A1C: CPT | Performed by: INTERNAL MEDICINE

## 2021-04-09 PROCEDURE — 84443 ASSAY THYROID STIM HORMONE: CPT | Performed by: INTERNAL MEDICINE

## 2021-04-09 PROCEDURE — 80061 LIPID PANEL: CPT | Performed by: INTERNAL MEDICINE

## 2021-04-09 RX ORDER — AMOXICILLIN AND CLAVULANATE POTASSIUM 875; 125 MG/1; MG/1
1 TABLET, FILM COATED ORAL 2 TIMES DAILY
Qty: 20 TABLET | Refills: 0 | Status: CANCELLED | OUTPATIENT
Start: 2021-04-09 | End: 2021-04-19

## 2021-04-09 RX ORDER — AMOXICILLIN 875 MG/1
875 TABLET, FILM COATED ORAL EVERY 12 HOURS
Qty: 14 TABLET | Refills: 0 | Status: SHIPPED | OUTPATIENT
Start: 2021-04-09 | End: 2021-04-09 | Stop reason: SDUPTHER

## 2021-04-09 RX ORDER — PROMETHAZINE HYDROCHLORIDE AND CODEINE PHOSPHATE 6.25; 1 MG/5ML; MG/5ML
5 SOLUTION ORAL NIGHTLY PRN
Qty: 180 ML | Refills: 0 | Status: SHIPPED | OUTPATIENT
Start: 2021-04-09 | End: 2021-04-09 | Stop reason: SDUPTHER

## 2021-04-09 RX ORDER — PROMETHAZINE HYDROCHLORIDE AND CODEINE PHOSPHATE 6.25; 1 MG/5ML; MG/5ML
5 SOLUTION ORAL NIGHTLY PRN
Qty: 180 ML | Refills: 0 | Status: SHIPPED | OUTPATIENT
Start: 2021-04-09 | End: 2021-04-15 | Stop reason: SDUPTHER

## 2021-04-09 RX ORDER — ESZOPICLONE 2 MG/1
2 TABLET, FILM COATED ORAL NIGHTLY
COMMUNITY
End: 2021-09-02

## 2021-04-09 RX ORDER — AMOXICILLIN 875 MG/1
875 TABLET, FILM COATED ORAL EVERY 12 HOURS
Qty: 14 TABLET | Refills: 0 | Status: SHIPPED | OUTPATIENT
Start: 2021-04-09 | End: 2021-04-16

## 2021-04-10 LAB
ALBUMIN SERPL BCP-MCNC: 4.4 G/DL (ref 3.5–5.2)
ALP SERPL-CCNC: 60 U/L (ref 55–135)
ALT SERPL W/O P-5'-P-CCNC: 6 U/L (ref 10–44)
ANION GAP SERPL CALC-SCNC: 11 MMOL/L (ref 8–16)
AST SERPL-CCNC: 9 U/L (ref 10–40)
BILIRUB SERPL-MCNC: 0.4 MG/DL (ref 0.1–1)
BUN SERPL-MCNC: 12 MG/DL (ref 6–20)
CALCIUM SERPL-MCNC: 9.8 MG/DL (ref 8.7–10.5)
CHLORIDE SERPL-SCNC: 101 MMOL/L (ref 95–110)
CHOLEST SERPL-MCNC: 264 MG/DL (ref 120–199)
CHOLEST/HDLC SERPL: 2.6 {RATIO} (ref 2–5)
CO2 SERPL-SCNC: 25 MMOL/L (ref 23–29)
CREAT SERPL-MCNC: 0.8 MG/DL (ref 0.5–1.4)
EST. GFR  (AFRICAN AMERICAN): >60 ML/MIN/1.73 M^2
EST. GFR  (NON AFRICAN AMERICAN): >60 ML/MIN/1.73 M^2
ESTIMATED AVG GLUCOSE: 120 MG/DL (ref 68–131)
GLUCOSE SERPL-MCNC: 96 MG/DL (ref 70–110)
HBA1C MFR BLD: 5.8 % (ref 4–5.6)
HDLC SERPL-MCNC: 103 MG/DL (ref 40–75)
HDLC SERPL: 39 % (ref 20–50)
LDLC SERPL CALC-MCNC: 138.2 MG/DL (ref 63–159)
NONHDLC SERPL-MCNC: 161 MG/DL
POTASSIUM SERPL-SCNC: 2.9 MMOL/L (ref 3.5–5.1)
PROT SERPL-MCNC: 8 G/DL (ref 6–8.4)
SODIUM SERPL-SCNC: 137 MMOL/L (ref 136–145)
T4 FREE SERPL-MCNC: 1.01 NG/DL (ref 0.71–1.51)
TRIGL SERPL-MCNC: 114 MG/DL (ref 30–150)
TSH SERPL DL<=0.005 MIU/L-ACNC: 0.36 UIU/ML (ref 0.4–4)

## 2021-04-12 ENCOUNTER — PATIENT OUTREACH (OUTPATIENT)
Dept: ADMINISTRATIVE | Facility: HOSPITAL | Age: 61
End: 2021-04-12

## 2021-04-12 ENCOUNTER — TELEPHONE (OUTPATIENT)
Dept: RADIOLOGY | Facility: HOSPITAL | Age: 61
End: 2021-04-12

## 2021-04-12 ENCOUNTER — PATIENT OUTREACH (OUTPATIENT)
Dept: ADMINISTRATIVE | Facility: OTHER | Age: 61
End: 2021-04-12

## 2021-04-12 DIAGNOSIS — Z12.31 ENCOUNTER FOR SCREENING MAMMOGRAM FOR MALIGNANT NEOPLASM OF BREAST: Primary | ICD-10-CM

## 2021-04-12 LAB — HIV 1+2 AB+HIV1 P24 AG SERPL QL IA: NEGATIVE

## 2021-04-14 ENCOUNTER — TELEPHONE (OUTPATIENT)
Dept: INTERNAL MEDICINE | Facility: CLINIC | Age: 61
End: 2021-04-14

## 2021-04-14 DIAGNOSIS — E05.90 SUBCLINICAL HYPERTHYROIDISM: ICD-10-CM

## 2021-04-14 DIAGNOSIS — E04.2 MULTINODULAR GOITER: Primary | ICD-10-CM

## 2021-04-15 ENCOUNTER — OFFICE VISIT (OUTPATIENT)
Dept: INTERNAL MEDICINE | Facility: CLINIC | Age: 61
End: 2021-04-15
Payer: COMMERCIAL

## 2021-04-15 VITALS
HEART RATE: 97 BPM | BODY MASS INDEX: 33.31 KG/M2 | DIASTOLIC BLOOD PRESSURE: 82 MMHG | SYSTOLIC BLOOD PRESSURE: 132 MMHG | WEIGHT: 181 LBS | OXYGEN SATURATION: 96 % | HEIGHT: 62 IN | TEMPERATURE: 98 F

## 2021-04-15 DIAGNOSIS — J01.90 ACUTE SINUSITIS, RECURRENCE NOT SPECIFIED, UNSPECIFIED LOCATION: Primary | ICD-10-CM

## 2021-04-15 DIAGNOSIS — R05.9 COUGH: ICD-10-CM

## 2021-04-15 PROCEDURE — 99214 OFFICE O/P EST MOD 30 MIN: CPT | Mod: S$GLB,,, | Performed by: PHYSICIAN ASSISTANT

## 2021-04-15 PROCEDURE — 3008F PR BODY MASS INDEX (BMI) DOCUMENTED: ICD-10-PCS | Mod: CPTII,S$GLB,, | Performed by: PHYSICIAN ASSISTANT

## 2021-04-15 PROCEDURE — 1126F AMNT PAIN NOTED NONE PRSNT: CPT | Mod: S$GLB,,, | Performed by: PHYSICIAN ASSISTANT

## 2021-04-15 PROCEDURE — 99999 PR PBB SHADOW E&M-EST. PATIENT-LVL V: ICD-10-PCS | Mod: PBBFAC,,, | Performed by: PHYSICIAN ASSISTANT

## 2021-04-15 PROCEDURE — 99214 PR OFFICE/OUTPT VISIT, EST, LEVL IV, 30-39 MIN: ICD-10-PCS | Mod: S$GLB,,, | Performed by: PHYSICIAN ASSISTANT

## 2021-04-15 PROCEDURE — 99999 PR PBB SHADOW E&M-EST. PATIENT-LVL V: CPT | Mod: PBBFAC,,, | Performed by: PHYSICIAN ASSISTANT

## 2021-04-15 PROCEDURE — 1126F PR PAIN SEVERITY QUANTIFIED, NO PAIN PRESENT: ICD-10-PCS | Mod: S$GLB,,, | Performed by: PHYSICIAN ASSISTANT

## 2021-04-15 PROCEDURE — 3008F BODY MASS INDEX DOCD: CPT | Mod: CPTII,S$GLB,, | Performed by: PHYSICIAN ASSISTANT

## 2021-04-15 RX ORDER — PROMETHAZINE HYDROCHLORIDE AND CODEINE PHOSPHATE 6.25; 1 MG/5ML; MG/5ML
5 SOLUTION ORAL EVERY 8 HOURS PRN
Qty: 100 ML | Refills: 0 | Status: SHIPPED | OUTPATIENT
Start: 2021-04-15 | End: 2021-04-15 | Stop reason: SDUPTHER

## 2021-04-15 RX ORDER — LEVOFLOXACIN 500 MG/1
500 TABLET, FILM COATED ORAL DAILY
Qty: 10 TABLET | Refills: 0 | Status: SHIPPED | OUTPATIENT
Start: 2021-04-15 | End: 2021-07-16 | Stop reason: SDUPTHER

## 2021-04-15 RX ORDER — PROMETHAZINE HYDROCHLORIDE AND CODEINE PHOSPHATE 6.25; 1 MG/5ML; MG/5ML
5 SOLUTION ORAL EVERY 8 HOURS PRN
Qty: 100 ML | Refills: 0 | Status: SHIPPED | OUTPATIENT
Start: 2021-04-15 | End: 2021-04-22

## 2021-05-18 ENCOUNTER — PATIENT OUTREACH (OUTPATIENT)
Dept: ADMINISTRATIVE | Facility: HOSPITAL | Age: 61
End: 2021-05-18

## 2021-05-24 DIAGNOSIS — K58.2 IRRITABLE BOWEL SYNDROME WITH BOTH CONSTIPATION AND DIARRHEA: ICD-10-CM

## 2021-05-24 DIAGNOSIS — R10.9 ABDOMINAL PAIN, ACUTE: ICD-10-CM

## 2021-05-24 RX ORDER — PROMETHAZINE HYDROCHLORIDE 25 MG/1
25 TABLET ORAL EVERY 8 HOURS PRN
Qty: 30 TABLET | Refills: 0 | Status: CANCELLED | OUTPATIENT
Start: 2021-05-24

## 2021-05-24 RX ORDER — BACLOFEN 10 MG/1
10 TABLET ORAL 2 TIMES DAILY
Qty: 30 TABLET | Refills: 0 | Status: CANCELLED | OUTPATIENT
Start: 2021-05-24

## 2021-05-25 ENCOUNTER — PATIENT MESSAGE (OUTPATIENT)
Dept: INTERNAL MEDICINE | Facility: CLINIC | Age: 61
End: 2021-05-25

## 2021-05-25 DIAGNOSIS — I10 ESSENTIAL HYPERTENSION: ICD-10-CM

## 2021-05-25 RX ORDER — LOSARTAN POTASSIUM 100 MG/1
100 TABLET ORAL DAILY
Qty: 90 TABLET | Refills: 1 | Status: SHIPPED | OUTPATIENT
Start: 2021-05-25 | End: 2021-11-17

## 2021-05-25 RX ORDER — PROMETHAZINE HYDROCHLORIDE 25 MG/1
25 TABLET ORAL EVERY 8 HOURS PRN
Qty: 30 TABLET | Refills: 0 | Status: SHIPPED | OUTPATIENT
Start: 2021-05-25 | End: 2021-08-11 | Stop reason: SDUPTHER

## 2021-05-25 RX ORDER — DICYCLOMINE HYDROCHLORIDE 10 MG/1
10 CAPSULE ORAL
Qty: 40 CAPSULE | Refills: 0 | Status: SHIPPED | OUTPATIENT
Start: 2021-05-25 | End: 2021-06-27

## 2021-05-25 RX ORDER — BACLOFEN 10 MG/1
10 TABLET ORAL 2 TIMES DAILY
Qty: 30 TABLET | Refills: 0 | Status: SHIPPED | OUTPATIENT
Start: 2021-05-25 | End: 2021-08-11 | Stop reason: SDUPTHER

## 2021-05-25 RX ORDER — HYDROCHLOROTHIAZIDE 12.5 MG/1
12.5 TABLET ORAL DAILY
Qty: 90 TABLET | Refills: 1 | Status: SHIPPED | OUTPATIENT
Start: 2021-05-25 | End: 2022-02-15

## 2021-07-12 ENCOUNTER — OFFICE VISIT (OUTPATIENT)
Dept: INTERNAL MEDICINE | Facility: CLINIC | Age: 61
End: 2021-07-12
Payer: COMMERCIAL

## 2021-07-12 VITALS
DIASTOLIC BLOOD PRESSURE: 70 MMHG | SYSTOLIC BLOOD PRESSURE: 120 MMHG | TEMPERATURE: 98 F | BODY MASS INDEX: 34.85 KG/M2 | OXYGEN SATURATION: 98 % | HEIGHT: 62 IN | HEART RATE: 109 BPM | WEIGHT: 189.38 LBS

## 2021-07-12 DIAGNOSIS — J32.9 SINUSITIS, UNSPECIFIED CHRONICITY, UNSPECIFIED LOCATION: ICD-10-CM

## 2021-07-12 DIAGNOSIS — R05.9 COUGH: ICD-10-CM

## 2021-07-12 PROCEDURE — 99214 OFFICE O/P EST MOD 30 MIN: CPT | Mod: S$GLB,,, | Performed by: INTERNAL MEDICINE

## 2021-07-12 PROCEDURE — 99999 PR PBB SHADOW E&M-EST. PATIENT-LVL IV: ICD-10-PCS | Mod: PBBFAC,,, | Performed by: INTERNAL MEDICINE

## 2021-07-12 PROCEDURE — 99214 PR OFFICE/OUTPT VISIT, EST, LEVL IV, 30-39 MIN: ICD-10-PCS | Mod: S$GLB,,, | Performed by: INTERNAL MEDICINE

## 2021-07-12 PROCEDURE — 3008F BODY MASS INDEX DOCD: CPT | Mod: CPTII,S$GLB,, | Performed by: INTERNAL MEDICINE

## 2021-07-12 PROCEDURE — 1125F AMNT PAIN NOTED PAIN PRSNT: CPT | Mod: S$GLB,,, | Performed by: INTERNAL MEDICINE

## 2021-07-12 PROCEDURE — 99999 PR PBB SHADOW E&M-EST. PATIENT-LVL IV: CPT | Mod: PBBFAC,,, | Performed by: INTERNAL MEDICINE

## 2021-07-12 PROCEDURE — 3008F PR BODY MASS INDEX (BMI) DOCUMENTED: ICD-10-PCS | Mod: CPTII,S$GLB,, | Performed by: INTERNAL MEDICINE

## 2021-07-12 PROCEDURE — 1125F PR PAIN SEVERITY QUANTIFIED, PAIN PRESENT: ICD-10-PCS | Mod: S$GLB,,, | Performed by: INTERNAL MEDICINE

## 2021-07-12 RX ORDER — AMOXICILLIN AND CLAVULANATE POTASSIUM 875; 125 MG/1; MG/1
1 TABLET, FILM COATED ORAL 2 TIMES DAILY
Qty: 20 TABLET | Refills: 0 | Status: SHIPPED | OUTPATIENT
Start: 2021-07-12 | End: 2021-07-22

## 2021-07-12 RX ORDER — PROMETHAZINE HYDROCHLORIDE AND CODEINE PHOSPHATE 6.25; 1 MG/5ML; MG/5ML
5 SOLUTION ORAL EVERY 8 HOURS PRN
Qty: 100 ML | Refills: 0 | Status: SHIPPED | OUTPATIENT
Start: 2021-07-12 | End: 2021-07-19

## 2021-07-16 ENCOUNTER — TELEPHONE (OUTPATIENT)
Dept: INTERNAL MEDICINE | Facility: CLINIC | Age: 61
End: 2021-07-16

## 2021-07-16 DIAGNOSIS — J01.90 ACUTE SINUSITIS, RECURRENCE NOT SPECIFIED, UNSPECIFIED LOCATION: ICD-10-CM

## 2021-07-16 RX ORDER — LEVOFLOXACIN 500 MG/1
500 TABLET, FILM COATED ORAL DAILY
Qty: 7 TABLET | Refills: 0 | Status: SHIPPED | OUTPATIENT
Start: 2021-07-16 | End: 2021-07-23

## 2021-07-28 ENCOUNTER — TELEPHONE (OUTPATIENT)
Dept: INTERNAL MEDICINE | Facility: CLINIC | Age: 61
End: 2021-07-28

## 2021-08-05 ENCOUNTER — PATIENT MESSAGE (OUTPATIENT)
Dept: INTERNAL MEDICINE | Facility: CLINIC | Age: 61
End: 2021-08-05

## 2021-09-02 ENCOUNTER — OFFICE VISIT (OUTPATIENT)
Dept: INTERNAL MEDICINE | Facility: CLINIC | Age: 61
End: 2021-09-02
Payer: COMMERCIAL

## 2021-09-02 VITALS
SYSTOLIC BLOOD PRESSURE: 102 MMHG | DIASTOLIC BLOOD PRESSURE: 66 MMHG | RESPIRATION RATE: 16 BRPM | HEIGHT: 62 IN | BODY MASS INDEX: 34.53 KG/M2 | TEMPERATURE: 98 F | HEART RATE: 74 BPM | WEIGHT: 187.63 LBS | OXYGEN SATURATION: 97 %

## 2021-09-02 DIAGNOSIS — I10 ESSENTIAL HYPERTENSION: ICD-10-CM

## 2021-09-02 DIAGNOSIS — J06.9 VIRAL URI WITH COUGH: Primary | ICD-10-CM

## 2021-09-02 LAB
CTP QC/QA: YES
SARS-COV-2 RDRP RESP QL NAA+PROBE: NEGATIVE

## 2021-09-02 PROCEDURE — 3008F BODY MASS INDEX DOCD: CPT | Mod: CPTII,S$GLB,, | Performed by: PHYSICIAN ASSISTANT

## 2021-09-02 PROCEDURE — 3074F PR MOST RECENT SYSTOLIC BLOOD PRESSURE < 130 MM HG: ICD-10-PCS | Mod: CPTII,S$GLB,, | Performed by: PHYSICIAN ASSISTANT

## 2021-09-02 PROCEDURE — 3078F DIAST BP <80 MM HG: CPT | Mod: CPTII,S$GLB,, | Performed by: PHYSICIAN ASSISTANT

## 2021-09-02 PROCEDURE — 1160F RVW MEDS BY RX/DR IN RCRD: CPT | Mod: CPTII,S$GLB,, | Performed by: PHYSICIAN ASSISTANT

## 2021-09-02 PROCEDURE — 99214 OFFICE O/P EST MOD 30 MIN: CPT | Mod: S$GLB,,, | Performed by: PHYSICIAN ASSISTANT

## 2021-09-02 PROCEDURE — 3044F PR MOST RECENT HEMOGLOBIN A1C LEVEL <7.0%: ICD-10-PCS | Mod: CPTII,S$GLB,, | Performed by: PHYSICIAN ASSISTANT

## 2021-09-02 PROCEDURE — 3078F PR MOST RECENT DIASTOLIC BLOOD PRESSURE < 80 MM HG: ICD-10-PCS | Mod: CPTII,S$GLB,, | Performed by: PHYSICIAN ASSISTANT

## 2021-09-02 PROCEDURE — 99999 PR PBB SHADOW E&M-EST. PATIENT-LVL IV: CPT | Mod: PBBFAC,,, | Performed by: PHYSICIAN ASSISTANT

## 2021-09-02 PROCEDURE — 99214 PR OFFICE/OUTPT VISIT, EST, LEVL IV, 30-39 MIN: ICD-10-PCS | Mod: S$GLB,,, | Performed by: PHYSICIAN ASSISTANT

## 2021-09-02 PROCEDURE — 1160F PR REVIEW ALL MEDS BY PRESCRIBER/CLIN PHARMACIST DOCUMENTED: ICD-10-PCS | Mod: CPTII,S$GLB,, | Performed by: PHYSICIAN ASSISTANT

## 2021-09-02 PROCEDURE — 1159F PR MEDICATION LIST DOCUMENTED IN MEDICAL RECORD: ICD-10-PCS | Mod: CPTII,S$GLB,, | Performed by: PHYSICIAN ASSISTANT

## 2021-09-02 PROCEDURE — 3044F HG A1C LEVEL LT 7.0%: CPT | Mod: CPTII,S$GLB,, | Performed by: PHYSICIAN ASSISTANT

## 2021-09-02 PROCEDURE — 99999 PR PBB SHADOW E&M-EST. PATIENT-LVL IV: ICD-10-PCS | Mod: PBBFAC,,, | Performed by: PHYSICIAN ASSISTANT

## 2021-09-02 PROCEDURE — 3008F PR BODY MASS INDEX (BMI) DOCUMENTED: ICD-10-PCS | Mod: CPTII,S$GLB,, | Performed by: PHYSICIAN ASSISTANT

## 2021-09-02 PROCEDURE — 1159F MED LIST DOCD IN RCRD: CPT | Mod: CPTII,S$GLB,, | Performed by: PHYSICIAN ASSISTANT

## 2021-09-02 PROCEDURE — 3074F SYST BP LT 130 MM HG: CPT | Mod: CPTII,S$GLB,, | Performed by: PHYSICIAN ASSISTANT

## 2021-09-02 PROCEDURE — U0002: ICD-10-PCS | Mod: QW,S$GLB,, | Performed by: PHYSICIAN ASSISTANT

## 2021-09-02 PROCEDURE — U0002 COVID-19 LAB TEST NON-CDC: HCPCS | Mod: QW,S$GLB,, | Performed by: PHYSICIAN ASSISTANT

## 2021-09-02 RX ORDER — CHLORPHENIRAMINE MALEATE AND DEXTROMETHORPHAN HYDROBROMIDE 4; 30 MG/1; MG/1
TABLET, FILM COATED ORAL
COMMUNITY
End: 2023-05-01 | Stop reason: ALTCHOICE

## 2021-09-02 RX ORDER — DIAZEPAM 10 MG/1
TABLET ORAL EVERY 12 HOURS PRN
COMMUNITY
Start: 2021-08-07

## 2021-09-02 RX ORDER — PROMETHAZINE HYDROCHLORIDE AND CODEINE PHOSPHATE 6.25; 1 MG/5ML; MG/5ML
5 SOLUTION ORAL EVERY 6 HOURS PRN
Qty: 118 ML | Refills: 0 | Status: SHIPPED | OUTPATIENT
Start: 2021-09-02 | End: 2022-04-13 | Stop reason: SDUPTHER

## 2021-09-19 DIAGNOSIS — R10.9 ABDOMINAL PAIN, ACUTE: ICD-10-CM

## 2021-09-19 DIAGNOSIS — K58.2 IRRITABLE BOWEL SYNDROME WITH BOTH CONSTIPATION AND DIARRHEA: ICD-10-CM

## 2021-09-20 RX ORDER — DICYCLOMINE HYDROCHLORIDE 10 MG/1
CAPSULE ORAL
Qty: 40 CAPSULE | Refills: 0 | Status: SHIPPED | OUTPATIENT
Start: 2021-09-20 | End: 2021-11-14 | Stop reason: SDUPTHER

## 2021-09-27 ENCOUNTER — TELEPHONE (OUTPATIENT)
Dept: ADMINISTRATIVE | Facility: HOSPITAL | Age: 61
End: 2021-09-27

## 2021-10-08 ENCOUNTER — TELEPHONE (OUTPATIENT)
Dept: ADMINISTRATIVE | Facility: HOSPITAL | Age: 61
End: 2021-10-08

## 2021-11-12 ENCOUNTER — IMMUNIZATION (OUTPATIENT)
Dept: PHARMACY | Facility: CLINIC | Age: 61
End: 2021-11-12
Payer: COMMERCIAL

## 2021-11-12 DIAGNOSIS — Z23 NEED FOR VACCINATION: Primary | ICD-10-CM

## 2021-11-14 DIAGNOSIS — R10.9 ABDOMINAL PAIN, ACUTE: ICD-10-CM

## 2021-11-14 DIAGNOSIS — K21.9 GASTROESOPHAGEAL REFLUX DISEASE: ICD-10-CM

## 2021-11-14 DIAGNOSIS — K58.2 IRRITABLE BOWEL SYNDROME WITH BOTH CONSTIPATION AND DIARRHEA: ICD-10-CM

## 2021-11-16 RX ORDER — DICYCLOMINE HYDROCHLORIDE 10 MG/1
CAPSULE ORAL
Qty: 40 CAPSULE | Refills: 0 | Status: SHIPPED | OUTPATIENT
Start: 2021-11-16 | End: 2022-02-01 | Stop reason: SDUPTHER

## 2021-11-16 RX ORDER — PANTOPRAZOLE SODIUM 40 MG/1
40 TABLET, DELAYED RELEASE ORAL DAILY
Qty: 90 TABLET | Refills: 0 | Status: SHIPPED | OUTPATIENT
Start: 2021-11-16 | End: 2022-01-27 | Stop reason: SDUPTHER

## 2021-12-21 ENCOUNTER — TELEPHONE (OUTPATIENT)
Dept: INTERNAL MEDICINE | Facility: CLINIC | Age: 61
End: 2021-12-21
Payer: COMMERCIAL

## 2021-12-22 ENCOUNTER — OFFICE VISIT (OUTPATIENT)
Dept: PRIMARY CARE CLINIC | Facility: CLINIC | Age: 61
End: 2021-12-22
Payer: COMMERCIAL

## 2021-12-22 VITALS
OXYGEN SATURATION: 96 % | BODY MASS INDEX: 34.56 KG/M2 | DIASTOLIC BLOOD PRESSURE: 78 MMHG | SYSTOLIC BLOOD PRESSURE: 126 MMHG | HEART RATE: 119 BPM | TEMPERATURE: 98 F | WEIGHT: 188.94 LBS

## 2021-12-22 DIAGNOSIS — J06.9 UPPER RESPIRATORY TRACT INFECTION, UNSPECIFIED TYPE: ICD-10-CM

## 2021-12-22 DIAGNOSIS — R05.9 COUGH: ICD-10-CM

## 2021-12-22 DIAGNOSIS — J02.9 SORE THROAT: Primary | ICD-10-CM

## 2021-12-22 LAB
CTP QC/QA: YES
CTP QC/QA: YES
S PYO RRNA THROAT QL PROBE: NEGATIVE
SARS-COV-2 RDRP RESP QL NAA+PROBE: NEGATIVE

## 2021-12-22 PROCEDURE — 3044F PR MOST RECENT HEMOGLOBIN A1C LEVEL <7.0%: ICD-10-PCS | Mod: CPTII,S$GLB,, | Performed by: NURSE PRACTITIONER

## 2021-12-22 PROCEDURE — 99213 OFFICE O/P EST LOW 20 MIN: CPT | Mod: S$GLB,,, | Performed by: NURSE PRACTITIONER

## 2021-12-22 PROCEDURE — 4010F PR ACE/ARB THEARPY RXD/TAKEN: ICD-10-PCS | Mod: CPTII,S$GLB,, | Performed by: NURSE PRACTITIONER

## 2021-12-22 PROCEDURE — 87081 CULTURE SCREEN ONLY: CPT | Performed by: NURSE PRACTITIONER

## 2021-12-22 PROCEDURE — 1159F PR MEDICATION LIST DOCUMENTED IN MEDICAL RECORD: ICD-10-PCS | Mod: CPTII,S$GLB,, | Performed by: NURSE PRACTITIONER

## 2021-12-22 PROCEDURE — 99999 PR PBB SHADOW E&M-EST. PATIENT-LVL IV: CPT | Mod: PBBFAC,,, | Performed by: NURSE PRACTITIONER

## 2021-12-22 PROCEDURE — 1160F PR REVIEW ALL MEDS BY PRESCRIBER/CLIN PHARMACIST DOCUMENTED: ICD-10-PCS | Mod: CPTII,S$GLB,, | Performed by: NURSE PRACTITIONER

## 2021-12-22 PROCEDURE — 3008F PR BODY MASS INDEX (BMI) DOCUMENTED: ICD-10-PCS | Mod: CPTII,S$GLB,, | Performed by: NURSE PRACTITIONER

## 2021-12-22 PROCEDURE — U0002 COVID-19 LAB TEST NON-CDC: HCPCS | Mod: QW,S$GLB,, | Performed by: NURSE PRACTITIONER

## 2021-12-22 PROCEDURE — 3074F PR MOST RECENT SYSTOLIC BLOOD PRESSURE < 130 MM HG: ICD-10-PCS | Mod: CPTII,S$GLB,, | Performed by: NURSE PRACTITIONER

## 2021-12-22 PROCEDURE — 99213 PR OFFICE/OUTPT VISIT, EST, LEVL III, 20-29 MIN: ICD-10-PCS | Mod: S$GLB,,, | Performed by: NURSE PRACTITIONER

## 2021-12-22 PROCEDURE — 3074F SYST BP LT 130 MM HG: CPT | Mod: CPTII,S$GLB,, | Performed by: NURSE PRACTITIONER

## 2021-12-22 PROCEDURE — U0002: ICD-10-PCS | Mod: QW,S$GLB,, | Performed by: NURSE PRACTITIONER

## 2021-12-22 PROCEDURE — 1159F MED LIST DOCD IN RCRD: CPT | Mod: CPTII,S$GLB,, | Performed by: NURSE PRACTITIONER

## 2021-12-22 PROCEDURE — 4010F ACE/ARB THERAPY RXD/TAKEN: CPT | Mod: CPTII,S$GLB,, | Performed by: NURSE PRACTITIONER

## 2021-12-22 PROCEDURE — 3078F DIAST BP <80 MM HG: CPT | Mod: CPTII,S$GLB,, | Performed by: NURSE PRACTITIONER

## 2021-12-22 PROCEDURE — 87880 POCT RAPID STREP A: ICD-10-PCS | Mod: QW,S$GLB,, | Performed by: NURSE PRACTITIONER

## 2021-12-22 PROCEDURE — 3008F BODY MASS INDEX DOCD: CPT | Mod: CPTII,S$GLB,, | Performed by: NURSE PRACTITIONER

## 2021-12-22 PROCEDURE — 3044F HG A1C LEVEL LT 7.0%: CPT | Mod: CPTII,S$GLB,, | Performed by: NURSE PRACTITIONER

## 2021-12-22 PROCEDURE — 99999 PR PBB SHADOW E&M-EST. PATIENT-LVL IV: ICD-10-PCS | Mod: PBBFAC,,, | Performed by: NURSE PRACTITIONER

## 2021-12-22 PROCEDURE — 1160F RVW MEDS BY RX/DR IN RCRD: CPT | Mod: CPTII,S$GLB,, | Performed by: NURSE PRACTITIONER

## 2021-12-22 PROCEDURE — 3078F PR MOST RECENT DIASTOLIC BLOOD PRESSURE < 80 MM HG: ICD-10-PCS | Mod: CPTII,S$GLB,, | Performed by: NURSE PRACTITIONER

## 2021-12-22 PROCEDURE — 87880 STREP A ASSAY W/OPTIC: CPT | Mod: QW,S$GLB,, | Performed by: NURSE PRACTITIONER

## 2021-12-22 RX ORDER — ESTAZOLAM 2 MG/1
TABLET ORAL
COMMUNITY
End: 2021-12-22

## 2021-12-22 RX ORDER — OXYCODONE HYDROCHLORIDE AND ACETAMINOPHEN 10; 300 MG/5ML; MG/5ML
SOLUTION ORAL
COMMUNITY
End: 2021-12-22 | Stop reason: ALTCHOICE

## 2021-12-22 RX ORDER — HYDROCODONE BITARTRATE AND ACETAMINOPHEN 7.5; 325 MG/1; MG/1
TABLET ORAL
COMMUNITY
End: 2021-12-22 | Stop reason: ALTCHOICE

## 2021-12-22 RX ORDER — LOSARTAN POTASSIUM 100 %
POWDER (GRAM) MISCELLANEOUS
COMMUNITY
End: 2022-03-02 | Stop reason: SDUPTHER

## 2021-12-22 RX ORDER — CODEINE PHOSPHATE AND GUAIFENESIN 10; 100 MG/5ML; MG/5ML
5 SOLUTION ORAL EVERY 6 HOURS PRN
Qty: 118 ML | Refills: 0 | Status: SHIPPED | OUTPATIENT
Start: 2021-12-22 | End: 2021-12-29

## 2021-12-22 RX ORDER — PANTOPRAZOLE SODIUM 40 MG/1
TABLET, DELAYED RELEASE ORAL
COMMUNITY
End: 2022-03-02 | Stop reason: SDUPTHER

## 2021-12-22 RX ORDER — AZITHROMYCIN 250 MG/1
TABLET, FILM COATED ORAL
Qty: 6 TABLET | Refills: 0 | Status: SHIPPED | OUTPATIENT
Start: 2021-12-22 | End: 2021-12-27

## 2021-12-22 RX ORDER — TRIAZOLAM 0.25 MG/1
TABLET ORAL
COMMUNITY
End: 2021-12-22

## 2021-12-22 RX ORDER — CYCLOBENZAPRINE HCL 10 MG
TABLET ORAL
COMMUNITY
End: 2021-12-22

## 2021-12-26 LAB — BACTERIA THROAT CULT: NORMAL

## 2022-01-11 ENCOUNTER — OFFICE VISIT (OUTPATIENT)
Dept: INTERNAL MEDICINE | Facility: CLINIC | Age: 62
End: 2022-01-11
Payer: COMMERCIAL

## 2022-01-11 VITALS
DIASTOLIC BLOOD PRESSURE: 98 MMHG | WEIGHT: 185.44 LBS | TEMPERATURE: 99 F | SYSTOLIC BLOOD PRESSURE: 132 MMHG | HEIGHT: 62 IN | BODY MASS INDEX: 34.12 KG/M2 | OXYGEN SATURATION: 98 % | HEART RATE: 100 BPM

## 2022-01-11 DIAGNOSIS — R11.0 NAUSEA: ICD-10-CM

## 2022-01-11 DIAGNOSIS — R50.9 FEVER, UNSPECIFIED FEVER CAUSE: Primary | ICD-10-CM

## 2022-01-11 DIAGNOSIS — A08.4 VIRAL GASTROENTERITIS: ICD-10-CM

## 2022-01-11 LAB
CTP QC/QA: YES
SARS-COV-2 RDRP RESP QL NAA+PROBE: NEGATIVE

## 2022-01-11 PROCEDURE — 1160F PR REVIEW ALL MEDS BY PRESCRIBER/CLIN PHARMACIST DOCUMENTED: ICD-10-PCS | Mod: CPTII,S$GLB,, | Performed by: PHYSICIAN ASSISTANT

## 2022-01-11 PROCEDURE — 3008F PR BODY MASS INDEX (BMI) DOCUMENTED: ICD-10-PCS | Mod: CPTII,S$GLB,, | Performed by: PHYSICIAN ASSISTANT

## 2022-01-11 PROCEDURE — 3080F PR MOST RECENT DIASTOLIC BLOOD PRESSURE >= 90 MM HG: ICD-10-PCS | Mod: CPTII,S$GLB,, | Performed by: PHYSICIAN ASSISTANT

## 2022-01-11 PROCEDURE — 1159F MED LIST DOCD IN RCRD: CPT | Mod: CPTII,S$GLB,, | Performed by: PHYSICIAN ASSISTANT

## 2022-01-11 PROCEDURE — 3075F SYST BP GE 130 - 139MM HG: CPT | Mod: CPTII,S$GLB,, | Performed by: PHYSICIAN ASSISTANT

## 2022-01-11 PROCEDURE — U0002: ICD-10-PCS | Mod: QW,S$GLB,, | Performed by: PHYSICIAN ASSISTANT

## 2022-01-11 PROCEDURE — U0002 COVID-19 LAB TEST NON-CDC: HCPCS | Mod: QW,S$GLB,, | Performed by: PHYSICIAN ASSISTANT

## 2022-01-11 PROCEDURE — 99214 OFFICE O/P EST MOD 30 MIN: CPT | Mod: S$GLB,,, | Performed by: PHYSICIAN ASSISTANT

## 2022-01-11 PROCEDURE — 3075F PR MOST RECENT SYSTOLIC BLOOD PRESS GE 130-139MM HG: ICD-10-PCS | Mod: CPTII,S$GLB,, | Performed by: PHYSICIAN ASSISTANT

## 2022-01-11 PROCEDURE — 99999 PR PBB SHADOW E&M-EST. PATIENT-LVL IV: ICD-10-PCS | Mod: PBBFAC,,, | Performed by: PHYSICIAN ASSISTANT

## 2022-01-11 PROCEDURE — 99214 PR OFFICE/OUTPT VISIT, EST, LEVL IV, 30-39 MIN: ICD-10-PCS | Mod: S$GLB,,, | Performed by: PHYSICIAN ASSISTANT

## 2022-01-11 PROCEDURE — 1159F PR MEDICATION LIST DOCUMENTED IN MEDICAL RECORD: ICD-10-PCS | Mod: CPTII,S$GLB,, | Performed by: PHYSICIAN ASSISTANT

## 2022-01-11 PROCEDURE — 3080F DIAST BP >= 90 MM HG: CPT | Mod: CPTII,S$GLB,, | Performed by: PHYSICIAN ASSISTANT

## 2022-01-11 PROCEDURE — 99999 PR PBB SHADOW E&M-EST. PATIENT-LVL IV: CPT | Mod: PBBFAC,,, | Performed by: PHYSICIAN ASSISTANT

## 2022-01-11 PROCEDURE — 3008F BODY MASS INDEX DOCD: CPT | Mod: CPTII,S$GLB,, | Performed by: PHYSICIAN ASSISTANT

## 2022-01-11 PROCEDURE — 1160F RVW MEDS BY RX/DR IN RCRD: CPT | Mod: CPTII,S$GLB,, | Performed by: PHYSICIAN ASSISTANT

## 2022-01-11 RX ORDER — PROMETHAZINE HYDROCHLORIDE 25 MG/1
25 TABLET ORAL EVERY 8 HOURS PRN
Qty: 20 TABLET | Refills: 0 | Status: SHIPPED | OUTPATIENT
Start: 2022-01-11 | End: 2022-01-18

## 2022-01-11 NOTE — PATIENT INSTRUCTIONS
"Patient Education       Viral Gastroenteritis   The Basics   Written by the doctors and editors at [facility]   What is viral gastroenteritis? -- Viral gastroenteritis is an infection that can cause diarrhea and vomiting. It happens when a person's stomach and intestines get infected with a virus (figure 1). Both adults and children can get viral gastroenteritis.  People can get the infection if they:  · Touch an infected person or a surface with the virus on it, and then don't wash their hands  · Eat foods or drink liquids with the virus in them. If people with the virus don't wash their hands, they can spread it to food or liquids they touch.  What are the symptoms of viral gastroenteritis? -- The infection causes diarrhea and vomiting. People can have either diarrhea or vomiting, or both. These symptoms usually start suddenly, and can be severe.  Viral gastroenteritis can also cause:  · A fever  · A headache or muscle aches  · Belly pain or cramping  · A loss of appetite  If you have diarrhea and vomiting, your body can lose too much water. Doctors call this "dehydration." Dehydration can make you feel thirsty, tired, dizzy, or confused. It can also make your urine look dark yellow.  Severe dehydration can be life-threatening. Babies, young children, and elderly people are more likely to get severe dehydration.  Do people with viral gastroenteritis need tests? -- Not usually. Their doctor or nurse should be able to tell if they have it by learning about their symptoms and doing an exam. But the doctor or nurse might do tests to check for dehydration or to see which virus is causing the infection. These tests can include:  · Blood tests  · Urine tests  · Tests on a sample of bowel movement  Is there anything I can do on my own to feel better or help my child? -- Yes. People with viral gastroenteritis need to drink enough fluids so they don't get dehydrated.  Some fluids help prevent dehydration better than " "others:  · Older children and adults can drink sports drinks.  · You can give babies and young children an "oral rehydration solution," such as Pedialyte. You can buy this in a store or pharmacy. If your child is vomiting, you can try to give your child a few teaspoons of fluid every few minutes.  · Babies who breastfeed can continue to breastfeed.  People with viral gastroenteritis should avoid drinks with a lot of sugar, like juice or soda. These can make diarrhea worse.  If you can keep food down, it's best to eat lean meats, fruits, vegetables, and whole-grain breads and cereals. Avoid eating foods with a lot of fat or sugar, which can make symptoms worse.  If you are an adult younger than 65 and you have a new bout of diarrhea, and no fever and no blood in your bowel movements, you can take medicine to stop diarrhea such as loperamide (brand name: Imodium) for 1 to 2 days. But if you are older than 65, have a fever, or have blood in your bowel movements, do not take these medicines without checking with your doctor.  Do not give medicines to stop diarrhea to children.  Should I call the doctor or nurse? -- Call the doctor or nurse if you or your child:  · Has any symptoms of dehydration  · Has diarrhea or vomiting that lasts longer than a few days  · Vomits up blood, has bloody diarrhea, or has severe belly pain  · Hasn't had anything to drink in a few hours (for children), or in many hours (for adults)  · Hasn't needed to urinate in the past 6 to 8 hours (during the day), or if your baby or young child hasn't had a wet diaper for 4 to 6 hours  How is viral gastroenteritis treated? -- Most people do not need any treatment, because their symptoms will get better on their own. But people with severe dehydration might need treatment with intravenous fluids. This involves getting fluids through an "IV" (a thin tube that goes into the vein).  Doctors do not treat viral gastroenteritis with antibiotics. That's " "because antibiotics treat infections that are caused by bacteria - not viruses.  Can viral gastroenteritis be prevented? -- Sometimes. To lower the chance of getting or spreading the infection, you can:  · Wash your hands with soap and water after you use the bathroom or change your child's diaper, and before you eat.  · Avoid changing your child's diaper near where you prepare food.  · Make sure your baby gets the rotavirus vaccine. Vaccines can prevent certain serious or deadly infections. Rotavirus is a virus that commonly causes viral gastroenteritis in children.  All topics are updated as new evidence becomes available and our peer review process is complete.  This topic retrieved from [employer] on: Sep 21, 2021.  Topic 11332 Version 11.0  Release: 29.4.2 - C29.263  © 2021 UpToDate, Inc. and/or its affiliates. All rights reserved.  figure 1: Digestive system     This drawing shows the organs in the body that process food. Together these organs are called "the digestive system," or "digestive tract." As food travels through this system, the body absorbs nutrients and water.  Graphic 00909 Version 4.0    Consumer Information Use and Disclaimer   This information is not specific medical advice and does not replace information you receive from your health care provider. This is only a brief summary of general information. It does NOT include all information about conditions, illnesses, injuries, tests, procedures, treatments, therapies, discharge instructions or life-style choices that may apply to you. You must talk with your health care provider for complete information about your health and treatment options. This information should not be used to decide whether or not to accept your health care provider's advice, instructions or recommendations. Only your health care provider has the knowledge and training to provide advice that is right for you. The use of this information is governed by the [employer] End User " License Agreement, available at [email or web address]://[email or web address]/en/solutions/lexicomp/about/chidi.The use of "[employer]" content is governed by the "[employer]" Terms of Use. ©2021 UpToDate, Inc. All rights reserved.  Copyright   © 2021 UpToDate, Inc. and/or its affiliates. All rights reserved.  Patient Education       Viral Gastroenteritis Discharge Instructions, Adult   About this topic   The stomach flu is also known as viral gastroenteritis. It is an infection caused by a virus. You may feel sick to your stomach or throw up. The medical terms for this are nausea and vomiting. You may also have loose stools, belly pain, or cramping. You may not be hungry. Some people have a fever or muscle aches. Viral gastroenteritis is easy to spread from person to person.           What care is needed at home?   · Ask your doctor what you need to do when you go home. Make sure you ask questions if you do not understand what the doctor says. This way you will know what you need to do.  · Drink small amounts of fluid every 15 to 30 minutes. Good fluids to drink are water, broth, sports drinks, and oral electrolyte solutions. It is also important to eat if you are able to keep food down.  · Avoid beer, wine, and mixed drinks.  · Check your blood sugar more often if you have high blood sugar.  · If you are throwing up, try to drink if you can until you are able to eat small amounts of food.  · If you cannot keep fluids down, suck on ice chips.  · If you have loose stools, try to drink extra fluids to replace the water your body has lost.  · If you are breastfeeding, keep feeding your baby as you normally would.  · Avoid sharing your food and drinks.  · Stay away from others until the throwing up or loose stools have stopped.  · Follow good hygiene practices. Wash your hands often with soap and water for at least 20 seconds. Alcohol-based hand sanitizers also work to kill the virus. This is very important:  ? After using the  bathroom  ? Before eating  ? Before cooking  What follow-up care is needed?   Your doctor may ask you to make visits to the office to check on your progress. Be sure to keep these visits.  What drugs may be needed?   Your doctor may not need to order drugs. Be sure to ask your doctor before you take any over-the-counter (OTC) or other drugs and treatments.  If your signs are very bad or last for more than a few days, the doctor may order IV fluids or drugs to:  · Fight an infection  · Stop loose stools  · Lower fever  · Stop throwing up  Do not take antibiotics unless your doctor orders them.  Will physical activity be limited?   Your physical activity will not be limited. Make sure you get lots of rest. You may not be able to travel or go to work until the loose stools and throwing up have stopped for 24 hours.  What changes to diet are needed?   · Take small sips of fluids often. Eat foods that have high water content like broth, Jello, and popsicles.  · Avoid liquids such as soda, ginger ale, tea, fruit juice, and drinks with caffeine. These can make fluid loss worse. Ask your doctor what foods and drinks are safe for you.  · You can eat a variety of food as your appetite gets better. Watch to see if some foods seem to make diarrhea or other symptoms worse, such as dairy products.  · Avoid fatty and sugary foods such as cakes, chocolates, ice cream, and take out foods.  What problems could happen?   · Too much fluid loss  What can be done to prevent this health problem?   · Avoid close contact with people who have this illness.  · Clean things and surfaces in your home like door handles and phones. Use bleach to clean the area. This can help lower the spread of infection.  · Do not share personal things like toothbrushes, towels, and drinking glasses.  · Take extra care when traveling. Drink bottled water only and do not eat raw foods.  · Consider being vaccinated against certain viral infections. Ask your doctor  about the shots that you need.  When do I need to call the doctor?   · You feel very weak, like you cant stand up, and your skin is cool, clammy, or looks blue or gray.  · You have severe abdominal pain.  · You have chest pain or trouble breathing.  · You have signs of severe fluid loss, such as:  ? No urine for more than 8 hours.  ? You feel very lightheaded or like you are going to pass out.  ? You feel weak like you are going to fall.  ? You are not able to keep any fluids down.  · You develop early signs of fluid loss again, such as:  ? Your urine is very dark colored.  ? Your mouth is dry.  ? You have muscle cramps.  ? You have a lack of energy.  ? You feel light-headed when you get up.  Teach Back: Helping You Understand   The Teach Back Method helps you understand the information we are giving you. The idea is simple. After talking with the staff, tell them in your own words what you were just told. This helps to make sure the staff has covered each thing clearly. It also helps to explain things that may have been a bit confusing. Before going home, make sure you are able to do these:  · I can tell you about my condition.  · I can tell you how often I should try to drink fluids and good kinds of fluids to drink.  · I can tell you what I will do if I have trouble keeping fluids down.  Where can I learn more?   American Academy of Family Physicians  [email or web address]   National Digestive Diseases Information Clearinghouse  http://digestive.niddk.nih.gov/ddiseases/pubs/viralgastroenteritis/   Last Reviewed Date   2021-06-09  Consumer Information Use and Disclaimer   This information is not specific medical advice and does not replace information you receive from your health care provider. This is only a brief summary of general information. It does NOT include all information about conditions, illnesses, injuries, tests, procedures, treatments, therapies,  discharge instructions or life-style choices that may apply to you. You must talk with your health care provider for complete information about your health and treatment options. This information should not be used to decide whether or not to accept your health care providers advice, instructions or recommendations. Only your health care provider has the knowledge and training to provide advice that is right for you.  Copyright   Copyright © 2021 UpToDate, Inc. and its affiliates and/or licensors. All rights reserved.  Patient Education       Viral Gastroenteritis Discharge Instructions, Adult   About this topic   The stomach flu is also known as viral gastroenteritis. It is an infection caused by a virus. You may feel sick to your stomach or throw up. The medical terms for this are nausea and vomiting. You may also have loose stools, belly pain, or cramping. You may not be hungry. Some people have a fever or muscle aches. Viral gastroenteritis is easy to spread from person to person.           What care is needed at home?   · Ask your doctor what you need to do when you go home. Make sure you ask questions if you do not understand what the doctor says. This way you will know what you need to do.  · Drink small amounts of fluid every 15 to 30 minutes. Good fluids to drink are water, broth, sports drinks, and oral electrolyte solutions. It is also important to eat if you are able to keep food down.  · Avoid beer, wine, and mixed drinks.  · Check your blood sugar more often if you have high blood sugar.  · If you are throwing up, try to drink if you can until you are able to eat small amounts of food.  · If you cannot keep fluids down, suck on ice chips.  · If you have loose stools, try to drink extra fluids to replace the water your body has lost.  · If you are breastfeeding, keep feeding your baby as you normally would.  · Avoid sharing your food and drinks.  · Stay away from others until the throwing up or loose  stools have stopped.  · Follow good hygiene practices. Wash your hands often with soap and water for at least 20 seconds. Alcohol-based hand sanitizers also work to kill the virus. This is very important:  ? After using the bathroom  ? Before eating  ? Before cooking  What follow-up care is needed?   Your doctor may ask you to make visits to the office to check on your progress. Be sure to keep these visits.  What drugs may be needed?   Your doctor may not need to order drugs. Be sure to ask your doctor before you take any over-the-counter (OTC) or other drugs and treatments.  If your signs are very bad or last for more than a few days, the doctor may order IV fluids or drugs to:  · Fight an infection  · Stop loose stools  · Lower fever  · Stop throwing up  Do not take antibiotics unless your doctor orders them.  Will physical activity be limited?   Your physical activity will not be limited. Make sure you get lots of rest. You may not be able to travel or go to work until the loose stools and throwing up have stopped for 24 hours.  What changes to diet are needed?   · Take small sips of fluids often. Eat foods that have high water content like broth, Jello, and popsicles.  · Avoid liquids such as soda, ginger ale, tea, fruit juice, and drinks with caffeine. These can make fluid loss worse. Ask your doctor what foods and drinks are safe for you.  · You can eat a variety of food as your appetite gets better. Watch to see if some foods seem to make diarrhea or other symptoms worse, such as dairy products.  · Avoid fatty and sugary foods such as cakes, chocolates, ice cream, and take out foods.  What problems could happen?   · Too much fluid loss  What can be done to prevent this health problem?   · Avoid close contact with people who have this illness.  · Clean things and surfaces in your home like door handles and phones. Use bleach to clean the area. This can help lower the spread of infection.  · Do not share  personal things like toothbrushes, towels, and drinking glasses.  · Take extra care when traveling. Drink bottled water only and do not eat raw foods.  · Consider being vaccinated against certain viral infections. Ask your doctor about the shots that you need.  When do I need to call the doctor?   · You feel very weak, like you cant stand up, and your skin is cool, clammy, or looks blue or gray.  · You have severe abdominal pain.  · You have chest pain or trouble breathing.  · You have signs of severe fluid loss, such as:  ? No urine for more than 8 hours.  ? You feel very lightheaded or like you are going to pass out.  ? You feel weak like you are going to fall.  ? You are not able to keep any fluids down.  · You develop early signs of fluid loss again, such as:  ? Your urine is very dark colored.  ? Your mouth is dry.  ? You have muscle cramps.  ? You have a lack of energy.  ? You feel light-headed when you get up.  Teach Back: Helping You Understand   The Teach Back Method helps you understand the information we are giving you. The idea is simple. After talking with the staff, tell them in your own words what you were just told. This helps to make sure the staff has covered each thing clearly. It also helps to explain things that may have been a bit confusing. Before going home, make sure you are able to do these:  · I can tell you about my condition.  · I can tell you how often I should try to drink fluids and good kinds of fluids to drink.  · I can tell you what I will do if I have trouble keeping fluids down.  Where can I learn more?   American Academy of Family Physicians  [email or web address]   National Digestive Diseases Information Clearinghouse  http://digestive.niddk.nih.gov/ddiseases/pubs/viralgastroenteritis/   Last Reviewed Date   2021-06-09  Consumer Information Use and Disclaimer   This information is not specific medical advice and does not replace  "information you receive from your health care provider. This is only a brief summary of general information. It does NOT include all information about conditions, illnesses, injuries, tests, procedures, treatments, therapies, discharge instructions or life-style choices that may apply to you. You must talk with your health care provider for complete information about your health and treatment options. This information should not be used to decide whether or not to accept your health care providers advice, instructions or recommendations. Only your health care provider has the knowledge and training to provide advice that is right for you.  Copyright   Copyright © 2021 UpToDate, Inc. and its affiliates and/or licensors. All rights reserved.  Patient Education       Viral Gastroenteritis   The Basics   Written by the doctors and editors at [employer]   What is viral gastroenteritis? -- Viral gastroenteritis is an infection that can cause diarrhea and vomiting. It happens when a person's stomach and intestines get infected with a virus (figure 1). Both adults and children can get viral gastroenteritis.  People can get the infection if they:  · Touch an infected person or a surface with the virus on it, and then don't wash their hands  · Eat foods or drink liquids with the virus in them. If people with the virus don't wash their hands, they can spread it to food or liquids they touch.  What are the symptoms of viral gastroenteritis? -- The infection causes diarrhea and vomiting. People can have either diarrhea or vomiting, or both. These symptoms usually start suddenly, and can be severe.  Viral gastroenteritis can also cause:  · A fever  · A headache or muscle aches  · Belly pain or cramping  · A loss of appetite  If you have diarrhea and vomiting, your body can lose too much water. Doctors call this "dehydration." Dehydration can make you feel thirsty, tired, dizzy, or confused. It can also make your urine look dark " "yellow.  Severe dehydration can be life-threatening. Babies, young children, and elderly people are more likely to get severe dehydration.  Do people with viral gastroenteritis need tests? -- Not usually. Their doctor or nurse should be able to tell if they have it by learning about their symptoms and doing an exam. But the doctor or nurse might do tests to check for dehydration or to see which virus is causing the infection. These tests can include:  · Blood tests  · Urine tests  · Tests on a sample of bowel movement  Is there anything I can do on my own to feel better or help my child? -- Yes. People with viral gastroenteritis need to drink enough fluids so they don't get dehydrated.  Some fluids help prevent dehydration better than others:  · Older children and adults can drink sports drinks.  · You can give babies and young children an "oral rehydration solution," such as Pedialyte. You can buy this in a store or pharmacy. If your child is vomiting, you can try to give your child a few teaspoons of fluid every few minutes.  · Babies who breastfeed can continue to breastfeed.  People with viral gastroenteritis should avoid drinks with a lot of sugar, like juice or soda. These can make diarrhea worse.  If you can keep food down, it's best to eat lean meats, fruits, vegetables, and whole-grain breads and cereals. Avoid eating foods with a lot of fat or sugar, which can make symptoms worse.  If you are an adult younger than 65 and you have a new bout of diarrhea, and no fever and no blood in your bowel movements, you can take medicine to stop diarrhea such as loperamide (brand name: Imodium) for 1 to 2 days. But if you are older than 65, have a fever, or have blood in your bowel movements, do not take these medicines without checking with your doctor.  Do not give medicines to stop diarrhea to children.  Should I call the doctor or nurse? -- Call the doctor or nurse if you or your child:  · Has any symptoms of " "dehydration  · Has diarrhea or vomiting that lasts longer than a few days  · Vomits up blood, has bloody diarrhea, or has severe belly pain  · Hasn't had anything to drink in a few hours (for children), or in many hours (for adults)  · Hasn't needed to urinate in the past 6 to 8 hours (during the day), or if your baby or young child hasn't had a wet diaper for 4 to 6 hours  How is viral gastroenteritis treated? -- Most people do not need any treatment, because their symptoms will get better on their own. But people with severe dehydration might need treatment with intravenous fluids. This involves getting fluids through an "IV" (a thin tube that goes into the vein).  Doctors do not treat viral gastroenteritis with antibiotics. That's because antibiotics treat infections that are caused by bacteria - not viruses.  Can viral gastroenteritis be prevented? -- Sometimes. To lower the chance of getting or spreading the infection, you can:  · Wash your hands with soap and water after you use the bathroom or change your child's diaper, and before you eat.  · Avoid changing your child's diaper near where you prepare food.  · Make sure your baby gets the rotavirus vaccine. Vaccines can prevent certain serious or deadly infections. Rotavirus is a virus that commonly causes viral gastroenteritis in children.  All topics are updated as new evidence becomes available and our peer review process is complete.  This topic retrieved from [employer] on: Sep 21, 2021.  Topic 02073 Version 11.0  Release: 29.4.2 - C29.263  © 2021 UpToDate, Inc. and/or its affiliates. All rights reserved.  figure 1: Digestive system     This drawing shows the organs in the body that process food. Together these organs are called "the digestive system," or "digestive tract." As food travels through this system, the body absorbs nutrients and water.  Graphic 15281 Version 4.0    Consumer Information Use and Disclaimer   This information is not specific " medical advice and does not replace information you receive from your health care provider. This is only a brief summary of general information. It does NOT include all information about conditions, illnesses, injuries, tests, procedures, treatments, therapies, discharge instructions or life-style choices that may apply to you. You must talk with your health care provider for complete information about your health and treatment options. This information should not be used to decide whether or not to accept your health care provider's advice, instructions or recommendations. Only your health care provider has the knowledge and training to provide advice that is right for you. The use of this information is governed by the [employer] End User License Agreement, available at https://www.[employer].iFlexMe/en/solutions/Coupon Wallet/about/chidi.The use of [employer] content is governed by the [employer] Terms of Use. ©2021 Up[employer]Date[employer] Inc. All rights reserved.  Copyright   © 2021 UpToDate, Inc. and/or its affiliates. All rights reserved.

## 2022-01-11 NOTE — PROGRESS NOTES
Subjective:      Patient ID: Miles Bowen is a 61 y.o. female.    Chief Complaint: Nausea and Diarrhea    Diarrhea started last night/this morning.     Abdominal Pain  This is a chronic problem. The current episode started yesterday. The onset quality is gradual. The problem occurs 2 to 4 times per day. The problem has been gradually worsening. The pain is located in the LLQ, LUQ, RLQ, RUQ, epigastric region and generalized abdominal region. The pain is at a severity of 7/10. The pain is moderate. The quality of the pain is aching, burning and dull. Associated symptoms include anorexia, diarrhea (just watery 10 times a day), headaches, myalgias and nausea. Pertinent negatives include no arthralgias, belching, constipation, dysuria, fever, flatus, frequency, hematochezia, hematuria, melena, vomiting or weight loss. The pain is relieved by nothing. She has tried acetaminophen and antacids for the symptoms. The treatment provided no relief. Her past medical history is significant for GERD and irritable bowel syndrome. Patient's medical history includes kidney stones and UTI.   Palpitations since this morning.   Still urinating but slightly dark and not as frequent as normal.   VERY ANXIOUS AND WORRIED.     Subjective fever. Pt temp not over 99.9.     Patient Active Problem List   Diagnosis    Hypertension    Depression    Anxiety    Hematemesis without nausea    GERD (gastroesophageal reflux disease)    Hiatal hernia    DDD (degenerative disc disease), lumbar    Common migraine    Allergic rhinitis    IBS (irritable bowel syndrome)    Multinodular goiter    MVP (mitral valve prolapse)    Nasal polyps    Obesity       Current Outpatient Medications:     ACETAMINOPHEN (TYLENOL EXTRA STRENGTH ORAL), Take 2 tablets by mouth every 4 to 6 hours as needed., Disp: , Rfl:     baclofen (LIORESAL) 10 MG tablet, Take 1 tablet (10 mg total) by mouth 2 (two) times daily as needed (muscle spasm)., Disp: 30  tablet, Rfl: 0    chlorpheniramine-dextromethorp (CORICIDIN HBP COUGH AND COLD) 4-30 mg Tab, Take by mouth as needed., Disp: , Rfl:     diazePAM (VALIUM) 10 MG Tab, every 12 (twelve) hours as needed., Disp: , Rfl:     dicyclomine (BENTYL) 10 MG capsule, Take 1 capsule by mouth 4 times daily before meals and nightly prn, Disp: 40 capsule, Rfl: 0    duloxetine (CYMBALTA) 60 MG capsule, Take 1 capsule by mouth once daily. , Disp: , Rfl: 0    estradiol (ESTRACE) 0.5 MG tablet, Take 0.5 mg by mouth once daily., Disp: , Rfl: 0    hydroCHLOROthiazide (HYDRODIURIL) 12.5 MG Tab, Take 1 tablet (12.5 mg total) by mouth once daily., Disp: 90 tablet, Rfl: 1    losartan (COZAAR) 100 MG tablet, Take 1 tablet by mouth once daily, Disp: 90 tablet, Rfl: 0    losartan potassium, bulk, 100 % Powd, 1 tablet, Disp: , Rfl:     pantoprazole (PROTONIX) 40 MG tablet, Take 1 tablet (40 mg total) by mouth once daily., Disp: 90 tablet, Rfl: 0    pantoprazole (PROTONIX) 40 MG tablet, 1 tablet(s) by mouth daily, Disp: , Rfl:     topiramate (TOPAMAX) 50 MG tablet, once daily. , Disp: , Rfl:     zolpidem (AMBIEN CR) 12.5 MG CR tablet, Take 12.5 mg by mouth nightly., Disp: , Rfl:     promethazine (PHENERGAN) 25 MG tablet, Take 1 tablet (25 mg total) by mouth every 8 (eight) hours as needed for Nausea. For nausea, Disp: 20 tablet, Rfl: 0      Review of Systems   Constitutional: Positive for activity change, appetite change, chills, diaphoresis and fatigue. Negative for fever, unexpected weight change and weight loss.   HENT: Positive for postnasal drip and rhinorrhea. Negative for congestion, hearing loss, sore throat, trouble swallowing and voice change.    Eyes: Negative.  Negative for visual disturbance.   Respiratory: Negative.  Negative for apnea, cough, choking, chest tightness, shortness of breath, wheezing and stridor.    Cardiovascular: Positive for palpitations. Negative for chest pain and leg swelling.   Gastrointestinal:  "Positive for abdominal pain, anorexia, diarrhea (just watery 10 times a day) and nausea. Negative for abdominal distention, anal bleeding, blood in stool, constipation, flatus, hematochezia, melena, rectal pain and vomiting.   Endocrine: Negative for cold intolerance, heat intolerance, polydipsia and polyuria.   Genitourinary: Negative.  Negative for difficulty urinating, dysuria, frequency and hematuria.   Musculoskeletal: Positive for myalgias. Negative for arthralgias, back pain, gait problem, joint swelling, neck pain and neck stiffness.   Skin: Negative for color change, pallor, rash and wound.   Neurological: Positive for headaches. Negative for dizziness, tremors, weakness, light-headedness and numbness.   Hematological: Negative for adenopathy.   Psychiatric/Behavioral: Negative for behavioral problems, confusion, self-injury, sleep disturbance and suicidal ideas. The patient is nervous/anxious.      Objective:   BP (!) 132/98 (BP Location: Right arm, Patient Position: Sitting, BP Method: Large (Manual))   Pulse 100   Temp 98.9 °F (37.2 °C) (Tympanic)   Ht 5' 2" (1.575 m)   Wt 84.1 kg (185 lb 6.5 oz)   SpO2 98%   BMI 33.91 kg/m²     Physical Exam  Vitals reviewed.   Constitutional:       Appearance: She is well-developed and well-nourished.   HENT:      Head: Normocephalic and atraumatic.      Right Ear: External ear normal.      Left Ear: External ear normal.      Nose: Nose normal.      Mouth/Throat:      Mouth: Oropharynx is clear and moist.   Eyes:      Extraocular Movements: EOM normal.      Conjunctiva/sclera: Conjunctivae normal.      Pupils: Pupils are equal, round, and reactive to light.   Cardiovascular:      Rate and Rhythm: Normal rate and regular rhythm.      Heart sounds: Normal heart sounds. No murmur heard.  No friction rub. No gallop.    Pulmonary:      Effort: Pulmonary effort is normal. No respiratory distress.      Breath sounds: Normal breath sounds. No wheezing or rales.   Chest: "      Chest wall: No tenderness.   Abdominal:      General: Bowel sounds are normal. There is no distension.      Palpations: Abdomen is soft.      Tenderness: There is generalized abdominal tenderness. There is no guarding or rebound. Negative signs include Gallegos's sign.   Musculoskeletal:         General: Normal range of motion.      Cervical back: Normal range of motion and neck supple.   Lymphadenopathy:      Cervical: No cervical adenopathy.   Skin:     General: Skin is warm and dry.   Neurological:      Mental Status: She is alert and oriented to person, place, and time.   Psychiatric:         Attention and Perception: Attention and perception normal.         Mood and Affect: Mood is anxious. Affect is tearful.         Speech: Speech normal.         Behavior: Behavior normal.         Thought Content: Thought content normal. Thought content does not include homicidal or suicidal ideation. Thought content does not include homicidal or suicidal plan.         Cognition and Memory: Cognition and memory normal.         Judgment: Judgment normal.         Assessment:     1. Fever, unspecified fever cause    2. Nausea    3. Viral gastroenteritis      Plan:   Fever, unspecified fever cause  -     POCT COVID-19 Rapid Screening  -     POCT Influenza A/B    Nausea  -     promethazine (PHENERGAN) 25 MG tablet; Take 1 tablet (25 mg total) by mouth every 8 (eight) hours as needed for Nausea. For nausea  Dispense: 20 tablet; Refill: 0    Viral gastroenteritis  -     promethazine (PHENERGAN) 25 MG tablet; Take 1 tablet (25 mg total) by mouth every 8 (eight) hours as needed for Nausea. For nausea  Dispense: 20 tablet; Refill: 0  -     POCT Influenza A/B    -IF NO IMPROVEMENT IN 24 HOURS WILL ORDER LABS AND XRAY    Follow up if symptoms worsen or fail to improve.

## 2022-01-27 DIAGNOSIS — K21.9 GASTROESOPHAGEAL REFLUX DISEASE: ICD-10-CM

## 2022-01-27 NOTE — TELEPHONE ENCOUNTER
Care Due:                  Date            Visit Type   Department     Provider  --------------------------------------------------------------------------------                                MYCHART                              FOLLOWUP/OF  [facility]  Last Visit: 07-      [facility] VISIT   MEDICINE       Carlos Paul  Next Visit: None Scheduled  None         None Found                                                            Last  Test          Frequency    Reason                     Performed    Due Date  --------------------------------------------------------------------------------    CMP.........  12 months..  hydroCHLOROthiazide,       Not Found    Overdue                             losartan.................    Powered by [employer]. Reference number: [id number].   1/27/2022 7:39:03 AM CST

## 2022-01-28 RX ORDER — PANTOPRAZOLE SODIUM 40 MG/1
40 TABLET, DELAYED RELEASE ORAL DAILY
Qty: 90 TABLET | Refills: 0 | Status: SHIPPED | OUTPATIENT
Start: 2022-01-28 | End: 2022-04-12 | Stop reason: SDUPTHER

## 2022-02-01 DIAGNOSIS — K58.2 IRRITABLE BOWEL SYNDROME WITH BOTH CONSTIPATION AND DIARRHEA: ICD-10-CM

## 2022-02-01 DIAGNOSIS — R10.9 ABDOMINAL PAIN, ACUTE: ICD-10-CM

## 2022-02-01 RX ORDER — DICYCLOMINE HYDROCHLORIDE 10 MG/1
CAPSULE ORAL
Qty: 40 CAPSULE | Refills: 0 | Status: SHIPPED | OUTPATIENT
Start: 2022-02-01 | End: 2022-03-14 | Stop reason: SDUPTHER

## 2022-02-09 ENCOUNTER — PATIENT OUTREACH (OUTPATIENT)
Dept: ADMINISTRATIVE | Facility: OTHER | Age: 62
End: 2022-02-09
Payer: COMMERCIAL

## 2022-02-09 ENCOUNTER — OFFICE VISIT (OUTPATIENT)
Dept: INTERNAL MEDICINE | Facility: CLINIC | Age: 62
End: 2022-02-09
Payer: COMMERCIAL

## 2022-02-09 DIAGNOSIS — R05.9 COUGH: ICD-10-CM

## 2022-02-09 DIAGNOSIS — R50.9 FEVER, UNSPECIFIED FEVER CAUSE: Primary | ICD-10-CM

## 2022-02-09 DIAGNOSIS — K21.9 GASTROESOPHAGEAL REFLUX DISEASE, UNSPECIFIED WHETHER ESOPHAGITIS PRESENT: ICD-10-CM

## 2022-02-09 DIAGNOSIS — R11.2 NAUSEA AND VOMITING, INTRACTABILITY OF VOMITING NOT SPECIFIED, UNSPECIFIED VOMITING TYPE: ICD-10-CM

## 2022-02-09 DIAGNOSIS — K58.9 IRRITABLE BOWEL SYNDROME, UNSPECIFIED TYPE: ICD-10-CM

## 2022-02-09 PROCEDURE — 1160F PR REVIEW ALL MEDS BY PRESCRIBER/CLIN PHARMACIST DOCUMENTED: ICD-10-PCS | Mod: CPTII,95,, | Performed by: PHYSICIAN ASSISTANT

## 2022-02-09 PROCEDURE — 1160F RVW MEDS BY RX/DR IN RCRD: CPT | Mod: CPTII,95,, | Performed by: PHYSICIAN ASSISTANT

## 2022-02-09 PROCEDURE — 99214 OFFICE O/P EST MOD 30 MIN: CPT | Mod: 95,,, | Performed by: PHYSICIAN ASSISTANT

## 2022-02-09 PROCEDURE — 1159F MED LIST DOCD IN RCRD: CPT | Mod: CPTII,95,, | Performed by: PHYSICIAN ASSISTANT

## 2022-02-09 PROCEDURE — 99214 PR OFFICE/OUTPT VISIT, EST, LEVL IV, 30-39 MIN: ICD-10-PCS | Mod: 95,,, | Performed by: PHYSICIAN ASSISTANT

## 2022-02-09 PROCEDURE — 1159F PR MEDICATION LIST DOCUMENTED IN MEDICAL RECORD: ICD-10-PCS | Mod: CPTII,95,, | Performed by: PHYSICIAN ASSISTANT

## 2022-02-09 RX ORDER — ONDANSETRON 4 MG/1
4 TABLET, ORALLY DISINTEGRATING ORAL EVERY 8 HOURS PRN
Qty: 10 TABLET | Refills: 0 | Status: SHIPPED | OUTPATIENT
Start: 2022-02-09 | End: 2022-02-23

## 2022-02-09 NOTE — PROGRESS NOTES
Subjective:      Patient ID: Miles Bowen is a 61 y.o. female.    Chief Complaint: No chief complaint on file.    The patient location is: [place]   The chief complaint leading to consultation is: cough    Visit type: audiovisual    Face to Face time with patient: 10:06-10:15  20 minutes of total time spent on the encounter, which includes face to face time and non-face to face time preparing to see the patient (eg, review of tests), Obtaining and/or reviewing separately obtained history, Documenting clinical information in the electronic or other health record, Independently interpreting results (not separately reported) and communicating results to the patient/family/caregiver, or Care coordination (not separately reported).     Each patient to whom he or she provides medical services by telemedicine is:  (1) informed of the relationship between the physician and patient and the respective role of any other health care provider with respect to management of the patient; and (2) notified that he or she may decline to receive medical services by telemedicine and may withdraw from such care at any time.    Notes: Patient is known to me, being seen today for cough/congestion x3days.    Patient also reports GI upset with low grade fever for several weeks.     COVID vaccine series and booster completed.    Last visit Jan 2022 with RAGHAV Dudley.  Dx: gastroenteritis at that time.     Review of Systems   Constitutional: Positive for chills and fever (off and on for 2-3wks, low grade, 100).   HENT: Positive for congestion, postnasal drip and rhinorrhea. Negative for ear pain and sore throat.    Respiratory: Positive for cough and shortness of breath. Negative for wheezing.    Cardiovascular: Positive for chest pain.   Gastrointestinal: Positive for abdominal pain (generalized), diarrhea (chronic), nausea and vomiting. Negative for constipation.        +GERD, takes protonix  GI upset x1.5wks  H/o IBS, takes  Jason  Previously followed by GI but has been several years   Genitourinary: Negative for dysuria, frequency and hematuria.   Musculoskeletal: Positive for myalgias.   Skin: Negative for rash.   Allergic/Immunologic: Positive for environmental allergies.   Neurological: Positive for headaches (h/o migraines).   Psychiatric/Behavioral: Positive for sleep disturbance (cough, d/t acid reflux).       Objective:   There were no vitals taken for this visit.  Physical Exam  Constitutional:       General: She is not in acute distress.     Appearance: She is well-developed. She is not ill-appearing or diaphoretic.   HENT:      Head: Normocephalic and atraumatic.      Right Ear: External ear normal.      Left Ear: External ear normal.   Eyes:      General: Lids are normal.         Right eye: No discharge.         Left eye: No discharge.      Conjunctiva/sclera: Conjunctivae normal.      Right eye: Right conjunctiva is not injected.      Left eye: Left conjunctiva is not injected.   Pulmonary:      Effort: Pulmonary effort is normal. No respiratory distress.      Comments: Speaks in full sentences without increased respiratory effort   Skin:     General: Skin is warm and dry.      Findings: No rash.   Neurological:      Mental Status: She is alert and oriented to person, place, and time.   Psychiatric:         Speech: Speech normal.         Behavior: Behavior normal.         Thought Content: Thought content normal.         Judgment: Judgment normal.       Assessment:      1. Fever, unspecified fever cause    2. Cough    3. Nausea and vomiting, intractability of vomiting not specified, unspecified vomiting type    4. Gastroesophageal reflux disease, unspecified whether esophagitis present    5. Irritable bowel syndrome, unspecified type       Plan:   Fever, unspecified fever cause  -     CBC Auto Differential; Future; Expected date: 02/09/2022  -     Comprehensive Metabolic Panel; Future; Expected date: 02/09/2022  -     X-Ray  Chest PA And Lateral; Future; Expected date: 02/09/2022  -     Urinalysis; Future; Expected date: 02/09/2022  -     Urine culture; Future; Expected date: 02/09/2022  -     POCT COVID-19 Rapid Screening  -     Influenza A & B by Molecular    Cough  -     X-Ray Chest PA And Lateral; Future; Expected date: 02/09/2022  -     POCT COVID-19 Rapid Screening    Nausea and vomiting, intractability of vomiting not specified, unspecified vomiting type  -     ondansetron (ZOFRAN-ODT) 4 MG TbDL; Take 1 tablet (4 mg total) by mouth every 8 (eight) hours as needed (nausea).  Dispense: 10 tablet; Refill: 0    Gastroesophageal reflux disease, unspecified whether esophagitis present  -     Ambulatory referral/consult to Gastroenterology; Future; Expected date: 02/16/2022    Irritable bowel syndrome, unspecified type  -     Ambulatory referral/consult to Gastroenterology; Future; Expected date: 02/16/2022      Offer promethazine DM and tessalon, patient declines, states these do not work well for her, she will message her PCP in regards to codeine cough syrup    Additional recommendations pending above eval    GI f/u to be scheduled     Concern for COVID, symptomatic care at this time      Discussed with patient he/she should self quarantine until we have the results of their test     Discussed worsening signs/symptoms and when to return to clinic or go to ED.   Patient expresses understanding and agrees with treatment plan.

## 2022-02-10 ENCOUNTER — PATIENT MESSAGE (OUTPATIENT)
Dept: PRIMARY CARE CLINIC | Facility: CLINIC | Age: 62
End: 2022-02-10
Payer: COMMERCIAL

## 2022-02-10 NOTE — PROGRESS NOTES
Health Maintenance Due   Topic Date Due    Shingles Vaccine (1 of 2) Never done    Influenza Vaccine (1) 09/01/2021     Updates were requested from care everywhere.  Chart was reviewed for overdue Proactive [facility] Encounters ([facility]) topics (CRS, Breast Cancer Screening, Eye exam)  Health Maintenance has been updated.  LIN[facility] immunization registry triggered.  Immunizations were reconciled.

## 2022-02-13 DIAGNOSIS — I10 ESSENTIAL HYPERTENSION: ICD-10-CM

## 2022-02-13 NOTE — TELEPHONE ENCOUNTER
Care Due:                  Date            Visit Type   Department     Provider  --------------------------------------------------------------------------------                                MYCHART                              FOLLOWUP/OF  [facility]  Last Visit: 07-      [facility] VISIT   MEDICINE       Carlos Paul  Next Visit: None Scheduled  None         None Found                                                            Last  Test          Frequency    Reason                     Performed    Due Date  --------------------------------------------------------------------------------    CMP.........  12 months..  hydroCHLOROthiazide,       04- 04-                             losartan.................    Powered by [employer]. Reference number: [id number].   2/13/2022 5:56:20 AM CST

## 2022-02-15 RX ORDER — HYDROCHLOROTHIAZIDE 12.5 MG/1
12.5 TABLET ORAL DAILY
Qty: 90 TABLET | Refills: 1 | Status: SHIPPED | OUTPATIENT
Start: 2022-02-15 | End: 2022-08-16 | Stop reason: SDUPTHER

## 2022-02-15 RX ORDER — BACLOFEN 10 MG/1
10 TABLET ORAL 2 TIMES DAILY PRN
Qty: 30 TABLET | Refills: 0 | Status: SHIPPED | OUTPATIENT
Start: 2022-02-15 | End: 2022-04-11 | Stop reason: SDUPTHER

## 2022-02-22 NOTE — TELEPHONE ENCOUNTER
Called in regards to ortho referral and sister was unable to schedule apt    Refill of lorazepam for trips to family in [place]

## 2022-02-23 ENCOUNTER — LAB VISIT (OUTPATIENT)
Dept: LAB | Facility: HOSPITAL | Age: 62
End: 2022-02-23
Attending: INTERNAL MEDICINE
Payer: COMMERCIAL

## 2022-02-23 ENCOUNTER — OFFICE VISIT (OUTPATIENT)
Dept: INTERNAL MEDICINE | Facility: CLINIC | Age: 62
End: 2022-02-23
Payer: COMMERCIAL

## 2022-02-23 VITALS
HEART RATE: 100 BPM | SYSTOLIC BLOOD PRESSURE: 114 MMHG | DIASTOLIC BLOOD PRESSURE: 84 MMHG | BODY MASS INDEX: 35.57 KG/M2 | WEIGHT: 193.31 LBS | HEIGHT: 62 IN | OXYGEN SATURATION: 98 %

## 2022-02-23 DIAGNOSIS — R30.0 DYSURIA: ICD-10-CM

## 2022-02-23 DIAGNOSIS — R05.9 COUGH: ICD-10-CM

## 2022-02-23 DIAGNOSIS — R53.83 OTHER FATIGUE: ICD-10-CM

## 2022-02-23 DIAGNOSIS — B96.89 ACUTE BACTERIAL RHINOSINUSITIS: Primary | ICD-10-CM

## 2022-02-23 DIAGNOSIS — J01.90 ACUTE BACTERIAL RHINOSINUSITIS: Primary | ICD-10-CM

## 2022-02-23 LAB
ALBUMIN SERPL BCP-MCNC: 4 G/DL (ref 3.5–5.2)
ALP SERPL-CCNC: 49 U/L (ref 55–135)
ALT SERPL W/O P-5'-P-CCNC: 10 U/L (ref 10–44)
ANION GAP SERPL CALC-SCNC: 9 MMOL/L (ref 8–16)
AST SERPL-CCNC: 9 U/L (ref 10–40)
BASOPHILS # BLD AUTO: 0.06 K/UL (ref 0–0.2)
BASOPHILS NFR BLD: 0.8 % (ref 0–1.9)
BILIRUB SERPL-MCNC: 0.2 MG/DL (ref 0.1–1)
BILIRUB UR QL STRIP: NEGATIVE
BUN SERPL-MCNC: 16 MG/DL (ref 8–23)
CALCIUM SERPL-MCNC: 9.7 MG/DL (ref 8.7–10.5)
CHLORIDE SERPL-SCNC: 106 MMOL/L (ref 95–110)
CLARITY UR: CLEAR
CO2 SERPL-SCNC: 25 MMOL/L (ref 23–29)
COLOR UR: YELLOW
CREAT SERPL-MCNC: 0.8 MG/DL (ref 0.5–1.4)
CTP QC/QA: YES
CTP QC/QA: YES
DIFFERENTIAL METHOD: ABNORMAL
EOSINOPHIL # BLD AUTO: 0.1 K/UL (ref 0–0.5)
EOSINOPHIL NFR BLD: 1.6 % (ref 0–8)
ERYTHROCYTE [DISTWIDTH] IN BLOOD BY AUTOMATED COUNT: 17 % (ref 11.5–14.5)
EST. GFR  (AFRICAN AMERICAN): >60 ML/MIN/1.73 M^2
EST. GFR  (NON AFRICAN AMERICAN): >60 ML/MIN/1.73 M^2
GLUCOSE SERPL-MCNC: 83 MG/DL (ref 70–110)
GLUCOSE UR QL STRIP: NEGATIVE
HCT VFR BLD AUTO: 35.2 % (ref 37–48.5)
HGB BLD-MCNC: 10.6 G/DL (ref 12–16)
HGB UR QL STRIP: ABNORMAL
IMM GRANULOCYTES # BLD AUTO: 0.02 K/UL (ref 0–0.04)
IMM GRANULOCYTES NFR BLD AUTO: 0.3 % (ref 0–0.5)
KETONES UR QL STRIP: NEGATIVE
LEUKOCYTE ESTERASE UR QL STRIP: NEGATIVE
LYMPHOCYTES # BLD AUTO: 2.1 K/UL (ref 1–4.8)
LYMPHOCYTES NFR BLD: 27.5 % (ref 18–48)
MCH RBC QN AUTO: 26.1 PG (ref 27–31)
MCHC RBC AUTO-ENTMCNC: 30.1 G/DL (ref 32–36)
MCV RBC AUTO: 87 FL (ref 82–98)
MONOCYTES # BLD AUTO: 0.5 K/UL (ref 0.3–1)
MONOCYTES NFR BLD: 6.4 % (ref 4–15)
NEUTROPHILS # BLD AUTO: 4.8 K/UL (ref 1.8–7.7)
NEUTROPHILS NFR BLD: 63.4 % (ref 38–73)
NITRITE UR QL STRIP: NEGATIVE
NRBC BLD-RTO: 0 /100 WBC
PH UR STRIP: 7 [PH] (ref 5–8)
PLATELET # BLD AUTO: 445 K/UL (ref 150–450)
PMV BLD AUTO: 10.2 FL (ref 9.2–12.9)
POC MOLECULAR INFLUENZA A AGN: NEGATIVE
POC MOLECULAR INFLUENZA B AGN: NEGATIVE
POTASSIUM SERPL-SCNC: 4.3 MMOL/L (ref 3.5–5.1)
PROT SERPL-MCNC: 6.9 G/DL (ref 6–8.4)
PROT UR QL STRIP: NEGATIVE
RBC # BLD AUTO: 4.06 M/UL (ref 4–5.4)
SARS-COV-2 RDRP RESP QL NAA+PROBE: NEGATIVE
SODIUM SERPL-SCNC: 140 MMOL/L (ref 136–145)
SP GR UR STRIP: 1.02 (ref 1–1.03)
TSH SERPL DL<=0.005 MIU/L-ACNC: 0.66 UIU/ML (ref 0.4–4)
URN SPEC COLLECT METH UR: ABNORMAL
WBC # BLD AUTO: 7.55 K/UL (ref 3.9–12.7)

## 2022-02-23 PROCEDURE — 3074F SYST BP LT 130 MM HG: CPT | Mod: CPTII,S$GLB,, | Performed by: FAMILY MEDICINE

## 2022-02-23 PROCEDURE — 99999 PR PBB SHADOW E&M-EST. PATIENT-LVL III: ICD-10-PCS | Mod: PBBFAC,,, | Performed by: FAMILY MEDICINE

## 2022-02-23 PROCEDURE — 80053 COMPREHEN METABOLIC PANEL: CPT | Performed by: FAMILY MEDICINE

## 2022-02-23 PROCEDURE — 81003 URINALYSIS AUTO W/O SCOPE: CPT | Performed by: FAMILY MEDICINE

## 2022-02-23 PROCEDURE — 3008F PR BODY MASS INDEX (BMI) DOCUMENTED: ICD-10-PCS | Mod: CPTII,S$GLB,, | Performed by: FAMILY MEDICINE

## 2022-02-23 PROCEDURE — U0002 COVID-19 LAB TEST NON-CDC: HCPCS | Mod: QW,S$GLB,, | Performed by: FAMILY MEDICINE

## 2022-02-23 PROCEDURE — 3008F BODY MASS INDEX DOCD: CPT | Mod: CPTII,S$GLB,, | Performed by: FAMILY MEDICINE

## 2022-02-23 PROCEDURE — 84443 ASSAY THYROID STIM HORMONE: CPT | Performed by: FAMILY MEDICINE

## 2022-02-23 PROCEDURE — 3074F PR MOST RECENT SYSTOLIC BLOOD PRESSURE < 130 MM HG: ICD-10-PCS | Mod: CPTII,S$GLB,, | Performed by: FAMILY MEDICINE

## 2022-02-23 PROCEDURE — 99215 PR OFFICE/OUTPT VISIT, EST, LEVL V, 40-54 MIN: ICD-10-PCS | Mod: S$GLB,,, | Performed by: FAMILY MEDICINE

## 2022-02-23 PROCEDURE — 1160F RVW MEDS BY RX/DR IN RCRD: CPT | Mod: CPTII,S$GLB,, | Performed by: FAMILY MEDICINE

## 2022-02-23 PROCEDURE — 87502 POCT INFLUENZA A/B MOLECULAR: ICD-10-PCS | Mod: QW,S$GLB,, | Performed by: FAMILY MEDICINE

## 2022-02-23 PROCEDURE — 99999 PR PBB SHADOW E&M-EST. PATIENT-LVL III: CPT | Mod: PBBFAC,,, | Performed by: FAMILY MEDICINE

## 2022-02-23 PROCEDURE — 1160F PR REVIEW ALL MEDS BY PRESCRIBER/CLIN PHARMACIST DOCUMENTED: ICD-10-PCS | Mod: CPTII,S$GLB,, | Performed by: FAMILY MEDICINE

## 2022-02-23 PROCEDURE — 86665 EPSTEIN-BARR CAPSID VCA: CPT | Performed by: FAMILY MEDICINE

## 2022-02-23 PROCEDURE — 99215 OFFICE O/P EST HI 40 MIN: CPT | Mod: S$GLB,,, | Performed by: FAMILY MEDICINE

## 2022-02-23 PROCEDURE — 1159F MED LIST DOCD IN RCRD: CPT | Mod: CPTII,S$GLB,, | Performed by: FAMILY MEDICINE

## 2022-02-23 PROCEDURE — 3079F PR MOST RECENT DIASTOLIC BLOOD PRESSURE 80-89 MM HG: ICD-10-PCS | Mod: CPTII,S$GLB,, | Performed by: FAMILY MEDICINE

## 2022-02-23 PROCEDURE — 1159F PR MEDICATION LIST DOCUMENTED IN MEDICAL RECORD: ICD-10-PCS | Mod: CPTII,S$GLB,, | Performed by: FAMILY MEDICINE

## 2022-02-23 PROCEDURE — U0002: ICD-10-PCS | Mod: QW,S$GLB,, | Performed by: FAMILY MEDICINE

## 2022-02-23 PROCEDURE — 3079F DIAST BP 80-89 MM HG: CPT | Mod: CPTII,S$GLB,, | Performed by: FAMILY MEDICINE

## 2022-02-23 PROCEDURE — 36415 COLL VENOUS BLD VENIPUNCTURE: CPT | Performed by: FAMILY MEDICINE

## 2022-02-23 PROCEDURE — 87502 INFLUENZA DNA AMP PROBE: CPT | Mod: QW,S$GLB,, | Performed by: FAMILY MEDICINE

## 2022-02-23 PROCEDURE — 85025 COMPLETE CBC W/AUTO DIFF WBC: CPT | Performed by: FAMILY MEDICINE

## 2022-02-23 RX ORDER — ALBUTEROL SULFATE 90 UG/1
2 AEROSOL, METERED RESPIRATORY (INHALATION) EVERY 6 HOURS PRN
Qty: 18 G | Refills: 0 | Status: SHIPPED | OUTPATIENT
Start: 2022-02-23 | End: 2022-03-02

## 2022-02-23 RX ORDER — DOXYCYCLINE 100 MG/1
100 CAPSULE ORAL 2 TIMES DAILY
Qty: 10 CAPSULE | Refills: 0 | Status: SHIPPED | OUTPATIENT
Start: 2022-02-23 | End: 2023-06-20 | Stop reason: SDUPTHER

## 2022-02-23 NOTE — PROGRESS NOTES
Subjective:   Patient ID: Miles Bowen is a 61 y.o. female.  Chief Complaint:  Cough (Started last night ), Fatigue (5-6 days), Nasal Congestion (Going on for 5-6 days), Abdominal Pain, and Diarrhea (2 days)    PCP Dr. Paul  Presents for evaluation of persistent upper respiratory symptoms/sinus infection  Also concerned because of recent increase in fatigue and myalgias  Finally additional urinary frequency and dysuria    Patient evaluated through telemedicine beginning of February  Symptoms at that time both URI and GI related  Did not obtain COVID a flu testing is recommended  GI issues are more chronic in referral to Gastroenterology ordered  Did not obtain CBC and CMP as recommended    Today reports sinus issues had improved, but then returned and are worse than before  She has frontal sinus pressure along with tooth/jaw pain  Also already greater than 10 days duration without significant improvement    Regarding fatigue, states it is not chronic, but this does feel like when she had mono many many years ago    Sinusitis  This is a new problem. The current episode started 1 to 4 weeks ago. The problem has been gradually worsening since onset. Maximum temperature: subjective. Her pain is at a severity of 3/10. The pain is mild. Associated symptoms include chills, congestion, coughing, diaphoresis, headaches and sinus pressure. Pertinent negatives include no ear pain, hoarse voice, neck pain, shortness of breath, sneezing, sore throat or swollen glands.   Dysuria   This is a new problem. The current episode started yesterday. The problem occurs every urination. The problem has been unchanged. The quality of the pain is described as burning. The pain is at a severity of 3/10. The pain is mild. Maximum temperature: subjective. She is sexually active. There is no history of pyelonephritis. Associated symptoms include chills, frequency and urgency. Pertinent negatives include no behavior changes, discharge,  flank pain, hematuria, hesitancy, nausea, sweats, vomiting, weight loss, bubble bath use, constipation, rash or withholding. She has tried nothing for the symptoms.       Current Outpatient Medications:     ACETAMINOPHEN (TYLENOL EXTRA STRENGTH ORAL), Take 2 tablets by mouth every 4 to 6 hours as needed., Disp: , Rfl:     baclofen (LIORESAL) 10 MG tablet, Take 1 tablet (10 mg total) by mouth 2 (two) times daily as needed (muscle spasm)., Disp: 30 tablet, Rfl: 0    chlorpheniramine-dextromethorp (CORICIDIN HBP COUGH AND COLD) 4-30 mg Tab, Take by mouth as needed., Disp: , Rfl:     diazePAM (VALIUM) 10 MG Tab, every 12 (twelve) hours as needed., Disp: , Rfl:     dicyclomine (BENTYL) 10 MG capsule, Take 1 capsule by mouth 4 times daily before meals and nightly prn, Disp: 40 capsule, Rfl: 0    duloxetine (CYMBALTA) 60 MG capsule, Take 1 capsule by mouth once daily. , Disp: , Rfl: 0    estradiol (ESTRACE) 0.5 MG tablet, Take 0.5 mg by mouth once daily., Disp: , Rfl: 0    hydroCHLOROthiazide (HYDRODIURIL) 12.5 MG Tab, Take 1 tablet (12.5 mg total) by mouth once daily., Disp: 90 tablet, Rfl: 1    losartan (COZAAR) 100 MG tablet, Take 1 tablet by mouth once daily, Disp: 90 tablet, Rfl: 0    pantoprazole (PROTONIX) 40 MG tablet, Take 1 tablet (40 mg total) by mouth once daily., Disp: 90 tablet, Rfl: 0    topiramate (TOPAMAX) 50 MG tablet, once daily. , Disp: , Rfl:     zolpidem (AMBIEN CR) 12.5 MG CR tablet, Take 12.5 mg by mouth nightly., Disp: , Rfl:     albuterol (PROVENTIL/VENTOLIN HFA) 90 mcg/actuation inhaler, Inhale 2 puffs into the lungs every 6 (six) hours as needed (Cough)., Disp: 18 g, Rfl: 0    doxycycline (MONODOX) 100 MG capsule, Take 1 capsule (100 mg total) by mouth 2 (two) times daily. for 5 days, Disp: 10 capsule, Rfl: 0    losartan potassium, bulk, 100 % Powd, 1 tablet, Disp: , Rfl:     pantoprazole (PROTONIX) 40 MG tablet, 1 tablet(s) by mouth daily, Disp: , Rfl:     Review of Systems  "  Constitutional: Positive for chills and diaphoresis. Negative for fatigue, fever and weight loss.   HENT: Positive for congestion, postnasal drip, rhinorrhea, sinus pressure and sinus pain. Negative for dental problem, ear discharge, ear pain, hoarse voice, sneezing, sore throat, tinnitus, trouble swallowing and voice change.    Eyes: Negative for pain, discharge, redness and itching.   Respiratory: Positive for cough. Negative for chest tightness, shortness of breath and wheezing.    Cardiovascular: Negative for chest pain, palpitations and leg swelling.   Gastrointestinal: Positive for abdominal distention and abdominal pain. Negative for constipation, diarrhea, nausea and vomiting.   Genitourinary: Positive for dysuria, frequency and urgency. Negative for decreased urine volume, difficulty urinating, enuresis, flank pain, hematuria and hesitancy.   Musculoskeletal: Negative for myalgias, neck pain and neck stiffness.   Skin: Negative for rash.   Neurological: Positive for headaches.   Hematological: Negative for adenopathy.     Objective:   /84   Pulse 100   Ht 5' 2" (1.575 m)   Wt 87.7 kg (193 lb 5.5 oz)   SpO2 98%   BMI 35.36 kg/m²     Physical Exam  Vitals and nursing note reviewed.   Constitutional:       General: She is not in acute distress.     Appearance: She is well-developed. She is obese. She is ill-appearing. She is not toxic-appearing or diaphoretic.   HENT:      Right Ear: Hearing, tympanic membrane, ear canal and external ear normal.      Left Ear: Hearing, tympanic membrane, ear canal and external ear normal.      Nose: Mucosal edema, congestion and rhinorrhea present.      Right Sinus: Frontal sinus tenderness present. No maxillary sinus tenderness.      Left Sinus: Frontal sinus tenderness present. No maxillary sinus tenderness.      Mouth/Throat:      Pharynx: Oropharynx is clear. Uvula midline. No pharyngeal swelling, oropharyngeal exudate or posterior oropharyngeal erythema.      " Tonsils: No tonsillar exudate or tonsillar abscesses.   Eyes:      General:         Right eye: No discharge.         Left eye: No discharge.      Conjunctiva/sclera: Conjunctivae normal.   Cardiovascular:      Rate and Rhythm: Normal rate and regular rhythm.      Heart sounds: Normal heart sounds.   Pulmonary:      Effort: Pulmonary effort is normal.      Breath sounds: Normal breath sounds. No wheezing, rhonchi or rales.   Abdominal:      Palpations: There is no hepatomegaly.      Tenderness: There is abdominal tenderness in the suprapubic area. There is no right CVA tenderness, left CVA tenderness, guarding or rebound.   Lymphadenopathy:      Cervical: No cervical adenopathy.   Skin:     General: Skin is warm and dry.      Findings: No rash.   Neurological:      Mental Status: She is alert.       Assessment:       ICD-10-CM ICD-9-CM   1. Acute bacterial rhinosinusitis  J01.90 461.9    B96.89    2. Cough  R05.9 786.2   3. Dysuria  R30.0 788.1   4. Other fatigue  R53.83 780.79     Plan:   Acute bacterial rhinosinusitis  Cough  -     POCT Influenza A/B Molecular  -     POCT COVID-19 Rapid Screening  -     albuterol (PROVENTIL/VENTOLIN HFA) 90 mcg/actuation inhaler; Inhale 2 puffs into the lungs every 6 (six) hours as needed (Cough).  Dispense: 18 g; Refill: 0        -     doxycycline (MONODOX) 100 MG capsule; Take 1               capsule (100 mg total) by mouth 2 (two) times daily. for 5        days  Dispense: 10 capsule; Refill: 0  Rapid flu and rapid COVID test negative  Based on duration and double sickening, likely acute bacterial rhinosinusitis  Amoxicillin Allergic  Doxycycline 100 mg twice a day for 5 days  Albuterol as needed for cough  Tylenol or Motrin for fever or aches  Rest  Increases fluids  Head of Bed at 45 degrees  Warm Compresses Over Sinuses Twice a Day    Dysuria  -     Urinalysis, Reflex to Urine Culture Urine, Clean Catch; Future; Expected date: 02/23/2022  Exacerbation of symptoms, specifically  subjective fever, chills, and myalgias could be related to underlying UTI  Check UA with reflex culture and sensitivity if positive    Other fatigue  -     Nguyễn-Barr Virus (EBV) Antibody Panel; Future; Expected date: 02/23/2022  -     CBC Auto Differential; Future; Expected date: 02/23/2022  -     Comprehensive Metabolic Panel; Future; Expected date: 02/23/2022  -     TSH; Future; Expected date: 02/23/2022    Follow up with any specialists scheduled  Follow-up with PCP in May for annual physical exam  Return to clinic sooner if needed the     40+ minutes of total time spent on the encounter, which includes face to face time and non-face to face time preparing to see the patient (eg, review of tests), Obtaining and/or reviewing separately obtained history, documenting clinical information in the electronic or other health record, independently interpreting results (not separately reported) and communicating results to the patient/family/caregiver, or Care coordination (not separately reported).

## 2022-02-24 ENCOUNTER — TELEPHONE (OUTPATIENT)
Dept: INTERNAL MEDICINE | Facility: CLINIC | Age: 62
End: 2022-02-24
Payer: COMMERCIAL

## 2022-02-24 ENCOUNTER — PATIENT MESSAGE (OUTPATIENT)
Dept: INTERNAL MEDICINE | Facility: CLINIC | Age: 62
End: 2022-02-24
Payer: COMMERCIAL

## 2022-02-24 DIAGNOSIS — D64.9 ANEMIA, UNSPECIFIED TYPE: Primary | ICD-10-CM

## 2022-02-24 NOTE — TELEPHONE ENCOUNTER
----- Message from Chris Fonseca sent at 2/24/2022  9:56 AM CST -----  Contact: Pt  Pt states she needs a Dr's notes for today and has questions rg tests that were run on yesterday 02/23 and can be reached at [phone]//thanks/dbw     Send her Dr's note through the portal

## 2022-02-24 NOTE — TELEPHONE ENCOUNTER
----- Message from Isi Rivers sent at 2/24/2022  7:30 AM CST -----  pt states that she's not feeling better, please advise..[phone] ([place])

## 2022-02-24 NOTE — LETTER
February 24, 2022      The [address]  [address] [address] [facility]  [place] [address]  Phone: [phone]  Fax: [phone]       Patient: Miles Bowen   YOB: 1960  Date of Visit: 02/23/2022    To Whom It May Concern:    Valerie Bowen  was at [facility] on 02/23/2022. The patient may return to work/school on 02/25/2022 with no restrictions. If you have any questions or concerns, or if I can be of further assistance, please do not hesitate to contact me.    Sincerely,    Anatoliy Herrera MD

## 2022-02-24 NOTE — TELEPHONE ENCOUNTER
Spoke to pt about getting an excuse for today she states she is not feeling well, informed her the message was sent to Dr Herrera for approval pt verbalized understanding

## 2022-02-28 LAB
EBV EA IGG SER-ACNC: <5 U/ML
EBV NA IGG SER-ACNC: 27.8 U/ML
EBV VCA IGG SER-ACNC: 297 U/ML
EBV VCA IGM SER-ACNC: <10 U/ML

## 2022-03-02 ENCOUNTER — OFFICE VISIT (OUTPATIENT)
Dept: INTERNAL MEDICINE | Facility: CLINIC | Age: 62
End: 2022-03-02
Payer: COMMERCIAL

## 2022-03-02 DIAGNOSIS — D64.9 ANEMIA, UNSPECIFIED TYPE: Primary | ICD-10-CM

## 2022-03-02 PROCEDURE — 3044F PR MOST RECENT HEMOGLOBIN A1C LEVEL <7.0%: ICD-10-PCS | Mod: CPTII,95,, | Performed by: INTERNAL MEDICINE

## 2022-03-02 PROCEDURE — 4010F PR ACE/ARB THEARPY RXD/TAKEN: ICD-10-PCS | Mod: CPTII,95,, | Performed by: INTERNAL MEDICINE

## 2022-03-02 PROCEDURE — 3044F HG A1C LEVEL LT 7.0%: CPT | Mod: CPTII,95,, | Performed by: INTERNAL MEDICINE

## 2022-03-02 PROCEDURE — 99213 OFFICE O/P EST LOW 20 MIN: CPT | Mod: 95,,, | Performed by: INTERNAL MEDICINE

## 2022-03-02 PROCEDURE — 99213 PR OFFICE/OUTPT VISIT, EST, LEVL III, 20-29 MIN: ICD-10-PCS | Mod: 95,,, | Performed by: INTERNAL MEDICINE

## 2022-03-02 PROCEDURE — 4010F ACE/ARB THERAPY RXD/TAKEN: CPT | Mod: CPTII,95,, | Performed by: INTERNAL MEDICINE

## 2022-03-02 NOTE — PROGRESS NOTES
Subjective:      Patient ID: Miles Bowen is a 61 y.o. female.    Chief Complaint: Fatigue  The patient location is: [place]  The chief complaint leading to consultation is: fatigue    Visit type: audiovisual    Face to Face time with patient: 20 min   minutes of total time spent on the encounter, which includes face to face time and non-face to face time preparing to see the patient (eg, review of tests), Obtaining and/or reviewing separately obtained history, Documenting clinical information in the electronic or other health record, Independently interpreting results (not separately reported) and communicating results to the patient/family/caregiver, or Care coordination (not separately reported).         Each patient to whom he or she provides medical services by telemedicine is:  (1) informed of the relationship between the physician and patient and the respective role of any other health care provider with respect to management of the patient; and (2) notified that he or she may decline to receive medical services by telemedicine and may withdraw from such care at any time.    Notes:     Fatigue  This is a recurrent problem. The current episode started more than 1 month ago. The problem occurs daily. The problem has been waxing and waning. Associated symptoms include arthralgias, chest pain, fatigue, headaches, neck pain and weakness. Pertinent negatives include no abdominal pain, anorexia, chills, congestion, diaphoresis, fever, joint swelling, numbness, rash, sore throat or vomiting. Nothing aggravates the symptoms. Treatments tried: abx. The treatment provided no relief.          Review of Systems   Constitutional: Positive for activity change and fatigue. Negative for chills, diaphoresis, fever and unexpected weight change.   HENT: Positive for rhinorrhea. Negative for congestion, hearing loss, sore throat and trouble swallowing.    Eyes: Positive for visual disturbance. Negative for discharge.    Respiratory: Positive for chest tightness. Negative for wheezing.    Cardiovascular: Positive for chest pain and palpitations.   Gastrointestinal: Positive for diarrhea. Negative for abdominal pain, anorexia, blood in stool, constipation and vomiting.   Endocrine: Positive for polyuria. Negative for polydipsia.   Genitourinary: Negative for difficulty urinating, dysuria, hematuria and menstrual problem.   Musculoskeletal: Positive for arthralgias and neck pain. Negative for joint swelling.   Skin: Negative for rash.   Neurological: Positive for weakness and headaches. Negative for numbness.   Psychiatric/Behavioral: Positive for confusion. Negative for dysphoric mood.     Objective:   There were no vitals taken for this visit.    Physical Exam  Constitutional:       General: She is awake.      Appearance: Normal appearance.   HENT:      Head: Normocephalic and atraumatic.   Eyes:      Conjunctiva/sclera: Conjunctivae normal.   Pulmonary:      Effort: Pulmonary effort is normal.   Musculoskeletal:      Cervical back: Normal range of motion.   Neurological:      Mental Status: She is alert. Mental status is at baseline.   Psychiatric:         Mood and Affect: Mood normal.         Behavior: Behavior normal. Behavior is cooperative.         Thought Content: Thought content normal.         Judgment: Judgment normal.         Assessment:     1. Anemia, unspecified type      Plan:   Anemia, unspecified type  -     Ferritin; Future; Expected date: 03/02/2022  -     Iron and TIBC; Future; Expected date: 03/02/2022  -     Iron; Future; Expected date: 03/02/2022  -     Vitamin B12; Future; Expected date: 03/02/2022  -     Folate; Future; Expected date: 03/02/2022        Lab Frequency Next Occurrence   Ambulatory referral/consult to Optometry Once 03/18/2021    Soft Tissue Head Neck Thyroid Once 04/09/2021   H. pylori antigen, stool Once 04/09/2021   Pancreatic elastase, fecal Once 04/09/2021   Fecal fat, qualitative Once  04/09/2021   Occult blood x 1, stool Once 04/09/2021   WBC, Stool Once 04/09/2021   Calprotectin, Stool Once 04/09/2021   Giardia / Cryptosporidum, EIA Once 04/09/2021   Stool Exam-Ova,Cysts,Parasites Once 04/09/2021   Clostridium difficile EIA Once 04/09/2021   Stool culture Once 04/09/2021   Mammo Digital Screening Bilat w/ Jim Once 04/12/2021   Ambulatory referral/consult to Endocrinology Once 04/21/2021   Ambulatory referral/consult to Gastroenterology Once 02/16/2022   CBC Auto Differential Once 02/09/2022   Comprehensive Metabolic Panel Once 02/09/2022   X-Ray Chest PA And Lateral Once 02/09/2022   Urinalysis Once 02/09/2022   Urine culture Once 02/09/2022   Ambulatory referral/consult to Hematology / Oncology Once 03/04/2022       Problem List Items Addressed This Visit    None     Visit Diagnoses     Anemia, unspecified type    -  Primary    Relevant Orders    Ferritin (Completed)    Iron and TIBC (Completed)    Iron (Completed)    Vitamin B12 (Completed)    Folate (Completed)          Follow up if symptoms worsen or fail to improve.  Pt would like labs at 1130.

## 2022-03-03 ENCOUNTER — LAB VISIT (OUTPATIENT)
Dept: LAB | Facility: HOSPITAL | Age: 62
End: 2022-03-03
Attending: INTERNAL MEDICINE
Payer: COMMERCIAL

## 2022-03-03 DIAGNOSIS — D64.9 ANEMIA, UNSPECIFIED TYPE: ICD-10-CM

## 2022-03-03 LAB
FERRITIN SERPL-MCNC: 10 NG/ML (ref 20–300)
FOLATE SERPL-MCNC: 5.3 NG/ML (ref 4–24)
IRON SERPL-MCNC: 37 UG/DL (ref 30–160)
IRON SERPL-MCNC: 37 UG/DL (ref 30–160)
SATURATED IRON: 8 % (ref 20–50)
TOTAL IRON BINDING CAPACITY: 491 UG/DL (ref 250–450)
TRANSFERRIN SERPL-MCNC: 332 MG/DL (ref 200–375)
VIT B12 SERPL-MCNC: 244 PG/ML (ref 210–950)

## 2022-03-03 PROCEDURE — 82746 ASSAY OF FOLIC ACID SERUM: CPT | Performed by: INTERNAL MEDICINE

## 2022-03-03 PROCEDURE — 36415 COLL VENOUS BLD VENIPUNCTURE: CPT | Performed by: INTERNAL MEDICINE

## 2022-03-03 PROCEDURE — 82728 ASSAY OF FERRITIN: CPT | Performed by: INTERNAL MEDICINE

## 2022-03-03 PROCEDURE — 82607 VITAMIN B-12: CPT | Performed by: INTERNAL MEDICINE

## 2022-03-03 PROCEDURE — 84466 ASSAY OF TRANSFERRIN: CPT | Performed by: INTERNAL MEDICINE

## 2022-03-04 ENCOUNTER — PATIENT MESSAGE (OUTPATIENT)
Dept: INTERNAL MEDICINE | Facility: CLINIC | Age: 62
End: 2022-03-04
Payer: COMMERCIAL

## 2022-03-04 DIAGNOSIS — D64.9 ANEMIA, UNSPECIFIED TYPE: Primary | ICD-10-CM

## 2022-03-04 NOTE — TELEPHONE ENCOUNTER
Iron and B12 are low.  Recommend take iron tablet over-the-counter 65 mg once daily.  Also take over-the-counter B12 at 1000 mg daily.  Recheck CBC iron and B12 in 6 weeks. Needs f/u appt if continues with concerning symptoms. ER if severe symptoms.

## 2022-03-07 ENCOUNTER — PATIENT MESSAGE (OUTPATIENT)
Dept: INTERNAL MEDICINE | Facility: CLINIC | Age: 62
End: 2022-03-07
Payer: COMMERCIAL

## 2022-03-14 DIAGNOSIS — J01.90 ACUTE BACTERIAL RHINOSINUSITIS: ICD-10-CM

## 2022-03-14 DIAGNOSIS — B96.89 ACUTE BACTERIAL RHINOSINUSITIS: ICD-10-CM

## 2022-03-14 DIAGNOSIS — K58.2 IRRITABLE BOWEL SYNDROME WITH BOTH CONSTIPATION AND DIARRHEA: ICD-10-CM

## 2022-03-14 DIAGNOSIS — R10.9 ABDOMINAL PAIN, ACUTE: ICD-10-CM

## 2022-03-14 RX ORDER — DOXYCYCLINE 100 MG/1
100 CAPSULE ORAL 2 TIMES DAILY
Qty: 10 CAPSULE | Refills: 0 | Status: CANCELLED | OUTPATIENT
Start: 2022-03-14 | End: 2022-03-19

## 2022-03-14 RX ORDER — DICYCLOMINE HYDROCHLORIDE 10 MG/1
CAPSULE ORAL
Qty: 40 CAPSULE | Refills: 0 | Status: SHIPPED | OUTPATIENT
Start: 2022-03-14 | End: 2022-04-22 | Stop reason: SDUPTHER

## 2022-03-14 NOTE — TELEPHONE ENCOUNTER

## 2022-03-17 ENCOUNTER — PATIENT MESSAGE (OUTPATIENT)
Dept: INTERNAL MEDICINE | Facility: CLINIC | Age: 62
End: 2022-03-17
Payer: COMMERCIAL

## 2022-03-21 ENCOUNTER — OFFICE VISIT (OUTPATIENT)
Dept: CARDIOLOGY | Facility: CLINIC | Age: 62
End: 2022-03-21
Payer: COMMERCIAL

## 2022-03-21 ENCOUNTER — OFFICE VISIT (OUTPATIENT)
Dept: INTERNAL MEDICINE | Facility: CLINIC | Age: 62
End: 2022-03-21
Payer: COMMERCIAL

## 2022-03-21 VITALS
HEIGHT: 62 IN | RESPIRATION RATE: 16 BRPM | SYSTOLIC BLOOD PRESSURE: 138 MMHG | OXYGEN SATURATION: 98 % | WEIGHT: 186.94 LBS | HEART RATE: 68 BPM | BODY MASS INDEX: 34.4 KG/M2 | DIASTOLIC BLOOD PRESSURE: 82 MMHG

## 2022-03-21 VITALS
SYSTOLIC BLOOD PRESSURE: 126 MMHG | HEIGHT: 62 IN | TEMPERATURE: 98 F | WEIGHT: 186.75 LBS | BODY MASS INDEX: 34.37 KG/M2 | RESPIRATION RATE: 18 BRPM | DIASTOLIC BLOOD PRESSURE: 84 MMHG | OXYGEN SATURATION: 98 % | HEART RATE: 77 BPM

## 2022-03-21 DIAGNOSIS — K58.9 IRRITABLE BOWEL SYNDROME, UNSPECIFIED TYPE: ICD-10-CM

## 2022-03-21 DIAGNOSIS — F41.9 ANXIETY: ICD-10-CM

## 2022-03-21 DIAGNOSIS — Z82.49 FAMILY HISTORY OF CARDIOVASCULAR DISEASE: ICD-10-CM

## 2022-03-21 DIAGNOSIS — I34.1 MVP (MITRAL VALVE PROLAPSE): ICD-10-CM

## 2022-03-21 DIAGNOSIS — I10 PRIMARY HYPERTENSION: ICD-10-CM

## 2022-03-21 DIAGNOSIS — R94.31 ABNORMAL ECG: ICD-10-CM

## 2022-03-21 DIAGNOSIS — R00.2 PALPITATION: ICD-10-CM

## 2022-03-21 DIAGNOSIS — E66.09 CLASS 1 OBESITY DUE TO EXCESS CALORIES WITH SERIOUS COMORBIDITY AND BODY MASS INDEX (BMI) OF 34.0 TO 34.9 IN ADULT: ICD-10-CM

## 2022-03-21 DIAGNOSIS — R07.89 ATYPICAL CHEST PAIN: Primary | ICD-10-CM

## 2022-03-21 DIAGNOSIS — Z00.00 ROUTINE GENERAL MEDICAL EXAMINATION AT A HEALTH CARE FACILITY: Primary | ICD-10-CM

## 2022-03-21 DIAGNOSIS — F32.A DEPRESSION, UNSPECIFIED DEPRESSION TYPE: ICD-10-CM

## 2022-03-21 DIAGNOSIS — R00.2 PALPITATIONS: ICD-10-CM

## 2022-03-21 DIAGNOSIS — R06.02 SHORTNESS OF BREATH: ICD-10-CM

## 2022-03-21 PROCEDURE — 3074F SYST BP LT 130 MM HG: CPT | Mod: CPTII,S$GLB,, | Performed by: INTERNAL MEDICINE

## 2022-03-21 PROCEDURE — 99999 PR PBB SHADOW E&M-EST. PATIENT-LVL III: ICD-10-PCS | Mod: PBBFAC,,, | Performed by: INTERNAL MEDICINE

## 2022-03-21 PROCEDURE — 1160F PR REVIEW ALL MEDS BY PRESCRIBER/CLIN PHARMACIST DOCUMENTED: ICD-10-PCS | Mod: CPTII,S$GLB,, | Performed by: INTERNAL MEDICINE

## 2022-03-21 PROCEDURE — 4010F PR ACE/ARB THEARPY RXD/TAKEN: ICD-10-PCS | Mod: CPTII,S$GLB,, | Performed by: INTERNAL MEDICINE

## 2022-03-21 PROCEDURE — 3075F PR MOST RECENT SYSTOLIC BLOOD PRESS GE 130-139MM HG: ICD-10-PCS | Mod: CPTII,S$GLB,, | Performed by: INTERNAL MEDICINE

## 2022-03-21 PROCEDURE — 99396 PREV VISIT EST AGE 40-64: CPT | Mod: S$GLB,,, | Performed by: INTERNAL MEDICINE

## 2022-03-21 PROCEDURE — 3008F PR BODY MASS INDEX (BMI) DOCUMENTED: ICD-10-PCS | Mod: CPTII,S$GLB,, | Performed by: INTERNAL MEDICINE

## 2022-03-21 PROCEDURE — 3008F BODY MASS INDEX DOCD: CPT | Mod: CPTII,S$GLB,, | Performed by: INTERNAL MEDICINE

## 2022-03-21 PROCEDURE — 99204 PR OFFICE/OUTPT VISIT, NEW, LEVL IV, 45-59 MIN: ICD-10-PCS | Mod: S$GLB,,, | Performed by: INTERNAL MEDICINE

## 2022-03-21 PROCEDURE — 3079F DIAST BP 80-89 MM HG: CPT | Mod: CPTII,S$GLB,, | Performed by: INTERNAL MEDICINE

## 2022-03-21 PROCEDURE — 4010F ACE/ARB THERAPY RXD/TAKEN: CPT | Mod: CPTII,S$GLB,, | Performed by: INTERNAL MEDICINE

## 2022-03-21 PROCEDURE — 1159F MED LIST DOCD IN RCRD: CPT | Mod: CPTII,S$GLB,, | Performed by: INTERNAL MEDICINE

## 2022-03-21 PROCEDURE — 3079F PR MOST RECENT DIASTOLIC BLOOD PRESSURE 80-89 MM HG: ICD-10-PCS | Mod: CPTII,S$GLB,, | Performed by: INTERNAL MEDICINE

## 2022-03-21 PROCEDURE — 3044F HG A1C LEVEL LT 7.0%: CPT | Mod: CPTII,S$GLB,, | Performed by: INTERNAL MEDICINE

## 2022-03-21 PROCEDURE — 99999 PR PBB SHADOW E&M-EST. PATIENT-LVL V: ICD-10-PCS | Mod: PBBFAC,,, | Performed by: INTERNAL MEDICINE

## 2022-03-21 PROCEDURE — 3074F PR MOST RECENT SYSTOLIC BLOOD PRESSURE < 130 MM HG: ICD-10-PCS | Mod: CPTII,S$GLB,, | Performed by: INTERNAL MEDICINE

## 2022-03-21 PROCEDURE — 3044F PR MOST RECENT HEMOGLOBIN A1C LEVEL <7.0%: ICD-10-PCS | Mod: CPTII,S$GLB,, | Performed by: INTERNAL MEDICINE

## 2022-03-21 PROCEDURE — 99396 PR PREVENTIVE VISIT,EST,40-64: ICD-10-PCS | Mod: S$GLB,,, | Performed by: INTERNAL MEDICINE

## 2022-03-21 PROCEDURE — 1160F RVW MEDS BY RX/DR IN RCRD: CPT | Mod: CPTII,S$GLB,, | Performed by: INTERNAL MEDICINE

## 2022-03-21 PROCEDURE — 99999 PR PBB SHADOW E&M-EST. PATIENT-LVL III: CPT | Mod: PBBFAC,,, | Performed by: INTERNAL MEDICINE

## 2022-03-21 PROCEDURE — 99204 OFFICE O/P NEW MOD 45 MIN: CPT | Mod: S$GLB,,, | Performed by: INTERNAL MEDICINE

## 2022-03-21 PROCEDURE — 3075F SYST BP GE 130 - 139MM HG: CPT | Mod: CPTII,S$GLB,, | Performed by: INTERNAL MEDICINE

## 2022-03-21 PROCEDURE — 99999 PR PBB SHADOW E&M-EST. PATIENT-LVL V: CPT | Mod: PBBFAC,,, | Performed by: INTERNAL MEDICINE

## 2022-03-21 PROCEDURE — 1159F PR MEDICATION LIST DOCUMENTED IN MEDICAL RECORD: ICD-10-PCS | Mod: CPTII,S$GLB,, | Performed by: INTERNAL MEDICINE

## 2022-03-21 NOTE — PROGRESS NOTES
"Subjective:    Patient ID:  Miles Bowen is a 61 y.o. female who presents for evaluation of Palpitations      Pt referred by Dr Carlos Paul      HPI pt presents for eval.  States she went to ER last week ([facility]) last week for palpitations.  Her current med conditions include HTN, MVP, obesity, GERD, hiatal hernia.  Nonsmoker.  Father  of MI age 62 or so.  She has h/o MV prolapse long time ago.  Released from ER. She states everything checked out ok.  No records available to review.  She states her heart races, beats fast, skips a beat.  Associated with chest heaviness.  Feels "like can't take a deep breath".  Has had palpitations in past.  Has anxiety.  ecg today sinus jean carlos 58 bpm, nonspecific st-t abnl.  Saw PCP today, ecg done, labs pending.  + fatigue.        Past Medical History:   Diagnosis Date    Anxiety     Depression     GERD (gastroesophageal reflux disease)     Heart murmur     Hematemesis without nausea 2016    Hypertension     Multinodular goiter 2016    MVP (mitral valve prolapse)     Obesity        Current Outpatient Medications:     ACETAMINOPHEN (TYLENOL EXTRA STRENGTH ORAL), Take 2 tablets by mouth every 4 to 6 hours as needed., Disp: , Rfl:     baclofen (LIORESAL) 10 MG tablet, Take 1 tablet (10 mg total) by mouth 2 (two) times daily as needed (muscle spasm)., Disp: 30 tablet, Rfl: 0    chlorpheniramine-dextromethorp (CORICIDIN HBP COUGH AND COLD) 4-30 mg Tab, Take by mouth as needed., Disp: , Rfl:     diazePAM (VALIUM) 10 MG Tab, every 12 (twelve) hours as needed., Disp: , Rfl:     dicyclomine (BENTYL) 10 MG capsule, Take 1 capsule by mouth 4 times daily before meals and nightly prn, Disp: 40 capsule, Rfl: 0    duloxetine (CYMBALTA) 60 MG capsule, Take 1 capsule by mouth once daily. , Disp: , Rfl: 0    estradiol (ESTRACE) 0.5 MG tablet, Take 0.5 mg by mouth once daily., Disp: , Rfl: 0    hydroCHLOROthiazide (HYDRODIURIL) 12.5 MG Tab, Take 1 tablet (12.5 mg " "total) by mouth once daily., Disp: 90 tablet, Rfl: 1    losartan (COZAAR) 100 MG tablet, Take 1 tablet (100 mg total) by mouth once daily., Disp: 90 tablet, Rfl: 0    pantoprazole (PROTONIX) 40 MG tablet, Take 1 tablet (40 mg total) by mouth once daily., Disp: 90 tablet, Rfl: 0    topiramate (TOPAMAX) 50 MG tablet, once daily. , Disp: , Rfl:     zolpidem (AMBIEN CR) 12.5 MG CR tablet, Take 12.5 mg by mouth nightly., Disp: , Rfl:       Review of Systems   Constitutional: Positive for malaise/fatigue.   HENT: Negative.    Eyes: Negative.    Cardiovascular: Positive for chest pain and palpitations.   Respiratory: Positive for shortness of breath.    Endocrine: Negative.    Hematologic/Lymphatic: Negative.    Skin: Negative.    Musculoskeletal: Negative.    Gastrointestinal: Negative.    Genitourinary: Negative.    Neurological: Negative.    Psychiatric/Behavioral: Negative.    Allergic/Immunologic: Negative.        /82 (BP Location: Left arm, Patient Position: Sitting, BP Method: Medium (Manual))   Pulse 68   Resp 16   Ht 5' 2" (1.575 m)   Wt 84.8 kg (186 lb 15.2 oz)   SpO2 98%   BMI 34.19 kg/m²     Wt Readings from Last 3 Encounters:   03/21/22 84.8 kg (186 lb 15.2 oz)   03/21/22 84.7 kg (186 lb 11.7 oz)   02/23/22 87.7 kg (193 lb 5.5 oz)     Temp Readings from Last 3 Encounters:   03/21/22 98.4 °F (36.9 °C)   01/11/22 98.9 °F (37.2 °C) (Tympanic)   12/22/21 97.7 °F (36.5 °C)     BP Readings from Last 3 Encounters:   03/21/22 138/82   03/21/22 126/84   02/23/22 114/84     Pulse Readings from Last 3 Encounters:   03/21/22 68   03/21/22 77   02/23/22 100          Objective:    Physical Exam  Vitals and nursing note reviewed.   Constitutional:       General: She is not in acute distress.     Appearance: Normal appearance. She is well-developed. She is not ill-appearing or diaphoretic.   HENT:      Head: Normocephalic.   Neck:      Thyroid: No thyromegaly.      Vascular: Normal carotid pulses. No carotid " bruit, hepatojugular reflux or JVD.   Cardiovascular:      Rate and Rhythm: Normal rate and regular rhythm.      Chest Wall: PMI is not displaced.      Pulses: Normal pulses.           Radial pulses are 2+ on the right side and 2+ on the left side.      Heart sounds: Normal heart sounds, S1 normal and S2 normal. No murmur heard.    No friction rub. No gallop.   Pulmonary:      Effort: Pulmonary effort is normal.      Breath sounds: Normal breath sounds. No wheezing or rales.   Abdominal:      General: Bowel sounds are normal. There is no abdominal bruit.      Palpations: Abdomen is soft. There is no hepatomegaly, splenomegaly or mass.      Tenderness: There is no abdominal tenderness.   Musculoskeletal:      Cervical back: Neck supple.   Lymphadenopathy:      Cervical: No cervical adenopathy.   Skin:     General: Skin is warm.   Neurological:      Mental Status: She is alert and oriented to person, place, and time.   Psychiatric:         Behavior: Behavior normal. Behavior is cooperative.         I have reviewed all pertinent labs and cardiac studies.      Chemistry        Component Value Date/Time     02/23/2022 1147    K 4.3 02/23/2022 1147     02/23/2022 1147    CO2 25 02/23/2022 1147    BUN 16 02/23/2022 1147    CREATININE 0.8 02/23/2022 1147    GLU 83 02/23/2022 1147        Component Value Date/Time    CALCIUM 9.7 02/23/2022 1147    ALKPHOS 49 (L) 02/23/2022 1147    AST 9 (L) 02/23/2022 1147    ALT 10 02/23/2022 1147    BILITOT 0.2 02/23/2022 1147    ESTGFRAFRICA >60.0 02/23/2022 1147    EGFRNONAA >60.0 02/23/2022 1147        Lab Results   Component Value Date    WBC 7.55 02/23/2022    HGB 10.6 (L) 02/23/2022    HCT 35.2 (L) 02/23/2022    MCV 87 02/23/2022     02/23/2022       Lab Results   Component Value Date    TSH 0.656 02/23/2022     Lab Results   Component Value Date    HGBA1C 5.8 (H) 04/09/2021     Lab Results   Component Value Date    CHOL 264 (H) 04/09/2021     Lab Results    Component Value Date     (H) 04/09/2021    HDL 76 08/20/2015     Lab Results   Component Value Date    LDLCALC 138.2 04/09/2021    LDLCALC 109 08/20/2015     Lab Results   Component Value Date    TRIG 114 04/09/2021    TRIG 107 08/20/2015     Lab Results   Component Value Date    CHOLHDL 39.0 04/09/2021           Assessment:       1. Atypical chest pain    2. Palpitation    3. Palpitations    4. Primary hypertension    5. Abnormal ECG    6. MVP (mitral valve prolapse)    7. Shortness of breath    8. Family history of cardiovascular disease    9. Class 1 obesity due to excess calories with serious comorbidity and body mass index (BMI) of 34.0 to 34.9 in adult         Plan:               Palpitations associated with atypical chest discomfort.  At risk for CAD.  Needs arrhythmia evaluation and ischemia eval.  2 week Bardy Holter.  Stress MPI.  Echocardiogram.  Discussed possible left heart catheterization with possible PCI if warranted should stress test and/or echo reveal evidence of coronary ischemia and/or CHF.  Pt advised of all risks and benefits of procedure.  Pt advised of alternative approaches and treatment strategies to include medical therapy, PCI as well as surgical revascularization with possible CABG.  All questions answered in clinic.   Cardiac diet.  HTN control.  Weight loss.  Lipid control.  F/u labs.  No changes in meds today.    PHONE REVIEW.          I have reviewed all pertinent labs and cardiac studies independently. Plans and recommendations have been formulated under my direct supervision. All questions answered and patient voiced understanding.

## 2022-03-21 NOTE — PROGRESS NOTES
"Subjective:      Patient ID: Miles Bowen is a 61 y.o. female.    Chief Complaint: No chief complaint on file.    HPI     Here today for annual prev exam.  Compliant with meds without significant side effects. Energy and appetite are good.     Also reports chronic intermittent palpitations. No cp, n/v, diaphoresis.     Review of Systems   Constitutional: Negative for chills and fever.   HENT: Negative for ear pain and sore throat.    Respiratory: Negative for cough.    Cardiovascular: Positive for palpitations. Negative for chest pain.   Gastrointestinal: Negative for abdominal pain and blood in stool.   Genitourinary: Negative for dysuria and hematuria.   Neurological: Negative for seizures and syncope.     Objective:   /84 (BP Location: Right arm, Patient Position: Sitting, BP Method: Medium (Manual))   Pulse 77   Temp 98.4 °F (36.9 °C)   Resp 18   Ht 5' 2" (1.575 m)   Wt 84.7 kg (186 lb 11.7 oz)   SpO2 98%   BMI 34.15 kg/m²     Physical Exam  Constitutional:       General: She is not in acute distress.     Appearance: She is well-developed.   HENT:      Head: Normocephalic and atraumatic.      Right Ear: External ear normal.      Left Ear: External ear normal.   Eyes:      Pupils: Pupils are equal, round, and reactive to light.   Neck:      Thyroid: No thyromegaly.   Cardiovascular:      Rate and Rhythm: Normal rate and regular rhythm.   Pulmonary:      Breath sounds: Normal breath sounds. No wheezing or rales.   Abdominal:      General: Bowel sounds are normal.      Palpations: Abdomen is soft.      Tenderness: There is no abdominal tenderness.   Musculoskeletal:         General: No swelling.      Cervical back: Neck supple.   Lymphadenopathy:      Cervical: No cervical adenopathy.   Skin:     General: Skin is warm and dry.   Neurological:      Mental Status: She is alert and oriented to person, place, and time.   Psychiatric:         Behavior: Behavior normal.         Assessment:     1. " Routine general medical examination at a health care facility    2. Primary hypertension    3. Depression, unspecified depression type    4. Anxiety    5. Irritable bowel syndrome, unspecified type    6. Palpitation      Plan:   Routine general medical examination at a health care facility  Heart healthy diet and reg exercise  HM reviewed    -     TSH; Future; Expected date: 03/21/2022  -     Hemoglobin A1C; Future; Expected date: 03/21/2022  -     Lipid Panel; Future; Expected date: 03/21/2022  -     T4, Free; Future; Expected date: 03/21/2022    Primary hypertension  Controlled  Depression, unspecified depression type  stable  Anxiety  stable  Irritable bowel syndrome, unspecified type  Stable     Palpitation  -     EKG 12-lead; Future  -     Ambulatory referral/consult to Cardiology; Future; Expected date: 03/28/2022        Lab Frequency Next Occurrence   Ambulatory referral/consult to Optometry Once 03/18/2021   US Soft Tissue Head Neck Thyroid Once 04/09/2021   H. pylori antigen, stool Once 04/09/2021   Pancreatic elastase, fecal Once 04/09/2021   Fecal fat, qualitative Once 04/09/2021   Occult blood x 1, stool Once 04/09/2021   WBC, Stool Once 04/09/2021   Calprotectin, Stool Once 04/09/2021   Giardia / Cryptosporidum, EIA Once 04/09/2021   Stool Exam-Ova,Cysts,Parasites Once 04/09/2021   Clostridium difficile EIA Once 04/09/2021   Stool culture Once 04/09/2021   Mammo Digital Screening Bilat w/ Jim Once 04/12/2021   Ambulatory referral/consult to Endocrinology Once 04/21/2021   Ambulatory referral/consult to Gastroenterology Once 02/16/2022   CBC Auto Differential Once 02/09/2022   Comprehensive Metabolic Panel Once 02/09/2022   X-Ray Chest PA And Lateral Once 02/09/2022   Urinalysis Once 02/09/2022   Urine culture Once 02/09/2022   Ambulatory referral/consult to Hematology / Oncology Once 03/04/2022   CBC Auto Differential Once 03/04/2022   Vitamin B12 Once 03/04/2022   Iron and TIBC Once 03/04/2022    Ferritin Once 03/04/2022       Problem List Items Addressed This Visit     Hypertension    Depression    Anxiety    IBS (irritable bowel syndrome)      Other Visit Diagnoses     Routine general medical examination at a health care facility    -  Primary    Relevant Orders    TSH (Completed)    Hemoglobin A1C (Completed)    Lipid Panel (Completed)    T4, Free (Completed)    Palpitation        Relevant Orders    EKG 12-lead (Completed)    Ambulatory referral/consult to Cardiology          Follow up in about 1 year (around 3/21/2023), or if symptoms worsen or fail to improve.

## 2022-03-21 NOTE — LETTER
March 21, 2022      The [address]  [address] [facility]  [place] [address]  Phone: [phone]  Fax: [phone]       Patient: Miles Bowen   YOB: 1960  Date of Visit: 03/21/2022    To Whom It May Concern:    Valerie Bowen  was at [facility] on 03/21/2022. The patient may return to work/school on 03/22/22 with no restrictions. If you have any questions or concerns, or if I can be of further assistance, please do not hesitate to contact me.    Sincerely,    Ellen Ellison MA

## 2022-03-30 ENCOUNTER — PATIENT MESSAGE (OUTPATIENT)
Dept: CARDIOLOGY | Facility: HOSPITAL | Age: 62
End: 2022-03-30
Payer: COMMERCIAL

## 2022-03-31 ENCOUNTER — TELEPHONE (OUTPATIENT)
Dept: CARDIOLOGY | Facility: HOSPITAL | Age: 62
End: 2022-03-31
Payer: COMMERCIAL

## 2022-04-07 ENCOUNTER — TELEPHONE (OUTPATIENT)
Dept: INTERNAL MEDICINE | Facility: CLINIC | Age: 62
End: 2022-04-07
Payer: COMMERCIAL

## 2022-04-07 DIAGNOSIS — U07.1 COVID-19: Primary | ICD-10-CM

## 2022-04-07 NOTE — TELEPHONE ENCOUNTER
----- Message from Corinne April sent at 4/7/2022  9:47 AM CDT -----  Contact: [phone] Patient  Patient would like to get medical advice.  Symptoms (please be specific):  fever, cough, runny nose, body aches, fatigue, loss of sense of taste & smell, neck and throat pain  How long have you had these symptoms: 04/03/22  Would you like a call back, or a response through your [facility] portal?:  call back  Pharmacy name and phone # (copy from chart):    [id number] - [place] - [address]  [address]  [place] [address]  Phone: [phone] Fax: [phone]    Comments:  Pt states she took a home Covid test this morning and tested positive.

## 2022-04-07 NOTE — TELEPHONE ENCOUNTER
Pt states that she took an at home covid test this morning and it came back positive. I advised her to isolate for five days and treat her symptoms with over the counter medications and to go to the ER for severe symptoms or trouble breathing. She verbalized understanding. She would like the home monitoring program.

## 2022-04-08 ENCOUNTER — NURSE TRIAGE (OUTPATIENT)
Dept: ADMINISTRATIVE | Facility: CLINIC | Age: 62
End: 2022-04-08
Payer: COMMERCIAL

## 2022-04-08 NOTE — TELEPHONE ENCOUNTER
[facility] pt responded to [facility] text message. Pt reports she tested positive on the 6th, and also that her symptoms started on the ^th as well. Sates that she has a runny nose, nasal congestion, HA, muscle aches. Home care advise given. Pt also had question about ending isolation. Pt. Question answered based on CDC guidelines, and verbalized understanding.    Reason for Disposition   [1] COVID-19 diagnosed by positive lab test (e.g., PCR, rapid self-test kit) AND [2] mild symptoms (e.g., cough, fever, others) AND [3] no complications or SOB    Additional Information   Negative: SEVERE difficulty breathing (e.g., struggling for each breath, speaks in single words)   Negative: Difficult to awaken or acting confused (e.g., disoriented, slurred speech)   Negative: Bluish (or gray) lips or face now   Negative: Shock suspected (e.g., cold/pale/clammy skin, too weak to stand, low BP, rapid pulse)   Negative: Sounds like a life-threatening emergency to the triager   Negative: SEVERE or constant chest pain or pressure (Exception: mild central chest pain, present only when coughing)   Negative: MODERATE difficulty breathing (e.g., speaks in phrases, SOB even at rest, pulse 100-120)   Negative: Headache and stiff neck (can't touch chin to chest)   Negative: Chest pain or pressure   Negative: Patient sounds very sick or weak to the triager   Negative: MILD difficulty breathing (e.g., minimal/no SOB at rest, SOB with walking, pulse <100)   Negative: Fever > 103 F (39.4 C)   Negative: [1] Fever > 101 F (38.3 C) AND [2] over 60 years of age   Negative: [1] Fever > 100.0 F (37.8 C) AND [2] bedridden (e.g., nursing home patient, CVA, chronic illness, recovering from surgery)   Negative: HIGH RISK for severe COVID complications (e.g., age > 64 years, obesity with BMI > 25, pregnant, chronic lung disease or other chronic medical condition) (Exception: Already seen by PCP and no new or worsening symptoms.)   Negative: [1] HIGH  RISK patient AND [2] influenza is widespread in the community AND [3] ONE OR MORE respiratory symptoms: cough, sore throat, runny or stuffy nose   Negative: [1] HIGH RISK patient AND [2] influenza exposure within the last 7 days AND [3] ONE OR MORE respiratory symptoms: cough, sore throat, runny or stuffy nose   Negative: [1] COVID-19 infection suspected by caller or triager AND [2] mild symptoms (cough, fever, or others) AND [3] negative COVID-19 rapid test   Negative: [1] COVID-19 infection suspected by caller or triager AND [2] mild symptoms (cough, fever, or others) AND [3] has not gotten tested yet   Negative: Fever present > 3 days (72 hours)   Negative: [1] Fever returns after gone for over 24 hours AND [2] symptoms worse or not improved   Negative: [1] Continuous (nonstop) coughing interferes with work or school AND [2] no improvement using cough treatment per Care Advice   Negative: Cough present > 3 weeks   Negative: [1] COVID-19 diagnosed by positive lab test (e.g., PCR, rapid self-test kit) AND [2] NO symptoms (e.g., cough, fever, others)    Protocols used: CORONAVIRUS (COVID-19) DIAGNOSED OR [id number]

## 2022-04-09 DIAGNOSIS — K21.9 GASTROESOPHAGEAL REFLUX DISEASE: ICD-10-CM

## 2022-04-09 NOTE — TELEPHONE ENCOUNTER
No new care gaps identified.  Powered by What's Trending by [employer]. Reference number: [id number].   4/09/2022 5:41:53 AM CDT

## 2022-04-11 DIAGNOSIS — K21.9 GASTROESOPHAGEAL REFLUX DISEASE: ICD-10-CM

## 2022-04-11 RX ORDER — BACLOFEN 10 MG/1
10 TABLET ORAL 2 TIMES DAILY PRN
Qty: 30 TABLET | Refills: 0 | Status: SHIPPED | OUTPATIENT
Start: 2022-04-11 | End: 2022-05-12 | Stop reason: SDUPTHER

## 2022-04-11 RX ORDER — PANTOPRAZOLE SODIUM 40 MG/1
40 TABLET, DELAYED RELEASE ORAL DAILY
Qty: 90 TABLET | Refills: 0 | OUTPATIENT
Start: 2022-04-11 | End: 2023-04-11

## 2022-04-11 RX ORDER — PANTOPRAZOLE SODIUM 40 MG/1
40 TABLET, DELAYED RELEASE ORAL DAILY
Qty: 90 TABLET | Refills: 0 | Status: CANCELLED | OUTPATIENT
Start: 2022-04-11 | End: 2023-04-11

## 2022-04-11 NOTE — TELEPHONE ENCOUNTER
No new care gaps identified.  Powered by [employer]. Reference number: [id number].   4/11/2022 4:42:36 PM CDT

## 2022-04-13 ENCOUNTER — OFFICE VISIT (OUTPATIENT)
Dept: INTERNAL MEDICINE | Facility: CLINIC | Age: 62
End: 2022-04-13
Payer: COMMERCIAL

## 2022-04-13 VITALS
OXYGEN SATURATION: 96 % | SYSTOLIC BLOOD PRESSURE: 132 MMHG | DIASTOLIC BLOOD PRESSURE: 80 MMHG | WEIGHT: 188.06 LBS | HEART RATE: 87 BPM | BODY MASS INDEX: 34.4 KG/M2 | TEMPERATURE: 98 F

## 2022-04-13 DIAGNOSIS — J32.1 FRONTAL SINUSITIS, UNSPECIFIED CHRONICITY: Primary | ICD-10-CM

## 2022-04-13 DIAGNOSIS — J01.90 ACUTE SINUSITIS, RECURRENCE NOT SPECIFIED, UNSPECIFIED LOCATION: ICD-10-CM

## 2022-04-13 DIAGNOSIS — J40 BRONCHITIS: ICD-10-CM

## 2022-04-13 DIAGNOSIS — K21.9 GASTROESOPHAGEAL REFLUX DISEASE: ICD-10-CM

## 2022-04-13 DIAGNOSIS — J06.9 VIRAL URI WITH COUGH: ICD-10-CM

## 2022-04-13 PROCEDURE — 3044F PR MOST RECENT HEMOGLOBIN A1C LEVEL <7.0%: ICD-10-PCS | Mod: CPTII,S$GLB,, | Performed by: INTERNAL MEDICINE

## 2022-04-13 PROCEDURE — 3075F SYST BP GE 130 - 139MM HG: CPT | Mod: CPTII,S$GLB,, | Performed by: INTERNAL MEDICINE

## 2022-04-13 PROCEDURE — 4010F ACE/ARB THERAPY RXD/TAKEN: CPT | Mod: CPTII,S$GLB,, | Performed by: INTERNAL MEDICINE

## 2022-04-13 PROCEDURE — 3044F HG A1C LEVEL LT 7.0%: CPT | Mod: CPTII,S$GLB,, | Performed by: INTERNAL MEDICINE

## 2022-04-13 PROCEDURE — 3008F PR BODY MASS INDEX (BMI) DOCUMENTED: ICD-10-PCS | Mod: CPTII,S$GLB,, | Performed by: INTERNAL MEDICINE

## 2022-04-13 PROCEDURE — 99999 PR PBB SHADOW E&M-EST. PATIENT-LVL III: CPT | Mod: PBBFAC,,, | Performed by: INTERNAL MEDICINE

## 2022-04-13 PROCEDURE — 3079F PR MOST RECENT DIASTOLIC BLOOD PRESSURE 80-89 MM HG: ICD-10-PCS | Mod: CPTII,S$GLB,, | Performed by: INTERNAL MEDICINE

## 2022-04-13 PROCEDURE — 99999 PR PBB SHADOW E&M-EST. PATIENT-LVL III: ICD-10-PCS | Mod: PBBFAC,,, | Performed by: INTERNAL MEDICINE

## 2022-04-13 PROCEDURE — 3075F PR MOST RECENT SYSTOLIC BLOOD PRESS GE 130-139MM HG: ICD-10-PCS | Mod: CPTII,S$GLB,, | Performed by: INTERNAL MEDICINE

## 2022-04-13 PROCEDURE — 4010F PR ACE/ARB THEARPY RXD/TAKEN: ICD-10-PCS | Mod: CPTII,S$GLB,, | Performed by: INTERNAL MEDICINE

## 2022-04-13 PROCEDURE — 3008F BODY MASS INDEX DOCD: CPT | Mod: CPTII,S$GLB,, | Performed by: INTERNAL MEDICINE

## 2022-04-13 PROCEDURE — 99213 OFFICE O/P EST LOW 20 MIN: CPT | Mod: S$GLB,,, | Performed by: INTERNAL MEDICINE

## 2022-04-13 PROCEDURE — 99213 PR OFFICE/OUTPT VISIT, EST, LEVL III, 20-29 MIN: ICD-10-PCS | Mod: S$GLB,,, | Performed by: INTERNAL MEDICINE

## 2022-04-13 PROCEDURE — 3079F DIAST BP 80-89 MM HG: CPT | Mod: CPTII,S$GLB,, | Performed by: INTERNAL MEDICINE

## 2022-04-13 RX ORDER — AMOXICILLIN AND CLAVULANATE POTASSIUM 875; 125 MG/1; MG/1
1 TABLET, FILM COATED ORAL 2 TIMES DAILY
Qty: 20 TABLET | Refills: 0 | Status: SHIPPED | OUTPATIENT
Start: 2022-04-13 | End: 2022-04-23

## 2022-04-13 RX ORDER — PROMETHAZINE HYDROCHLORIDE AND CODEINE PHOSPHATE 6.25; 1 MG/5ML; MG/5ML
5 SOLUTION ORAL EVERY 6 HOURS PRN
Qty: 118 ML | Refills: 0 | Status: SHIPPED | OUTPATIENT
Start: 2022-04-13 | End: 2022-04-20

## 2022-04-13 RX ORDER — PANTOPRAZOLE SODIUM 40 MG/1
40 TABLET, DELAYED RELEASE ORAL DAILY
Qty: 90 TABLET | Refills: 3 | Status: SHIPPED | OUTPATIENT
Start: 2022-04-13 | End: 2022-07-13

## 2022-04-13 NOTE — PROGRESS NOTES
Subjective:      Patient ID: Miles Bowen is a 61 y.o. female.    Chief Complaint: Sinus Problem    62 yo with   Patient Active Problem List   Diagnosis    Hypertension    Depression    Anxiety    GERD (gastroesophageal reflux disease)    Hiatal hernia    DDD (degenerative disc disease), lumbar    Common migraine    Allergic rhinitis    IBS (irritable bowel syndrome)    Multinodular goiter    MVP (mitral valve prolapse)    Nasal polyps    Obesity    Abnormal ECG    Palpitations    Atypical chest pain    Shortness of breath     Past Medical History:   Diagnosis Date    Anxiety     Depression     GERD (gastroesophageal reflux disease)     Heart murmur     Hematemesis without nausea 11/4/2016    Hypertension     Multinodular goiter 12/23/2016    MVP (mitral valve prolapse)     Obesity        Here today reporting diagnosed with COVID 10 days ago.  Symptoms significantly improved.  However 2-3 days ago she began with right sinus stabbing pain.  Sore throat and dry cough.  Stuffy nose.  Symptoms are worsening.  Coricidin and saline nasal spray are mild help.  Sinus Problem  Associated symptoms include congestion, ear pain, headaches, neck pain, sinus pressure and a sore throat. Pertinent negatives include no chills, coughing or diaphoresis.   Otalgia   There is pain in both ears. This is a new problem. The current episode started in the past 7 days. The problem has been rapidly worsening. The pain is severe. Associated symptoms include diarrhea, headaches, neck pain, rhinorrhea and a sore throat. Pertinent negatives include no abdominal pain, coughing, ear discharge or rash. She has tried acetaminophen for the symptoms. The treatment provided no relief. There is no history of a chronic ear infection, hearing loss or a tympanostomy tube.     Review of Systems   Constitutional: Negative for chills, diaphoresis and fever.   HENT: Positive for congestion, ear pain, rhinorrhea, sinus pressure,  sinus pain and sore throat. Negative for ear discharge, postnasal drip and voice change.    Respiratory: Negative for cough.    Cardiovascular: Negative for chest pain.   Gastrointestinal: Positive for diarrhea. Negative for abdominal pain.   Musculoskeletal: Positive for neck pain.   Skin: Negative for rash and wound.   Neurological: Positive for headaches. Negative for syncope.     Objective:   /80   Pulse 87   Temp 98.3 °F (36.8 °C)   Wt 85.3 kg (188 lb 0.8 oz)   SpO2 96%   BMI 34.40 kg/m²     Physical Exam  HENT:      Right Ear: Tympanic membrane normal.      Left Ear: Tympanic membrane normal.      Nose:      Right Nostril: No epistaxis.      Left Nostril: No epistaxis.      Right Turbinates: Enlarged.      Left Turbinates: Enlarged.      Right Sinus: Maxillary sinus tenderness present.      Left Sinus: Maxillary sinus tenderness present.   Cardiovascular:      Rate and Rhythm: Normal rate and regular rhythm.   Pulmonary:      Effort: Pulmonary effort is normal.      Breath sounds: Normal breath sounds. No wheezing or rales.   Musculoskeletal:      Cervical back: No rigidity.         Assessment:     1. Frontal sinusitis, unspecified chronicity    2. Gastroesophageal reflux disease    3. Acute sinusitis, recurrence not specified, unspecified location    4. Viral URI with cough    5. Bronchitis      Plan:   Frontal sinusitis, unspecified chronicity    Gastroesophageal reflux disease  -     pantoprazole (PROTONIX) 40 MG tablet; Take 1 tablet (40 mg total) by mouth once daily.  Dispense: 90 tablet; Refill: 3    Acute sinusitis, recurrence not specified, unspecified location  -     amoxicillin-clavulanate 875-125mg (AUGMENTIN) 875-125 mg per tablet; Take 1 tablet by mouth 2 (two) times daily. for 10 days  Dispense: 20 tablet; Refill: 0    Viral URI with cough    Bronchitis  -     promethazine-codeine 6.25-10 mg/5 ml (PHENERGAN WITH CODEINE) 6.25-10 mg/5 mL syrup; Take 5 mLs by mouth every 6 (six) hours as  needed for Cough.  Dispense: 118 mL; Refill: 0        Lab Frequency Next Occurrence   Pancreatic elastase, fecal Once 04/09/2021   Fecal fat, qualitative Once 04/09/2021   Calprotectin, Stool Once 04/09/2021   Giardia / Cryptosporidum, EIA Once 04/09/2021   Clostridium difficile EIA Once 04/09/2021   Ambulatory referral/consult to Endocrinology Once 04/21/2021   Ambulatory referral/consult to Gastroenterology Once 02/16/2022   CBC Auto Differential Once 02/09/2022   Comprehensive Metabolic Panel Once 02/09/2022   X-Ray Chest PA And Lateral Once 02/09/2022   Urinalysis Once 02/09/2022   Urine culture Once 02/09/2022   Ambulatory referral/consult to Hematology / Oncology Once 03/04/2022   CBC Auto Differential Once 03/04/2022   Vitamin B12 Once 03/04/2022   Iron and TIBC Once 03/04/2022   Ferritin Once 03/04/2022       Problem List Items Addressed This Visit    None     Visit Diagnoses     Frontal sinusitis, unspecified chronicity    -  Primary    Gastroesophageal reflux disease        Relevant Medications    pantoprazole (PROTONIX) 40 MG tablet    Acute sinusitis, recurrence not specified, unspecified location        Viral URI with cough        Bronchitis              No follow-ups on file.

## 2022-04-22 DIAGNOSIS — K58.2 IRRITABLE BOWEL SYNDROME WITH BOTH CONSTIPATION AND DIARRHEA: ICD-10-CM

## 2022-04-22 DIAGNOSIS — R10.9 ABDOMINAL PAIN, ACUTE: ICD-10-CM

## 2022-04-22 RX ORDER — DICYCLOMINE HYDROCHLORIDE 10 MG/1
CAPSULE ORAL
Qty: 40 CAPSULE | Refills: 0 | Status: SHIPPED | OUTPATIENT
Start: 2022-04-22 | End: 2022-05-12 | Stop reason: SDUPTHER

## 2022-04-22 NOTE — TELEPHONE ENCOUNTER
Refill Routing Note   Medication(s) are not appropriate for processing by [facility] for the following reason(s):      - Outside of protocol    [facility] action(s):  Route          Medication reconciliation completed: No     Appointments  past 12m or future 3m with PCP    Date Provider   Last Visit   4/13/2022 Carlos Paul MD   Next Visit   Visit date not found Carlos Paul MD   ED visits in past 90 days: 0        Note composed:8:16 AM 04/22/2022

## 2022-04-24 ENCOUNTER — PATIENT MESSAGE (OUTPATIENT)
Dept: INTERNAL MEDICINE | Facility: CLINIC | Age: 62
End: 2022-04-24
Payer: COMMERCIAL

## 2022-04-24 DIAGNOSIS — J32.9 SINUSITIS, UNSPECIFIED CHRONICITY, UNSPECIFIED LOCATION: Primary | ICD-10-CM

## 2022-04-25 ENCOUNTER — PATIENT MESSAGE (OUTPATIENT)
Dept: INTERNAL MEDICINE | Facility: CLINIC | Age: 62
End: 2022-04-25
Payer: COMMERCIAL

## 2022-05-02 ENCOUNTER — PATIENT MESSAGE (OUTPATIENT)
Dept: OTOLARYNGOLOGY | Facility: CLINIC | Age: 62
End: 2022-05-02
Payer: COMMERCIAL

## 2022-05-12 ENCOUNTER — HOSPITAL ENCOUNTER (OUTPATIENT)
Dept: RADIOLOGY | Facility: HOSPITAL | Age: 62
Discharge: HOME | End: 2022-05-12
Attending: PHYSICIAN ASSISTANT
Payer: COMMERCIAL

## 2022-05-12 ENCOUNTER — TELEPHONE (OUTPATIENT)
Dept: PRIMARY CARE CLINIC | Facility: CLINIC | Age: 62
End: 2022-05-12

## 2022-05-12 ENCOUNTER — PATIENT MESSAGE (OUTPATIENT)
Dept: INTERNAL MEDICINE | Facility: CLINIC | Age: 62
End: 2022-05-12
Payer: COMMERCIAL

## 2022-05-12 ENCOUNTER — OFFICE VISIT (OUTPATIENT)
Dept: PRIMARY CARE CLINIC | Facility: CLINIC | Age: 62
End: 2022-05-12
Payer: COMMERCIAL

## 2022-05-12 VITALS
RESPIRATION RATE: 18 BRPM | HEART RATE: 74 BPM | TEMPERATURE: 98 F | SYSTOLIC BLOOD PRESSURE: 114 MMHG | OXYGEN SATURATION: 99 % | WEIGHT: 188.69 LBS | HEIGHT: 62 IN | BODY MASS INDEX: 34.72 KG/M2 | DIASTOLIC BLOOD PRESSURE: 70 MMHG

## 2022-05-12 DIAGNOSIS — K58.2 IRRITABLE BOWEL SYNDROME WITH BOTH CONSTIPATION AND DIARRHEA: ICD-10-CM

## 2022-05-12 DIAGNOSIS — M25.562 LEFT KNEE PAIN, UNSPECIFIED CHRONICITY: Primary | ICD-10-CM

## 2022-05-12 DIAGNOSIS — M25.562 LEFT KNEE PAIN, UNSPECIFIED CHRONICITY: ICD-10-CM

## 2022-05-12 DIAGNOSIS — R10.9 ABDOMINAL PAIN, ACUTE: ICD-10-CM

## 2022-05-12 PROCEDURE — 73562 X-RAY EXAM OF KNEE 3: CPT | Mod: 26,LT,, | Performed by: RADIOLOGY

## 2022-05-12 PROCEDURE — 4010F ACE/ARB THERAPY RXD/TAKEN: CPT | Mod: CPTII,S$GLB,, | Performed by: PHYSICIAN ASSISTANT

## 2022-05-12 PROCEDURE — 3008F PR BODY MASS INDEX (BMI) DOCUMENTED: ICD-10-PCS | Mod: CPTII,S$GLB,, | Performed by: PHYSICIAN ASSISTANT

## 2022-05-12 PROCEDURE — 3008F BODY MASS INDEX DOCD: CPT | Mod: CPTII,S$GLB,, | Performed by: PHYSICIAN ASSISTANT

## 2022-05-12 PROCEDURE — 73560 X-RAY EXAM OF KNEE 1 OR 2: CPT | Mod: TC,RT

## 2022-05-12 PROCEDURE — 1160F RVW MEDS BY RX/DR IN RCRD: CPT | Mod: CPTII,S$GLB,, | Performed by: PHYSICIAN ASSISTANT

## 2022-05-12 PROCEDURE — 99213 PR OFFICE/OUTPT VISIT, EST, LEVL III, 20-29 MIN: ICD-10-PCS | Mod: S$GLB,,, | Performed by: PHYSICIAN ASSISTANT

## 2022-05-12 PROCEDURE — 3074F PR MOST RECENT SYSTOLIC BLOOD PRESSURE < 130 MM HG: ICD-10-PCS | Mod: CPTII,S$GLB,, | Performed by: PHYSICIAN ASSISTANT

## 2022-05-12 PROCEDURE — 1159F PR MEDICATION LIST DOCUMENTED IN MEDICAL RECORD: ICD-10-PCS | Mod: CPTII,S$GLB,, | Performed by: PHYSICIAN ASSISTANT

## 2022-05-12 PROCEDURE — 99213 OFFICE O/P EST LOW 20 MIN: CPT | Mod: S$GLB,,, | Performed by: PHYSICIAN ASSISTANT

## 2022-05-12 PROCEDURE — 1159F MED LIST DOCD IN RCRD: CPT | Mod: CPTII,S$GLB,, | Performed by: PHYSICIAN ASSISTANT

## 2022-05-12 PROCEDURE — 4010F PR ACE/ARB THEARPY RXD/TAKEN: ICD-10-PCS | Mod: CPTII,S$GLB,, | Performed by: PHYSICIAN ASSISTANT

## 2022-05-12 PROCEDURE — 1160F PR REVIEW ALL MEDS BY PRESCRIBER/CLIN PHARMACIST DOCUMENTED: ICD-10-PCS | Mod: CPTII,S$GLB,, | Performed by: PHYSICIAN ASSISTANT

## 2022-05-12 PROCEDURE — 3044F HG A1C LEVEL LT 7.0%: CPT | Mod: CPTII,S$GLB,, | Performed by: PHYSICIAN ASSISTANT

## 2022-05-12 PROCEDURE — 73560 XR KNEE ORTHO LEFT: ICD-10-PCS | Mod: 26,RT,, | Performed by: RADIOLOGY

## 2022-05-12 PROCEDURE — 73560 X-RAY EXAM OF KNEE 1 OR 2: CPT | Mod: 26,RT,, | Performed by: RADIOLOGY

## 2022-05-12 PROCEDURE — 3044F PR MOST RECENT HEMOGLOBIN A1C LEVEL <7.0%: ICD-10-PCS | Mod: CPTII,S$GLB,, | Performed by: PHYSICIAN ASSISTANT

## 2022-05-12 PROCEDURE — 3074F SYST BP LT 130 MM HG: CPT | Mod: CPTII,S$GLB,, | Performed by: PHYSICIAN ASSISTANT

## 2022-05-12 PROCEDURE — 3078F DIAST BP <80 MM HG: CPT | Mod: CPTII,S$GLB,, | Performed by: PHYSICIAN ASSISTANT

## 2022-05-12 PROCEDURE — 99999 PR PBB SHADOW E&M-EST. PATIENT-LVL V: ICD-10-PCS | Mod: PBBFAC,,, | Performed by: PHYSICIAN ASSISTANT

## 2022-05-12 PROCEDURE — 73562 XR KNEE ORTHO LEFT: ICD-10-PCS | Mod: 26,LT,, | Performed by: RADIOLOGY

## 2022-05-12 PROCEDURE — 99999 PR PBB SHADOW E&M-EST. PATIENT-LVL V: CPT | Mod: PBBFAC,,, | Performed by: PHYSICIAN ASSISTANT

## 2022-05-12 PROCEDURE — 3078F PR MOST RECENT DIASTOLIC BLOOD PRESSURE < 80 MM HG: ICD-10-PCS | Mod: CPTII,S$GLB,, | Performed by: PHYSICIAN ASSISTANT

## 2022-05-12 RX ORDER — BACLOFEN 10 MG/1
10 TABLET ORAL 2 TIMES DAILY PRN
Qty: 30 TABLET | Refills: 0 | Status: SHIPPED | OUTPATIENT
Start: 2022-05-12 | End: 2022-06-03 | Stop reason: SDUPTHER

## 2022-05-12 RX ORDER — DICYCLOMINE HYDROCHLORIDE 10 MG/1
CAPSULE ORAL
Qty: 40 CAPSULE | Refills: 0 | Status: CANCELLED | OUTPATIENT
Start: 2022-05-12

## 2022-05-12 RX ORDER — DICYCLOMINE HYDROCHLORIDE 10 MG/1
CAPSULE ORAL
Qty: 40 CAPSULE | Refills: 0 | Status: SHIPPED | OUTPATIENT
Start: 2022-05-12 | End: 2022-06-03 | Stop reason: SDUPTHER

## 2022-05-12 RX ORDER — LORAZEPAM 2 MG/1
TABLET ORAL
COMMUNITY
Start: 2022-05-08

## 2022-05-12 NOTE — TELEPHONE ENCOUNTER
Refill Routing Note   Medication(s) are not appropriate for processing by [facility] for the following reason(s):      - Outside of protocol    [facility] action(s):  Route          Medication reconciliation completed: No     Appointments  past 12m or future 3m with PCP    Date Provider   Last Visit   4/13/2022 Carlos Paul MD   Next Visit   5/12/2022 Carlos Paul MD   ED visits in past 90 days: 0        Note composed:9:24 AM 05/12/2022

## 2022-05-12 NOTE — TELEPHONE ENCOUNTER
Refill Routing Note   Medication(s) are not appropriate for processing by [facility] for the following reason(s):      - Outside of protocol    [facility] action(s):  Quick Discontinue          Medication reconciliation completed: No     Appointments  past 12m or future 3m with PCP    Date Provider   Last Visit   4/13/2022 Carlos Paul MD   Next Visit   Visit date not found Carlos Paul MD   ED visits in past 90 days: 0        Note composed:9:24 AM 05/12/2022

## 2022-05-12 NOTE — PROGRESS NOTES
"Subjective:      Patient ID: Miles Bowen is a 61 y.o. female.    Chief Complaint: Knee Injury (Pt stated she slipped and fell on the side she had a knee replacement on . Right side)    Miles Bowen is a 61 y.o. female who presents to clinic for acute knee pain that happened Friday after fall.  Tripped and fell onto left knee.  Now with knee pain.  Tried iced, tried elevating but pain is not improving and limping.      Review of Systems   Constitutional: Negative for chills, diaphoresis, fatigue and fever.   Respiratory: Negative for cough and shortness of breath.    Cardiovascular: Negative for chest pain and palpitations.   Gastrointestinal: Negative for abdominal pain, diarrhea, nausea and vomiting.   Musculoskeletal:        Left knee pain    Skin: Negative for rash.       Objective:   /70   Pulse 74   Temp 97.8 °F (36.6 °C) (Temporal)   Resp 18   Ht 5' 2" (1.575 m)   Wt 85.6 kg (188 lb 11.4 oz)   SpO2 99%   BMI 34.52 kg/m²   Physical Exam  Vitals reviewed.   Constitutional:       General: She is not in acute distress.     Appearance: She is well-developed. She is not diaphoretic.   HENT:      Head: Normocephalic and atraumatic.      Right Ear: External ear normal.      Left Ear: External ear normal.      Nose: Nose normal.   Cardiovascular:      Rate and Rhythm: Normal rate.   Pulmonary:      Effort: Pulmonary effort is normal. No respiratory distress.   Musculoskeletal:      Left knee: Bony tenderness (tenderness over distal medial patella ) present. Decreased range of motion: pain with knee flexion  No LCL laxity, MCL laxity, ACL laxity or PCL laxity.     Comments: Tenderness over patellar ligament    Skin:     General: Skin is warm and dry.      Capillary Refill: Capillary refill takes less than 2 seconds.   Neurological:      Mental Status: She is alert and oriented to person, place, and time.      Motor: No abnormal muscle tone.   Psychiatric:         Behavior: Behavior normal. "       Assessment:      1. Left knee pain, unspecified chronicity       Plan:   Left knee pain, unspecified chronicity  -     X-ray Knee Ortho Left; Future; Expected date: 05/12/2022    suspect patellar tendon strain  X-ray to rule out fracture given tenderness over patella  Discussed ice, tylenol, knee sleeve (compression), rest (stay off for next 1-2 weeks)  If not improving, then follow-up with PT/Ortho     2022  04/15/2022   1  Lorazepam 2 Mg Tablet   45.00  30  Ly Sim  [id number]  [place] (2621)  0  3.00 LME  [place]  [place] 05/07/2022 04/08/2022   1  Diazepam 10 Mg Tablet   15.00  30  Ly Sim  [id number]  [place] (2621)  1  0.50 LME  [place] 04/30/2022 04/08/2022   1  Zolpidem Tart Er 12.5 Mg Tab   30.00  30  Ly Sim  [id number]  [place] (2621)  0  0.63 LME  [place] 04/13/2022 04/13/2022   2  Promethazine-Codeine Syrup   118.00  6  [facility]  [id number]  [facility] (3696)  0  5.90 MME  [place] 04/08/2022 04/08/2022   1  Diazepam 10 Mg Tablet   15.00  30  Ly Sim  [id number]  [place] (2621)  0  0.50 LME  [place] 04/08/2022 04/08/2022   1  Lorazepam 2 Mg Tablet   30.00  30  Ly Sim  [id number]  [place] (2621)  0  2.00 LME  [place] 04/02/2022 02/04/2022   1  Zolpidem Tart Er 12.5 Mg Tab   30.00  30  Ly Sim  [id number]  [place] (8532)  2  0.63 LME  [place] 03/06/2022 02/04/2022   1  Zolpidem Tart Er 12.5 Mg Tab   30.00  30  Ly Sim  [id number]  [place] (8532)  1  0.63 LME  [place] 02/27/2022 01/28/2022   1  Oxazepam 15 Mg Capsule   30.00  15  Ly Sim  [id number]  [place] (8532)  1  1.50 LME  [place] 02/21/2022 01/21/2022   1  Lorazepam 2 Mg Tablet   45.00  15  Ly Sim  [id number]  [place] (8532)  2  6.00 LME  Comm Ins  JUDI Bradshaw PA-C   Physician Assistant   [facility]

## 2022-05-12 NOTE — TELEPHONE ENCOUNTER
Pt returned your call and asked if you could call her or send a message through [employer].

## 2022-05-12 NOTE — TELEPHONE ENCOUNTER
----- Message from Guerrero Chung sent at 5/12/2022 12:06 PM CDT -----  Contact: Miles Logan is requesting a call to discuss xray results. Please call her back at [phone].            Thanks  DD

## 2022-05-13 ENCOUNTER — TELEPHONE (OUTPATIENT)
Dept: INTERNAL MEDICINE | Facility: CLINIC | Age: 62
End: 2022-05-13
Payer: COMMERCIAL

## 2022-05-13 ENCOUNTER — TELEPHONE (OUTPATIENT)
Dept: ORTHOPEDICS | Facility: CLINIC | Age: 62
End: 2022-05-13
Payer: COMMERCIAL

## 2022-05-13 NOTE — TELEPHONE ENCOUNTER
----- Message from Samantha Louis sent at 5/13/2022  1:56 PM CDT -----  Contact: Patient @ [phone]  Good Afternoon  Patient would like to know if office to call her    Please call and advise

## 2022-05-13 NOTE — TELEPHONE ENCOUNTER
LVM asking pt to verify a hx of knee replacement. Stated that MD does not treat these issues and will assist in getting pt scheduled w/ the correct provider. Left call back number

## 2022-05-17 ENCOUNTER — HOSPITAL ENCOUNTER (OUTPATIENT)
Dept: RADIOLOGY | Facility: HOSPITAL | Age: 62
Discharge: HOME | End: 2022-05-17
Attending: INTERNAL MEDICINE
Payer: COMMERCIAL

## 2022-05-17 ENCOUNTER — OFFICE VISIT (OUTPATIENT)
Dept: INTERNAL MEDICINE | Facility: CLINIC | Age: 62
End: 2022-05-17
Payer: COMMERCIAL

## 2022-05-17 VITALS
HEART RATE: 98 BPM | OXYGEN SATURATION: 96 % | TEMPERATURE: 97 F | WEIGHT: 190.25 LBS | DIASTOLIC BLOOD PRESSURE: 82 MMHG | SYSTOLIC BLOOD PRESSURE: 128 MMHG | BODY MASS INDEX: 34.8 KG/M2

## 2022-05-17 DIAGNOSIS — M25.562 PAIN IN BOTH KNEES, UNSPECIFIED CHRONICITY: Primary | ICD-10-CM

## 2022-05-17 DIAGNOSIS — M25.561 PAIN IN BOTH KNEES, UNSPECIFIED CHRONICITY: ICD-10-CM

## 2022-05-17 DIAGNOSIS — S89.90XA KNEE INJURY, UNSPECIFIED LATERALITY, INITIAL ENCOUNTER: ICD-10-CM

## 2022-05-17 DIAGNOSIS — M25.562 PAIN IN BOTH KNEES, UNSPECIFIED CHRONICITY: ICD-10-CM

## 2022-05-17 DIAGNOSIS — M25.561 PAIN IN BOTH KNEES, UNSPECIFIED CHRONICITY: Primary | ICD-10-CM

## 2022-05-17 PROCEDURE — 3044F PR MOST RECENT HEMOGLOBIN A1C LEVEL <7.0%: ICD-10-PCS | Mod: CPTII,S$GLB,, | Performed by: INTERNAL MEDICINE

## 2022-05-17 PROCEDURE — 3044F HG A1C LEVEL LT 7.0%: CPT | Mod: CPTII,S$GLB,, | Performed by: INTERNAL MEDICINE

## 2022-05-17 PROCEDURE — 3008F BODY MASS INDEX DOCD: CPT | Mod: CPTII,S$GLB,, | Performed by: INTERNAL MEDICINE

## 2022-05-17 PROCEDURE — 3074F SYST BP LT 130 MM HG: CPT | Mod: CPTII,S$GLB,, | Performed by: INTERNAL MEDICINE

## 2022-05-17 PROCEDURE — 99999 PR PBB SHADOW E&M-EST. PATIENT-LVL V: CPT | Mod: PBBFAC,,, | Performed by: INTERNAL MEDICINE

## 2022-05-17 PROCEDURE — 3008F PR BODY MASS INDEX (BMI) DOCUMENTED: ICD-10-PCS | Mod: CPTII,S$GLB,, | Performed by: INTERNAL MEDICINE

## 2022-05-17 PROCEDURE — 73560 XR KNEE 1 OR 2 VIEW RIGHT: ICD-10-PCS | Mod: 26,RT,, | Performed by: RADIOLOGY

## 2022-05-17 PROCEDURE — 73560 X-RAY EXAM OF KNEE 1 OR 2: CPT | Mod: TC,RT

## 2022-05-17 PROCEDURE — 3074F PR MOST RECENT SYSTOLIC BLOOD PRESSURE < 130 MM HG: ICD-10-PCS | Mod: CPTII,S$GLB,, | Performed by: INTERNAL MEDICINE

## 2022-05-17 PROCEDURE — 99999 PR PBB SHADOW E&M-EST. PATIENT-LVL V: ICD-10-PCS | Mod: PBBFAC,,, | Performed by: INTERNAL MEDICINE

## 2022-05-17 PROCEDURE — 73560 X-RAY EXAM OF KNEE 1 OR 2: CPT | Mod: 26,RT,, | Performed by: RADIOLOGY

## 2022-05-17 PROCEDURE — 3079F PR MOST RECENT DIASTOLIC BLOOD PRESSURE 80-89 MM HG: ICD-10-PCS | Mod: CPTII,S$GLB,, | Performed by: INTERNAL MEDICINE

## 2022-05-17 PROCEDURE — 99213 PR OFFICE/OUTPT VISIT, EST, LEVL III, 20-29 MIN: ICD-10-PCS | Mod: S$GLB,,, | Performed by: INTERNAL MEDICINE

## 2022-05-17 PROCEDURE — 4010F PR ACE/ARB THEARPY RXD/TAKEN: ICD-10-PCS | Mod: CPTII,S$GLB,, | Performed by: INTERNAL MEDICINE

## 2022-05-17 PROCEDURE — 4010F ACE/ARB THERAPY RXD/TAKEN: CPT | Mod: CPTII,S$GLB,, | Performed by: INTERNAL MEDICINE

## 2022-05-17 PROCEDURE — 99213 OFFICE O/P EST LOW 20 MIN: CPT | Mod: S$GLB,,, | Performed by: INTERNAL MEDICINE

## 2022-05-17 PROCEDURE — 3079F DIAST BP 80-89 MM HG: CPT | Mod: CPTII,S$GLB,, | Performed by: INTERNAL MEDICINE

## 2022-05-17 NOTE — PROGRESS NOTES
Subjective:      Patient ID: Miles Bowen is a 61 y.o. female.    Chief Complaint: Knee Injury    HPI.    62 yo with   Patient Active Problem List   Diagnosis    Hypertension    Depression    Anxiety    GERD (gastroesophageal reflux disease)    Hiatal hernia    DDD (degenerative disc disease), lumbar    Common migraine    Allergic rhinitis    IBS (irritable bowel syndrome)    Multinodular goiter    MVP (mitral valve prolapse)    Nasal polyps    Obesity    Abnormal ECG    Palpitations    Atypical chest pain    Shortness of breath     Past Medical History:   Diagnosis Date    Anxiety     Depression     GERD (gastroesophageal reflux disease)     Heart murmur     Hematemesis without nausea 11/4/2016    Hypertension     Multinodular goiter 12/23/2016    MVP (mitral valve prolapse)     Obesity      C/o knee pain. Reports fall 10 days ago. Landed on bhavesh knees. Continues with pain and tenderness to bhavesh knees.     Reports h/o rash with NSAIDs.     Review of Systems   Constitutional: Positive for activity change. Negative for unexpected weight change.   HENT: Negative for hearing loss, rhinorrhea and trouble swallowing.    Eyes: Negative for discharge and visual disturbance.   Respiratory: Negative for chest tightness and wheezing.    Cardiovascular: Negative for chest pain and palpitations.   Gastrointestinal: Negative for blood in stool, constipation, diarrhea and vomiting.   Endocrine: Negative for polydipsia and polyuria.   Genitourinary: Negative for difficulty urinating, dysuria, hematuria and menstrual problem.   Musculoskeletal: Positive for arthralgias and joint swelling. Negative for neck pain.   Neurological: Negative for weakness and headaches.   Psychiatric/Behavioral: Negative for confusion and dysphoric mood.     Objective:   /82 (BP Location: Right arm, Patient Position: Sitting, BP Method: Large (Manual))   Pulse 98   Temp 96.9 °F (36.1 °C) (Tympanic)   Wt 86.3 kg (190  lb 4.1 oz)   SpO2 96%   BMI 34.80 kg/m²     Physical Exam  bhavesh knees with diffuse tenderness. Normal gait. No redness or warmth. Fair rom.     Assessment:     1. Pain in both knees, unspecified chronicity    2. Knee injury, unspecified laterality, initial encounter      Plan:   Pain in both knees, unspecified chronicity  -     Ambulatory referral/consult to Orthopedics; Future; Expected date: 05/24/2022  -     Cancel: X-Ray Knee Complete 4 Or More Views Bilat; Future; Expected date: 05/17/2022    Knee injury, unspecified laterality, initial encounter  -     Ambulatory referral/consult to Orthopedics; Future; Expected date: 05/24/2022  -     Cancel: X-Ray Knee Complete 4 Or More Views Bilat; Future; Expected date: 05/17/2022        Lab Frequency Next Occurrence   Pancreatic elastase, fecal Once 04/09/2021   Fecal fat, qualitative Once 04/09/2021   Calprotectin, Stool Once 04/09/2021   Giardia / Cryptosporidum, EIA Once 04/09/2021   Clostridium difficile EIA Once 04/09/2021   Ambulatory referral/consult to Gastroenterology Once 02/16/2022   CBC Auto Differential Once 02/09/2022   Comprehensive Metabolic Panel Once 02/09/2022   X-Ray Chest PA And Lateral Once 02/09/2022   Urinalysis Once 02/09/2022   Urine culture Once 02/09/2022   Ambulatory referral/consult to Hematology / Oncology Once 03/04/2022   CBC Auto Differential Once 03/04/2022   Vitamin B12 Once 03/04/2022   Iron and TIBC Once 03/04/2022   Ferritin Once 03/04/2022   Ambulatory referral/consult to ENT Once 05/02/2022       Problem List Items Addressed This Visit    None     Visit Diagnoses     Pain in both knees, unspecified chronicity    -  Primary    Relevant Orders    Ambulatory referral/consult to Orthopedics    Knee injury, unspecified laterality, initial encounter        Relevant Orders    Ambulatory referral/consult to Orthopedics          Follow up if symptoms worsen or fail to improve.

## 2022-06-03 DIAGNOSIS — K58.2 IRRITABLE BOWEL SYNDROME WITH BOTH CONSTIPATION AND DIARRHEA: ICD-10-CM

## 2022-06-03 DIAGNOSIS — R10.9 ABDOMINAL PAIN, ACUTE: ICD-10-CM

## 2022-06-03 RX ORDER — DICYCLOMINE HYDROCHLORIDE 10 MG/1
CAPSULE ORAL
Qty: 40 CAPSULE | Refills: 0 | Status: SHIPPED | OUTPATIENT
Start: 2022-06-03 | End: 2022-06-20 | Stop reason: SDUPTHER

## 2022-06-03 RX ORDER — BACLOFEN 10 MG/1
10 TABLET ORAL 2 TIMES DAILY PRN
Qty: 30 TABLET | Refills: 0 | Status: SHIPPED | OUTPATIENT
Start: 2022-06-03 | End: 2022-06-23 | Stop reason: SDUPTHER

## 2022-06-03 NOTE — TELEPHONE ENCOUNTER
Refill Routing Note   Medication(s) are not appropriate for processing by [facility] for the following reason(s):      - Outside of protocol    [facility] action(s):  Route          Medication reconciliation completed: No     Appointments  past 12m or future 3m with PCP    Date Provider   Last Visit   5/17/2022 Carlos Paul MD   Next Visit   Visit date not found Carlos Paul MD   ED visits in past 90 days: 0        Note composed:9:51 AM 06/03/2022

## 2022-06-09 ENCOUNTER — OFFICE VISIT (OUTPATIENT)
Dept: INTERNAL MEDICINE | Facility: CLINIC | Age: 62
End: 2022-06-09
Payer: COMMERCIAL

## 2022-06-09 VITALS
WEIGHT: 189.38 LBS | BODY MASS INDEX: 34.85 KG/M2 | SYSTOLIC BLOOD PRESSURE: 132 MMHG | DIASTOLIC BLOOD PRESSURE: 92 MMHG | TEMPERATURE: 99 F | OXYGEN SATURATION: 96 % | HEART RATE: 102 BPM | HEIGHT: 62 IN

## 2022-06-09 DIAGNOSIS — G43.109 MIGRAINE WITH AURA AND WITHOUT STATUS MIGRAINOSUS, NOT INTRACTABLE: Primary | ICD-10-CM

## 2022-06-09 DIAGNOSIS — R19.7 DIARRHEA, UNSPECIFIED TYPE: ICD-10-CM

## 2022-06-09 PROCEDURE — 4010F ACE/ARB THERAPY RXD/TAKEN: CPT | Mod: CPTII,S$GLB,, | Performed by: PHYSICIAN ASSISTANT

## 2022-06-09 PROCEDURE — 99214 OFFICE O/P EST MOD 30 MIN: CPT | Mod: S$GLB,,, | Performed by: PHYSICIAN ASSISTANT

## 2022-06-09 PROCEDURE — 3075F SYST BP GE 130 - 139MM HG: CPT | Mod: CPTII,S$GLB,, | Performed by: PHYSICIAN ASSISTANT

## 2022-06-09 PROCEDURE — 3075F PR MOST RECENT SYSTOLIC BLOOD PRESS GE 130-139MM HG: ICD-10-PCS | Mod: CPTII,S$GLB,, | Performed by: PHYSICIAN ASSISTANT

## 2022-06-09 PROCEDURE — 3044F HG A1C LEVEL LT 7.0%: CPT | Mod: CPTII,S$GLB,, | Performed by: PHYSICIAN ASSISTANT

## 2022-06-09 PROCEDURE — 3008F PR BODY MASS INDEX (BMI) DOCUMENTED: ICD-10-PCS | Mod: CPTII,S$GLB,, | Performed by: PHYSICIAN ASSISTANT

## 2022-06-09 PROCEDURE — 1159F PR MEDICATION LIST DOCUMENTED IN MEDICAL RECORD: ICD-10-PCS | Mod: CPTII,S$GLB,, | Performed by: PHYSICIAN ASSISTANT

## 2022-06-09 PROCEDURE — 3044F PR MOST RECENT HEMOGLOBIN A1C LEVEL <7.0%: ICD-10-PCS | Mod: CPTII,S$GLB,, | Performed by: PHYSICIAN ASSISTANT

## 2022-06-09 PROCEDURE — 99999 PR PBB SHADOW E&M-EST. PATIENT-LVL V: ICD-10-PCS | Mod: PBBFAC,,, | Performed by: PHYSICIAN ASSISTANT

## 2022-06-09 PROCEDURE — 99999 PR PBB SHADOW E&M-EST. PATIENT-LVL V: CPT | Mod: PBBFAC,,, | Performed by: PHYSICIAN ASSISTANT

## 2022-06-09 PROCEDURE — 1160F PR REVIEW ALL MEDS BY PRESCRIBER/CLIN PHARMACIST DOCUMENTED: ICD-10-PCS | Mod: CPTII,S$GLB,, | Performed by: PHYSICIAN ASSISTANT

## 2022-06-09 PROCEDURE — 1160F RVW MEDS BY RX/DR IN RCRD: CPT | Mod: CPTII,S$GLB,, | Performed by: PHYSICIAN ASSISTANT

## 2022-06-09 PROCEDURE — 99214 PR OFFICE/OUTPT VISIT, EST, LEVL IV, 30-39 MIN: ICD-10-PCS | Mod: S$GLB,,, | Performed by: PHYSICIAN ASSISTANT

## 2022-06-09 PROCEDURE — 3080F PR MOST RECENT DIASTOLIC BLOOD PRESSURE >= 90 MM HG: ICD-10-PCS | Mod: CPTII,S$GLB,, | Performed by: PHYSICIAN ASSISTANT

## 2022-06-09 PROCEDURE — 4010F PR ACE/ARB THEARPY RXD/TAKEN: ICD-10-PCS | Mod: CPTII,S$GLB,, | Performed by: PHYSICIAN ASSISTANT

## 2022-06-09 PROCEDURE — 3008F BODY MASS INDEX DOCD: CPT | Mod: CPTII,S$GLB,, | Performed by: PHYSICIAN ASSISTANT

## 2022-06-09 PROCEDURE — 1159F MED LIST DOCD IN RCRD: CPT | Mod: CPTII,S$GLB,, | Performed by: PHYSICIAN ASSISTANT

## 2022-06-09 PROCEDURE — 3080F DIAST BP >= 90 MM HG: CPT | Mod: CPTII,S$GLB,, | Performed by: PHYSICIAN ASSISTANT

## 2022-06-09 RX ORDER — BUTALBITAL, ACETAMINOPHEN AND CAFFEINE 50; 325; 40 MG/1; MG/1; MG/1
1 TABLET ORAL EVERY 4 HOURS PRN
Qty: 15 TABLET | Refills: 0 | Status: SHIPPED | OUTPATIENT
Start: 2022-06-09 | End: 2022-07-09

## 2022-06-09 RX ORDER — DIPHENOXYLATE HYDROCHLORIDE AND ATROPINE SULFATE 2.5; .025 MG/1; MG/1
1 TABLET ORAL 4 TIMES DAILY PRN
Qty: 30 TABLET | Refills: 0 | Status: SHIPPED | OUTPATIENT
Start: 2022-06-09 | End: 2022-06-19

## 2022-06-09 RX ORDER — BUTALBITAL, ACETAMINOPHEN AND CAFFEINE 50; 325; 40 MG/1; MG/1; MG/1
1 TABLET ORAL EVERY 4 HOURS PRN
Qty: 15 TABLET | Refills: 0 | Status: SHIPPED | OUTPATIENT
Start: 2022-06-09 | End: 2022-06-09 | Stop reason: SDUPTHER

## 2022-06-09 NOTE — PROGRESS NOTES
Subjective:      Patient ID: Miles Bowen is a 61 y.o. female.    Chief Complaint: Migraine and Diarrhea    Migraine   This is a recurrent problem. The current episode started yesterday. The problem has been waxing and waning. The pain is located in the right unilateral region. Pertinent negatives include no abdominal pain, back pain, coughing, dizziness, fever, hearing loss, nausea, numbness, rhinorrhea, sore throat, vomiting or weakness.     Episodic recurrent diarrhea related to stress. Acute onset and has to run to the bathroom, relief after BM. Toxic work environment but in process of looking for another job. Pt states that in the past she was prescribed lomotil and it did help with her symptoms.   Doesn't like taking imodium and rarely works.   See's a psychiatrist, Dr. Glez.     Patient Active Problem List   Diagnosis    Hypertension    Depression    Anxiety    GERD (gastroesophageal reflux disease)    Hiatal hernia    DDD (degenerative disc disease), lumbar    Common migraine    Allergic rhinitis    IBS (irritable bowel syndrome)    Multinodular goiter    MVP (mitral valve prolapse)    Nasal polyps    Obesity    Abnormal ECG    Palpitations    Atypical chest pain    Shortness of breath       Current Outpatient Medications:     ACETAMINOPHEN (TYLENOL EXTRA STRENGTH ORAL), Take 2 tablets by mouth every 4 to 6 hours as needed., Disp: , Rfl:     baclofen (LIORESAL) 10 MG tablet, Take 1 tablet (10 mg total) by mouth 2 (two) times daily as needed (muscle spasm)., Disp: 30 tablet, Rfl: 0    chlorpheniramine-dextromethorp (CORICIDIN HBP COUGH AND COLD) 4-30 mg Tab, Take by mouth as needed., Disp: , Rfl:     diazePAM (VALIUM) 10 MG Tab, every 12 (twelve) hours as needed., Disp: , Rfl:     dicyclomine (BENTYL) 10 MG capsule, Take 1 capsule by mouth 4 times daily before meals and nightly prn, Disp: 40 capsule, Rfl: 0    duloxetine (CYMBALTA) 60 MG capsule, Take 1 capsule by mouth once  daily. , Disp: , Rfl: 0    estradioL (ESTRACE) 0.5 MG tablet, Take 1 tablet (0.5 mg total) by mouth once daily., Disp: 30 tablet, Rfl: 0    hydroCHLOROthiazide (HYDRODIURIL) 12.5 MG Tab, Take 1 tablet (12.5 mg total) by mouth once daily., Disp: 90 tablet, Rfl: 1    LORazepam (ATIVAN) 2 MG Tab, SMARTSI Tablet(s) By Mouth Every 8-10 Hours PRN, Disp: , Rfl:     losartan (COZAAR) 100 MG tablet, Take 1 tablet (100 mg total) by mouth once daily., Disp: 90 tablet, Rfl: 0    pantoprazole (PROTONIX) 40 MG tablet, Take 1 tablet (40 mg total) by mouth once daily., Disp: 90 tablet, Rfl: 3    topiramate (TOPAMAX) 50 MG tablet, once daily. , Disp: , Rfl:     zolpidem (AMBIEN CR) 12.5 MG CR tablet, Take 12.5 mg by mouth nightly., Disp: , Rfl:     butalbital-acetaminophen-caffeine -40 mg (FIORICET, ESGIC) -40 mg per tablet, Take 1 tablet by mouth every 4 (four) hours as needed for Headaches (migraine)., Disp: 15 tablet, Rfl: 0    diphenoxylate-atropine 2.5-0.025 mg (LOMOTIL) 2.5-0.025 mg per tablet, Take 1 tablet by mouth 4 (four) times daily as needed for Diarrhea., Disp: 30 tablet, Rfl: 0      Review of Systems   Constitutional: Negative for activity change, appetite change, chills, diaphoresis, fatigue, fever and unexpected weight change.   HENT: Negative.  Negative for congestion, hearing loss, postnasal drip, rhinorrhea, sore throat, trouble swallowing and voice change.    Eyes: Negative.  Negative for visual disturbance.   Respiratory: Negative.  Negative for cough, choking, chest tightness and shortness of breath.    Cardiovascular: Negative for chest pain, palpitations and leg swelling.   Gastrointestinal: Positive for diarrhea. Negative for abdominal distention, abdominal pain, blood in stool, constipation, nausea and vomiting.   Endocrine: Negative for cold intolerance, heat intolerance, polydipsia and polyuria.   Genitourinary: Negative.  Negative for difficulty urinating and frequency.  "  Musculoskeletal: Negative for arthralgias, back pain, gait problem, joint swelling and myalgias.   Skin: Negative for color change, pallor, rash and wound.   Neurological: Negative for dizziness, tremors, weakness, light-headedness, numbness and headaches.   Hematological: Negative for adenopathy.   Psychiatric/Behavioral: Positive for decreased concentration and dysphoric mood. Negative for agitation, behavioral problems, confusion, hallucinations, self-injury, sleep disturbance and suicidal ideas. The patient is nervous/anxious. The patient is not hyperactive.      Objective:   BP (!) 132/92 (BP Location: Right arm, Patient Position: Sitting, BP Method: Medium (Manual))   Pulse 102   Temp 98.6 °F (37 °C) (Tympanic)   Ht 5' 2" (1.575 m)   Wt 85.9 kg (189 lb 6 oz)   SpO2 96%   BMI 34.64 kg/m²     Physical Exam  Vitals reviewed.   Constitutional:       General: She is not in acute distress.     Appearance: Normal appearance. She is well-developed. She is not ill-appearing, toxic-appearing or diaphoretic.   HENT:      Head: Normocephalic and atraumatic.      Right Ear: External ear normal.      Left Ear: External ear normal.      Nose: Nose normal.   Eyes:      Conjunctiva/sclera: Conjunctivae normal.      Pupils: Pupils are equal, round, and reactive to light.   Cardiovascular:      Rate and Rhythm: Normal rate and regular rhythm.      Heart sounds: Normal heart sounds. No murmur heard.    No friction rub. No gallop.   Pulmonary:      Effort: Pulmonary effort is normal. No respiratory distress.      Breath sounds: Normal breath sounds. No wheezing or rales.   Chest:      Chest wall: No tenderness.   Abdominal:      General: Abdomen is flat. Bowel sounds are normal. There is no distension.      Palpations: Abdomen is soft. There is no mass.      Tenderness: There is no abdominal tenderness. There is no guarding.      Hernia: No hernia is present.   Musculoskeletal:         General: Normal range of motion.      " Cervical back: Normal range of motion and neck supple.   Lymphadenopathy:      Cervical: No cervical adenopathy.   Skin:     General: Skin is warm and dry.   Neurological:      Mental Status: She is alert and oriented to person, place, and time.   Psychiatric:         Mood and Affect: Mood normal.         Behavior: Behavior normal.         Thought Content: Thought content normal.         Judgment: Judgment normal.         Assessment:     1. Migraine with aura and without status migrainosus, not intractable    2. Diarrhea, unspecified type      Plan:   Migraine with aura and without status migrainosus, not intractable  -     Discontinue: butalbital-acetaminophen-caffeine -40 mg (FIORICET, ESGIC) -40 mg per tablet; Take 1 tablet by mouth every 4 (four) hours as needed for Headaches (migraine).  Dispense: 15 tablet; Refill: 0  -     butalbital-acetaminophen-caffeine -40 mg (FIORICET, ESGIC) -40 mg per tablet; Take 1 tablet by mouth every 4 (four) hours as needed for Headaches (migraine).  Dispense: 15 tablet; Refill: 0    Diarrhea, unspecified type  -     diphenoxylate-atropine 2.5-0.025 mg (LOMOTIL) 2.5-0.025 mg per tablet; Take 1 tablet by mouth 4 (four) times daily as needed for Diarrhea.  Dispense: 30 tablet; Refill: 0    -declines referral to GI  -schedule follow up with psych    Follow up if symptoms worsen or fail to improve.

## 2022-06-13 ENCOUNTER — PATIENT MESSAGE (OUTPATIENT)
Dept: INTERNAL MEDICINE | Facility: CLINIC | Age: 62
End: 2022-06-13

## 2022-06-16 ENCOUNTER — TELEPHONE (OUTPATIENT)
Dept: INTERNAL MEDICINE | Facility: CLINIC | Age: 62
End: 2022-06-16
Payer: COMMERCIAL

## 2022-06-19 DIAGNOSIS — I10 ESSENTIAL HYPERTENSION: ICD-10-CM

## 2022-06-19 NOTE — TELEPHONE ENCOUNTER
No new care gaps identified.  [facility] Embedded Care Gaps. Reference number: [id number]. 6/19/2022   5:36:03 AM CDT

## 2022-06-20 DIAGNOSIS — R10.9 ABDOMINAL PAIN, ACUTE: ICD-10-CM

## 2022-06-20 DIAGNOSIS — K58.2 IRRITABLE BOWEL SYNDROME WITH BOTH CONSTIPATION AND DIARRHEA: ICD-10-CM

## 2022-06-20 DIAGNOSIS — I10 ESSENTIAL HYPERTENSION: ICD-10-CM

## 2022-06-20 RX ORDER — LOSARTAN POTASSIUM 100 MG/1
100 TABLET ORAL DAILY
Qty: 90 TABLET | Refills: 0 | OUTPATIENT
Start: 2022-06-20

## 2022-06-20 RX ORDER — LOSARTAN POTASSIUM 100 MG/1
100 TABLET ORAL DAILY
Qty: 90 TABLET | Refills: 3 | Status: SHIPPED | OUTPATIENT
Start: 2022-06-20 | End: 2023-06-25 | Stop reason: SDUPTHER

## 2022-06-20 RX ORDER — DICYCLOMINE HYDROCHLORIDE 10 MG/1
CAPSULE ORAL
Qty: 40 CAPSULE | Refills: 0 | Status: CANCELLED | OUTPATIENT
Start: 2022-06-20

## 2022-06-20 NOTE — TELEPHONE ENCOUNTER
No new care gaps identified.  [facility] Embedded Care Gaps. Reference number: [id number]. 6/20/2022   10:17:32 AM CDT

## 2022-06-20 NOTE — TELEPHONE ENCOUNTER
Refill Routing Note   Medication(s) are not appropriate for processing by [facility] for the following reason(s):      - Outside of protocol    [facility] action(s):  Route  Approve          Medication reconciliation completed: No     Appointments  past 12m or future 3m with PCP    Date Provider   Last Visit   5/17/2022 Carlos Paul MD   Next Visit   Visit date not found Carlos Paul MD   ED visits in past 90 days: 0        Note composed:10:31 AM 06/20/2022

## 2022-06-20 NOTE — TELEPHONE ENCOUNTER
Refill Decision Note   Miles Bowen  is requesting a refill authorization.  Brief Assessment and Rationale for Refill:  Quick Discontinue     Medication Therapy Plan:  Pended in another encounter    Medication Reconciliation Completed: No   Comments:     No Care Gaps recommended.     Note composed:10:28 AM 06/20/2022

## 2022-07-11 ENCOUNTER — PATIENT MESSAGE (OUTPATIENT)
Dept: INTERNAL MEDICINE | Facility: CLINIC | Age: 62
End: 2022-07-11

## 2022-07-11 ENCOUNTER — OFFICE VISIT (OUTPATIENT)
Dept: INTERNAL MEDICINE | Facility: CLINIC | Age: 62
End: 2022-07-11
Payer: COMMERCIAL

## 2022-07-11 VITALS
DIASTOLIC BLOOD PRESSURE: 84 MMHG | HEART RATE: 82 BPM | OXYGEN SATURATION: 96 % | WEIGHT: 185.19 LBS | HEIGHT: 62 IN | BODY MASS INDEX: 34.08 KG/M2 | SYSTOLIC BLOOD PRESSURE: 132 MMHG | TEMPERATURE: 98 F

## 2022-07-11 DIAGNOSIS — J01.90 ACUTE SINUSITIS, RECURRENCE NOT SPECIFIED, UNSPECIFIED LOCATION: Primary | ICD-10-CM

## 2022-07-11 PROCEDURE — 4010F ACE/ARB THERAPY RXD/TAKEN: CPT | Mod: CPTII,S$GLB,, | Performed by: PHYSICIAN ASSISTANT

## 2022-07-11 PROCEDURE — 1159F PR MEDICATION LIST DOCUMENTED IN MEDICAL RECORD: ICD-10-PCS | Mod: CPTII,S$GLB,, | Performed by: PHYSICIAN ASSISTANT

## 2022-07-11 PROCEDURE — 99214 PR OFFICE/OUTPT VISIT, EST, LEVL IV, 30-39 MIN: ICD-10-PCS | Mod: S$GLB,,, | Performed by: PHYSICIAN ASSISTANT

## 2022-07-11 PROCEDURE — 99214 OFFICE O/P EST MOD 30 MIN: CPT | Mod: S$GLB,,, | Performed by: PHYSICIAN ASSISTANT

## 2022-07-11 PROCEDURE — 1160F RVW MEDS BY RX/DR IN RCRD: CPT | Mod: CPTII,S$GLB,, | Performed by: PHYSICIAN ASSISTANT

## 2022-07-11 PROCEDURE — 4010F PR ACE/ARB THEARPY RXD/TAKEN: ICD-10-PCS | Mod: CPTII,S$GLB,, | Performed by: PHYSICIAN ASSISTANT

## 2022-07-11 PROCEDURE — 3008F PR BODY MASS INDEX (BMI) DOCUMENTED: ICD-10-PCS | Mod: CPTII,S$GLB,, | Performed by: PHYSICIAN ASSISTANT

## 2022-07-11 PROCEDURE — 3008F BODY MASS INDEX DOCD: CPT | Mod: CPTII,S$GLB,, | Performed by: PHYSICIAN ASSISTANT

## 2022-07-11 PROCEDURE — 1160F PR REVIEW ALL MEDS BY PRESCRIBER/CLIN PHARMACIST DOCUMENTED: ICD-10-PCS | Mod: CPTII,S$GLB,, | Performed by: PHYSICIAN ASSISTANT

## 2022-07-11 PROCEDURE — 3044F PR MOST RECENT HEMOGLOBIN A1C LEVEL <7.0%: ICD-10-PCS | Mod: CPTII,S$GLB,, | Performed by: PHYSICIAN ASSISTANT

## 2022-07-11 PROCEDURE — 3079F PR MOST RECENT DIASTOLIC BLOOD PRESSURE 80-89 MM HG: ICD-10-PCS | Mod: CPTII,S$GLB,, | Performed by: PHYSICIAN ASSISTANT

## 2022-07-11 PROCEDURE — 3044F HG A1C LEVEL LT 7.0%: CPT | Mod: CPTII,S$GLB,, | Performed by: PHYSICIAN ASSISTANT

## 2022-07-11 PROCEDURE — 1159F MED LIST DOCD IN RCRD: CPT | Mod: CPTII,S$GLB,, | Performed by: PHYSICIAN ASSISTANT

## 2022-07-11 PROCEDURE — 3075F SYST BP GE 130 - 139MM HG: CPT | Mod: CPTII,S$GLB,, | Performed by: PHYSICIAN ASSISTANT

## 2022-07-11 PROCEDURE — 3075F PR MOST RECENT SYSTOLIC BLOOD PRESS GE 130-139MM HG: ICD-10-PCS | Mod: CPTII,S$GLB,, | Performed by: PHYSICIAN ASSISTANT

## 2022-07-11 PROCEDURE — 99999 PR PBB SHADOW E&M-EST. PATIENT-LVL V: ICD-10-PCS | Mod: PBBFAC,,, | Performed by: PHYSICIAN ASSISTANT

## 2022-07-11 PROCEDURE — 3079F DIAST BP 80-89 MM HG: CPT | Mod: CPTII,S$GLB,, | Performed by: PHYSICIAN ASSISTANT

## 2022-07-11 PROCEDURE — 99999 PR PBB SHADOW E&M-EST. PATIENT-LVL V: CPT | Mod: PBBFAC,,, | Performed by: PHYSICIAN ASSISTANT

## 2022-07-11 RX ORDER — AZITHROMYCIN 250 MG/1
TABLET, FILM COATED ORAL
Qty: 6 TABLET | Refills: 0 | Status: SHIPPED | OUTPATIENT
Start: 2022-07-11 | End: 2022-09-14 | Stop reason: ALTCHOICE

## 2022-07-11 NOTE — PROGRESS NOTES
Subjective:      Patient ID: Miles Bowen is a 61 y.o. female.    Chief Complaint: sinus infection     URI   This is a new problem. The current episode started yesterday. There has been no fever. Associated symptoms include congestion, ear pain, headaches, a plugged ear sensation, rhinorrhea, sinus pain and a sore throat. Pertinent negatives include no abdominal pain, chest pain, coughing, diarrhea, dysuria, joint pain, joint swelling, nausea, neck pain, rash, sneezing, swollen glands or vomiting. She has tried acetaminophen for the symptoms. The treatment provided no relief.   Heart palpitations with otc cold meds. Can only tolerate saline sprays.     Patient Active Problem List   Diagnosis    Hypertension    Depression    Anxiety    GERD (gastroesophageal reflux disease)    Hiatal hernia    DDD (degenerative disc disease), lumbar    Common migraine    Allergic rhinitis    IBS (irritable bowel syndrome)    Multinodular goiter    MVP (mitral valve prolapse)    Nasal polyps    Obesity    Abnormal ECG    Palpitations    Atypical chest pain    Shortness of breath       Current Outpatient Medications:     ACETAMINOPHEN (TYLENOL EXTRA STRENGTH ORAL), Take 2 tablets by mouth every 4 to 6 hours as needed., Disp: , Rfl:     baclofen (LIORESAL) 10 MG tablet, Take 1 tablet (10 mg total) by mouth 2 (two) times daily as needed (muscle spasm)., Disp: 30 tablet, Rfl: 0    chlorpheniramine-dextromethorp (CORICIDIN HBP COUGH AND COLD) 4-30 mg Tab, Take by mouth as needed., Disp: , Rfl:     diazePAM (VALIUM) 10 MG Tab, every 12 (twelve) hours as needed., Disp: , Rfl:     duloxetine (CYMBALTA) 60 MG capsule, Take 1 capsule by mouth once daily. , Disp: , Rfl: 0    estradioL (ESTRACE) 0.5 MG tablet, TAKE ONE TABLET BY MOUTH ONE TIME DAILY, Disp: 30 tablet, Rfl: 0    hydroCHLOROthiazide (HYDRODIURIL) 12.5 MG Tab, Take 1 tablet (12.5 mg total) by mouth once daily., Disp: 90 tablet, Rfl: 1    LORazepam  (ATIVAN) 2 MG Tab, SMARTSI Tablet(s) By Mouth Every 8-10 Hours PRN, Disp: , Rfl:     losartan (COZAAR) 100 MG tablet, Take 1 tablet (100 mg total) by mouth once daily., Disp: 90 tablet, Rfl: 3    pantoprazole (PROTONIX) 40 MG tablet, Take 1 tablet (40 mg total) by mouth once daily., Disp: 90 tablet, Rfl: 3    topiramate (TOPAMAX) 50 MG tablet, once daily. , Disp: , Rfl:     zolpidem (AMBIEN CR) 12.5 MG CR tablet, Take 12.5 mg by mouth nightly., Disp: , Rfl:     azithromycin (ZITHROMAX Z-MANFRED) 250 MG tablet, Take 2 tabs po on day 1 followed by 1 tab po from day 2-5, Disp: 6 tablet, Rfl: 0    dicyclomine (BENTYL) 10 MG capsule, Take 1 capsule by mouth 4 times daily before meals and nightly prn, Disp: 40 capsule, Rfl: 0      Review of Systems   Constitutional: Negative for activity change, appetite change, chills, diaphoresis, fatigue, fever and unexpected weight change.   HENT: Positive for congestion, ear pain, postnasal drip, rhinorrhea, sinus pressure, sinus pain and sore throat. Negative for hearing loss, sneezing, trouble swallowing and voice change.    Eyes: Negative.  Negative for visual disturbance.   Respiratory: Negative.  Negative for cough, choking, chest tightness and shortness of breath.    Cardiovascular: Negative for chest pain, palpitations and leg swelling.   Gastrointestinal: Negative for abdominal distention, abdominal pain, blood in stool, constipation, diarrhea, nausea and vomiting.   Endocrine: Negative for cold intolerance, heat intolerance, polydipsia and polyuria.   Genitourinary: Negative.  Negative for difficulty urinating, dysuria and frequency.   Musculoskeletal: Negative for arthralgias, back pain, gait problem, joint pain, joint swelling, myalgias and neck pain.   Skin: Negative for color change, pallor, rash and wound.   Neurological: Positive for headaches. Negative for dizziness, tremors, weakness, light-headedness and numbness.   Hematological: Negative for adenopathy.  "  Psychiatric/Behavioral: Negative for behavioral problems, confusion, self-injury, sleep disturbance and suicidal ideas. The patient is not nervous/anxious.      Objective:   /84 (BP Location: Left arm, Patient Position: Sitting, BP Method: Large (Manual))   Pulse 82   Temp 98.4 °F (36.9 °C) (Tympanic)   Ht 5' 2" (1.575 m)   Wt 84 kg (185 lb 3 oz)   SpO2 96%   BMI 33.87 kg/m²     Physical Exam  Vitals and nursing note reviewed.   Constitutional:       General: She is not in acute distress.     Appearance: Normal appearance. She is well-developed. She is not ill-appearing, toxic-appearing or diaphoretic.   HENT:      Head: Normocephalic and atraumatic.      Right Ear: Hearing, tympanic membrane, ear canal and external ear normal. No tenderness.      Left Ear: Hearing, tympanic membrane, ear canal and external ear normal. No tenderness.      Nose: Mucosal edema, congestion and rhinorrhea present.      Right Sinus: Maxillary sinus tenderness and frontal sinus tenderness present.      Left Sinus: Maxillary sinus tenderness and frontal sinus tenderness present.      Mouth/Throat:      Mouth: No oral lesions.      Dentition: Normal dentition. No dental caries or dental abscesses.      Pharynx: Uvula midline. Oropharyngeal exudate and posterior oropharyngeal erythema present. No uvula swelling.      Tonsils: No tonsillar exudate or tonsillar abscesses.   Eyes:      General:         Right eye: No discharge.         Left eye: No discharge.      Conjunctiva/sclera: Conjunctivae normal.      Pupils: Pupils are equal, round, and reactive to light.   Cardiovascular:      Rate and Rhythm: Normal rate and regular rhythm.      Heart sounds: Normal heart sounds. No murmur heard.    No friction rub. No gallop.   Pulmonary:      Effort: Pulmonary effort is normal. No respiratory distress.      Breath sounds: Normal breath sounds. No wheezing or rales.   Musculoskeletal:      Cervical back: Normal range of motion and neck " supple.   Lymphadenopathy:      Cervical: No cervical adenopathy.   Skin:     General: Skin is warm.      Coloration: Skin is not pale.      Findings: No erythema or rash.   Neurological:      Mental Status: She is alert and oriented to person, place, and time.   Psychiatric:         Behavior: Behavior normal.         Thought Content: Thought content normal.         Judgment: Judgment normal.         Assessment:     1. Acute sinusitis, recurrence not specified, unspecified location      Plan:   Acute sinusitis, recurrence not specified, unspecified location  -     azithromycin (ZITHROMAX Z-MANFRED) 250 MG tablet; Take 2 tabs po on day 1 followed by 1 tab po from day 2-5  Dispense: 6 tablet; Refill: 0  -     POCT COVID-19 Rapid Screening    Educational handout on over-the-counter medications and at-home conservative care, pertinent to the patients diagnosis today, was handed to the patient and discussed in detail.    Follow up if symptoms worsen or fail to improve.

## 2022-07-13 DIAGNOSIS — K21.9 GASTROESOPHAGEAL REFLUX DISEASE: ICD-10-CM

## 2022-07-13 RX ORDER — PANTOPRAZOLE SODIUM 40 MG/1
40 TABLET, DELAYED RELEASE ORAL DAILY
Qty: 90 TABLET | Refills: 3 | Status: SHIPPED | OUTPATIENT
Start: 2022-07-13 | End: 2022-12-30 | Stop reason: SDUPTHER

## 2022-07-14 NOTE — TELEPHONE ENCOUNTER
No new care gaps identified.  [facility] Embedded Care Gaps. Reference number: [id number]. 7/13/2022   7:17:10 PM CDT

## 2022-07-14 NOTE — TELEPHONE ENCOUNTER
Refill Decision Note   Miles Bowen  is requesting a refill authorization.  Brief Assessment and Rationale for Refill:  Approve     Medication Therapy Plan:       Medication Reconciliation Completed: No   Comments:     No Care Gaps recommended.     Note composed:10:33 PM 07/13/2022

## 2022-07-18 ENCOUNTER — PATIENT MESSAGE (OUTPATIENT)
Dept: INTERNAL MEDICINE | Facility: CLINIC | Age: 62
End: 2022-07-18
Payer: COMMERCIAL

## 2022-07-18 DIAGNOSIS — R05.9 COUGH: Primary | ICD-10-CM

## 2022-07-18 RX ORDER — LEVOFLOXACIN 500 MG/1
500 TABLET, FILM COATED ORAL DAILY
Qty: 7 TABLET | Refills: 0 | Status: SHIPPED | OUTPATIENT
Start: 2022-07-18 | End: 2022-07-26

## 2022-07-18 RX ORDER — PROMETHAZINE HYDROCHLORIDE AND CODEINE PHOSPHATE 6.25; 1 MG/5ML; MG/5ML
5 SOLUTION ORAL EVERY 4 HOURS PRN
Qty: 118 ML | Refills: 0 | Status: SHIPPED | OUTPATIENT
Start: 2022-07-18 | End: 2022-07-19 | Stop reason: SDUPTHER

## 2022-07-18 RX ORDER — PROMETHAZINE HYDROCHLORIDE AND DEXTROMETHORPHAN HYDROBROMIDE 6.25; 15 MG/5ML; MG/5ML
5 SYRUP ORAL NIGHTLY
Qty: 118 ML | Refills: 0 | Status: SHIPPED | OUTPATIENT
Start: 2022-07-18 | End: 2022-07-23

## 2022-08-24 DIAGNOSIS — Z12.31 OTHER SCREENING MAMMOGRAM: ICD-10-CM

## 2022-08-31 ENCOUNTER — TELEPHONE (OUTPATIENT)
Dept: ORTHOPEDICS | Facility: CLINIC | Age: 62
End: 2022-08-31
Payer: COMMERCIAL

## 2022-08-31 NOTE — TELEPHONE ENCOUNTER
[facility]DI[facility] stating that we cannot schedule w/ Dr. Keene b/c she has a Hx of a knee replacement. Stated to call back or schedule via [employer] w/ Kylie Hammond PA-C, or Dr. Ramirez. Left call-back number     ----- Message from Christina Mosher sent at 8/31/2022  6:59 AM CDT -----  Regarding: appt  Contact: Pateint  Patient requesting appt for chronic knee pain, please call her back at [phone]

## 2022-09-07 ENCOUNTER — OFFICE VISIT (OUTPATIENT)
Dept: INTERNAL MEDICINE | Facility: CLINIC | Age: 62
End: 2022-09-07
Payer: COMMERCIAL

## 2022-09-07 ENCOUNTER — LAB VISIT (OUTPATIENT)
Dept: LAB | Facility: HOSPITAL | Age: 62
End: 2022-09-07
Payer: COMMERCIAL

## 2022-09-07 VITALS
DIASTOLIC BLOOD PRESSURE: 76 MMHG | SYSTOLIC BLOOD PRESSURE: 124 MMHG | OXYGEN SATURATION: 97 % | HEIGHT: 62 IN | BODY MASS INDEX: 34.03 KG/M2 | HEART RATE: 75 BPM | WEIGHT: 184.94 LBS | TEMPERATURE: 98 F

## 2022-09-07 DIAGNOSIS — R39.9 UTI SYMPTOMS: ICD-10-CM

## 2022-09-07 DIAGNOSIS — Z23 NEEDS FLU SHOT: ICD-10-CM

## 2022-09-07 DIAGNOSIS — R39.9 UTI SYMPTOMS: Primary | ICD-10-CM

## 2022-09-07 DIAGNOSIS — I10 PRIMARY HYPERTENSION: ICD-10-CM

## 2022-09-07 DIAGNOSIS — F41.9 ANXIETY: ICD-10-CM

## 2022-09-07 DIAGNOSIS — K21.9 GASTROESOPHAGEAL REFLUX DISEASE, UNSPECIFIED WHETHER ESOPHAGITIS PRESENT: ICD-10-CM

## 2022-09-07 LAB
BACTERIA #/AREA URNS HPF: ABNORMAL /HPF
BILIRUB UR QL STRIP: NEGATIVE
CLARITY UR: CLEAR
COLOR UR: YELLOW
GLUCOSE UR QL STRIP: NEGATIVE
HGB UR QL STRIP: ABNORMAL
KETONES UR QL STRIP: NEGATIVE
LEUKOCYTE ESTERASE UR QL STRIP: NEGATIVE
MICROSCOPIC COMMENT: ABNORMAL
NITRITE UR QL STRIP: NEGATIVE
PH UR STRIP: 7 [PH] (ref 5–8)
PROT UR QL STRIP: NEGATIVE
RBC #/AREA URNS HPF: 12 /HPF (ref 0–4)
SP GR UR STRIP: 1.01 (ref 1–1.03)
SQUAMOUS #/AREA URNS HPF: 5 /HPF
URN SPEC COLLECT METH UR: ABNORMAL
WBC #/AREA URNS HPF: 0 /HPF (ref 0–5)

## 2022-09-07 PROCEDURE — 4010F ACE/ARB THERAPY RXD/TAKEN: CPT | Mod: CPTII,S$GLB,, | Performed by: NURSE PRACTITIONER

## 2022-09-07 PROCEDURE — 3074F PR MOST RECENT SYSTOLIC BLOOD PRESSURE < 130 MM HG: ICD-10-PCS | Mod: CPTII,S$GLB,, | Performed by: NURSE PRACTITIONER

## 2022-09-07 PROCEDURE — 90471 IMMUNIZATION ADMIN: CPT | Mod: S$GLB,,, | Performed by: NURSE PRACTITIONER

## 2022-09-07 PROCEDURE — 90471 FLU VACCINE (QUAD) GREATER THAN OR EQUAL TO 3YO PRESERVATIVE FREE IM: ICD-10-PCS | Mod: S$GLB,,, | Performed by: NURSE PRACTITIONER

## 2022-09-07 PROCEDURE — 3008F PR BODY MASS INDEX (BMI) DOCUMENTED: ICD-10-PCS | Mod: CPTII,S$GLB,, | Performed by: NURSE PRACTITIONER

## 2022-09-07 PROCEDURE — 1160F PR REVIEW ALL MEDS BY PRESCRIBER/CLIN PHARMACIST DOCUMENTED: ICD-10-PCS | Mod: CPTII,S$GLB,, | Performed by: NURSE PRACTITIONER

## 2022-09-07 PROCEDURE — 99999 PR PBB SHADOW E&M-EST. PATIENT-LVL V: ICD-10-PCS | Mod: PBBFAC,,, | Performed by: NURSE PRACTITIONER

## 2022-09-07 PROCEDURE — 3078F DIAST BP <80 MM HG: CPT | Mod: CPTII,S$GLB,, | Performed by: NURSE PRACTITIONER

## 2022-09-07 PROCEDURE — 1159F MED LIST DOCD IN RCRD: CPT | Mod: CPTII,S$GLB,, | Performed by: NURSE PRACTITIONER

## 2022-09-07 PROCEDURE — 99999 PR PBB SHADOW E&M-EST. PATIENT-LVL V: CPT | Mod: PBBFAC,,, | Performed by: NURSE PRACTITIONER

## 2022-09-07 PROCEDURE — 1159F PR MEDICATION LIST DOCUMENTED IN MEDICAL RECORD: ICD-10-PCS | Mod: CPTII,S$GLB,, | Performed by: NURSE PRACTITIONER

## 2022-09-07 PROCEDURE — 4010F PR ACE/ARB THEARPY RXD/TAKEN: ICD-10-PCS | Mod: CPTII,S$GLB,, | Performed by: NURSE PRACTITIONER

## 2022-09-07 PROCEDURE — 1160F RVW MEDS BY RX/DR IN RCRD: CPT | Mod: CPTII,S$GLB,, | Performed by: NURSE PRACTITIONER

## 2022-09-07 PROCEDURE — 99214 PR OFFICE/OUTPT VISIT, EST, LEVL IV, 30-39 MIN: ICD-10-PCS | Mod: 25,S$GLB,, | Performed by: NURSE PRACTITIONER

## 2022-09-07 PROCEDURE — 3074F SYST BP LT 130 MM HG: CPT | Mod: CPTII,S$GLB,, | Performed by: NURSE PRACTITIONER

## 2022-09-07 PROCEDURE — 3078F PR MOST RECENT DIASTOLIC BLOOD PRESSURE < 80 MM HG: ICD-10-PCS | Mod: CPTII,S$GLB,, | Performed by: NURSE PRACTITIONER

## 2022-09-07 PROCEDURE — 3044F HG A1C LEVEL LT 7.0%: CPT | Mod: CPTII,S$GLB,, | Performed by: NURSE PRACTITIONER

## 2022-09-07 PROCEDURE — 90686 IIV4 VACC NO PRSV 0.5 ML IM: CPT | Mod: S$GLB,,, | Performed by: NURSE PRACTITIONER

## 2022-09-07 PROCEDURE — 99214 OFFICE O/P EST MOD 30 MIN: CPT | Mod: 25,S$GLB,, | Performed by: NURSE PRACTITIONER

## 2022-09-07 PROCEDURE — 3044F PR MOST RECENT HEMOGLOBIN A1C LEVEL <7.0%: ICD-10-PCS | Mod: CPTII,S$GLB,, | Performed by: NURSE PRACTITIONER

## 2022-09-07 PROCEDURE — 90686 FLU VACCINE (QUAD) GREATER THAN OR EQUAL TO 3YO PRESERVATIVE FREE IM: ICD-10-PCS | Mod: S$GLB,,, | Performed by: NURSE PRACTITIONER

## 2022-09-07 PROCEDURE — 81000 URINALYSIS NONAUTO W/SCOPE: CPT | Performed by: NURSE PRACTITIONER

## 2022-09-07 PROCEDURE — 3008F BODY MASS INDEX DOCD: CPT | Mod: CPTII,S$GLB,, | Performed by: NURSE PRACTITIONER

## 2022-09-07 RX ORDER — PHENAZOPYRIDINE HYDROCHLORIDE 100 MG/1
100 TABLET, FILM COATED ORAL 3 TIMES DAILY PRN
Qty: 30 TABLET | Refills: 0 | Status: SHIPPED | OUTPATIENT
Start: 2022-09-07 | End: 2022-09-17

## 2022-09-07 RX ORDER — SULFAMETHOXAZOLE AND TRIMETHOPRIM 800; 160 MG/1; MG/1
1 TABLET ORAL 2 TIMES DAILY
Qty: 14 TABLET | Refills: 0 | Status: SHIPPED | OUTPATIENT
Start: 2022-09-07 | End: 2022-09-14

## 2022-10-04 ENCOUNTER — PATIENT MESSAGE (OUTPATIENT)
Dept: ADMINISTRATIVE | Facility: HOSPITAL | Age: 62
End: 2022-10-04
Payer: COMMERCIAL

## 2022-10-05 ENCOUNTER — OFFICE VISIT (OUTPATIENT)
Dept: INTERNAL MEDICINE | Facility: CLINIC | Age: 62
End: 2022-10-05
Payer: COMMERCIAL

## 2022-10-05 DIAGNOSIS — G43.011 INTRACTABLE MIGRAINE WITHOUT AURA AND WITH STATUS MIGRAINOSUS: Primary | ICD-10-CM

## 2022-10-05 DIAGNOSIS — J06.9 VIRAL URI WITH COUGH: ICD-10-CM

## 2022-10-05 PROCEDURE — 3044F PR MOST RECENT HEMOGLOBIN A1C LEVEL <7.0%: ICD-10-PCS | Mod: CPTII,95,, | Performed by: PHYSICIAN ASSISTANT

## 2022-10-05 PROCEDURE — 99213 PR OFFICE/OUTPT VISIT, EST, LEVL III, 20-29 MIN: ICD-10-PCS | Mod: 95,,, | Performed by: PHYSICIAN ASSISTANT

## 2022-10-05 PROCEDURE — 4010F PR ACE/ARB THEARPY RXD/TAKEN: ICD-10-PCS | Mod: CPTII,95,, | Performed by: PHYSICIAN ASSISTANT

## 2022-10-05 PROCEDURE — 99213 OFFICE O/P EST LOW 20 MIN: CPT | Mod: 95,,, | Performed by: PHYSICIAN ASSISTANT

## 2022-10-05 PROCEDURE — 3044F HG A1C LEVEL LT 7.0%: CPT | Mod: CPTII,95,, | Performed by: PHYSICIAN ASSISTANT

## 2022-10-05 PROCEDURE — 4010F ACE/ARB THERAPY RXD/TAKEN: CPT | Mod: CPTII,95,, | Performed by: PHYSICIAN ASSISTANT

## 2022-10-05 RX ORDER — PROMETHAZINE HYDROCHLORIDE AND CODEINE PHOSPHATE 6.25; 1 MG/5ML; MG/5ML
5 SOLUTION ORAL EVERY 6 HOURS PRN
Qty: 118 ML | Refills: 0 | Status: SHIPPED | OUTPATIENT
Start: 2022-10-05 | End: 2022-11-07 | Stop reason: SDUPTHER

## 2022-10-05 RX ORDER — BUTALBITAL, ACETAMINOPHEN AND CAFFEINE 50; 325; 40 MG/1; MG/1; MG/1
1 TABLET ORAL EVERY 6 HOURS PRN
Qty: 30 TABLET | Refills: 0 | Status: SHIPPED | OUTPATIENT
Start: 2022-10-05 | End: 2022-10-12 | Stop reason: SDUPTHER

## 2022-10-05 NOTE — PROGRESS NOTES
The patient location is: [place], [place]  The chief complaint leading to consultation is: migraine    Visit type: audiovisual    Face to Face time with patient: 14 min  16 minutes of total time spent on the encounter, which includes face to face time and non-face to face time preparing to see the patient (eg, review of tests), Obtaining and/or reviewing separately obtained history, Documenting clinical information in the electronic or other health record, Independently interpreting results (not separately reported) and communicating results to the patient/family/caregiver, or Care coordination (not separately reported).         Each patient to whom he or she provides medical services by telemedicine is:  (1) informed of the relationship between the physician and patient and the respective role of any other health care provider with respect to management of the patient; and (2) notified that he or she may decline to receive medical services by telemedicine and may withdraw from such care at any time.    Notes:    Subjective:      Patient ID: Miles Bowen is a 62 y.o. female.    Chief Complaint: No chief complaint on file.    Headache   This is a new problem. The current episode started yesterday. The problem occurs constantly. The pain is located in the Left unilateral region. The pain quality is similar to prior headaches. The quality of the pain is described as aching. The pain is at a severity of 9/10. Associated symptoms include anorexia, coughing, nausea, vomiting and weakness. Pertinent negatives include no abdominal pain, abnormal behavior, back pain, blurred vision, dizziness, drainage, ear pain, eye pain, eye redness, eye watering, facial sweating, fever, hearing loss, insomnia, loss of balance, muscle aches, neck pain, numbness, phonophobia, photophobia, rhinorrhea, scalp tenderness, seizures, sinus pressure, sore throat, swollen glands, tingling, tinnitus, visual change or weight loss.  Treatments tried: topamax, tylenol. The treatment provided no relief. There is no history of cancer, cluster headaches, hypertension, immunosuppression, migraine headaches, migraines in the family, obesity, pseudotumor cerebri, recent head traumas, sinus disease or TMJ.     Also having some mild sinus symptoms. Congestion, runny nose, postnasal drip, and cough since Sunday. Taking coricidin HBP, hot tea, water.  Has been having some facial pressure as well.   Requesting promethazine with codeine. No improvement with promethazine DM.    Patient Active Problem List   Diagnosis    Hypertension    Depression    Anxiety    GERD (gastroesophageal reflux disease)    Hiatal hernia    DDD (degenerative disc disease), lumbar    Common migraine    Allergic rhinitis    IBS (irritable bowel syndrome)    Multinodular goiter    MVP (mitral valve prolapse)    Nasal polyps    Obesity    Abnormal ECG    Palpitations    Atypical chest pain    Shortness of breath       Current Outpatient Medications:     ACETAMINOPHEN (TYLENOL EXTRA STRENGTH ORAL), Take 2 tablets by mouth every 4 to 6 hours as needed., Disp: , Rfl:     baclofen (LIORESAL) 10 MG tablet, Take 1 tablet (10 mg total) by mouth 2 (two) times daily as needed (muscle spasm)., Disp: 30 tablet, Rfl: 0    butalbital-acetaminophen-caffeine -40 mg (FIORICET, ESGIC) -40 mg per tablet, Take 1 tablet by mouth every 6 (six) hours as needed for Headaches., Disp: 30 tablet, Rfl: 0    chlorpheniramine-dextromethorp (CORICIDIN HBP COUGH AND COLD) 4-30 mg Tab, Take by mouth as needed., Disp: , Rfl:     diazePAM (VALIUM) 10 MG Tab, every 12 (twelve) hours as needed., Disp: , Rfl:     dicyclomine (BENTYL) 10 MG capsule, Take 1 capsule by mouth 4 times daily before meals and nightly prn, Disp: 40 capsule, Rfl: 0    duloxetine (CYMBALTA) 60 MG capsule, Take 1 capsule by mouth once daily. , Disp: , Rfl: 0    estradioL (ESTRACE) 0.5 MG tablet, TAKE ONE TABLET BY MOUTH ONE TIME DAILY,  Disp: 30 tablet, Rfl: 0    hydroCHLOROthiazide (HYDRODIURIL) 12.5 MG Tab, Take 1 tablet (12.5 mg total) by mouth once daily., Disp: 90 tablet, Rfl: 1    LORazepam (ATIVAN) 2 MG Tab, SMARTSI Tablet(s) By Mouth Every 8-10 Hours PRN, Disp: , Rfl:     losartan (COZAAR) 100 MG tablet, Take 1 tablet (100 mg total) by mouth once daily., Disp: 90 tablet, Rfl: 3    pantoprazole (PROTONIX) 40 MG tablet, TAKE 1 TABLET (40 MG TOTAL) BY MOUTH ONCE DAILY., Disp: 90 tablet, Rfl: 3    promethazine (PHENERGAN) 25 MG tablet, Take 1 tablet (25 mg total) by mouth every 8 (eight) hours as needed for nausea, Disp: 30 tablet, Rfl: 0    promethazine-codeine 6.25-10 mg/5 ml (PHENERGAN WITH CODEINE) 6.25-10 mg/5 mL syrup, Take 5 mLs by mouth every 6 (six) hours as needed for Cough., Disp: 118 mL, Rfl: 0    topiramate (TOPAMAX) 50 MG tablet, once daily. , Disp: , Rfl:     zolpidem (AMBIEN CR) 12.5 MG CR tablet, Take 12.5 mg by mouth nightly., Disp: , Rfl:     Review of Systems   Constitutional:  Negative for activity change, appetite change, chills, diaphoresis, fatigue, fever, unexpected weight change and weight loss.   HENT:  Positive for congestion. Negative for ear pain, hearing loss, rhinorrhea, sinus pressure, sore throat, tinnitus and trouble swallowing.    Eyes:  Positive for visual disturbance. Negative for blurred vision, photophobia, pain, discharge and redness.   Respiratory:  Positive for cough. Negative for chest tightness and wheezing.    Cardiovascular:  Negative for chest pain and palpitations.   Gastrointestinal:  Positive for anorexia, diarrhea, nausea and vomiting. Negative for abdominal pain, blood in stool and constipation.   Endocrine: Negative for polydipsia and polyuria.   Genitourinary:  Negative for difficulty urinating, dysuria, hematuria and menstrual problem.   Musculoskeletal:  Negative for arthralgias, back pain, joint swelling and neck pain.   Neurological:  Positive for weakness and headaches. Negative for  dizziness, tingling, seizures, numbness and loss of balance.   Psychiatric/Behavioral:  Positive for confusion. Negative for dysphoric mood. The patient does not have insomnia.    Objective:   There were no vitals taken for this visit.    Physical Exam  Constitutional:       General: She is not in acute distress.     Appearance: Normal appearance. She is not ill-appearing, toxic-appearing or diaphoretic.   HENT:      Head: Normocephalic and atraumatic.   Pulmonary:      Effort: Pulmonary effort is normal. No respiratory distress.   Neurological:      Mental Status: She is alert.       Assessment:     1. Intractable migraine without aura and with status migrainosus    2. Viral URI with cough      Plan:   Intractable migraine without aura and with status migrainosus  -     butalbital-acetaminophen-caffeine -40 mg (FIORICET, ESGIC) -40 mg per tablet; Take 1 tablet by mouth every 6 (six) hours as needed for Headaches.  Dispense: 30 tablet; Refill: 0    Viral URI with cough  -     promethazine-codeine 6.25-10 mg/5 ml (PHENERGAN WITH CODEINE) 6.25-10 mg/5 mL syrup; Take 5 mLs by mouth every 6 (six) hours as needed for Cough.  Dispense: 118 mL; Refill: 0  - checked. Ok.   -avoid taking with lorazepam or pain medications  -take with caution. Risks discussed    Follow up if symptoms worsen or fail to improve.

## 2022-10-10 ENCOUNTER — PATIENT MESSAGE (OUTPATIENT)
Dept: INTERNAL MEDICINE | Facility: CLINIC | Age: 62
End: 2022-10-10

## 2022-10-10 ENCOUNTER — OFFICE VISIT (OUTPATIENT)
Dept: INTERNAL MEDICINE | Facility: CLINIC | Age: 62
End: 2022-10-10
Payer: COMMERCIAL

## 2022-10-10 ENCOUNTER — TELEPHONE (OUTPATIENT)
Dept: INTERNAL MEDICINE | Facility: CLINIC | Age: 62
End: 2022-10-10
Payer: COMMERCIAL

## 2022-10-10 VITALS
HEART RATE: 85 BPM | WEIGHT: 190.5 LBS | DIASTOLIC BLOOD PRESSURE: 90 MMHG | SYSTOLIC BLOOD PRESSURE: 130 MMHG | HEIGHT: 62 IN | OXYGEN SATURATION: 97 % | TEMPERATURE: 99 F | BODY MASS INDEX: 35.06 KG/M2

## 2022-10-10 DIAGNOSIS — R22.0 LIP SWELLING: ICD-10-CM

## 2022-10-10 DIAGNOSIS — R51.9 SEVERE HEADACHE: Primary | ICD-10-CM

## 2022-10-10 DIAGNOSIS — G43.011 INTRACTABLE MIGRAINE WITHOUT AURA AND WITH STATUS MIGRAINOSUS: ICD-10-CM

## 2022-10-10 PROCEDURE — 3044F HG A1C LEVEL LT 7.0%: CPT | Mod: CPTII,S$GLB,, | Performed by: PHYSICIAN ASSISTANT

## 2022-10-10 PROCEDURE — 3075F SYST BP GE 130 - 139MM HG: CPT | Mod: CPTII,S$GLB,, | Performed by: PHYSICIAN ASSISTANT

## 2022-10-10 PROCEDURE — 99999 PR PBB SHADOW E&M-EST. PATIENT-LVL V: ICD-10-PCS | Mod: PBBFAC,,, | Performed by: PHYSICIAN ASSISTANT

## 2022-10-10 PROCEDURE — 1159F MED LIST DOCD IN RCRD: CPT | Mod: CPTII,S$GLB,, | Performed by: PHYSICIAN ASSISTANT

## 2022-10-10 PROCEDURE — 4010F ACE/ARB THERAPY RXD/TAKEN: CPT | Mod: CPTII,S$GLB,, | Performed by: PHYSICIAN ASSISTANT

## 2022-10-10 PROCEDURE — 3008F PR BODY MASS INDEX (BMI) DOCUMENTED: ICD-10-PCS | Mod: CPTII,S$GLB,, | Performed by: PHYSICIAN ASSISTANT

## 2022-10-10 PROCEDURE — 3044F PR MOST RECENT HEMOGLOBIN A1C LEVEL <7.0%: ICD-10-PCS | Mod: CPTII,S$GLB,, | Performed by: PHYSICIAN ASSISTANT

## 2022-10-10 PROCEDURE — 3080F PR MOST RECENT DIASTOLIC BLOOD PRESSURE >= 90 MM HG: ICD-10-PCS | Mod: CPTII,S$GLB,, | Performed by: PHYSICIAN ASSISTANT

## 2022-10-10 PROCEDURE — 99214 PR OFFICE/OUTPT VISIT, EST, LEVL IV, 30-39 MIN: ICD-10-PCS | Mod: S$GLB,,, | Performed by: PHYSICIAN ASSISTANT

## 2022-10-10 PROCEDURE — 1159F PR MEDICATION LIST DOCUMENTED IN MEDICAL RECORD: ICD-10-PCS | Mod: CPTII,S$GLB,, | Performed by: PHYSICIAN ASSISTANT

## 2022-10-10 PROCEDURE — 3008F BODY MASS INDEX DOCD: CPT | Mod: CPTII,S$GLB,, | Performed by: PHYSICIAN ASSISTANT

## 2022-10-10 PROCEDURE — 1160F RVW MEDS BY RX/DR IN RCRD: CPT | Mod: CPTII,S$GLB,, | Performed by: PHYSICIAN ASSISTANT

## 2022-10-10 PROCEDURE — 3075F PR MOST RECENT SYSTOLIC BLOOD PRESS GE 130-139MM HG: ICD-10-PCS | Mod: CPTII,S$GLB,, | Performed by: PHYSICIAN ASSISTANT

## 2022-10-10 PROCEDURE — 1160F PR REVIEW ALL MEDS BY PRESCRIBER/CLIN PHARMACIST DOCUMENTED: ICD-10-PCS | Mod: CPTII,S$GLB,, | Performed by: PHYSICIAN ASSISTANT

## 2022-10-10 PROCEDURE — 3080F DIAST BP >= 90 MM HG: CPT | Mod: CPTII,S$GLB,, | Performed by: PHYSICIAN ASSISTANT

## 2022-10-10 PROCEDURE — 99214 OFFICE O/P EST MOD 30 MIN: CPT | Mod: S$GLB,,, | Performed by: PHYSICIAN ASSISTANT

## 2022-10-10 PROCEDURE — 4010F PR ACE/ARB THEARPY RXD/TAKEN: ICD-10-PCS | Mod: CPTII,S$GLB,, | Performed by: PHYSICIAN ASSISTANT

## 2022-10-10 PROCEDURE — 99999 PR PBB SHADOW E&M-EST. PATIENT-LVL V: CPT | Mod: PBBFAC,,, | Performed by: PHYSICIAN ASSISTANT

## 2022-10-10 RX ORDER — TOPIRAMATE 25 MG/1
TABLET ORAL
Qty: 90 TABLET | Refills: 1 | Status: SHIPPED | OUTPATIENT
Start: 2022-10-10 | End: 2022-12-16 | Stop reason: SDUPTHER

## 2022-10-10 RX ORDER — HYDROCODONE BITARTRATE AND ACETAMINOPHEN 5; 325 MG/1; MG/1
1 TABLET ORAL EVERY 6 HOURS PRN
COMMUNITY
Start: 2022-09-08 | End: 2023-01-31 | Stop reason: ALTCHOICE

## 2022-10-10 NOTE — PROGRESS NOTES
Subjective:      Patient ID: Miles Bowen is a 62 y.o. female.    Chief Complaint: Migraine    Migraine   This is a recurrent problem. The current episode started in the past 7 days. The problem occurs constantly. The pain is located in the Right unilateral region. The pain does not radiate. The pain is at a severity of 9/10. The pain is severe. Associated symptoms include blurred vision, dizziness, eye pain and nausea. Pertinent negatives include no abdominal pain, abnormal behavior, anorexia, back pain, coughing, drainage, ear pain, eye redness, eye watering, hearing loss, insomnia, loss of balance, muscle aches, neck pain, numbness, phonophobia, photophobia, rhinorrhea, sinus pressure, sore throat, swollen glands, tingling, tinnitus, visual change, vomiting, weakness or weight loss. She has tried darkened room and acetaminophen for the symptoms. Her past medical history is significant for migraine headaches. There is no history of cancer, cluster headaches, hypertension, immunosuppression, migraines in the family, obesity, pseudotumor cerebri, recent head traumas, sinus disease or TMJ.     Patient Active Problem List   Diagnosis    Hypertension    Depression    Anxiety    GERD (gastroesophageal reflux disease)    Hiatal hernia    DDD (degenerative disc disease), lumbar    Common migraine    Allergic rhinitis    IBS (irritable bowel syndrome)    Multinodular goiter    MVP (mitral valve prolapse)    Nasal polyps    Obesity    Abnormal ECG    Palpitations    Atypical chest pain    Shortness of breath       Current Outpatient Medications:     ACETAMINOPHEN (TYLENOL EXTRA STRENGTH ORAL), Take 2 tablets by mouth every 4 to 6 hours as needed., Disp: , Rfl:     baclofen (LIORESAL) 10 MG tablet, Take 1 tablet (10 mg total) by mouth 2 (two) times daily as needed (muscle spasm)., Disp: 30 tablet, Rfl: 0    butalbital-acetaminophen-caffeine -40 mg (FIORICET, ESGIC) -40 mg per tablet, Take 1 tablet by  mouth every 6 (six) hours as needed for Headaches., Disp: 30 tablet, Rfl: 0    chlorpheniramine-dextromethorp (CORICIDIN HBP COUGH AND COLD) 4-30 mg Tab, Take by mouth as needed., Disp: , Rfl:     diazePAM (VALIUM) 10 MG Tab, every 12 (twelve) hours as needed., Disp: , Rfl:     dicyclomine (BENTYL) 10 MG capsule, Take 1 capsule by mouth 4 times daily before meals and nightly prn, Disp: 40 capsule, Rfl: 0    duloxetine (CYMBALTA) 60 MG capsule, Take 1 capsule by mouth once daily. , Disp: , Rfl: 0    estradioL (ESTRACE) 0.5 MG tablet, TAKE ONE TABLET BY MOUTH ONE TIME DAILY, Disp: 30 tablet, Rfl: 0    hydroCHLOROthiazide (HYDRODIURIL) 12.5 MG Tab, Take 1 tablet (12.5 mg total) by mouth once daily., Disp: 90 tablet, Rfl: 1    HYDROcodone-acetaminophen (NORCO) 5-325 mg per tablet, Take 1 tablet by mouth every 6 (six) hours as needed., Disp: , Rfl:     LORazepam (ATIVAN) 2 MG Tab, SMARTSI Tablet(s) By Mouth Every 8-10 Hours PRN, Disp: , Rfl:     losartan (COZAAR) 100 MG tablet, Take 1 tablet (100 mg total) by mouth once daily., Disp: 90 tablet, Rfl: 3    pantoprazole (PROTONIX) 40 MG tablet, TAKE 1 TABLET (40 MG TOTAL) BY MOUTH ONCE DAILY., Disp: 90 tablet, Rfl: 3    promethazine (PHENERGAN) 25 MG tablet, Take 1 tablet (25 mg total) by mouth every 8 (eight) hours as needed for nausea, Disp: 30 tablet, Rfl: 0    promethazine-codeine 6.25-10 mg/5 ml (PHENERGAN WITH CODEINE) 6.25-10 mg/5 mL syrup, Take 5 mLs by mouth every 6 (six) hours as needed for Cough., Disp: 118 mL, Rfl: 0    zolpidem (AMBIEN CR) 12.5 MG CR tablet, Take 12.5 mg by mouth nightly., Disp: , Rfl:     topiramate (TOPAMAX) 25 MG tablet, Take 1 tablet (25 mg total) by mouth every morning AND 2 tablets (50 mg total) every evening., Disp: 90 tablet, Rfl: 1    Review of Systems   Constitutional:  Negative for weight loss.   HENT:  Negative for ear pain, hearing loss, rhinorrhea, sinus pressure, sore throat and tinnitus.    Eyes:  Positive for blurred vision  "and pain. Negative for photophobia and redness.   Respiratory:  Negative for cough.    Gastrointestinal:  Positive for nausea. Negative for abdominal pain, anorexia and vomiting.   Musculoskeletal:  Negative for back pain and neck pain.   Neurological:  Positive for dizziness. Negative for tingling, weakness, numbness and loss of balance.   Psychiatric/Behavioral:  The patient does not have insomnia.    Objective:   BP (!) 130/90 (BP Location: Left arm, Patient Position: Sitting, BP Method: Medium (Manual))   Pulse 85   Temp 98.5 °F (36.9 °C) (Tympanic)   Ht 5' 2" (1.575 m)   Wt 86.4 kg (190 lb 7.6 oz)   SpO2 97%   BMI 34.84 kg/m²     Physical Exam  Vitals reviewed.   Constitutional:       General: She is not in acute distress.     Appearance: Normal appearance. She is well-developed. She is not ill-appearing, toxic-appearing or diaphoretic.   HENT:      Head: Normocephalic and atraumatic.      Right Ear: External ear normal.      Left Ear: External ear normal.      Nose: Nose normal.      Mouth/Throat:      Lips: Pink.      Mouth: Mucous membranes are moist. No injury, lacerations or oral lesions.      Dentition: Normal dentition. Does not have dentures. No dental tenderness, gingival swelling, dental caries, dental abscesses or gum lesions.      Tongue: No lesions. Tongue does not deviate from midline.      Palate: No mass and lesions.      Pharynx: Oropharynx is clear. No pharyngeal swelling or uvula swelling.     Eyes:      Conjunctiva/sclera: Conjunctivae normal.      Pupils: Pupils are equal, round, and reactive to light.   Cardiovascular:      Rate and Rhythm: Normal rate and regular rhythm.      Heart sounds: Normal heart sounds. No murmur heard.    No friction rub. No gallop.   Pulmonary:      Effort: Pulmonary effort is normal. No respiratory distress.      Breath sounds: Normal breath sounds. No wheezing or rales.   Chest:      Chest wall: No tenderness.   Abdominal:      General: There is no " distension.      Palpations: Abdomen is soft.      Tenderness: There is no abdominal tenderness.   Musculoskeletal:         General: Normal range of motion.      Cervical back: Normal range of motion and neck supple.   Lymphadenopathy:      Cervical: No cervical adenopathy.   Skin:     General: Skin is warm and dry.      Capillary Refill: Capillary refill takes less than 2 seconds.      Findings: No rash.   Neurological:      Mental Status: She is alert and oriented to person, place, and time.      Motor: No weakness.      Coordination: Coordination normal.      Gait: Gait normal.   Psychiatric:         Mood and Affect: Mood normal.         Behavior: Behavior normal.         Thought Content: Thought content normal.         Judgment: Judgment normal.       Assessment:     1. Severe headache    2. Lip swelling    3. Intractable migraine without aura and with status migrainosus      Plan:   Severe headache  -     CT Head Without Contrast; Future; Expected date: 10/10/2022  -     Ambulatory referral/consult to Tele-Headache; Future; Expected date: 10/17/2022    Lip swelling    Intractable migraine without aura and with status migrainosus  -     topiramate (TOPAMAX) 25 MG tablet; Take 1 tablet (25 mg total) by mouth every morning AND 2 tablets (50 mg total) every evening.  Dispense: 90 tablet; Refill: 1  -if lip swelling gets worse go to the ER  -may consider switching BP meds, stop losartan if symptoms worsen.   -red flags discussed in detail.     Follow up if symptoms worsen or fail to improve.

## 2022-10-11 ENCOUNTER — HOSPITAL ENCOUNTER (OUTPATIENT)
Dept: RADIOLOGY | Facility: HOSPITAL | Age: 62
Discharge: HOME | End: 2022-10-11
Attending: PHYSICIAN ASSISTANT
Payer: COMMERCIAL

## 2022-10-11 DIAGNOSIS — R51.9 SEVERE HEADACHE: ICD-10-CM

## 2022-10-11 PROCEDURE — 70450 CT HEAD WITHOUT CONTRAST: ICD-10-PCS | Mod: 26,,, | Performed by: RADIOLOGY

## 2022-10-11 PROCEDURE — 70450 CT HEAD/BRAIN W/O DYE: CPT | Mod: TC

## 2022-10-11 PROCEDURE — 70450 CT HEAD/BRAIN W/O DYE: CPT | Mod: 26,,, | Performed by: RADIOLOGY

## 2022-10-12 ENCOUNTER — OFFICE VISIT (OUTPATIENT)
Dept: INTERNAL MEDICINE | Facility: CLINIC | Age: 62
End: 2022-10-12
Payer: COMMERCIAL

## 2022-10-12 ENCOUNTER — TELEPHONE (OUTPATIENT)
Dept: INTERNAL MEDICINE | Facility: CLINIC | Age: 62
End: 2022-10-12

## 2022-10-12 ENCOUNTER — LAB VISIT (OUTPATIENT)
Dept: LAB | Facility: HOSPITAL | Age: 62
End: 2022-10-12
Attending: PHYSICIAN ASSISTANT
Payer: COMMERCIAL

## 2022-10-12 VITALS
HEIGHT: 62 IN | WEIGHT: 193.56 LBS | SYSTOLIC BLOOD PRESSURE: 135 MMHG | BODY MASS INDEX: 35.62 KG/M2 | TEMPERATURE: 98 F | OXYGEN SATURATION: 95 % | DIASTOLIC BLOOD PRESSURE: 86 MMHG | HEART RATE: 64 BPM

## 2022-10-12 DIAGNOSIS — I10 PRIMARY HYPERTENSION: ICD-10-CM

## 2022-10-12 DIAGNOSIS — E04.2 MULTINODULAR GOITER: ICD-10-CM

## 2022-10-12 DIAGNOSIS — F41.9 ANXIETY: Primary | ICD-10-CM

## 2022-10-12 DIAGNOSIS — G43.011 INTRACTABLE MIGRAINE WITHOUT AURA AND WITH STATUS MIGRAINOSUS: ICD-10-CM

## 2022-10-12 DIAGNOSIS — F41.9 ANXIETY: ICD-10-CM

## 2022-10-12 LAB
ALBUMIN SERPL BCP-MCNC: 4.1 G/DL (ref 3.5–5.2)
ALP SERPL-CCNC: 56 U/L (ref 55–135)
ALT SERPL W/O P-5'-P-CCNC: 18 U/L (ref 10–44)
ANION GAP SERPL CALC-SCNC: 14 MMOL/L (ref 8–16)
AST SERPL-CCNC: 13 U/L (ref 10–40)
BASOPHILS # BLD AUTO: 0.07 K/UL (ref 0–0.2)
BASOPHILS NFR BLD: 0.8 % (ref 0–1.9)
BILIRUB SERPL-MCNC: 0.2 MG/DL (ref 0.1–1)
BUN SERPL-MCNC: 14 MG/DL (ref 8–23)
CALCIUM SERPL-MCNC: 9.6 MG/DL (ref 8.7–10.5)
CHLORIDE SERPL-SCNC: 107 MMOL/L (ref 95–110)
CO2 SERPL-SCNC: 20 MMOL/L (ref 23–29)
CREAT SERPL-MCNC: 0.8 MG/DL (ref 0.5–1.4)
DIFFERENTIAL METHOD: NORMAL
EOSINOPHIL # BLD AUTO: 0.2 K/UL (ref 0–0.5)
EOSINOPHIL NFR BLD: 2.2 % (ref 0–8)
ERYTHROCYTE [DISTWIDTH] IN BLOOD BY AUTOMATED COUNT: 13.9 % (ref 11.5–14.5)
ERYTHROCYTE [SEDIMENTATION RATE] IN BLOOD BY PHOTOMETRIC METHOD: 4 MM/HR (ref 0–36)
EST. GFR  (NO RACE VARIABLE): >60 ML/MIN/1.73 M^2
GLUCOSE SERPL-MCNC: 84 MG/DL (ref 70–110)
HCT VFR BLD AUTO: 37.8 % (ref 37–48.5)
HGB BLD-MCNC: 12.4 G/DL (ref 12–16)
IMM GRANULOCYTES # BLD AUTO: 0.02 K/UL (ref 0–0.04)
IMM GRANULOCYTES NFR BLD AUTO: 0.2 % (ref 0–0.5)
LYMPHOCYTES # BLD AUTO: 3.3 K/UL (ref 1–4.8)
LYMPHOCYTES NFR BLD: 37.2 % (ref 18–48)
MCH RBC QN AUTO: 30.3 PG (ref 27–31)
MCHC RBC AUTO-ENTMCNC: 32.8 G/DL (ref 32–36)
MCV RBC AUTO: 92 FL (ref 82–98)
MONOCYTES # BLD AUTO: 0.5 K/UL (ref 0.3–1)
MONOCYTES NFR BLD: 5.6 % (ref 4–15)
NEUTROPHILS # BLD AUTO: 4.8 K/UL (ref 1.8–7.7)
NEUTROPHILS NFR BLD: 54 % (ref 38–73)
NRBC BLD-RTO: 0 /100 WBC
PLATELET # BLD AUTO: 285 K/UL (ref 150–450)
PMV BLD AUTO: 9.9 FL (ref 9.2–12.9)
POTASSIUM SERPL-SCNC: 4 MMOL/L (ref 3.5–5.1)
PROT SERPL-MCNC: 6.9 G/DL (ref 6–8.4)
RBC # BLD AUTO: 4.09 M/UL (ref 4–5.4)
SODIUM SERPL-SCNC: 141 MMOL/L (ref 136–145)
WBC # BLD AUTO: 8.81 K/UL (ref 3.9–12.7)

## 2022-10-12 PROCEDURE — 3008F PR BODY MASS INDEX (BMI) DOCUMENTED: ICD-10-PCS | Mod: CPTII,S$GLB,, | Performed by: PHYSICIAN ASSISTANT

## 2022-10-12 PROCEDURE — 1159F PR MEDICATION LIST DOCUMENTED IN MEDICAL RECORD: ICD-10-PCS | Mod: CPTII,S$GLB,, | Performed by: PHYSICIAN ASSISTANT

## 2022-10-12 PROCEDURE — 80053 COMPREHEN METABOLIC PANEL: CPT | Performed by: PHYSICIAN ASSISTANT

## 2022-10-12 PROCEDURE — 3044F PR MOST RECENT HEMOGLOBIN A1C LEVEL <7.0%: ICD-10-PCS | Mod: CPTII,S$GLB,, | Performed by: PHYSICIAN ASSISTANT

## 2022-10-12 PROCEDURE — 4010F ACE/ARB THERAPY RXD/TAKEN: CPT | Mod: CPTII,S$GLB,, | Performed by: PHYSICIAN ASSISTANT

## 2022-10-12 PROCEDURE — 99999 PR PBB SHADOW E&M-EST. PATIENT-LVL V: ICD-10-PCS | Mod: PBBFAC,,, | Performed by: PHYSICIAN ASSISTANT

## 2022-10-12 PROCEDURE — 4010F PR ACE/ARB THEARPY RXD/TAKEN: ICD-10-PCS | Mod: CPTII,S$GLB,, | Performed by: PHYSICIAN ASSISTANT

## 2022-10-12 PROCEDURE — 3075F SYST BP GE 130 - 139MM HG: CPT | Mod: CPTII,S$GLB,, | Performed by: PHYSICIAN ASSISTANT

## 2022-10-12 PROCEDURE — 85652 RBC SED RATE AUTOMATED: CPT | Performed by: PHYSICIAN ASSISTANT

## 2022-10-12 PROCEDURE — 99214 OFFICE O/P EST MOD 30 MIN: CPT | Mod: S$GLB,,, | Performed by: PHYSICIAN ASSISTANT

## 2022-10-12 PROCEDURE — 3075F PR MOST RECENT SYSTOLIC BLOOD PRESS GE 130-139MM HG: ICD-10-PCS | Mod: CPTII,S$GLB,, | Performed by: PHYSICIAN ASSISTANT

## 2022-10-12 PROCEDURE — 3079F DIAST BP 80-89 MM HG: CPT | Mod: CPTII,S$GLB,, | Performed by: PHYSICIAN ASSISTANT

## 2022-10-12 PROCEDURE — 3079F PR MOST RECENT DIASTOLIC BLOOD PRESSURE 80-89 MM HG: ICD-10-PCS | Mod: CPTII,S$GLB,, | Performed by: PHYSICIAN ASSISTANT

## 2022-10-12 PROCEDURE — 1160F PR REVIEW ALL MEDS BY PRESCRIBER/CLIN PHARMACIST DOCUMENTED: ICD-10-PCS | Mod: CPTII,S$GLB,, | Performed by: PHYSICIAN ASSISTANT

## 2022-10-12 PROCEDURE — 85025 COMPLETE CBC W/AUTO DIFF WBC: CPT | Performed by: PHYSICIAN ASSISTANT

## 2022-10-12 PROCEDURE — 3044F HG A1C LEVEL LT 7.0%: CPT | Mod: CPTII,S$GLB,, | Performed by: PHYSICIAN ASSISTANT

## 2022-10-12 PROCEDURE — 1160F RVW MEDS BY RX/DR IN RCRD: CPT | Mod: CPTII,S$GLB,, | Performed by: PHYSICIAN ASSISTANT

## 2022-10-12 PROCEDURE — 1159F MED LIST DOCD IN RCRD: CPT | Mod: CPTII,S$GLB,, | Performed by: PHYSICIAN ASSISTANT

## 2022-10-12 PROCEDURE — 3008F BODY MASS INDEX DOCD: CPT | Mod: CPTII,S$GLB,, | Performed by: PHYSICIAN ASSISTANT

## 2022-10-12 PROCEDURE — 84443 ASSAY THYROID STIM HORMONE: CPT | Performed by: PHYSICIAN ASSISTANT

## 2022-10-12 PROCEDURE — 36415 COLL VENOUS BLD VENIPUNCTURE: CPT | Performed by: PHYSICIAN ASSISTANT

## 2022-10-12 PROCEDURE — 99214 PR OFFICE/OUTPT VISIT, EST, LEVL IV, 30-39 MIN: ICD-10-PCS | Mod: S$GLB,,, | Performed by: PHYSICIAN ASSISTANT

## 2022-10-12 PROCEDURE — 99999 PR PBB SHADOW E&M-EST. PATIENT-LVL V: CPT | Mod: PBBFAC,,, | Performed by: PHYSICIAN ASSISTANT

## 2022-10-12 RX ORDER — NEBULIZER AND COMPRESSOR
EACH MISCELLANEOUS
Qty: 1 EACH | Refills: 0 | Status: SHIPPED | OUTPATIENT
Start: 2022-10-12 | End: 2022-10-12 | Stop reason: SDUPTHER

## 2022-10-12 RX ORDER — NEBULIZER AND COMPRESSOR
EACH MISCELLANEOUS
Qty: 1 EACH | Refills: 0 | Status: SHIPPED | OUTPATIENT
Start: 2022-10-12 | End: 2023-05-01 | Stop reason: ALTCHOICE

## 2022-10-12 RX ORDER — BUTALBITAL, ACETAMINOPHEN AND CAFFEINE 50; 325; 40 MG/1; MG/1; MG/1
1 TABLET ORAL EVERY 6 HOURS PRN
Qty: 30 TABLET | Refills: 0 | Status: SHIPPED | OUTPATIENT
Start: 2022-10-12 | End: 2022-11-11

## 2022-10-12 NOTE — PROGRESS NOTES
Subjective:      Patient ID: Miles Bowen is a 62 y.o. female.    Chief Complaint: Migraine (Follow up /)    HPI  Here today for headache follow up.   Improved after taking 2 fioricets. Needs refill on fioricet.   Referral placed last visit for teleheadache. Has not yet been contacted.   CT head was normal.   Requesting note for work as well. Scheduled to see her psychiatrist on Friday to fill out FMLA.   HIgh stress at work.     BP Readings from Last 3 Encounters:   10/12/22 135/86   10/10/22 (!) 130/90   09/07/22 124/76      Patient Active Problem List   Diagnosis    Hypertension    Depression    Anxiety    GERD (gastroesophageal reflux disease)    Hiatal hernia    DDD (degenerative disc disease), lumbar    Common migraine    Allergic rhinitis    IBS (irritable bowel syndrome)    Multinodular goiter    MVP (mitral valve prolapse)    Nasal polyps    Obesity    Abnormal ECG    Palpitations    Atypical chest pain    Shortness of breath       Current Outpatient Medications:     ACETAMINOPHEN (TYLENOL EXTRA STRENGTH ORAL), Take 2 tablets by mouth every 4 to 6 hours as needed., Disp: , Rfl:     baclofen (LIORESAL) 10 MG tablet, Take 1 tablet (10 mg total) by mouth 2 (two) times daily as needed (muscle spasm)., Disp: 30 tablet, Rfl: 0    chlorpheniramine-dextromethorp (CORICIDIN HBP COUGH AND COLD) 4-30 mg Tab, Take by mouth as needed., Disp: , Rfl:     diazePAM (VALIUM) 10 MG Tab, every 12 (twelve) hours as needed., Disp: , Rfl:     dicyclomine (BENTYL) 10 MG capsule, Take 1 capsule by mouth 4 times daily before meals and nightly prn, Disp: 40 capsule, Rfl: 0    duloxetine (CYMBALTA) 60 MG capsule, Take 1 capsule by mouth once daily. , Disp: , Rfl: 0    estradioL (ESTRACE) 0.5 MG tablet, TAKE ONE TABLET BY MOUTH ONE TIME DAILY, Disp: 30 tablet, Rfl: 0    hydroCHLOROthiazide (HYDRODIURIL) 12.5 MG Tab, Take 1 tablet (12.5 mg total) by mouth once daily., Disp: 90 tablet, Rfl: 1    HYDROcodone-acetaminophen (NORCO)  5-325 mg per tablet, Take 1 tablet by mouth every 6 (six) hours as needed., Disp: , Rfl:     HYDROcodone-acetaminophen (NORCO) 5-325 mg per tablet, Take 1 tablet by mouth every 6 (six) hours as needed for pain for 7 days., Disp: 28 tablet, Rfl: 0    LORazepam (ATIVAN) 2 MG Tab, SMARTSI Tablet(s) By Mouth Every 8-10 Hours PRN, Disp: , Rfl:     losartan (COZAAR) 100 MG tablet, Take 1 tablet (100 mg total) by mouth once daily., Disp: 90 tablet, Rfl: 3    pantoprazole (PROTONIX) 40 MG tablet, TAKE 1 TABLET (40 MG TOTAL) BY MOUTH ONCE DAILY., Disp: 90 tablet, Rfl: 3    promethazine (PHENERGAN) 25 MG tablet, Take 1 tablet (25 mg total) by mouth every 8 (eight) hours as needed for nausea, Disp: 30 tablet, Rfl: 0    promethazine-codeine 6.25-10 mg/5 ml (PHENERGAN WITH CODEINE) 6.25-10 mg/5 mL syrup, Take 5 mLs by mouth every 6 (six) hours as needed for Cough., Disp: 118 mL, Rfl: 0    topiramate (TOPAMAX) 25 MG tablet, Take 1 tablet (25 mg total) by mouth every morning AND 2 tablets (50 mg total) every evening., Disp: 90 tablet, Rfl: 1    zolpidem (AMBIEN CR) 12.5 MG CR tablet, Take 12.5 mg by mouth nightly., Disp: , Rfl:     BLOOD PRESSURE CUFF Misc, Use to check blood pressure daily, Disp: 1 each, Rfl: 0    butalbital-acetaminophen-caffeine -40 mg (FIORICET, ESGIC) -40 mg per tablet, Take 1 tablet by mouth every 6 (six) hours as needed for Headaches., Disp: 30 tablet, Rfl: 0    Review of Systems   Constitutional:  Negative for activity change, appetite change, chills, diaphoresis, fatigue, fever and unexpected weight change.   HENT: Negative.  Negative for congestion, hearing loss, postnasal drip, rhinorrhea, sore throat, trouble swallowing and voice change.    Eyes: Negative.  Negative for visual disturbance.   Respiratory: Negative.  Negative for cough, choking, chest tightness and shortness of breath.    Cardiovascular:  Negative for chest pain, palpitations and leg swelling.   Gastrointestinal:  Negative  "for abdominal distention, abdominal pain, blood in stool, constipation, diarrhea, nausea and vomiting.   Endocrine: Negative for cold intolerance, heat intolerance, polydipsia and polyuria.   Genitourinary: Negative.  Negative for difficulty urinating and frequency.   Musculoskeletal:  Negative for arthralgias, back pain, gait problem, joint swelling and myalgias.   Skin:  Negative for color change, pallor, rash and wound.   Neurological:  Positive for headaches. Negative for dizziness, tremors, weakness, light-headedness and numbness.   Hematological:  Negative for adenopathy.   Psychiatric/Behavioral:  Negative for behavioral problems, confusion, self-injury, sleep disturbance and suicidal ideas. The patient is not nervous/anxious.    Objective:   /86 (BP Location: Right arm, Patient Position: Sitting, BP Method: Large (Manual))   Pulse 64   Temp 97.8 °F (36.6 °C) (Oral)   Ht 5' 2" (1.575 m)   Wt 87.8 kg (193 lb 9 oz)   SpO2 95%   BMI 35.40 kg/m²     Physical Exam  Vitals and nursing note reviewed.   Constitutional:       General: She is not in acute distress.     Appearance: Normal appearance. She is well-developed. She is not ill-appearing, toxic-appearing or diaphoretic.   HENT:      Head: Normocephalic and atraumatic.   Cardiovascular:      Rate and Rhythm: Normal rate and regular rhythm.      Heart sounds: Normal heart sounds. No murmur heard.    No friction rub. No gallop.   Pulmonary:      Effort: Pulmonary effort is normal. No respiratory distress.      Breath sounds: Normal breath sounds. No wheezing or rales.   Musculoskeletal:         General: Normal range of motion.   Skin:     General: Skin is warm.      Capillary Refill: Capillary refill takes less than 2 seconds.      Findings: No rash.   Neurological:      Mental Status: She is alert and oriented to person, place, and time.      Motor: No weakness.      Gait: Gait normal.   Psychiatric:         Mood and Affect: Mood normal.         " Behavior: Behavior normal.         Thought Content: Thought content normal.         Judgment: Judgment normal.     CT Head Without Contrast  Narrative: EXAMINATION:  CT HEAD WITHOUT CONTRAST    CLINICAL HISTORY:  Headache, chronic, new features or increased frequency; Headache, unspecified    TECHNIQUE:  Low dose axial images were obtained through the head.  Coronal and sagittal reformations were also performed. Contrast was not administered.    COMPARISON:  None.    FINDINGS:  The cortical sulcal pattern demonstrates age related atrophy..  No hydrocephalus, midline shift or abnormal mass effect present. No intra-or extra-axial mass or hemorrhage. No CT evidence of large territory acute stroke.    Orbits are unremarkable. Paranasal sinuses demonstrate mild mucosal thickening most prevalent within the visualized left maxillary sinus..  Mastoid air cells are clear. Calvarium is intact.  Impression: No acute intracranial abnormality.  Mild paranasal sinus disease.    Electronically signed by: Jorge Bradshaw MD  Date:    10/11/2022  Time:    15:04   Assessment:     1. Anxiety    2. Intractable migraine without aura and with status migrainosus    3. Primary hypertension    4. Multinodular goiter      Plan:   Anxiety  -     TSH; Future    Intractable migraine without aura and with status migrainosus  -     butalbital-acetaminophen-caffeine -40 mg (FIORICET, ESGIC) -40 mg per tablet; Take 1 tablet by mouth every 6 (six) hours as needed for Headaches.  Dispense: 30 tablet; Refill: 0  -     MRI Brain W WO Contrast; Future; Expected date: 10/12/2022  -     CBC Auto Differential; Future; Expected date: 10/12/2022  -     Comprehensive Metabolic Panel; Future  -     Sedimentation rate; Future; Expected date: 10/12/2022    Primary hypertension  -     Comprehensive Metabolic Panel; Future  -     Sedimentation rate; Future; Expected date: 10/12/2022  -     Discontinue: BLOOD PRESSURE CUFF Misc; Use to check blood  pressure daily  Dispense: 1 each; Refill: 0  -     Discontinue: BLOOD PRESSURE CUFF Misc; Use to check blood pressure daily  Dispense: 1 each; Refill: 0  -     BLOOD PRESSURE CUFF Misc; Use to check blood pressure daily  Dispense: 1 each; Refill: 0    Multinodular goiter  -     TSH; Future    -start to monitor blood pressure at home  -follow up with pcp as scheduled.  -10/10 headache go to the ER  -check labs today    Follow up if symptoms worsen or fail to improve.

## 2022-10-12 NOTE — TELEPHONE ENCOUNTER
Spoke with pt about refill on her norco. Pt has enough to last her through the weekend.    Prednisone Counseling:  I discussed with the patient the risks of prolonged use of prednisone including but not limited to weight gain, insomnia, osteoporosis, mood changes, diabetes, susceptibility to infection, glaucoma and high blood pressure.  In cases where prednisone use is prolonged, patients should be monitored with blood pressure checks, serum glucose levels and an eye exam.  Additionally, the patient may need to be placed on GI prophylaxis, PCP prophylaxis, and calcium and vitamin D supplementation and/or a bisphosphonate.  The patient verbalized understanding of the proper use and the possible adverse effects of prednisone.  All of the patient's questions and concerns were addressed.

## 2022-10-13 LAB — TSH SERPL DL<=0.005 MIU/L-ACNC: 1.63 UIU/ML (ref 0.4–4)

## 2022-10-13 NOTE — TELEPHONE ENCOUNTER
Left message on Mrs. Aquino's voicemail requesting a call back.      Angelica Resendiz MA  You 3 days ago     [place]  Good morning, the pt has a referral in can you please give her a call to schedule. Thank you

## 2022-10-24 ENCOUNTER — TELEPHONE (OUTPATIENT)
Dept: NEUROLOGY | Facility: CLINIC | Age: 62
End: 2022-10-24
Payer: COMMERCIAL

## 2022-10-24 NOTE — TELEPHONE ENCOUNTER
----- Message from Brinda Aguirre sent at 10/24/2022  1:13 PM CDT -----  Regarding: Appt  Contact: pt @ [phone]  Pt was calling to see why appt was cancelled. Asking for a call back

## 2022-10-24 NOTE — TELEPHONE ENCOUNTER
Spoke with patient and is rescheduled appropriately on 10/28 at 8am    Assisting patient with scheduling MRI with sedation

## 2022-10-28 ENCOUNTER — OFFICE VISIT (OUTPATIENT)
Dept: NEUROLOGY | Facility: CLINIC | Age: 62
End: 2022-10-28
Payer: COMMERCIAL

## 2022-10-28 DIAGNOSIS — F32.A DEPRESSION, UNSPECIFIED DEPRESSION TYPE: ICD-10-CM

## 2022-10-28 DIAGNOSIS — G43.109 MIGRAINE WITH AURA AND WITHOUT STATUS MIGRAINOSUS, NOT INTRACTABLE: Primary | ICD-10-CM

## 2022-10-28 DIAGNOSIS — G47.00 INSOMNIA, UNSPECIFIED TYPE: ICD-10-CM

## 2022-10-28 DIAGNOSIS — R00.2 PALPITATIONS: ICD-10-CM

## 2022-10-28 DIAGNOSIS — I10 PRIMARY HYPERTENSION: ICD-10-CM

## 2022-10-28 DIAGNOSIS — J30.9 ALLERGIC RHINITIS, UNSPECIFIED SEASONALITY, UNSPECIFIED TRIGGER: ICD-10-CM

## 2022-10-28 DIAGNOSIS — R07.89 ATYPICAL CHEST PAIN: ICD-10-CM

## 2022-10-28 DIAGNOSIS — F41.9 ANXIETY: ICD-10-CM

## 2022-10-28 DIAGNOSIS — F43.9 STRESS: ICD-10-CM

## 2022-10-28 DIAGNOSIS — R94.31 ABNORMAL ECG: ICD-10-CM

## 2022-10-28 DIAGNOSIS — R11.0 NAUSEA: ICD-10-CM

## 2022-10-28 DIAGNOSIS — Z87.442 HISTORY OF KIDNEY STONES: ICD-10-CM

## 2022-10-28 PROCEDURE — 99205 OFFICE O/P NEW HI 60 MIN: CPT | Mod: 95,,, | Performed by: PHYSICIAN ASSISTANT

## 2022-10-28 PROCEDURE — 4010F ACE/ARB THERAPY RXD/TAKEN: CPT | Mod: CPTII,95,, | Performed by: PHYSICIAN ASSISTANT

## 2022-10-28 PROCEDURE — 99205 PR OFFICE/OUTPT VISIT, NEW, LEVL V, 60-74 MIN: ICD-10-PCS | Mod: 95,,, | Performed by: PHYSICIAN ASSISTANT

## 2022-10-28 PROCEDURE — 1159F PR MEDICATION LIST DOCUMENTED IN MEDICAL RECORD: ICD-10-PCS | Mod: CPTII,95,, | Performed by: PHYSICIAN ASSISTANT

## 2022-10-28 PROCEDURE — 3044F HG A1C LEVEL LT 7.0%: CPT | Mod: CPTII,95,, | Performed by: PHYSICIAN ASSISTANT

## 2022-10-28 PROCEDURE — 3044F PR MOST RECENT HEMOGLOBIN A1C LEVEL <7.0%: ICD-10-PCS | Mod: CPTII,95,, | Performed by: PHYSICIAN ASSISTANT

## 2022-10-28 PROCEDURE — 1159F MED LIST DOCD IN RCRD: CPT | Mod: CPTII,95,, | Performed by: PHYSICIAN ASSISTANT

## 2022-10-28 PROCEDURE — 4010F PR ACE/ARB THEARPY RXD/TAKEN: ICD-10-PCS | Mod: CPTII,95,, | Performed by: PHYSICIAN ASSISTANT

## 2022-10-28 PROCEDURE — 1160F RVW MEDS BY RX/DR IN RCRD: CPT | Mod: CPTII,95,, | Performed by: PHYSICIAN ASSISTANT

## 2022-10-28 PROCEDURE — 1160F PR REVIEW ALL MEDS BY PRESCRIBER/CLIN PHARMACIST DOCUMENTED: ICD-10-PCS | Mod: CPTII,95,, | Performed by: PHYSICIAN ASSISTANT

## 2022-10-28 NOTE — PATIENT INSTRUCTIONS
Supplements for Migraine:  1. Magnesium Oxide - 400mg by mouth daily  2. Riboflavin (Vitamin B2) - 400mg by mouth daily  3. Coenzyme Q10 - 200mg tablet by mouth daily    - Please download Migraine Jose bobby on phone and begin tracking your headaches

## 2022-10-28 NOTE — PROGRESS NOTES
"New Patient     The patient location is: [place]  The chief complaint leading to consultation is: headache     Visit type: audiovisual    Face to Face time with patient: 52 min  65 minutes of total time spent on the encounter, which includes face to face time and non-face to face time preparing to see the patient (eg, review of tests), Obtaining and/or reviewing separately obtained history, Documenting clinical information in the electronic or other health record, Independently interpreting results (not separately reported) and communicating results to the patient/family/caregiver, or Care coordination (not separately reported).     Each patient to whom he or she provides medical services by telemedicine is:  (1) informed of the relationship between the physician and patient and the respective role of any other health care provider with respect to management of the patient; and (2) notified that he or she may decline to receive medical services by telemedicine and may withdraw from such care at any time.    Notes:       SUBJECTIVE:  Patient ID: Miles Bowen   MRN: [id number]  Referred By: Patricia Resendez  Chief Complaint: Headache      History of Present Illness:   62 y.o. female with migraines, lumbar DDD, IBS, allergic rhinitis, h/o palpitations, tachycardia, murmur, MVP, atypical cp, anxiety, depression, GERD, HTN, kidney stone 2021, insomnia who presents to virtual visit alone for evaluation of headaches.   PMHx negative for TBI, Meningitis, Aneurysms, asthma, GI bleed, osteoporosis, CAD/MI, CVA/TIA, DM, cancer  Family Hx negative for Migraines     Pt has been suffering from migraines since her childhood. She recalls seeing a neurologist who dx her w/ migraines "a while" ago. They have since come and gone throughout her lifetime and have been well controlled over the last few years on tpx. She did have one episode of a kidney stone last year but no other episodes while on tpx.   Then, approximately 1 month " "ago pt had "the worst migraine I've ever had" that lasted for 2 weeks with associated slowed cognition and nausea/vomiting around the time of increased stress at work, "stress is an understatement". She was seen by PCP. CTH was obtained and was wnl. She is pending an MRI brain. She was also prescribed fioricet which somewhat helped. But pt was still unable to return to work 2/2 pain. Pt sees pain mgmt outside of [facility] for her knee pain who rx norco which helped along w/ fioricet. But HA still lingered. She then went to ED in  and was told she was severely dehydrated 2/2 n/v. She was given nausea meds and morphine which resolved the pain. Pt states that she has had mild-mod HA's every other day since that time (for the last 2 weeks) and had her tpx increased 1-2 wks ago. Pt states she has been under a large amount of stress 2/2 work but has plans to transition to another position that will be less stressful. Pt also has a psychiatrist who assists her in managing her stress who prescribes cymbalta, ambien, diazepam, and lorazapam.  Location/Radiation - right retro-orbital frontotemporalis, b/l frontalis and around nose, b/l occipitalis radiating forward to frontalis  Quality - throbbing, pulsating, pressure, ice pick stabbing  Duration - 30 min - 2 wks  Intensity (range) - mild to severe  Triggers - sinuses/allergies rhinitis, tension, stress, hunger/missed meals, poor sleep  Aggravating Factors - lights, smells, sounds, exertion  Alleviating Factors - quiet, cold, dark, rest  Prodrome/Aura - flashes of lights, inability to concentrate  Sleep - h/o insomnnia on ambien which typically helps, h/o trouble falling and falling asleep, sometimes snores, denies apneic events/awakening gasping  Associated symptoms with the headache: photo/phono/osmophobia, worsened by exertion, n/v, slowed cognition    Treatments Tried   Tpx  Aimovig - "sick"  Cymbalta  Triptans - avoid 2/2 h/o palpitations/tachycardia/cp  Advil - " "rash  Fioricet  Nsaids - GI upset  Imitrex - "side effects"  tylenol  Phenergan   Zofran - doesn't help    Social History  Alcohol - denies  Smoke - denies  Recreational Drug Use- denies  Occupation -     Current Medications:    Current Outpatient Medications:     ACETAMINOPHEN (TYLENOL EXTRA STRENGTH ORAL), Take 2 tablets by mouth every 4 to 6 hours as needed., Disp: , Rfl:     baclofen (LIORESAL) 10 MG tablet, Take 1 tablet (10 mg total) by mouth 2 (two) times daily as needed (muscle spasm)., Disp: 30 tablet, Rfl: 0    BLOOD PRESSURE CUFF Misc, Use to check blood pressure daily, Disp: 1 each, Rfl: 0    butalbital-acetaminophen-caffeine -40 mg (FIORICET, ESGIC) -40 mg per tablet, Take 1 tablet by mouth every 6 (six) hours as needed for Headaches., Disp: 30 tablet, Rfl: 0    chlorpheniramine-dextromethorp (CORICIDIN HBP COUGH AND COLD) 4-30 mg Tab, Take by mouth as needed., Disp: , Rfl:     diazePAM (VALIUM) 10 MG Tab, every 12 (twelve) hours as needed., Disp: , Rfl:     dicyclomine (BENTYL) 10 MG capsule, Take 1 capsule by mouth 4 times daily before meals and nightly prn, Disp: 40 capsule, Rfl: 0    duloxetine (CYMBALTA) 60 MG capsule, Take 1 capsule by mouth once daily. , Disp: , Rfl: 0    estradioL (ESTRACE) 0.5 MG tablet, TAKE ONE TABLET BY MOUTH ONE TIME DAILY, Disp: 30 tablet, Rfl: 0    hydroCHLOROthiazide (HYDRODIURIL) 12.5 MG Tab, Take 1 tablet (12.5 mg total) by mouth once daily., Disp: 90 tablet, Rfl: 1    HYDROcodone-acetaminophen (NORCO) 5-325 mg per tablet, Take 1 tablet by mouth every 6 (six) hours as needed., Disp: , Rfl:     HYDROcodone-acetaminophen (NORCO) 5-325 mg per tablet, Take 1 tablet by mouth every 6 (six) hours as needed for pain for 7 days., Disp: 28 tablet, Rfl: 0    LORazepam (ATIVAN) 2 MG Tab, SMARTSI Tablet(s) By Mouth Every 8-10 Hours PRN, Disp: , Rfl:     losartan (COZAAR) 100 MG tablet, Take 1 tablet (100 mg total) by mouth once daily., Disp: 90 tablet, Rfl: " 3    pantoprazole (PROTONIX) 40 MG tablet, TAKE 1 TABLET (40 MG TOTAL) BY MOUTH ONCE DAILY., Disp: 90 tablet, Rfl: 3    promethazine (PHENERGAN) 25 MG tablet, Take 1 tablet (25 mg total) by mouth every 8 (eight) hours as needed for nausea, Disp: 30 tablet, Rfl: 0    rimegepant 75 mg odt, At the onset of a HA, Place ODT tablet on or under the tongue once daily as needed for headaches, Disp: 8 tablet, Rfl: 5    topiramate (TOPAMAX) 25 MG tablet, Take 1 tablet (25 mg total) by mouth every morning AND 2 tablets (50 mg total) every evening., Disp: 90 tablet, Rfl: 1    zolpidem (AMBIEN CR) 12.5 MG CR tablet, Take 12.5 mg by mouth nightly., Disp: , Rfl:     Review of Systems - as per HPI, otherwise a balanced 10 systems review is negative.    OBJECTIVE:  Vitals:  There were no vitals taken for this visit.    Physical Exam: certain limitations due to video visit platform, able to perform the following with the patient's assistance:  Constitutional: she appears well-developed and well-nourished. she is well groomed. NAD  HENT:    Head: Normocephalic and atraumatic,  Frontalis was NTTP, temporalis was NTTP   Eyes: Conjunctivae and EOM are normal. Pupils are equal and round  Neck: Occiput and trapezius NTT, muscles tight   Musculoskeletal: Normal range of motion.   Psychiatric: Normal mood and affect.     Neuro exam:    Mental status:  The patient is alert and oriented to person, place and time.  Language is intact and fluent  Remote and recent memory are intact  Normal attention and concentration  Mood is stable    Cranial Nerves:  Extraocular movements are intact and without nystagmus.    Facial movement is symmetric.  Facial sensation is intact.    Tongue in midline without fasciculation.   FROM of neck in all (6) directions without pain  Shoulder shrug symmetrical.    Coordination:     Finger to nose - normal and symmetric bilaterally     Motor:  Normal muscle bulk and symmetry. No fasciculations were noted.   Tremor not  apparent   Pronator drift not apparent.                        Sensory:  RUE  intact light touch  LUE intact light touch  RLE intact light touch  LLE intact light touch    Gait:   Normal gait    Review of Data:   Notes from pcp reviewed   Labs:  Lab Visit on 10/12/2022   Component Date Value Ref Range Status    WBC 10/12/2022 8.81  3.90 - 12.70 K/uL Final    RBC 10/12/2022 4.09  4.00 - 5.40 M/uL Final    Hemoglobin 10/12/2022 12.4  12.0 - 16.0 g/dL Final    Hematocrit 10/12/2022 37.8  37.0 - 48.5 % Final    MCV 10/12/2022 92  82 - 98 fL Final    MCH 10/12/2022 30.3  27.0 - 31.0 pg Final    MCHC 10/12/2022 32.8  32.0 - 36.0 g/dL Final    RDW 10/12/2022 13.9  11.5 - 14.5 % Final    Platelets 10/12/2022 285  150 - 450 K/uL Final    MPV 10/12/2022 9.9  9.2 - 12.9 fL Final    Immature Granulocytes 10/12/2022 0.2  0.0 - 0.5 % Final    Gran # (ANC) 10/12/2022 4.8  1.8 - 7.7 K/uL Final    Immature Grans (Abs) 10/12/2022 0.02  0.00 - 0.04 K/uL Final    Lymph # 10/12/2022 3.3  1.0 - 4.8 K/uL Final    Mono # 10/12/2022 0.5  0.3 - 1.0 K/uL Final    Eos # 10/12/2022 0.2  0.0 - 0.5 K/uL Final    Baso # 10/12/2022 0.07  0.00 - 0.20 K/uL Final    nRBC 10/12/2022 0  0 /100 WBC Final    Gran % 10/12/2022 54.0  38.0 - 73.0 % Final    Lymph % 10/12/2022 37.2  18.0 - 48.0 % Final    Mono % 10/12/2022 5.6  4.0 - 15.0 % Final    Eosinophil % 10/12/2022 2.2  0.0 - 8.0 % Final    Basophil % 10/12/2022 0.8  0.0 - 1.9 % Final    Differential Method 10/12/2022 Automated   Final    Sodium 10/12/2022 141  136 - 145 mmol/L Final    Potassium 10/12/2022 4.0  3.5 - 5.1 mmol/L Final    Chloride 10/12/2022 107  95 - 110 mmol/L Final    CO2 10/12/2022 20 (L)  23 - 29 mmol/L Final    Glucose 10/12/2022 84  70 - 110 mg/dL Final    BUN 10/12/2022 14  8 - 23 mg/dL Final    Creatinine 10/12/2022 0.8  0.5 - 1.4 mg/dL Final    Calcium 10/12/2022 9.6  8.7 - 10.5 mg/dL Final    Total Protein 10/12/2022 6.9  6.0 - 8.4 g/dL Final    Albumin 10/12/2022 4.1  3.5 -  5.2 g/dL Final    Total Bilirubin 10/12/2022 0.2  0.1 - 1.0 mg/dL Final    Alkaline Phosphatase 10/12/2022 56  55 - 135 U/L Final    AST 10/12/2022 13  10 - 40 U/L Final    ALT 10/12/2022 18  10 - 44 U/L Final    Anion Gap 10/12/2022 14  8 - 16 mmol/L Final    eGFR 10/12/2022 >60  >60 mL/min/1.73 m^2 Final    TSH 10/12/2022 1.628  0.400 - 4.000 uIU/mL Final    Sed Rate 10/12/2022 4  0 - 36 mm/Hr Final   Lab Visit on 09/07/2022   Component Date Value Ref Range Status    Specimen UA 09/07/2022 Urine, Clean Catch   Final    Color, UA 09/07/2022 Yellow  Yellow, Straw, Angelica Final    Appearance, UA 09/07/2022 Clear  Clear Final    pH, UA 09/07/2022 7.0  5.0 - 8.0 Final    Specific Gravity, UA 09/07/2022 1.010  1.005 - 1.030 Final    Protein, UA 09/07/2022 Negative  Negative Final    Glucose, UA 09/07/2022 Negative  Negative Final    Ketones, UA 09/07/2022 Negative  Negative Final    Bilirubin (UA) 09/07/2022 Negative  Negative Final    Occult Blood UA 09/07/2022 1+ (A)  Negative Final    Nitrite, UA 09/07/2022 Negative  Negative Final    Leukocytes, UA 09/07/2022 Negative  Negative Final    RBC, UA 09/07/2022 12 (H)  0 - 4 /hpf Final    WBC, UA 09/07/2022 0  0 - 5 /hpf Final    Bacteria 09/07/2022 Occasional  None-Occ /hpf Final    Squam Epithel, UA 09/07/2022 5  /hpf Final    Microscopic Comment 09/07/2022 SEE COMMENT   Final     Imaging:  Results for orders placed or performed during the hospital encounter of 10/11/22   CT Head Without Contrast    Narrative    EXAMINATION:  CT HEAD WITHOUT CONTRAST    CLINICAL HISTORY:  Headache, chronic, new features or increased frequency; Headache, unspecified    TECHNIQUE:  Low dose axial images were obtained through the head.  Coronal and sagittal reformations were also performed. Contrast was not administered.    COMPARISON:  None.    FINDINGS:  The cortical sulcal pattern demonstrates age related atrophy..  No hydrocephalus, midline shift or abnormal mass effect present. No  intra-or extra-axial mass or hemorrhage. No CT evidence of large territory acute stroke.    Orbits are unremarkable. Paranasal sinuses demonstrate mild mucosal thickening most prevalent within the visualized left maxillary sinus..  Mastoid air cells are clear. Calvarium is intact.      Impression    No acute intracranial abnormality.  Mild paranasal sinus disease.      Electronically signed by: Jorge Bradshaw MD  Date:    10/11/2022  Time:    15:04     Note: I have independently reviewed any/all imaging/labs/tests and agree with the report (s) as documented.  Any discrepancies will be as noted/demarcated by free text.  VINEET JOHN 10/28/2022    ASSESSMENT:  1. Migraine with aura and without status migrainosus, not intractable    2. Depression, unspecified depression type    3. Anxiety    4. Allergic rhinitis, unspecified seasonality, unspecified trigger    5. Palpitations    6. Primary hypertension    7. Atypical chest pain    8. Abnormal ECG    9. Insomnia, unspecified type    10. History of kidney stones    11. Stress    12. Nausea          PLAN:  - Discussed symptoms appear to be consistent with migraines complicated by stress, sinuses, and recent weather change. Discussed this with patient along with treatment options and patient agreed with the following plan  - ppx - continue cymbalta for dual purpose anx/dep and HA's, tpx was recently increased to 25mg qam and 50mg qpm (discussed risks of kidney stones, will monitor and d/c if occurs)  - abortive - try nurtec  - nausea - continue phenergan  - avoid triptans 2/2 h/o palpitations, tachycardia, murmur, and cp  - avoid nsaids 2/2 previous SE's and GI upset  - pt wishes to avoid cgrp inhibitors 2/2 previous SE's  - stress, anx/dep, and insomnia - continue mgmt per psychiatrist, currently taking cymbalta, ambien, diazepam, and lorazapam  - sinuses/allergic rhinitis - continue mgmt per ENT  - pending MRI brain  - HTN - stable, mgmt per pcp  - h/o kidney stones -  caution w/ tpx and zonisamide, discussed w/ pt  - risks, benefits, and potential side effects of tpx, nurtec, cymbalta, phenergan, triptans, nsaids, cgrp inhibitors discussed   - alternative treatment options offered   - importance of healthy diet, regular exercise and sleep hygiene in the treatment of headaches    - Start tracking headaches via Migraine Jose bobby on phone   - RTC in 1 mo      Orders Placed This Encounter    rimegepant 75 mg odt       I have discussed realistic goals of care with patient at length as well as medication options, and need for lifestyle adjustment. I have explained that treatment will take time. We have agreed that the goal will be to reduce frequency/intensity/quantity of HA, not to be completely HA free. I have explained my non narcotic policy regarding headache treatment.    Patient agreeable to work on lifestyle adjustments.    Questions and concerns were sought and answered to the patient's stated verbal satisfaction.  The patient verbalizes understanding and agreement with the above stated treatment plan.     CC: MD Madeline Taylor PAISRA  [facility]  [phone]    Dr. Powers was available during today's encounter.

## 2022-10-31 ENCOUNTER — TELEPHONE (OUTPATIENT)
Dept: PHARMACY | Facility: CLINIC | Age: 62
End: 2022-10-31
Payer: COMMERCIAL

## 2022-11-07 ENCOUNTER — PATIENT MESSAGE (OUTPATIENT)
Dept: INTERNAL MEDICINE | Facility: CLINIC | Age: 62
End: 2022-11-07

## 2022-11-07 ENCOUNTER — OFFICE VISIT (OUTPATIENT)
Dept: INTERNAL MEDICINE | Facility: CLINIC | Age: 62
End: 2022-11-07
Payer: COMMERCIAL

## 2022-11-07 ENCOUNTER — TELEPHONE (OUTPATIENT)
Dept: INTERNAL MEDICINE | Facility: CLINIC | Age: 62
End: 2022-11-07
Payer: COMMERCIAL

## 2022-11-07 DIAGNOSIS — R05.9 COUGH, UNSPECIFIED TYPE: ICD-10-CM

## 2022-11-07 DIAGNOSIS — J06.9 VIRAL URI WITH COUGH: ICD-10-CM

## 2022-11-07 DIAGNOSIS — J32.0 MAXILLARY SINUSITIS, UNSPECIFIED CHRONICITY: Primary | ICD-10-CM

## 2022-11-07 PROCEDURE — 4010F PR ACE/ARB THEARPY RXD/TAKEN: ICD-10-PCS | Mod: CPTII,95,, | Performed by: INTERNAL MEDICINE

## 2022-11-07 PROCEDURE — 99214 PR OFFICE/OUTPT VISIT, EST, LEVL IV, 30-39 MIN: ICD-10-PCS | Mod: 95,,, | Performed by: INTERNAL MEDICINE

## 2022-11-07 PROCEDURE — 99214 OFFICE O/P EST MOD 30 MIN: CPT | Mod: 95,,, | Performed by: INTERNAL MEDICINE

## 2022-11-07 PROCEDURE — 3044F HG A1C LEVEL LT 7.0%: CPT | Mod: CPTII,95,, | Performed by: INTERNAL MEDICINE

## 2022-11-07 PROCEDURE — 4010F ACE/ARB THERAPY RXD/TAKEN: CPT | Mod: CPTII,95,, | Performed by: INTERNAL MEDICINE

## 2022-11-07 PROCEDURE — 3044F PR MOST RECENT HEMOGLOBIN A1C LEVEL <7.0%: ICD-10-PCS | Mod: CPTII,95,, | Performed by: INTERNAL MEDICINE

## 2022-11-07 RX ORDER — DOXYCYCLINE HYCLATE 100 MG
100 TABLET ORAL EVERY 12 HOURS
Qty: 14 TABLET | Refills: 0 | Status: SHIPPED | OUTPATIENT
Start: 2022-11-07 | End: 2022-11-14

## 2022-11-07 RX ORDER — PROMETHAZINE HYDROCHLORIDE AND CODEINE PHOSPHATE 6.25; 1 MG/5ML; MG/5ML
5 SOLUTION ORAL EVERY 6 HOURS PRN
Qty: 118 ML | Refills: 0 | Status: SHIPPED | OUTPATIENT
Start: 2022-11-07 | End: 2022-11-14

## 2022-11-07 NOTE — PROGRESS NOTES
Subjective:      Patient ID: Miles Bowen is a 62 y.o. female.    Chief Complaint: No chief complaint on file.    Cough  This is a recurrent problem. The current episode started in the past 7 days. The problem has been gradually worsening. The problem occurs every few minutes. The cough is Non-productive and productive of sputum. Associated symptoms include headaches, nasal congestion, postnasal drip, rhinorrhea and a sore throat. Pertinent negatives include no chest pain, chills, ear pain, fever, hemoptysis, shortness of breath, sweats or wheezing. Nothing aggravates the symptoms. She has tried OTC cough suppressant for the symptoms. The treatment provided no relief. Her past medical history is significant for environmental allergies. There is no history of asthma, bronchiectasis, bronchitis, COPD, emphysema or pneumonia.   Has yellow-tinged nasal discharge and sinus pain.  Similar to her previous sinus infections. Home covid test negative today. Tmax less than 100.     The patient location is: [place]  The chief complaint leading to consultation is:sinus probem and cough    Visit type: audiovisual    Face to Face time with patient: 15   minutes of total time spent on the encounter, which includes face to face time and non-face to face time preparing to see the patient (eg, review of tests), Obtaining and/or reviewing separately obtained history, Documenting clinical information in the electronic or other health record, Independently interpreting results (not separately reported) and communicating results to the patient/family/caregiver, or Care coordination (not separately reported).         Each patient to whom he or she provides medical services by telemedicine is:  (1) informed of the relationship between the physician and patient and the respective role of any other health care provider with respect to management of the patient; and (2) notified that he or she may decline to receive medical services by  telemedicine and may withdraw from such care at any time.    Notes:     Review of Systems   Constitutional:  Negative for chills and fever.   HENT:  Positive for congestion, postnasal drip, rhinorrhea, sinus pain, sore throat and voice change. Negative for drooling, ear pain, facial swelling, nosebleeds and trouble swallowing.    Respiratory:  Positive for cough. Negative for hemoptysis, shortness of breath and wheezing.    Cardiovascular:  Negative for chest pain.   Allergic/Immunologic: Positive for environmental allergies.   Neurological:  Positive for headaches.         Objective:   There were no vitals taken for this visit.    Physical Exam  Constitutional:       General: She is awake.      Appearance: Normal appearance.   HENT:      Head: Normocephalic and atraumatic.   Eyes:      Conjunctiva/sclera: Conjunctivae normal.   Pulmonary:      Effort: Pulmonary effort is normal.   Musculoskeletal:      Cervical back: No rigidity.   Neurological:      Mental Status: She is alert. Mental status is at baseline.   Psychiatric:         Mood and Affect: Mood normal.         Behavior: Behavior normal. Behavior is cooperative.         Thought Content: Thought content normal.         Judgment: Judgment normal.     Bilateral tenderness maxillary sinuses on the self exam.    Assessment:     1. Maxillary sinusitis, unspecified chronicity    2. Viral URI with cough    3. Cough, unspecified type      Plan:   1. Maxillary sinusitis, unspecified chronicity  -     doxycycline (VIBRA-TABS) 100 MG tablet; Take 1 tablet (100 mg total) by mouth every 12 (twelve) hours. for 7 days  Dispense: 14 tablet; Refill: 0  -     promethazine-codeine 6.25-10 mg/5 ml (PHENERGAN WITH CODEINE) 6.25-10 mg/5 mL syrup; Take 5 mLs by mouth every 6 (six) hours as needed for Cough.  Dispense: 118 mL; Refill: 0    2. Viral URI with cough    3. Cough, unspecified type  -     promethazine-codeine 6.25-10 mg/5 ml (PHENERGAN WITH CODEINE) 6.25-10 mg/5 mL syrup;  Take 5 mLs by mouth every 6 (six) hours as needed for Cough.  Dispense: 118 mL; Refill: 0        Future Appointments   Date Time Provider Department Center   11/8/2022 11:30 AM Sophy Villegas MD [facility] ENT [facility]   11/9/2022  8:00 AM Martin Da Silva MD [facility] OBGYN [place] [facility]   11/10/2022  2:20 PM Carlos Paul MD [facility] IM [facility]   11/14/2022 11:20 AM Espinoza Castellon MD [facility]   12/16/2022 11:30 AM Madeline Sky PA-C [facility] NEURO [facility]       Lab Frequency Next Occurrence   Ambulatory referral/consult to Gastroenterology Once 02/16/2022   CBC Auto Differential Once 02/09/2022   Comprehensive Metabolic Panel Once 02/09/2022   X-Ray Chest PA And Lateral Once 02/09/2022   Urinalysis Once 02/09/2022   Urine culture Once 02/09/2022   Ambulatory referral/consult to Hematology / Oncology Once 03/04/2022   Ambulatory referral/consult to ENT Once 05/02/2022   Ambulatory referral/consult to Orthopedics Once 05/24/2022   Mammo Digital Screening Bilat w/ Jim Once 08/24/2022   MRI Brain W WO Contrast Once 10/12/2022       No follow-ups on file.

## 2022-11-11 ENCOUNTER — TELEPHONE (OUTPATIENT)
Dept: SPORTS MEDICINE | Facility: CLINIC | Age: 62
End: 2022-11-11
Payer: COMMERCIAL

## 2022-11-11 NOTE — TELEPHONE ENCOUNTER
Called to inform patient that Dr. Castellon would be unable to see her for her right knee since she has had a total knee replacement. Discussed needing to schedule her with another provider for her appointment and left the main line number [phone] to reschedule with Kylie Flores PA-C.

## 2022-11-14 ENCOUNTER — PATIENT MESSAGE (OUTPATIENT)
Dept: SPORTS MEDICINE | Facility: CLINIC | Age: 62
End: 2022-11-14
Payer: COMMERCIAL

## 2023-01-01 NOTE — TELEPHONE ENCOUNTER
Problem: Cardiac:  Goal: Ability to maintain vital signs within normal range will improve  Description: Ability to maintain vital signs within normal range will improve  Outcome: Ongoing  Goal: Cardiovascular alteration will improve  Description: Cardiovascular alteration will improve  Outcome: Ongoing     Problem: Health Behavior:  Goal: Will modify at least one risk factor affecting health status  Description: Will modify at least one risk factor affecting health status  Outcome: Ongoing  Goal: Identification of resources available to assist in meeting health care needs will improve  Description: Identification of resources available to assist in meeting health care needs will improve  Outcome: Ongoing     Problem: Physical Regulation:  Goal: Complications related to the disease process, condition or treatment will be avoided or minimized  Description: Complications related to the disease process, condition or treatment will be avoided or minimized  Outcome: Ongoing The protonix still has a month left its unavailable for refill for at least 3 more weeks.      immune

## 2023-01-10 ENCOUNTER — OFFICE VISIT (OUTPATIENT)
Dept: INTERNAL MEDICINE | Facility: CLINIC | Age: 63
End: 2023-01-10
Payer: COMMERCIAL

## 2023-01-10 VITALS
DIASTOLIC BLOOD PRESSURE: 68 MMHG | HEART RATE: 98 BPM | SYSTOLIC BLOOD PRESSURE: 114 MMHG | TEMPERATURE: 97 F | WEIGHT: 192.69 LBS | HEIGHT: 62 IN | BODY MASS INDEX: 35.46 KG/M2 | OXYGEN SATURATION: 98 %

## 2023-01-10 DIAGNOSIS — J32.9 RECURRENT SINUSITIS: Primary | ICD-10-CM

## 2023-01-10 PROCEDURE — 99214 PR OFFICE/OUTPT VISIT, EST, LEVL IV, 30-39 MIN: ICD-10-PCS | Mod: S$GLB,,, | Performed by: PHYSICIAN ASSISTANT

## 2023-01-10 PROCEDURE — 99999 PR PBB SHADOW E&M-EST. PATIENT-LVL V: CPT | Mod: PBBFAC,,, | Performed by: PHYSICIAN ASSISTANT

## 2023-01-10 PROCEDURE — 99214 OFFICE O/P EST MOD 30 MIN: CPT | Mod: S$GLB,,, | Performed by: PHYSICIAN ASSISTANT

## 2023-01-10 PROCEDURE — 99215 OFFICE O/P EST HI 40 MIN: CPT | Mod: PBBFAC | Performed by: PHYSICIAN ASSISTANT

## 2023-01-10 PROCEDURE — 99999 PR PBB SHADOW E&M-EST. PATIENT-LVL V: ICD-10-PCS | Mod: PBBFAC,,, | Performed by: PHYSICIAN ASSISTANT

## 2023-01-10 RX ORDER — LEVOFLOXACIN 750 MG/1
750 TABLET ORAL DAILY
Qty: 5 TABLET | Refills: 0 | Status: SHIPPED | OUTPATIENT
Start: 2023-01-10 | End: 2023-01-15

## 2023-01-10 RX ORDER — PROMETHAZINE HYDROCHLORIDE AND CODEINE PHOSPHATE 6.25; 1 MG/5ML; MG/5ML
5 SOLUTION ORAL EVERY 4 HOURS PRN
Qty: 118 ML | Refills: 0 | Status: SHIPPED | OUTPATIENT
Start: 2023-01-10 | End: 2023-06-20 | Stop reason: SDUPTHER

## 2023-01-10 NOTE — PROGRESS NOTES
Subjective:      Patient ID: Miles Bowen is a 62 y.o. female.    Chief Complaint: Cough and Sore Throat (///)    Cough  This is a new problem. The current episode started in the past 7 days. The problem has been gradually worsening. The cough is Productive of sputum. Associated symptoms include chills, a fever, nasal congestion, postnasal drip, rhinorrhea and a sore throat. Pertinent negatives include no chest pain, ear congestion, ear pain, headaches, heartburn, hemoptysis, myalgias, shortness of breath, sweats or weight loss. Treatments tried: tylenol. The treatment provided no relief. Her past medical history is significant for environmental allergies.     7 or 8 sinus infections a year. Had sinus surgery years ago. Intolerant to flonase and sinus irrigation. Is able to tolerate saline only.     Patient Active Problem List   Diagnosis    Hypertension    Depression    Anxiety    GERD (gastroesophageal reflux disease)    Hiatal hernia    DDD (degenerative disc disease), lumbar    Common migraine    Allergic rhinitis    IBS (irritable bowel syndrome)    Multinodular goiter    MVP (mitral valve prolapse)    Nasal polyps    Obesity    Abnormal ECG    Palpitations    Atypical chest pain    Shortness of breath    Migraine with aura and without status migrainosus, not intractable         Current Outpatient Medications:     ACETAMINOPHEN (TYLENOL EXTRA STRENGTH ORAL), Take 2 tablets by mouth every 4 to 6 hours as needed., Disp: , Rfl:     baclofen (LIORESAL) 10 MG tablet, Take 1 tablet (10 mg total) by mouth 2 (two) times daily as needed (muscle spasm)., Disp: 30 tablet, Rfl: 0    BLOOD PRESSURE CUFF Misc, Use to check blood pressure daily, Disp: 1 each, Rfl: 0    chlorpheniramine-dextromethorp (CORICIDIN HBP COUGH AND COLD) 4-30 mg Tab, Take by mouth as needed., Disp: , Rfl:     diazePAM (VALIUM) 10 MG Tab, every 12 (twelve) hours as needed., Disp: , Rfl:     dicyclomine (BENTYL) 10 MG capsule, Take 1 capsule by  mouth 4 times daily before meals and nightly prn, Disp: 40 capsule, Rfl: 0    duloxetine (CYMBALTA) 60 MG capsule, Take 1 capsule by mouth once daily. , Disp: , Rfl: 0    estradioL (ESTRACE) 0.5 MG tablet, Take 1 tablet (0.5 mg total) by mouth once daily., Disp: 30 tablet, Rfl: 11    hydroCHLOROthiazide (HYDRODIURIL) 12.5 MG Tab, Take 1 tablet (12.5 mg total) by mouth once daily., Disp: 90 tablet, Rfl: 1    HYDROcodone-acetaminophen (NORCO) 5-325 mg per tablet, Take 1 tablet by mouth every 6 (six) hours as needed., Disp: , Rfl:     HYDROcodone-acetaminophen (NORCO) 5-325 mg per tablet, Take 1 tablet by mouth every 6 (six) hours as needed for pain., Disp: 28 tablet, Rfl: 0    HYDROcodone-acetaminophen (NORCO) 5-325 mg per tablet, Take 1 tablet by mouth every 6 hours as neeed for pain, Disp: 28 tablet, Rfl: 0    HYDROcodone-acetaminophen (NORCO) 5-325 mg per tablet, Take 1 tablet by mouth every 6 (six) hours as needed for pain., Disp: 28 tablet, Rfl: 0    LORazepam (ATIVAN) 2 MG Tab, SMARTSI Tablet(s) By Mouth Every 8-10 Hours PRN, Disp: , Rfl:     losartan (COZAAR) 100 MG tablet, Take 1 tablet (100 mg total) by mouth once daily., Disp: 90 tablet, Rfl: 3    pantoprazole (PROTONIX) 40 MG tablet, Take 1 tablet (40 mg total) by mouth once daily., Disp: 90 tablet, Rfl: 0    promethazine (PHENERGAN) 25 MG tablet, Take 1 tablet (25 mg total) by mouth every 8 (eight) hours as needed for Nausea., Disp: 30 tablet, Rfl: 0    rimegepant 75 mg odt, At the onset of a headache, Place ODT tablet on or under the tongue once daily as needed for headaches, Disp: 8 tablet, Rfl: 5    topiramate (TOPAMAX) 25 MG tablet, Take 1 tablet (25 mg total) by mouth every morning AND 2 tablets (50 mg total) every evening., Disp: 90 tablet, Rfl: 1    zolpidem (AMBIEN CR) 12.5 MG CR tablet, Take 12.5 mg by mouth nightly., Disp: , Rfl:     levoFLOXacin (LEVAQUIN) 750 MG tablet, Take 1 tablet (750 mg total) by mouth once daily. for 5 days, Disp: 5  "tablet, Rfl: 0    promethazine-codeine 6.25-10 mg/5 ml (PHENERGAN WITH CODEINE) 6.25-10 mg/5 mL syrup, Take 5 mLs by mouth every 4 (four) hours as needed for Cough., Disp: 118 mL, Rfl: 0    Review of Systems   Constitutional:  Positive for chills and fever. Negative for weight loss.   HENT:  Positive for postnasal drip, rhinorrhea and sore throat. Negative for ear pain.    Respiratory:  Negative for hemoptysis and shortness of breath.    Cardiovascular:  Negative for chest pain.   Gastrointestinal:  Negative for heartburn.   Musculoskeletal:  Negative for myalgias.   Allergic/Immunologic: Positive for environmental allergies.   Neurological:  Negative for headaches.   Objective:   /68 (BP Location: Left arm, Patient Position: Sitting, BP Method: Large (Automatic))   Pulse 98   Temp 96.9 °F (36.1 °C) (Tympanic)   Ht 5' 2" (1.575 m)   Wt 87.4 kg (192 lb 10.9 oz)   SpO2 98%   BMI 35.24 kg/m²     Physical Exam  Vitals and nursing note reviewed.   Constitutional:       General: She is not in acute distress.     Appearance: Normal appearance. She is well-developed. She is not ill-appearing, toxic-appearing or diaphoretic.   HENT:      Head: Normocephalic and atraumatic.      Right Ear: Hearing, tympanic membrane, ear canal and external ear normal. No tenderness.      Left Ear: Hearing, tympanic membrane, ear canal and external ear normal. No tenderness.      Nose: Mucosal edema, congestion and rhinorrhea present.      Right Sinus: Maxillary sinus tenderness and frontal sinus tenderness present.      Left Sinus: Maxillary sinus tenderness and frontal sinus tenderness present.      Mouth/Throat:      Mouth: Mucous membranes are moist. No oral lesions.      Dentition: Normal dentition. No dental caries or dental abscesses.      Tongue: No lesions.      Palate: No lesions.      Pharynx: Uvula midline. Oropharyngeal exudate and posterior oropharyngeal erythema present. No pharyngeal swelling or uvula swelling.      " Tonsils: No tonsillar exudate or tonsillar abscesses.   Eyes:      General:         Right eye: No discharge.         Left eye: No discharge.      Extraocular Movements: Extraocular movements intact.      Conjunctiva/sclera: Conjunctivae normal.      Pupils: Pupils are equal, round, and reactive to light.   Cardiovascular:      Rate and Rhythm: Normal rate and regular rhythm.      Heart sounds: Normal heart sounds. No murmur heard.    No friction rub. No gallop.   Pulmonary:      Effort: Pulmonary effort is normal. No respiratory distress.      Breath sounds: Normal breath sounds. No wheezing or rales.   Musculoskeletal:      Cervical back: Normal range of motion and neck supple.   Lymphadenopathy:      Cervical: No cervical adenopathy.   Skin:     General: Skin is warm.      Coloration: Skin is not pale.      Findings: No erythema or rash.   Neurological:      Mental Status: She is alert and oriented to person, place, and time.   Psychiatric:         Mood and Affect: Mood normal.         Behavior: Behavior normal.         Thought Content: Thought content normal.         Judgment: Judgment normal.     Narrative & Impression  Findings: Axial imaging acquired at paranasal sinuses. Sagittal and coronal reformations were obtained.     Nasal septum is essentially midline. Postoperative findings from bilateral antrectomy and ethmoidectomy. Mild morena bullosa involving superior nasal turbinates. The sphenoid and frontal sinuses appear to be normally aerated. There is some minimal mucoperiosteal thickening seen within what remains of the ethmoid sinuses. Within the maxillary sinuses there are retention cysts or polyps on the left measuring up to 15 mm in maximal diameter. There is also a retention cyst or polyp in the floor of the right maxillary sinus which is about 14 mm in diameter. Additional smaller subcentimeter polyp is seen near the maxillary ostium. There are no significant osseous findings.  IMPRESSION:     Postoperative findings suggesting bilateral antrectomy and ethmoidectomy. Mild chronic ethmoid sinusitis. Superior morena bullosa and multiple retention cysts or polyps within the maxillary sinuses.  ______________________________________      Electronically signed by: TOMY POTTER MD  Date:                                            09/06/16  Time:                                           15:01   Assessment:     1. Recurrent sinusitis      Plan:   Recurrent sinusitis  -     Ambulatory referral/consult to ENT  -     levoFLOXacin (LEVAQUIN) 750 MG tablet; Take 1 tablet (750 mg total) by mouth once daily. for 5 days  Dispense: 5 tablet; Refill: 0  -     promethazine-codeine 6.25-10 mg/5 ml (PHENERGAN WITH CODEINE) 6.25-10 mg/5 mL syrup; Take 5 mLs by mouth every 4 (four) hours as needed for Cough.  Dispense: 118 mL; Refill: 0    - reviewed.   -SE and risks discussed in detail.   -needs to see ENT. On too many abx a year.     Follow up if symptoms worsen or fail to improve.

## 2023-01-11 ENCOUNTER — TELEPHONE (OUTPATIENT)
Dept: PHARMACY | Facility: CLINIC | Age: 63
End: 2023-01-11
Payer: COMMERCIAL

## 2023-01-11 ENCOUNTER — OFFICE VISIT (OUTPATIENT)
Dept: OTOLARYNGOLOGY | Facility: CLINIC | Age: 63
End: 2023-01-11
Payer: COMMERCIAL

## 2023-01-11 VITALS
DIASTOLIC BLOOD PRESSURE: 89 MMHG | TEMPERATURE: 98 F | WEIGHT: 194.44 LBS | SYSTOLIC BLOOD PRESSURE: 124 MMHG | HEART RATE: 89 BPM | BODY MASS INDEX: 35.56 KG/M2

## 2023-01-11 DIAGNOSIS — J32.4 CHRONIC PANSINUSITIS: ICD-10-CM

## 2023-01-11 DIAGNOSIS — J34.3 HYPERTROPHY OF INFERIOR NASAL TURBINATE: ICD-10-CM

## 2023-01-11 DIAGNOSIS — J30.89 NON-SEASONAL ALLERGIC RHINITIS, UNSPECIFIED TRIGGER: Primary | ICD-10-CM

## 2023-01-11 PROCEDURE — 3008F PR BODY MASS INDEX (BMI) DOCUMENTED: ICD-10-PCS | Mod: CPTII,S$GLB,, | Performed by: OTOLARYNGOLOGY

## 2023-01-11 PROCEDURE — 3074F SYST BP LT 130 MM HG: CPT | Mod: CPTII,S$GLB,, | Performed by: OTOLARYNGOLOGY

## 2023-01-11 PROCEDURE — 3008F BODY MASS INDEX DOCD: CPT | Mod: CPTII,S$GLB,, | Performed by: OTOLARYNGOLOGY

## 2023-01-11 PROCEDURE — 99999 PR PBB SHADOW E&M-EST. PATIENT-LVL IV: ICD-10-PCS | Mod: PBBFAC,,, | Performed by: OTOLARYNGOLOGY

## 2023-01-11 PROCEDURE — 3079F PR MOST RECENT DIASTOLIC BLOOD PRESSURE 80-89 MM HG: ICD-10-PCS | Mod: CPTII,S$GLB,, | Performed by: OTOLARYNGOLOGY

## 2023-01-11 PROCEDURE — 3079F DIAST BP 80-89 MM HG: CPT | Mod: CPTII,S$GLB,, | Performed by: OTOLARYNGOLOGY

## 2023-01-11 PROCEDURE — 99204 OFFICE O/P NEW MOD 45 MIN: CPT | Mod: S$GLB,,, | Performed by: OTOLARYNGOLOGY

## 2023-01-11 PROCEDURE — 99999 PR PBB SHADOW E&M-EST. PATIENT-LVL IV: CPT | Mod: PBBFAC,,, | Performed by: OTOLARYNGOLOGY

## 2023-01-11 PROCEDURE — 99204 PR OFFICE/OUTPT VISIT, NEW, LEVL IV, 45-59 MIN: ICD-10-PCS | Mod: S$GLB,,, | Performed by: OTOLARYNGOLOGY

## 2023-01-11 PROCEDURE — 3074F PR MOST RECENT SYSTOLIC BLOOD PRESSURE < 130 MM HG: ICD-10-PCS | Mod: CPTII,S$GLB,, | Performed by: OTOLARYNGOLOGY

## 2023-01-11 NOTE — PROGRESS NOTES
Referring Provider:    Patricia Resendez Pa-c  [id number] [facility]  [place] [address]  Subjective:   Patient: Miles Bowen [id number], :1960   Visit date:2023 4:37 PM    Chief Complaint:  chronic recurrent sinusitis (Pt states she always has allergy and sinus flare ups. C/o feeling polyp right nares, and c/o pain. Pt had sinus surgery in past)    HPI:    Prior notes reviewed by myself.  Clinical documentation obtained by nursing staff reviewed.     61 y/o female with long history of chronic sinusitis presents for evaluation.  She has been having multiple flare ups of acute sinusitis requiring oral antibiotics, up to 6 or 7 per year.  She does not tolerate nasal sprays due to the aftertaste.  She has a history of prior ESS with Dr. Singh in .  She has severe migraine HA and had a recent head CT ordered by Patricia Resendez which is below.        Objective:     Physical Exam:  Vitals:  /89   Pulse 89   Temp 98.1 °F (36.7 °C) (Temporal)   Wt 88.2 kg (194 lb 7.1 oz)   BMI 35.56 kg/m²   General appearance:  Well developed, well nourished    Ears:  Otoscopy of external auditory canals and tympanic membranes was normal, clinical speech reception thresholds grossly intact, no mass/lesion of auricle.    Nose:  No masses/lesions of external nose, congested nasal mucosa, septum, and turbinates were hypertrophied 3+.      Mouth:  No mass/lesion of lips, teeth, gums, hard/soft palate, tongue, tonsils, or oropharynx.    Neck & Lymphatics:  No cervical lymphadenopathy, no neck mass/crepitus/ asymmetry, trachea is midline, no thyroid enlargement/tenderness/mass.        [x]  Data Reviewed:    Lab Results   Component Value Date    WBC 8.81 10/12/2022    HGB 12.4 10/12/2022    HCT 37.8 10/12/2022    MCV 92 10/12/2022    EOSINOPHIL 2.2 10/12/2022         [x]  Independent interpretation of test:  CT Head Without Contrast  Order: [id number]  Status: Final result     Visible to patient: Yes  (seen)     Next appt: 02/03/2023 at 11:00 AM in Neurology (Madeline Sky PA-C)     Dx: Severe headache     3 Result Notes    1 Patient Communication  Details    Reading Physician Reading Date Result Priority   Jorge Bradshaw MD  [phone] 10/11/2022 STAT     Narrative & Impression  EXAMINATION:  CT HEAD WITHOUT CONTRAST     CLINICAL HISTORY:  Headache, chronic, new features or increased frequency; Headache, unspecified     TECHNIQUE:  Low dose axial images were obtained through the head.  Coronal and sagittal reformations were also performed. Contrast was not administered.     COMPARISON:  None.     FINDINGS:  The cortical sulcal pattern demonstrates age related atrophy..  No hydrocephalus, midline shift or abnormal mass effect present. No intra-or extra-axial mass or hemorrhage. No CT evidence of large territory acute stroke.     Orbits are unremarkable. Paranasal sinuses demonstrate mild mucosal thickening most prevalent within the visualized left maxillary sinus..  Mastoid air cells are clear. Calvarium is intact.     Impression:     No acute intracranial abnormality.  Mild paranasal sinus disease.        Electronically signed by: Jorge Bradshaw MD  Date:                                            10/11/2022  Time:                                           15:04           Exam Ended: 10/11/22 14:50 Last Resulted: 10/11/22 15:04                   Assessment & Plan:   Non-seasonal allergic rhinitis, unspecified trigger  -     Aspergillus fumagatus IgE; Future; Expected date: 01/11/2023  -     Bermuda grass IgE; Future; Expected date: 01/11/2023  -     Cat epithelium IgE; Future; Expected date: 01/11/2023  -     Cladosporium IgE; Future; Expected date: 01/11/2023  -     Cockroach, American IgE; Future; Expected date: 01/11/2023  -     [place], bald IgE; Future; Expected date: 01/11/2023  -     D. farinae IgE; Future; Expected date: 01/11/2023  -     D. pteronyssinus IgE; Future; Expected date:  01/11/2023  -     Dog dander IgE; Future; Expected date: 01/11/2023  -     Plantain, English IgE; Future; Expected date: 01/11/2023  -     Les grass IgE; Future; Expected date: 01/11/2023  -     [place] elder, rough IgE; Future; Expected date: 01/11/2023  -     Mugwort IgE; Future; Expected date: 01/11/2023  -     Nettle IgE; Future; Expected date: 01/11/2023  -     Oak, white IgE; Future; Expected date: 01/11/2023  -     Penicillium IgE; Future; Expected date: 01/11/2023  -     Ragweed, short, common IgE; Future; Expected date: 01/11/2023  -     Ze IgE; Future; Expected date: 01/11/2023  -     Allergen, Cocklebur; Future; Expected date: 01/11/2023  -     Allergen, Elm Cedar; Future; Expected date: 01/11/2023  -     Allergen, Meadow Grass ([facility] Blue); Future; Expected date: 01/11/2023  -     Allergen, Mucor Racemosus; Future; Expected date: 01/11/2023  -     Allergen, Pecan Hickory IgE; Future; Expected date: 01/11/2023  -     Allergen, White Carmelo; Future; Expected date: 01/11/2023  -     Allergen-Alternaria Alternata; Future; Expected date: 01/11/2023  -     Allergen-Singer; Future; Expected date: 01/11/2023  -     Allergen-Common Pigweed; Future; Expected date: 01/11/2023  -     Allergen-Silver Birch; Future; Expected date: 01/11/2023  -     Feather Panel #2; Future; Expected date: 01/11/2023  -     RAST Allergen for [place]; Future; Expected date: 01/11/2023  -     RAST Allergen Maple ([place]); Future; Expected date: 01/11/2023  -     RAST Allergen [place]; Future; Expected date: 01/11/2023  -     RAST Allergen, Lamb's Quarters; Future; Expected date: 01/11/2023  -     RAST Allergen, Sheep [place]); Future; Expected date: 01/11/2023    Chronic pansinusitis  -     CT [employer] Sinuses without; Future; Expected date: 01/11/2023    Hypertrophy of inferior nasal turbinate        Recurrent acute sinusitis.  Agreed to move forward with allergy testing and dedicated sinus imaging.

## 2023-01-13 ENCOUNTER — PATIENT MESSAGE (OUTPATIENT)
Dept: PRIMARY CARE CLINIC | Facility: CLINIC | Age: 63
End: 2023-01-13
Payer: COMMERCIAL

## 2023-01-20 ENCOUNTER — TELEPHONE (OUTPATIENT)
Dept: OTOLARYNGOLOGY | Facility: CLINIC | Age: 63
End: 2023-01-20
Payer: COMMERCIAL

## 2023-01-20 DIAGNOSIS — G43.109 MIGRAINE WITH AURA AND WITHOUT STATUS MIGRAINOSUS, NOT INTRACTABLE: ICD-10-CM

## 2023-01-20 RX ORDER — PROMETHAZINE HYDROCHLORIDE 25 MG/1
25 TABLET ORAL EVERY 8 HOURS PRN
Qty: 30 TABLET | Refills: 0 | Status: SHIPPED | OUTPATIENT
Start: 2023-01-20 | End: 2023-05-01 | Stop reason: SDUPTHER

## 2023-01-20 RX ORDER — RIMEGEPANT SULFATE 75 MG/75MG
TABLET, ORALLY DISINTEGRATING ORAL
Qty: 8 TABLET | Refills: 5 | Status: SHIPPED | OUTPATIENT
Start: 2023-01-20 | End: 2023-01-30

## 2023-01-20 NOTE — TELEPHONE ENCOUNTER
No new care gaps identified.  Health [facility] Embedded Care Gaps. Reference number: [id number]. 1/20/2023   6:07:34 AM CST

## 2023-01-24 ENCOUNTER — PATIENT MESSAGE (OUTPATIENT)
Dept: NEUROLOGY | Facility: CLINIC | Age: 63
End: 2023-01-24
Payer: COMMERCIAL

## 2023-01-25 ENCOUNTER — PATIENT MESSAGE (OUTPATIENT)
Dept: ADMINISTRATIVE | Facility: HOSPITAL | Age: 63
End: 2023-01-25
Payer: COMMERCIAL

## 2023-01-25 ENCOUNTER — TELEPHONE (OUTPATIENT)
Dept: NEUROLOGY | Facility: CLINIC | Age: 63
End: 2023-01-25
Payer: COMMERCIAL

## 2023-01-26 NOTE — TELEPHONE ENCOUNTER
----- Message from Jo-Ann Ugarte sent at 1/25/2023 10:23 AM CST -----  Regarding: Patient advice  Contact: Pt [phone]  Patient called to request refill on Nurtec  or for something else to be called in Upset states it suppose to be as needed wants to know why it can not be refilled today Please call to discuss further

## 2023-01-30 ENCOUNTER — PATIENT MESSAGE (OUTPATIENT)
Dept: NEUROLOGY | Facility: CLINIC | Age: 63
End: 2023-01-30

## 2023-01-30 ENCOUNTER — PATIENT MESSAGE (OUTPATIENT)
Dept: INTERNAL MEDICINE | Facility: CLINIC | Age: 63
End: 2023-01-30
Payer: COMMERCIAL

## 2023-01-30 ENCOUNTER — OFFICE VISIT (OUTPATIENT)
Dept: NEUROLOGY | Facility: CLINIC | Age: 63
End: 2023-01-30
Payer: COMMERCIAL

## 2023-01-30 DIAGNOSIS — G43.109 MIGRAINE WITH AURA AND WITHOUT STATUS MIGRAINOSUS, NOT INTRACTABLE: Primary | ICD-10-CM

## 2023-01-30 PROCEDURE — 1160F PR REVIEW ALL MEDS BY PRESCRIBER/CLIN PHARMACIST DOCUMENTED: ICD-10-PCS | Mod: CPTII,95,, | Performed by: PHYSICIAN ASSISTANT

## 2023-01-30 PROCEDURE — 99215 PR OFFICE/OUTPT VISIT, EST, LEVL V, 40-54 MIN: ICD-10-PCS | Mod: 95,,, | Performed by: PHYSICIAN ASSISTANT

## 2023-01-30 PROCEDURE — 1160F RVW MEDS BY RX/DR IN RCRD: CPT | Mod: CPTII,95,, | Performed by: PHYSICIAN ASSISTANT

## 2023-01-30 PROCEDURE — 99215 OFFICE O/P EST HI 40 MIN: CPT | Mod: 95,,, | Performed by: PHYSICIAN ASSISTANT

## 2023-01-30 PROCEDURE — 1159F MED LIST DOCD IN RCRD: CPT | Mod: CPTII,95,, | Performed by: PHYSICIAN ASSISTANT

## 2023-01-30 PROCEDURE — 1159F PR MEDICATION LIST DOCUMENTED IN MEDICAL RECORD: ICD-10-PCS | Mod: CPTII,95,, | Performed by: PHYSICIAN ASSISTANT

## 2023-01-30 PROCEDURE — 99417 PROLNG OP E/M EACH 15 MIN: CPT | Mod: 95,,, | Performed by: PHYSICIAN ASSISTANT

## 2023-01-30 PROCEDURE — 99417 PR PROLONGED SVC, OUTPT, W/WO DIRECT PT CONTACT,  EA ADDTL 15 MIN: ICD-10-PCS | Mod: 95,,, | Performed by: PHYSICIAN ASSISTANT

## 2023-01-30 NOTE — PROGRESS NOTES
Established Patient     The patient location is: [place]  The chief complaint leading to consultation is: headache follow up visit    Visit type: audiovisual    Face to Face time with patient: 40 min  70 minutes of total time spent on the encounter, which includes face to face time and non-face to face time preparing to see the patient (eg, review of tests), Obtaining and/or reviewing separately obtained history, Documenting clinical information in the electronic or other health record, Independently interpreting results (not separately reported) and communicating results to the patient/family/caregiver, or Care coordination (not separately reported).     Each patient to whom he or she provides medical services by telemedicine is:  (1) informed of the relationship between the physician and patient and the respective role of any other health care provider with respect to management of the patient; and (2) notified that he or she may decline to receive medical services by telemedicine and may withdraw from such care at any time.    Notes:        SUBJECTIVE:  Patient ID: Miles Bowen   Chief Complaint: Headache    History of Present Illness:  Miles Bowen is a 62 y.o. female with migraines, lumbar DDD, IBS, allergic rhinitis, h/o palpitations, tachycardia, murmur, MVP, atypical cp, anxiety, depression, GERD, HTN, kidney stone 2021, insomnia who presents via virtual visit alone for follow-up of headaches.       01/30/2023 - Interval History:  Pt presents today 2/2 HA today. She has run out of nurtec and requests another alternative option today. Nurtec works well in resolving ha's. Pt voices displeasure in staff as she felt she was not responded to until appt today. She also felt her questions were not answered. Per EMR staff did respond to pt, called pharmacy, and had telephone call w/ pt regarding her concerns w/ her nurtec prescription and answered questions to their best ability. Pt defers transition to  another provider or staff and verbalizes satisfaction w/ answers today and wish to continue care w/ current provider and staff.   As pt verbalized a wish to receive something right now for her HA and it is after 3pm and visit is virtual, advised pt to go to ED/UC as this most appropriately addresses pt's needs, pt defers at this time. Discussed multiple rescue options. However, pt wishes to avoid nsaids 2/2 GI upset and rash w/ previous nsaids, must avoid triptans 2/2 h/o cp/ palpitations, defers anti-emetics, defers neuro-modulation devices, and defers other gepants.   No further questions/concerns at this time. Pt agreeable w/ following plan.   Plan: continue cymbalta and tpx, change nurtec to ppx and abortive, continue phenergan, sent msg to her pcp as requested by pt, consider other rescues vs ED/UC, rtc prn    Recommendations made at last Office Visit on 10/28/22:  - Discussed symptoms appear to be consistent with migraines complicated by stress, sinuses, and recent weather change. Discussed this with patient along with treatment options and patient agreed with the following plan  - ppx - continue cymbalta for dual purpose anx/dep and HA's, tpx was recently increased to 25mg qam and 50mg qpm (discussed risks of kidney stones, will monitor and d/c if occurs)  - abortive - try nurtec  - nausea - continue phenergan  - avoid triptans 2/2 h/o palpitations and cp  - avoid nsaids 2/2 previous SE's and GI upset  - pt wishes to avoid cgrp inhibitors 2/2 previous SE's  - stress, anx/dep, and insomnia - continue mgmt per psychiatrist, currently taking cymbalta, ambien, diazepam, and lorazapam  - sinuses/allergic rhinitis - continue mgmt per ENT  - pending MRI brain  - HTN - stable, mgmt per pcp  - h/o kidney stones - caution w/ tpx and zonisamide, discussed w/ pt  - risks, benefits, and potential side effects of tpx, nurtec, cymbalta, phenergan, triptans, nsaids, cgrp inhibitors discussed   - alternative treatment options  "offered   - importance of healthy diet, regular exercise and sleep hygiene in the treatment of headaches    - Start tracking headaches via Migraine Buddy bobby on phone   - RTC in 1 mo        Treatments Tried:  Tpx  Aimovig - "sick"  Cymbalta  Nurtec - helps  Triptans - avoid 2/2 h/o palpitations/tachycardia/cp  Advil - rash  Fioricet  Nsaids - GI upset  Imitrex - "side effects"  tylenol  Phenergan   Zofran - doesn't help    Current Medications:    Current Outpatient Medications:     ACETAMINOPHEN (TYLENOL EXTRA STRENGTH ORAL), Take 2 tablets by mouth every 4 to 6 hours as needed., Disp: , Rfl:     baclofen (LIORESAL) 10 MG tablet, Take 1 tablet (10 mg total) by mouth 2 (two) times daily as needed (muscle spasm)., Disp: 30 tablet, Rfl: 0    BLOOD PRESSURE CUFF Misc, Use to check blood pressure daily, Disp: 1 each, Rfl: 0    chlorpheniramine-dextromethorp (CORICIDIN HBP COUGH AND COLD) 4-30 mg Tab, Take by mouth as needed., Disp: , Rfl:     diazePAM (VALIUM) 10 MG Tab, every 12 (twelve) hours as needed., Disp: , Rfl:     dicyclomine (BENTYL) 10 MG capsule, Take 1 capsule by mouth 4 times daily before meals and nightly prn, Disp: 40 capsule, Rfl: 0    duloxetine (CYMBALTA) 60 MG capsule, Take 1 capsule by mouth once daily. , Disp: , Rfl: 0    estradioL (ESTRACE) 0.5 MG tablet, Take 1 tablet (0.5 mg total) by mouth once daily., Disp: 30 tablet, Rfl: 11    hydroCHLOROthiazide (HYDRODIURIL) 12.5 MG Tab, Take 1 tablet (12.5 mg total) by mouth once daily., Disp: 90 tablet, Rfl: 1    HYDROcodone-acetaminophen (NORCO) 5-325 mg per tablet, Take 1 tablet by mouth every 6 (six) hours as needed., Disp: , Rfl:     HYDROcodone-acetaminophen (NORCO) 5-325 mg per tablet, Take 1 tablet by mouth every 6 (six) hours as needed for pain., Disp: 28 tablet, Rfl: 0    HYDROcodone-acetaminophen (NORCO) 5-325 mg per tablet, Take 1 tablet by mouth every 6 hours as neeed for pain, Disp: 28 tablet, Rfl: 0    HYDROcodone-acetaminophen (NORCO) 5-325 " mg per tablet, Take 1 tablet by mouth every 6 (six) hours as needed for pain., Disp: 28 tablet, Rfl: 0    LORazepam (ATIVAN) 2 MG Tab, SMARTSI Tablet(s) By Mouth Every 8-10 Hours PRN, Disp: , Rfl:     losartan (COZAAR) 100 MG tablet, Take 1 tablet (100 mg total) by mouth once daily., Disp: 90 tablet, Rfl: 3    pantoprazole (PROTONIX) 40 MG tablet, Take 1 tablet (40 mg total) by mouth once daily., Disp: 90 tablet, Rfl: 0    promethazine (PHENERGAN) 25 MG tablet, Take 1 tablet (25 mg total) by mouth every 8 (eight) hours as needed for Nausea., Disp: 30 tablet, Rfl: 0    rimegepant 75 mg odt, Take 1 tablet (75 mg total) by mouth every other day. Place ODT tablet on or under the tongue., Disp: 16 tablet, Rfl: 5    topiramate (TOPAMAX) 25 MG tablet, Take 1 tablet (25 mg total) by mouth every morning AND 2 tablets (50 mg total) every evening., Disp: 90 tablet, Rfl: 1    zolpidem (AMBIEN CR) 12.5 MG CR tablet, Take 12.5 mg by mouth nightly., Disp: , Rfl:     Review of Systems - as per HPI, otherwise a balanced 10 systems review is negative.    OBJECTIVE:  Vitals:  There were no vitals taken for this visit.     Physical Exam:  Constitutional: she appears well-developed and well-nourished. she is well groomed. NAD   HENT:    Head: Normocephalic and atraumatic  Eyes: Conjunctivae and EOM are normal  Musculoskeletal: Normal range of motion. No joint stiffness.   Psychiatric: Mood and affect are normal    Neuro: Patient is alert and oriented to person, place, and time. Language is intact and fluent. Speech is clear and fluent. Recent and remote memory are intact.  Normal attention and concentration.  Facial movement is symmetric. Moves all 4 extremities against gravity.      Review of Data:   Notes from neuro reviewed   Labs:  Office Visit on 2022   Component Date Value Ref Range Status    DIAGNOSIS: 2022 Comment   Final    Specimen adequacy: 2022 Comment   Final    Clinician Provided ICD10 2022  Comment   Final    Performed by: 12/02/2022 Comment   Final    QC reviewed by: 12/02/2022 Comment   Final    . 12/02/2022 .   Final    Note: 12/02/2022 Comment   Final    Test Methodology: 12/02/2022 Comment   Final    . 12/02/2022 Comment   Final     Imaging:  Results for orders placed or performed during the hospital encounter of 10/11/22   CT Head Without Contrast    Narrative    EXAMINATION:  CT HEAD WITHOUT CONTRAST    CLINICAL HISTORY:  Headache, chronic, new features or increased frequency; Headache, unspecified    TECHNIQUE:  Low dose axial images were obtained through the head.  Coronal and sagittal reformations were also performed. Contrast was not administered.    COMPARISON:  None.    FINDINGS:  The cortical sulcal pattern demonstrates age related atrophy..  No hydrocephalus, midline shift or abnormal mass effect present. No intra-or extra-axial mass or hemorrhage. No CT evidence of large territory acute stroke.    Orbits are unremarkable. Paranasal sinuses demonstrate mild mucosal thickening most prevalent within the visualized left maxillary sinus..  Mastoid air cells are clear. Calvarium is intact.      Impression    No acute intracranial abnormality.  Mild paranasal sinus disease.      Electronically signed by: Jorge Bradshaw MD  Date:    10/11/2022  Time:    15:04     Note: I have independently reviewed any/all imaging/labs/tests and agree with the report (s) as documented.  Any discrepancies will be as noted/demarcated by free text.  VINEET JOHN 1/30/2023    ASSESSMENT:  1. Migraine with aura and without status migrainosus, not intractable        PLAN:  - Discussed symptoms appear to be consistent with migraines complicated by stress, sinuses, and recent weather change. Discussed this with patient along with treatment options and patient agreed with the following plan  - ppx - continue cymbalta for dual purpose anx/dep and HA's, tpx 50mg qpm (discussed risks of kidney stones, will monitor and d/c  if occurs)  - use nurtec as ppx and abortive  - nausea - continue phenergan  - avoid triptans 2/2 h/o palpitations, tachycardia, murmur, and cp  - avoid nsaids 2/2 previous SE's and GI upset  - pt wishes to avoid cgrp inhibitors 2/2 previous SE's  - stress, anx/dep, and insomnia - continue mgmt per psychiatrist, currently taking cymbalta, ambien, diazepam, and lorazapam  - sinuses/allergic rhinitis - continue mgmt per ENT  - pending MRI brain  - HTN - stable, mgmt per pcp  - h/o kidney stones - caution w/ tpx and zonisamide, discussed w/ pt  - track ha's   - Discussed goals of therapy are to decrease the frequency, intensity, and duration of headaches  - RTC prn     Orders Placed This Encounter    rimegepant 75 mg odt       Questions and concerns were sought and answered to the patient's stated verbal satisfaction.  The patient verbalizes understanding and agreement with the above stated treatment plan.     CC: MD Madeline Taylor PA-C  [facility] [place]   [phone]    Dr. Powers was available during today's encounter.

## 2023-01-30 NOTE — Clinical Note
Jessi Paul, MsRoxana Aquino specifically requested today for me to message you bc she would like an appt with you ASAP. She wants to discuss fioricet as a possible HA abortive. I have advised for her to continue nurtec as this helps. I have also changed her script so she receives more tablets per month which is her initial complaint. I do not prescribe fioricet or any other narcotics so she asked me to reach out to you to see if you would be willing to do that. I informed her that I cannot and am not making suggestions or recommendations to you and this is completely your choice and that you may say no. Very sorry to bother you! Madeline

## 2023-01-30 NOTE — TELEPHONE ENCOUNTER
Patient called the office and the following was discussed:    Patient said that I 'acted' like I did not what was going on with her health so I had to go into her chart to figure out what was going on. Patient said that provider canceled nurtec and was unable to get refill on nurtec. Based on the messages that Krystal sent to the patient, nurtec could be refilled every once a month and the next filled date would be 2/02/23. Patient asked if there's anything that she could take besides nurtec. I informed the patient that I'm not licensed/certified to provide medical advise other than taking OTC medications. Patient said that otc medications did not work for you and kept asking when she will be able to hear from Madeline. I tried to reassure her that she'll get an answer asap but also informed her that provider seeing patients today.

## 2023-01-31 ENCOUNTER — OFFICE VISIT (OUTPATIENT)
Dept: INTERNAL MEDICINE | Facility: CLINIC | Age: 63
End: 2023-01-31
Payer: COMMERCIAL

## 2023-01-31 VITALS
BODY MASS INDEX: 35.49 KG/M2 | SYSTOLIC BLOOD PRESSURE: 118 MMHG | WEIGHT: 192.88 LBS | HEIGHT: 62 IN | HEART RATE: 68 BPM | OXYGEN SATURATION: 99 % | DIASTOLIC BLOOD PRESSURE: 78 MMHG | TEMPERATURE: 98 F

## 2023-01-31 DIAGNOSIS — F32.A DEPRESSION, UNSPECIFIED DEPRESSION TYPE: ICD-10-CM

## 2023-01-31 DIAGNOSIS — G43.011 INTRACTABLE MIGRAINE WITHOUT AURA AND WITH STATUS MIGRAINOSUS: ICD-10-CM

## 2023-01-31 DIAGNOSIS — I10 PRIMARY HYPERTENSION: ICD-10-CM

## 2023-01-31 DIAGNOSIS — F41.9 ANXIETY: ICD-10-CM

## 2023-01-31 PROCEDURE — 3074F SYST BP LT 130 MM HG: CPT | Mod: CPTII,S$GLB,, | Performed by: PHYSICIAN ASSISTANT

## 2023-01-31 PROCEDURE — 1160F PR REVIEW ALL MEDS BY PRESCRIBER/CLIN PHARMACIST DOCUMENTED: ICD-10-PCS | Mod: CPTII,S$GLB,, | Performed by: PHYSICIAN ASSISTANT

## 2023-01-31 PROCEDURE — 1160F RVW MEDS BY RX/DR IN RCRD: CPT | Mod: CPTII,S$GLB,, | Performed by: PHYSICIAN ASSISTANT

## 2023-01-31 PROCEDURE — 3078F DIAST BP <80 MM HG: CPT | Mod: CPTII,S$GLB,, | Performed by: PHYSICIAN ASSISTANT

## 2023-01-31 PROCEDURE — 99999 PR PBB SHADOW E&M-EST. PATIENT-LVL V: ICD-10-PCS | Mod: PBBFAC,,, | Performed by: PHYSICIAN ASSISTANT

## 2023-01-31 PROCEDURE — 99213 PR OFFICE/OUTPT VISIT, EST, LEVL III, 20-29 MIN: ICD-10-PCS | Mod: S$GLB,,, | Performed by: PHYSICIAN ASSISTANT

## 2023-01-31 PROCEDURE — 1159F PR MEDICATION LIST DOCUMENTED IN MEDICAL RECORD: ICD-10-PCS | Mod: CPTII,S$GLB,, | Performed by: PHYSICIAN ASSISTANT

## 2023-01-31 PROCEDURE — 3008F BODY MASS INDEX DOCD: CPT | Mod: CPTII,S$GLB,, | Performed by: PHYSICIAN ASSISTANT

## 2023-01-31 PROCEDURE — 1159F MED LIST DOCD IN RCRD: CPT | Mod: CPTII,S$GLB,, | Performed by: PHYSICIAN ASSISTANT

## 2023-01-31 PROCEDURE — 3078F PR MOST RECENT DIASTOLIC BLOOD PRESSURE < 80 MM HG: ICD-10-PCS | Mod: CPTII,S$GLB,, | Performed by: PHYSICIAN ASSISTANT

## 2023-01-31 PROCEDURE — 3008F PR BODY MASS INDEX (BMI) DOCUMENTED: ICD-10-PCS | Mod: CPTII,S$GLB,, | Performed by: PHYSICIAN ASSISTANT

## 2023-01-31 PROCEDURE — 99213 OFFICE O/P EST LOW 20 MIN: CPT | Mod: S$GLB,,, | Performed by: PHYSICIAN ASSISTANT

## 2023-01-31 PROCEDURE — 3074F PR MOST RECENT SYSTOLIC BLOOD PRESSURE < 130 MM HG: ICD-10-PCS | Mod: CPTII,S$GLB,, | Performed by: PHYSICIAN ASSISTANT

## 2023-01-31 PROCEDURE — 99999 PR PBB SHADOW E&M-EST. PATIENT-LVL V: CPT | Mod: PBBFAC,,, | Performed by: PHYSICIAN ASSISTANT

## 2023-01-31 RX ORDER — BUTALBITAL, ACETAMINOPHEN AND CAFFEINE 50; 325; 40 MG/1; MG/1; MG/1
1 TABLET ORAL EVERY 6 HOURS PRN
Qty: 12 TABLET | Refills: 0 | Status: SHIPPED | OUTPATIENT
Start: 2023-01-31 | End: 2023-03-02

## 2023-01-31 RX ORDER — BUTALBITAL, ACETAMINOPHEN AND CAFFEINE 50; 325; 40 MG/1; MG/1; MG/1
1 TABLET ORAL EVERY 6 HOURS PRN
Qty: 12 TABLET | Refills: 0 | Status: SHIPPED | OUTPATIENT
Start: 2023-01-31 | End: 2023-01-31 | Stop reason: SDUPTHER

## 2023-01-31 NOTE — PROGRESS NOTES
Subjective:      Patient ID: Miles Bowen is a 62 y.o. female.    Chief Complaint: Medication Refill    HPI  Here today to get a refill on fioricet for headaches. Saw neurologist yesterday and would not fill because could not write for controlled medications.    Currently on rimegepant and cymbalta.   Rimegepant quantity limited due to insurance so neurologist changing the rx to preventative rather than abortive treatment. Will take a few days to get the medication approved. Pt requesting refill on fioricet for a few days until she is able to get the migraine medication.       Patient Active Problem List   Diagnosis    Hypertension    Depression    Anxiety    GERD (gastroesophageal reflux disease)    Hiatal hernia    DDD (degenerative disc disease), lumbar    Common migraine    Allergic rhinitis    IBS (irritable bowel syndrome)    Multinodular goiter    MVP (mitral valve prolapse)    Nasal polyps    Obesity    Abnormal ECG    Palpitations    Atypical chest pain    Shortness of breath    Migraine with aura and without status migrainosus, not intractable         Current Outpatient Medications:     ACETAMINOPHEN (TYLENOL EXTRA STRENGTH ORAL), Take 2 tablets by mouth every 4 to 6 hours as needed., Disp: , Rfl:     baclofen (LIORESAL) 10 MG tablet, Take 1 tablet (10 mg total) by mouth 2 (two) times daily as needed (muscle spasm)., Disp: 30 tablet, Rfl: 0    BLOOD PRESSURE CUFF Misc, Use to check blood pressure daily, Disp: 1 each, Rfl: 0    chlorpheniramine-dextromethorp (CORICIDIN HBP COUGH AND COLD) 4-30 mg Tab, Take by mouth as needed., Disp: , Rfl:     diazePAM (VALIUM) 10 MG Tab, every 12 (twelve) hours as needed., Disp: , Rfl:     dicyclomine (BENTYL) 10 MG capsule, Take 1 capsule by mouth 4 times daily before meals and nightly prn, Disp: 40 capsule, Rfl: 0    duloxetine (CYMBALTA) 60 MG capsule, Take 1 capsule by mouth once daily. , Disp: , Rfl: 0    estradioL (ESTRACE) 0.5 MG tablet, Take 1 tablet (0.5  mg total) by mouth once daily., Disp: 30 tablet, Rfl: 11    hydroCHLOROthiazide (HYDRODIURIL) 12.5 MG Tab, Take 1 tablet (12.5 mg total) by mouth once daily., Disp: 90 tablet, Rfl: 1    HYDROcodone-acetaminophen (NORCO) 5-325 mg per tablet, Take 1 tablet by mouth every 6 (six) hours as needed for pain., Disp: 28 tablet, Rfl: 0    LORazepam (ATIVAN) 2 MG Tab, SMARTSI Tablet(s) By Mouth Every 8-10 Hours PRN, Disp: , Rfl:     losartan (COZAAR) 100 MG tablet, Take 1 tablet (100 mg total) by mouth once daily., Disp: 90 tablet, Rfl: 3    pantoprazole (PROTONIX) 40 MG tablet, Take 1 tablet (40 mg total) by mouth once daily., Disp: 90 tablet, Rfl: 0    promethazine (PHENERGAN) 25 MG tablet, Take 1 tablet (25 mg total) by mouth every 8 (eight) hours as needed for Nausea., Disp: 30 tablet, Rfl: 0    rimegepant 75 mg odt, Take 1 tablet (75 mg total) by mouth every other day. Place ODT tablet on or under the tongue., Disp: 16 tablet, Rfl: 5    zolpidem (AMBIEN CR) 12.5 MG CR tablet, Take 12.5 mg by mouth nightly., Disp: , Rfl:     butalbital-acetaminophen-caffeine -40 mg (FIORICET, ESGIC) -40 mg per tablet, Take 1 tablet by mouth every 6 (six) hours as needed for Pain., Disp: 12 tablet, Rfl: 0    topiramate (TOPAMAX) 25 MG tablet, Take 1 tablet (25 mg total) by mouth every morning AND 2 tablets (50 mg total) every evening., Disp: 90 tablet, Rfl: 1    Review of Systems   Constitutional:  Negative for activity change, appetite change and unexpected weight change.   HENT: Negative.  Negative for hearing loss, postnasal drip, rhinorrhea, trouble swallowing and voice change.    Eyes: Negative.  Negative for visual disturbance.   Respiratory: Negative.  Negative for choking, chest tightness and shortness of breath.    Cardiovascular:  Negative for palpitations and leg swelling.   Gastrointestinal:  Negative for abdominal distention, blood in stool, constipation and diarrhea.   Endocrine: Negative for cold intolerance,  "heat intolerance, polydipsia and polyuria.   Genitourinary: Negative.  Negative for difficulty urinating and frequency.   Musculoskeletal:  Negative for back pain and gait problem.   Skin:  Negative for color change, pallor and wound.   Neurological:  Negative for dizziness, tremors and light-headedness.   Hematological:  Negative for adenopathy.   Psychiatric/Behavioral:  Negative for behavioral problems, confusion, self-injury, sleep disturbance and suicidal ideas. The patient is not nervous/anxious.    Objective:   /78 (BP Location: Left arm, Patient Position: Sitting, BP Method: Large (Manual))   Pulse 68   Temp 98 °F (36.7 °C) (Tympanic)   Ht 5' 2" (1.575 m)   Wt 87.5 kg (192 lb 14.4 oz)   SpO2 99%   BMI 35.28 kg/m²     Physical Exam  Vitals reviewed.   Constitutional:       General: She is not in acute distress.     Appearance: Normal appearance. She is well-developed. She is not ill-appearing, toxic-appearing or diaphoretic.   HENT:      Head: Normocephalic and atraumatic.      Right Ear: External ear normal.      Left Ear: External ear normal.      Nose: Nose normal.   Eyes:      Conjunctiva/sclera: Conjunctivae normal.      Pupils: Pupils are equal, round, and reactive to light.   Cardiovascular:      Rate and Rhythm: Normal rate and regular rhythm.      Heart sounds: Normal heart sounds. No murmur heard.    No friction rub. No gallop.   Pulmonary:      Effort: Pulmonary effort is normal. No respiratory distress.      Breath sounds: Normal breath sounds. No wheezing or rales.   Chest:      Chest wall: No tenderness.   Abdominal:      General: There is no distension.      Palpations: Abdomen is soft.      Tenderness: There is no abdominal tenderness.   Musculoskeletal:         General: Normal range of motion.      Cervical back: Normal range of motion and neck supple.   Lymphadenopathy:      Cervical: No cervical adenopathy.   Skin:     General: Skin is warm and dry.      Capillary Refill: " Capillary refill takes less than 2 seconds.      Findings: No rash.   Neurological:      Mental Status: She is alert and oriented to person, place, and time.      Motor: No weakness.      Coordination: Coordination normal.      Gait: Gait normal.   Psychiatric:         Mood and Affect: Mood normal.         Behavior: Behavior normal.         Thought Content: Thought content normal.         Judgment: Judgment normal.     Assessment:     1. Intractable migraine without aura and with status migrainosus    2. Primary hypertension    3. Anxiety    4. Depression, unspecified depression type      Plan:   Intractable migraine without aura and with status migrainosus  -     Discontinue: butalbital-acetaminophen-caffeine -40 mg (FIORICET, ESGIC) -40 mg per tablet; Take 1 tablet by mouth every 6 (six) hours as needed for Pain.  Dispense: 12 tablet; Refill: 0  -     butalbital-acetaminophen-caffeine -40 mg (FIORICET, ESGIC) -40 mg per tablet; Take 1 tablet by mouth every 6 (six) hours as needed for Pain.  Dispense: 12 tablet; Refill: 0    Primary hypertension    Anxiety    Depression, unspecified depression type      -avoid use with valium and ativan and norco. Risks discussed in detail.    checked and appropriate.   -short supply until able to get her migraine medication     Follow up if symptoms worsen or fail to improve.

## 2023-02-01 ENCOUNTER — TELEPHONE (OUTPATIENT)
Dept: NEUROLOGY | Facility: CLINIC | Age: 63
End: 2023-02-01
Payer: COMMERCIAL

## 2023-02-01 NOTE — TELEPHONE ENCOUNTER
Spoke with the patient and was told that she's been dealing with a migraine for 10+ days now, she had an appointment with HA provider on 1/30/23 in regards to the nurtec and she was informed by her insurance company that PA was not submitted by provider yet.    Patient requests provider to send her any kind of communication directly to her stating that the PA was submitted and authorized.

## 2023-02-01 NOTE — TELEPHONE ENCOUNTER
----- Message from Vijaya Alvarado sent at 2/1/2023  2:44 PM CST -----  Contact: pt  Pt requesting a callback to speak with a nurse regarding speaking with pt insurance company           Confirmed contact below:  Contact Name:Miles Bowen  Phone Number: [phone]

## 2023-02-02 NOTE — TELEPHONE ENCOUNTER
Called pharmacy, prior auth has already been completed and medication has been approved. Pharmacy will inform pt. Will ask team to also inform pt.

## 2023-02-02 NOTE — TELEPHONE ENCOUNTER
Attempted to call the patient, left detailed message per ELVIS that PA is completed and pharmacy is filling patient's medication

## 2023-02-08 RX ORDER — BACLOFEN 10 MG/1
10 TABLET ORAL 2 TIMES DAILY PRN
Qty: 30 TABLET | Refills: 0 | Status: SHIPPED | OUTPATIENT
Start: 2023-02-08 | End: 2023-03-08 | Stop reason: SDUPTHER

## 2023-02-25 ENCOUNTER — PATIENT MESSAGE (OUTPATIENT)
Dept: ADMINISTRATIVE | Facility: OTHER | Age: 63
End: 2023-02-25
Payer: COMMERCIAL

## 2023-03-08 ENCOUNTER — PATIENT MESSAGE (OUTPATIENT)
Dept: NEUROLOGY | Facility: CLINIC | Age: 63
End: 2023-03-08
Payer: COMMERCIAL

## 2023-03-09 RX ORDER — BACLOFEN 10 MG/1
10 TABLET ORAL 2 TIMES DAILY PRN
Qty: 30 TABLET | Refills: 0 | Status: SHIPPED | OUTPATIENT
Start: 2023-03-09 | End: 2023-04-04 | Stop reason: SDUPTHER

## 2023-04-05 RX ORDER — BACLOFEN 10 MG/1
10 TABLET ORAL 2 TIMES DAILY PRN
Qty: 30 TABLET | Refills: 0 | Status: SHIPPED | OUTPATIENT
Start: 2023-04-05 | End: 2023-05-01 | Stop reason: SDUPTHER

## 2023-05-01 ENCOUNTER — OFFICE VISIT (OUTPATIENT)
Dept: INTERNAL MEDICINE | Facility: CLINIC | Age: 63
End: 2023-05-01
Payer: COMMERCIAL

## 2023-05-01 ENCOUNTER — LAB VISIT (OUTPATIENT)
Dept: LAB | Facility: HOSPITAL | Age: 63
End: 2023-05-01
Attending: INTERNAL MEDICINE
Payer: COMMERCIAL

## 2023-05-01 VITALS
HEIGHT: 62 IN | SYSTOLIC BLOOD PRESSURE: 106 MMHG | BODY MASS INDEX: 34.53 KG/M2 | TEMPERATURE: 97 F | OXYGEN SATURATION: 98 % | DIASTOLIC BLOOD PRESSURE: 72 MMHG | HEART RATE: 84 BPM | WEIGHT: 187.63 LBS

## 2023-05-01 DIAGNOSIS — R10.12 LEFT UPPER QUADRANT ABDOMINAL PAIN: ICD-10-CM

## 2023-05-01 DIAGNOSIS — R19.7 DIARRHEA, UNSPECIFIED TYPE: Primary | ICD-10-CM

## 2023-05-01 LAB
BACTERIA #/AREA URNS HPF: ABNORMAL /HPF
BILIRUB UR QL STRIP: NEGATIVE
CLARITY UR: CLEAR
COLOR UR: YELLOW
GLUCOSE UR QL STRIP: NEGATIVE
HGB UR QL STRIP: ABNORMAL
KETONES UR QL STRIP: NEGATIVE
LEUKOCYTE ESTERASE UR QL STRIP: NEGATIVE
MICROSCOPIC COMMENT: ABNORMAL
NITRITE UR QL STRIP: NEGATIVE
OTHER ELEMENTS URNS MICRO: ABNORMAL
PH UR STRIP: 5 [PH] (ref 5–8)
PROT UR QL STRIP: NEGATIVE
RBC #/AREA URNS HPF: 1 /HPF (ref 0–4)
SP GR UR STRIP: 1.02 (ref 1–1.03)
SQUAMOUS #/AREA URNS HPF: 5 /HPF
URN SPEC COLLECT METH UR: ABNORMAL
WBC #/AREA URNS HPF: 1 /HPF (ref 0–5)

## 2023-05-01 PROCEDURE — 4010F ACE/ARB THERAPY RXD/TAKEN: CPT | Mod: CPTII,S$GLB,, | Performed by: INTERNAL MEDICINE

## 2023-05-01 PROCEDURE — 3008F PR BODY MASS INDEX (BMI) DOCUMENTED: ICD-10-PCS | Mod: CPTII,S$GLB,, | Performed by: INTERNAL MEDICINE

## 2023-05-01 PROCEDURE — 4010F PR ACE/ARB THEARPY RXD/TAKEN: ICD-10-PCS | Mod: CPTII,S$GLB,, | Performed by: INTERNAL MEDICINE

## 2023-05-01 PROCEDURE — 99214 PR OFFICE/OUTPT VISIT, EST, LEVL IV, 30-39 MIN: ICD-10-PCS | Mod: S$GLB,,, | Performed by: INTERNAL MEDICINE

## 2023-05-01 PROCEDURE — 99214 OFFICE O/P EST MOD 30 MIN: CPT | Mod: S$GLB,,, | Performed by: INTERNAL MEDICINE

## 2023-05-01 PROCEDURE — 3078F PR MOST RECENT DIASTOLIC BLOOD PRESSURE < 80 MM HG: ICD-10-PCS | Mod: CPTII,S$GLB,, | Performed by: INTERNAL MEDICINE

## 2023-05-01 PROCEDURE — 3074F PR MOST RECENT SYSTOLIC BLOOD PRESSURE < 130 MM HG: ICD-10-PCS | Mod: CPTII,S$GLB,, | Performed by: INTERNAL MEDICINE

## 2023-05-01 PROCEDURE — 99999 PR PBB SHADOW E&M-EST. PATIENT-LVL V: ICD-10-PCS | Mod: PBBFAC,,, | Performed by: INTERNAL MEDICINE

## 2023-05-01 PROCEDURE — 3074F SYST BP LT 130 MM HG: CPT | Mod: CPTII,S$GLB,, | Performed by: INTERNAL MEDICINE

## 2023-05-01 PROCEDURE — 3078F DIAST BP <80 MM HG: CPT | Mod: CPTII,S$GLB,, | Performed by: INTERNAL MEDICINE

## 2023-05-01 PROCEDURE — 99999 PR PBB SHADOW E&M-EST. PATIENT-LVL V: CPT | Mod: PBBFAC,,, | Performed by: INTERNAL MEDICINE

## 2023-05-01 PROCEDURE — 81000 URINALYSIS NONAUTO W/SCOPE: CPT | Performed by: INTERNAL MEDICINE

## 2023-05-01 PROCEDURE — 3008F BODY MASS INDEX DOCD: CPT | Mod: CPTII,S$GLB,, | Performed by: INTERNAL MEDICINE

## 2023-05-01 RX ORDER — DIPHENOXYLATE HYDROCHLORIDE AND ATROPINE SULFATE 2.5; .025 MG/1; MG/1
1 TABLET ORAL 4 TIMES DAILY PRN
Qty: 30 TABLET | Refills: 0 | Status: SHIPPED | OUTPATIENT
Start: 2023-05-01

## 2023-05-01 RX ORDER — PROMETHAZINE HYDROCHLORIDE 25 MG/1
25 TABLET ORAL EVERY 8 HOURS PRN
Qty: 30 TABLET | Refills: 0 | Status: SHIPPED | OUTPATIENT
Start: 2023-05-01 | End: 2023-06-27 | Stop reason: SDUPTHER

## 2023-05-01 RX ORDER — BACLOFEN 10 MG/1
10 TABLET ORAL 2 TIMES DAILY PRN
Qty: 30 TABLET | Refills: 0 | Status: SHIPPED | OUTPATIENT
Start: 2023-05-01 | End: 2023-05-17 | Stop reason: SDUPTHER

## 2023-05-01 NOTE — TELEPHONE ENCOUNTER
Group Topic:  LILLIAM Education    Date: 1/13/2023  Start Time:  1:00 PM  End Time:  1:45 PM  Facilitators: Negrito Ruvalcaba V    Focus: Education discussing evidenced based material that deals with addiction and recovery.    Number in attendance: 8    Group Description: Education group is a powerful tool used to promote growth and change. Pt.'s will share their struggles and concerns with the unique opportunity to receive multiple perspectives, support, encouragement and feedback from other individuals in safe and confidential environment.         Goals: To discuss information learned today and how it will apply to their addiction.   Handouts: Movie: Coping with Cravings  Attendance: Present  Mood/Affect: Appropriate  Behavior/Socialization: Appropriate to group  Participation: Active  Overall Patient Response to Group: Appropriate to topic  Individual Response to Group: \"Cravings can not be taken lightly.\"  Ability to Apply Content to Group: 5     FABIEN Ruvalcaba M.S. [employer]          No care due was identified.  [facility] Embedded Care Due Messages. Reference number: [id number].   5/01/2023 5:26:52 AM CDT

## 2023-05-01 NOTE — LETTER
May 1, 2023      The [address]  [address] [address] [facility]  [place] [address]  Phone: [phone]  Fax: [phone]       Patient: Miles Bowen   YOB: 1960  Date of Visit: 05/01/2023    To Whom It May Concern:    Valerie Bowen  was at [facility] on 05/01/2023. The patient may return to work/school on 05/03/23 with no restrictions. If you have any questions or concerns, or if I can be of further assistance, please do not hesitate to contact me.    Sincerely,    Ellen Ellison LPN

## 2023-05-01 NOTE — PROGRESS NOTES
"Subjective:      Patient ID: Miles Bowen is a 62 y.o. female.    Chief Complaint: Nausea    HPI    63 yo with   Patient Active Problem List   Diagnosis    Hypertension    Depression    Anxiety    GERD (gastroesophageal reflux disease)    Hiatal hernia    DDD (degenerative disc disease), lumbar    Common migraine    Allergic rhinitis    IBS (irritable bowel syndrome)    Multinodular goiter    MVP (mitral valve prolapse)    Nasal polyps    Obesity    Abnormal ECG    Palpitations    Atypical chest pain    Shortness of breath    Migraine with aura and without status migrainosus, not intractable     Past Medical History:   Diagnosis Date    Anxiety     Depression     GERD (gastroesophageal reflux disease)     Heart murmur     Hematemesis without nausea 11/4/2016    Hypertension     Multinodular goiter 12/23/2016    MVP (mitral valve prolapse)     Obesity      Here today c/o one month nausea and diarrhea. Occ emesis. No foreign travel. No raw foods. No blood. No fever. Imodium not helping. Promethazine helps nausea. Tolerated water yesterday and today. No sick contacts. Admits to 1 mo of left upper quad pain.     Review of Systems   Constitutional:  Negative for diaphoresis and fever.   HENT:  Negative for congestion.    Eyes:  Negative for visual disturbance.   Respiratory:  Negative for chest tightness, shortness of breath and wheezing.    Cardiovascular:  Negative for chest pain, palpitations and leg swelling.   Gastrointestinal:  Positive for abdominal pain and diarrhea. Negative for anal bleeding, blood in stool, constipation and rectal pain.   Skin:  Negative for rash and wound.   Objective:   /72 (BP Location: Right arm, Patient Position: Sitting, BP Method: Large (Manual))   Pulse 84   Temp 97.4 °F (36.3 °C) (Tympanic)   Ht 5' 2" (1.575 m)   Wt 85.1 kg (187 lb 9.8 oz)   SpO2 98%   BMI 34.31 kg/m²     Physical Exam  Constitutional:       General: She is awake.      Appearance: Normal appearance. "   HENT:      Head: Normocephalic and atraumatic.   Eyes:      Conjunctiva/sclera: Conjunctivae normal.   Pulmonary:      Effort: Pulmonary effort is normal.   Abdominal:      Tenderness: There is abdominal tenderness. There is no guarding or rebound.   Musculoskeletal:      Cervical back: Normal range of motion.   Neurological:      Mental Status: She is alert. Mental status is at baseline.   Psychiatric:         Mood and Affect: Mood normal.         Behavior: Behavior normal. Behavior is cooperative.         Thought Content: Thought content normal.         Judgment: Judgment normal.       Lab Results   Component Value Date    WBC 9.31 05/01/2023    HGB 15.1 05/01/2023    HGB 12.4 10/12/2022    HGB 10.6 (L) 02/23/2022    HCT 47.7 05/01/2023    MCV 92 05/01/2023    MCV 92 10/12/2022    MCV 87 02/23/2022     05/01/2023    CHOL 248 (H) 03/21/2022    TRIG 99 03/21/2022     (H) 03/21/2022    LDLCALC 127.2 03/21/2022    LDLCALC 138.2 04/09/2021    LDLCALC 109 08/20/2015    ALT 15 05/01/2023    AST 15 05/01/2023     05/01/2023    K 3.0 (L) 05/01/2023    CALCIUM 9.9 05/01/2023     05/01/2023    CO2 23 05/01/2023    BUN 18 05/01/2023    CREATININE 0.9 05/01/2023    CREATININE 0.8 10/12/2022    CREATININE 0.8 02/23/2022    EGFRNORACEVR >60.0 05/01/2023    EGFRNORACEVR >60 10/12/2022    TSH 1.628 10/12/2022    TSH 0.554 03/21/2022    TSH 0.656 02/23/2022    GLU 80 05/01/2023    HGBA1C 5.7 (H) 03/21/2022    HGBA1C 5.8 (H) 04/09/2021    HGBA1C 6.0 10/16/2020          The ASCVD Risk score (Chuy DK, et al., 2019) failed to calculate for the following reasons:    The valid HDL cholesterol range is 20 to 100 mg/dL     Assessment:     1. Diarrhea, unspecified type    2. Left upper quadrant abdominal pain      Plan:   1. Diarrhea, unspecified type  -     X-Ray Abdomen Flat And Erect; Future; Expected date: 05/01/2023  -     Ambulatory referral/consult to Gastroenterology; Future; Expected date:  05/08/2023  -     Celiac Disease Panel; Future; Expected date: 05/01/2023  -     H. pylori antigen, stool; Future; Expected date: 05/01/2023  -     Pancreatic elastase, fecal; Future; Expected date: 05/01/2023  -     Fecal fat, qualitative; Future; Expected date: 05/01/2023  -     Occult blood x 1, stool; Future; Expected date: 05/01/2023  -     WBC, Stool; Future; Expected date: 05/01/2023  -     Calprotectin, Stool; Future; Expected date: 05/01/2023  -     Giardia / Cryptosporidum, EIA; Future; Expected date: 05/01/2023  -     Stool Exam-Ova,Cysts,Parasites; Future; Expected date: 05/01/2023  -     Clostridium difficile EIA; Future; Expected date: 05/01/2023  -     Stool culture; Future; Expected date: 05/01/2023  -     diphenoxylate-atropine 2.5-0.025 mg (LOMOTIL) 2.5-0.025 mg per tablet; Take 1 tablet by mouth 4 (four) times daily as needed for Diarrhea.  Dispense: 30 tablet; Refill: 0    2. Left upper quadrant abdominal pain  -     X-Ray Abdomen Flat And Erect; Future; Expected date: 05/01/2023  -     Ambulatory referral/consult to Gastroenterology; Future; Expected date: 05/08/2023  -     CBC Auto Differential; Future; Expected date: 05/01/2023  -     Comprehensive Metabolic Panel; Future; Expected date: 05/01/2023  -     Urinalysis, Reflex to Urine Culture Urine, Clean Catch; Future; Expected date: 05/01/2023        Patient Instructions   Cut your losartan/hydrochlorothiazide tablets in half.    Resume full-dose when your top blood pressure number rises above 125.     Stay hydrated.  Drink plenty of water.  Along with drinking glasses of water throughout the day also sip on water throughout the day or let ice chips melt in mouth throughout the day.  Future Appointments   Date Time Provider Department Center   5/29/2023 12:40 PM Carlos Paul MD [facility]       Lab Frequency Next Occurrence   Ambulatory referral/consult to ENT Once 05/02/2022   Ambulatory referral/consult to Orthopedics Once  05/24/2022   Mammo Digital Screening Bilat w/ Jim Once 08/24/2022   MRI Brain W WO Contrast Once 10/12/2022   Aspergillus fumagatus IgE Once 01/11/2023   Bermuda grass IgE Once 01/11/2023   Cat epithelium IgE Once 01/11/2023   Cladosporium IgE Once 01/11/2023   Cockroach, American IgE Once 01/11/2023   [place], bald IgE Once 01/11/2023   D. farinae IgE Once 01/11/2023   D. pteronyssinus IgE Once 01/11/2023   Dog dander IgE Once 01/11/2023   Plantain, English IgE Once 01/11/2023   Les grass IgE Once 01/11/2023   Marsh elder, rough IgE Once 01/11/2023   Mugwort IgE Once 01/11/2023   Nettle IgE Once 01/11/2023   Hematite, white IgE Once 01/11/2023   Penicillium IgE Once 01/11/2023   Ragweed, short, common IgE Once 01/11/2023   Ze IgE Once 01/11/2023   Allergen, Cocklebur Once 01/11/2023   Allergen, Elm [place] Once 01/11/2023   Allergen, Meadow Grass ([place] Blue) Once 01/11/2023   Allergen, Mucor Racemosus Once 01/11/2023   Allergen, Pecan [place] IgE Once 01/11/2023   Allergen, White Carmelo Once 01/11/2023   Allergen-Alternaria Alternata Once 01/11/2023   Allergen-[place] Once 01/11/2023   Allergen-Common Pigweed Once 01/11/2023   Allergen-Silver Birch Once 01/11/2023   Feather Panel #2 Once 01/11/2023   RAST Allergen for Eastern [place] Once 01/11/2023   RAST Allergen Maple ([place]) Once 01/11/2023   RAST Allergen [place] Once 01/11/2023   RAST Allergen, Morris's Quarters Once 01/11/2023   RAST Allergen, Sheep Sorrel(Yellow Dock) Once 01/11/2023   CT Medtronic Sinuses without Once 01/11/2023   X-Ray Abdomen Flat And Erect Once 05/01/2023   Ambulatory referral/consult to Gastroenterology Once 05/08/2023   Celiac Disease Panel Once 05/01/2023   H. pylori antigen, stool Once 05/01/2023   Pancreatic elastase, fecal Once 05/01/2023   Fecal fat, qualitative Once 05/01/2023   Occult blood x 1, stool Once 05/01/2023   WBC, Stool Once 05/01/2023   Calprotectin, Stool Once 05/01/2023   Giardia / Cryptosporidum, EIA Once  05/01/2023   Stool Exam-Ova,Cysts,Parasites Once 05/01/2023   Clostridium difficile EIA Once 05/01/2023   Stool culture Once 05/01/2023       Follow up in about 4 weeks (around 5/29/2023).  Please provide work excuse for today and tomorrow.

## 2023-05-01 NOTE — PATIENT INSTRUCTIONS
Cut your losartan/hydrochlorothiazide tablets in half.    Resume full-dose when your top blood pressure number rises above 125.     Stay hydrated.  Drink plenty of water.  Along with drinking glasses of water throughout the day also sip on water throughout the day or let ice chips melt in mouth throughout the day.

## 2023-05-02 ENCOUNTER — OFFICE VISIT (OUTPATIENT)
Dept: GASTROENTEROLOGY | Facility: CLINIC | Age: 63
End: 2023-05-02
Payer: COMMERCIAL

## 2023-05-02 VITALS
BODY MASS INDEX: 35.54 KG/M2 | WEIGHT: 193.13 LBS | SYSTOLIC BLOOD PRESSURE: 134 MMHG | HEIGHT: 62 IN | OXYGEN SATURATION: 94 % | DIASTOLIC BLOOD PRESSURE: 92 MMHG | HEART RATE: 92 BPM

## 2023-05-02 DIAGNOSIS — R11.2 NAUSEA AND VOMITING, UNSPECIFIED VOMITING TYPE: ICD-10-CM

## 2023-05-02 DIAGNOSIS — Z87.19 HISTORY OF DIVERTICULOSIS: ICD-10-CM

## 2023-05-02 DIAGNOSIS — R14.0 ABDOMINAL BLOATING: ICD-10-CM

## 2023-05-02 DIAGNOSIS — R19.7 DIARRHEA, UNSPECIFIED TYPE: ICD-10-CM

## 2023-05-02 DIAGNOSIS — R10.32 LEFT LOWER QUADRANT PAIN: Primary | ICD-10-CM

## 2023-05-02 PROCEDURE — 99204 PR OFFICE/OUTPT VISIT, NEW, LEVL IV, 45-59 MIN: ICD-10-PCS | Mod: S$GLB,,, | Performed by: INTERNAL MEDICINE

## 2023-05-02 PROCEDURE — 99204 OFFICE O/P NEW MOD 45 MIN: CPT | Mod: S$GLB,,, | Performed by: INTERNAL MEDICINE

## 2023-05-02 PROCEDURE — 99999 PR PBB SHADOW E&M-EST. PATIENT-LVL V: CPT | Mod: PBBFAC,,, | Performed by: INTERNAL MEDICINE

## 2023-05-02 PROCEDURE — 4010F ACE/ARB THERAPY RXD/TAKEN: CPT | Mod: CPTII,S$GLB,, | Performed by: INTERNAL MEDICINE

## 2023-05-02 PROCEDURE — 4010F PR ACE/ARB THEARPY RXD/TAKEN: ICD-10-PCS | Mod: CPTII,S$GLB,, | Performed by: INTERNAL MEDICINE

## 2023-05-02 PROCEDURE — 99999 PR PBB SHADOW E&M-EST. PATIENT-LVL V: ICD-10-PCS | Mod: PBBFAC,,, | Performed by: INTERNAL MEDICINE

## 2023-05-03 ENCOUNTER — PATIENT MESSAGE (OUTPATIENT)
Dept: GASTROENTEROLOGY | Facility: CLINIC | Age: 63
End: 2023-05-03
Payer: COMMERCIAL

## 2023-05-03 ENCOUNTER — TELEPHONE (OUTPATIENT)
Dept: GASTROENTEROLOGY | Facility: CLINIC | Age: 63
End: 2023-05-03
Payer: COMMERCIAL

## 2023-05-03 NOTE — TELEPHONE ENCOUNTER
Unable to contact pt via phone number provided; unable to leave . Letter [facility] for pt to review.

## 2023-05-03 NOTE — TELEPHONE ENCOUNTER
----- Message from Kylie Samuel MD sent at 5/3/2023 10:06 AM CDT -----  Regarding: FW: pt call bk  Letter made and saved in patient's chart  ----- Message -----  From: Toni Milligan, MA  Sent: 5/2/2023   3:08 PM CDT  To: Kylie Samuel MD  Subject: FW: pt call bk                                   Pt requesting a doctors note of why she needs her procedure done, and how important it is to have CT Scan done   Please advise     ----- Message -----  From: Lacy Trevino  Sent: 5/2/2023   2:12 PM CDT  To: Jimmie Taylor  Subject: pt call bk                                       Name of Who is Calling:MILES SRINIVASAN [[id number]]        What is the request in detail: Pt requesting a doctors note of why she needs her procedure done, And how important it is to have CT Scan done  Please advise           Can the clinic reply by [facility]: yes         What Number to Call Back if not in [facility]: Telephone Information:  Mobile          [phone]

## 2023-05-04 DIAGNOSIS — E87.6 HYPOKALEMIA: ICD-10-CM

## 2023-05-04 RX ORDER — POTASSIUM CHLORIDE 20 MEQ/1
20 TABLET, EXTENDED RELEASE ORAL DAILY
Qty: 90 TABLET | Refills: 2 | Status: SHIPPED | OUTPATIENT
Start: 2023-05-04

## 2023-05-17 DIAGNOSIS — I10 ESSENTIAL HYPERTENSION: ICD-10-CM

## 2023-05-17 RX ORDER — BACLOFEN 10 MG/1
10 TABLET ORAL 2 TIMES DAILY PRN
Qty: 30 TABLET | Refills: 0 | Status: SHIPPED | OUTPATIENT
Start: 2023-05-17 | End: 2023-06-12 | Stop reason: SDUPTHER

## 2023-05-17 RX ORDER — HYDROCHLOROTHIAZIDE 12.5 MG/1
12.5 TABLET ORAL DAILY
Qty: 90 TABLET | Refills: 0 | Status: SHIPPED | OUTPATIENT
Start: 2023-05-17 | End: 2023-08-13 | Stop reason: SDUPTHER

## 2023-05-17 NOTE — TELEPHONE ENCOUNTER
Refill Routing Note   Medication(s) are not appropriate for processing by [facility] for the following reason(s):      Required labs abnormal  Required vitals abnormal    [facility] action(s):  Defer None identified          Appointments  past 12m or future 3m with PCP    Date Provider   Last Visit   5/1/2023 Carlos Paul MD   Next Visit   5/17/2023 Carlos Paul MD   ED visits in past 90 days: 0        Note composed:8:33 AM 05/17/2023

## 2023-05-17 NOTE — TELEPHONE ENCOUNTER
No care due was identified.  [facility] Embedded Care Due Messages. Reference number: [id number].   5/17/2023 6:38:33 AM CDT

## 2023-06-01 DIAGNOSIS — G43.011 INTRACTABLE MIGRAINE WITHOUT AURA AND WITH STATUS MIGRAINOSUS: ICD-10-CM

## 2023-06-02 RX ORDER — TOPIRAMATE 25 MG/1
TABLET ORAL
Qty: 90 TABLET | Refills: 6 | Status: SHIPPED | OUTPATIENT
Start: 2023-06-02 | End: 2024-02-22 | Stop reason: SDUPTHER

## 2023-06-05 DIAGNOSIS — K21.9 GASTROESOPHAGEAL REFLUX DISEASE: ICD-10-CM

## 2023-06-05 RX ORDER — PANTOPRAZOLE SODIUM 40 MG/1
40 TABLET, DELAYED RELEASE ORAL DAILY
Qty: 90 TABLET | Refills: 0 | Status: SHIPPED | OUTPATIENT
Start: 2023-06-05 | End: 2023-09-03 | Stop reason: SDUPTHER

## 2023-06-05 NOTE — TELEPHONE ENCOUNTER
No care due was identified.  [facility] Embedded Care Due Messages. Reference number: [id number].   6/05/2023 12:28:29 PM CDT

## 2023-06-12 RX ORDER — BACLOFEN 10 MG/1
10 TABLET ORAL 2 TIMES DAILY PRN
Qty: 30 TABLET | Refills: 0 | Status: SHIPPED | OUTPATIENT
Start: 2023-06-12 | End: 2023-06-29 | Stop reason: SDUPTHER

## 2023-06-20 ENCOUNTER — OFFICE VISIT (OUTPATIENT)
Dept: INTERNAL MEDICINE | Facility: CLINIC | Age: 63
End: 2023-06-20
Payer: COMMERCIAL

## 2023-06-20 VITALS
HEART RATE: 84 BPM | SYSTOLIC BLOOD PRESSURE: 128 MMHG | HEIGHT: 62 IN | WEIGHT: 191.56 LBS | TEMPERATURE: 97 F | DIASTOLIC BLOOD PRESSURE: 84 MMHG | BODY MASS INDEX: 35.25 KG/M2 | OXYGEN SATURATION: 97 %

## 2023-06-20 DIAGNOSIS — B96.89 ACUTE BACTERIAL RHINOSINUSITIS: ICD-10-CM

## 2023-06-20 DIAGNOSIS — J01.90 ACUTE BACTERIAL RHINOSINUSITIS: ICD-10-CM

## 2023-06-20 DIAGNOSIS — J32.9 SINUSITIS, UNSPECIFIED CHRONICITY, UNSPECIFIED LOCATION: Primary | ICD-10-CM

## 2023-06-20 DIAGNOSIS — R05.9 COUGH, UNSPECIFIED TYPE: ICD-10-CM

## 2023-06-20 DIAGNOSIS — J32.9 RECURRENT SINUSITIS: ICD-10-CM

## 2023-06-20 LAB
CTP QC/QA: YES
SARS-COV-2 RDRP RESP QL NAA+PROBE: NEGATIVE

## 2023-06-20 PROCEDURE — 4010F PR ACE/ARB THEARPY RXD/TAKEN: ICD-10-PCS | Mod: CPTII,S$GLB,, | Performed by: INTERNAL MEDICINE

## 2023-06-20 PROCEDURE — 3008F BODY MASS INDEX DOCD: CPT | Mod: CPTII,S$GLB,, | Performed by: INTERNAL MEDICINE

## 2023-06-20 PROCEDURE — 3079F DIAST BP 80-89 MM HG: CPT | Mod: CPTII,S$GLB,, | Performed by: INTERNAL MEDICINE

## 2023-06-20 PROCEDURE — 1159F MED LIST DOCD IN RCRD: CPT | Mod: CPTII,S$GLB,, | Performed by: INTERNAL MEDICINE

## 2023-06-20 PROCEDURE — 1159F PR MEDICATION LIST DOCUMENTED IN MEDICAL RECORD: ICD-10-PCS | Mod: CPTII,S$GLB,, | Performed by: INTERNAL MEDICINE

## 2023-06-20 PROCEDURE — 3079F PR MOST RECENT DIASTOLIC BLOOD PRESSURE 80-89 MM HG: ICD-10-PCS | Mod: CPTII,S$GLB,, | Performed by: INTERNAL MEDICINE

## 2023-06-20 PROCEDURE — 3074F SYST BP LT 130 MM HG: CPT | Mod: CPTII,S$GLB,, | Performed by: INTERNAL MEDICINE

## 2023-06-20 PROCEDURE — 99214 PR OFFICE/OUTPT VISIT, EST, LEVL IV, 30-39 MIN: ICD-10-PCS | Mod: S$GLB,,, | Performed by: INTERNAL MEDICINE

## 2023-06-20 PROCEDURE — 99999 PR PBB SHADOW E&M-EST. PATIENT-LVL IV: CPT | Mod: PBBFAC,,, | Performed by: INTERNAL MEDICINE

## 2023-06-20 PROCEDURE — 99214 OFFICE O/P EST MOD 30 MIN: CPT | Mod: S$GLB,,, | Performed by: INTERNAL MEDICINE

## 2023-06-20 PROCEDURE — 3074F PR MOST RECENT SYSTOLIC BLOOD PRESSURE < 130 MM HG: ICD-10-PCS | Mod: CPTII,S$GLB,, | Performed by: INTERNAL MEDICINE

## 2023-06-20 PROCEDURE — 87635: ICD-10-PCS | Mod: QW,S$GLB,, | Performed by: INTERNAL MEDICINE

## 2023-06-20 PROCEDURE — 87635 SARS-COV-2 COVID-19 AMP PRB: CPT | Mod: QW,S$GLB,, | Performed by: INTERNAL MEDICINE

## 2023-06-20 PROCEDURE — 3008F PR BODY MASS INDEX (BMI) DOCUMENTED: ICD-10-PCS | Mod: CPTII,S$GLB,, | Performed by: INTERNAL MEDICINE

## 2023-06-20 PROCEDURE — 99999 PR PBB SHADOW E&M-EST. PATIENT-LVL IV: ICD-10-PCS | Mod: PBBFAC,,, | Performed by: INTERNAL MEDICINE

## 2023-06-20 PROCEDURE — 4010F ACE/ARB THERAPY RXD/TAKEN: CPT | Mod: CPTII,S$GLB,, | Performed by: INTERNAL MEDICINE

## 2023-06-20 RX ORDER — PROMETHAZINE HYDROCHLORIDE AND DEXTROMETHORPHAN HYDROBROMIDE 6.25; 15 MG/5ML; MG/5ML
5 SYRUP ORAL NIGHTLY PRN
Qty: 180 ML | Refills: 0 | Status: CANCELLED | OUTPATIENT
Start: 2023-06-20

## 2023-06-20 RX ORDER — DOXYCYCLINE 100 MG/1
100 CAPSULE ORAL 2 TIMES DAILY
Qty: 10 CAPSULE | Refills: 0 | Status: SHIPPED | OUTPATIENT
Start: 2023-06-20 | End: 2023-06-20 | Stop reason: SDUPTHER

## 2023-06-20 RX ORDER — PROMETHAZINE HYDROCHLORIDE AND CODEINE PHOSPHATE 6.25; 1 MG/5ML; MG/5ML
5 SOLUTION ORAL EVERY 4 HOURS PRN
Qty: 118 ML | Refills: 0 | Status: SHIPPED | OUTPATIENT
Start: 2023-06-20 | End: 2023-06-20 | Stop reason: SDUPTHER

## 2023-06-20 RX ORDER — DOXYCYCLINE 100 MG/1
100 CAPSULE ORAL 2 TIMES DAILY
Qty: 10 CAPSULE | Refills: 0 | Status: SHIPPED | OUTPATIENT
Start: 2023-06-20 | End: 2023-06-25

## 2023-06-20 RX ORDER — DOXYCYCLINE HYCLATE 100 MG
100 TABLET ORAL EVERY 12 HOURS
Qty: 14 TABLET | Refills: 0 | Status: CANCELLED | OUTPATIENT
Start: 2023-06-20 | End: 2023-06-27

## 2023-06-20 RX ORDER — PROMETHAZINE HYDROCHLORIDE AND CODEINE PHOSPHATE 6.25; 1 MG/5ML; MG/5ML
5 SOLUTION ORAL EVERY 4 HOURS PRN
Qty: 118 ML | Refills: 0 | Status: SHIPPED | OUTPATIENT
Start: 2023-06-20 | End: 2023-06-27

## 2023-06-20 NOTE — PROGRESS NOTES
"Subjective:      Patient ID: Miles Bowen is a 62 y.o. female.    Chief Complaint: Nasal Congestion    HPI    63 yo with   Patient Active Problem List   Diagnosis    Hypertension    Depression    Anxiety    GERD (gastroesophageal reflux disease)    Hiatal hernia    DDD (degenerative disc disease), lumbar    Common migraine    Allergic rhinitis    IBS (irritable bowel syndrome)    Multinodular goiter    MVP (mitral valve prolapse)    Nasal polyps    Obesity    Abnormal ECG    Palpitations    Atypical chest pain    Shortness of breath    Migraine with aura and without status migrainosus, not intractable     Past Medical History:   Diagnosis Date    Anxiety     Depression     GERD (gastroesophageal reflux disease)     Heart murmur     Hematemesis without nausea 11/4/2016    Hypertension     Multinodular goiter 12/23/2016    MVP (mitral valve prolapse)     Obesity      Here today c/o sinus problem   5 days of nasal congestion. Sinus pain and pressure. Yellow sinus d/c. Post nasal drip. Coridicin not helping.      Review of Systems   Constitutional:  Negative for chills and fever.   HENT:  Negative for ear pain and sore throat.    Respiratory:  Positive for cough. Negative for shortness of breath and wheezing.    Cardiovascular:  Negative for chest pain, palpitations and leg swelling.   Gastrointestinal:  Negative for abdominal pain and blood in stool.   Genitourinary:  Negative for dysuria and hematuria.   Neurological:  Negative for seizures and syncope.   Objective:   /84 (BP Location: Left arm, Patient Position: Sitting, BP Method: Medium (Manual))   Pulse 84   Temp 97.1 °F (36.2 °C) (Tympanic)   Ht 5' 2" (1.575 m)   Wt 86.9 kg (191 lb 9.3 oz)   SpO2 97%   BMI 35.04 kg/m²     Physical Exam  Constitutional:       General: She is awake.      Appearance: Normal appearance.   HENT:      Head: Normocephalic and atraumatic.      Right Ear: Tympanic membrane normal.      Left Ear: Tympanic membrane normal. "      Nose:      Right Nostril: No epistaxis.      Left Nostril: No epistaxis.      Right Turbinates: Swollen.      Left Turbinates: Swollen.      Right Sinus: Maxillary sinus tenderness and frontal sinus tenderness present.      Left Sinus: Maxillary sinus tenderness and frontal sinus tenderness present.      Mouth/Throat:      Pharynx: Posterior oropharyngeal erythema present. No pharyngeal swelling, oropharyngeal exudate or uvula swelling.   Eyes:      Conjunctiva/sclera: Conjunctivae normal.   Pulmonary:      Effort: Pulmonary effort is normal.   Musculoskeletal:      Cervical back: Normal range of motion.   Neurological:      Mental Status: She is alert. Mental status is at baseline.   Psychiatric:         Mood and Affect: Mood normal.         Behavior: Behavior normal. Behavior is cooperative.         Thought Content: Thought content normal.         Judgment: Judgment normal.       Lab Results   Component Value Date    WBC 9.31 05/01/2023    HGB 15.1 05/01/2023    HGB 12.4 10/12/2022    HGB 10.6 (L) 02/23/2022    HCT 47.7 05/01/2023    MCV 92 05/01/2023    MCV 92 10/12/2022    MCV 87 02/23/2022     05/01/2023    CHOL 248 (H) 03/21/2022    TRIG 99 03/21/2022     (H) 03/21/2022    LDLCALC 127.2 03/21/2022    LDLCALC 138.2 04/09/2021    LDLCALC 109 08/20/2015    ALT 15 05/01/2023    AST 15 05/01/2023     05/01/2023    K 3.0 (L) 05/01/2023    CALCIUM 9.9 05/01/2023     05/01/2023    CO2 23 05/01/2023    BUN 18 05/01/2023    CREATININE 0.9 05/01/2023    CREATININE 0.8 10/12/2022    CREATININE 0.8 02/23/2022    EGFRNORACEVR >60.0 05/01/2023    EGFRNORACEVR >60 10/12/2022    TSH 1.628 10/12/2022    TSH 0.554 03/21/2022    TSH 0.656 02/23/2022    GLU 80 05/01/2023    HGBA1C 5.7 (H) 03/21/2022    HGBA1C 5.8 (H) 04/09/2021    HGBA1C 6.0 10/16/2020          The ASCVD Risk score (Chuy NICOLAS, et al., 2019) failed to calculate for the following reasons:    The valid HDL cholesterol range is 20 to 100  mg/dL     Assessment:     1. Sinusitis, unspecified chronicity, unspecified location    2. Cough, unspecified type    3. Acute bacterial rhinosinusitis    4. Recurrent sinusitis      Plan:   1. Sinusitis, unspecified chronicity, unspecified location    2. Cough, unspecified type  -     POCT COVID-19 Rapid Screening    3. Acute bacterial rhinosinusitis  -     Discontinue: doxycycline (MONODOX) 100 MG capsule; Take 1 capsule (100 mg total) by mouth 2 (two) times daily. for 5 days  Dispense: 10 capsule; Refill: 0  -     Discontinue: doxycycline (MONODOX) 100 MG capsule; Take 1 capsule (100 mg total) by mouth 2 (two) times daily. for 5 days  Dispense: 10 capsule; Refill: 0  -     doxycycline (MONODOX) 100 MG capsule; Take 1 capsule (100 mg total) by mouth 2 (two) times daily. for 5 days  Dispense: 10 capsule; Refill: 0    4. Recurrent sinusitis  -     Discontinue: promethazine-codeine 6.25-10 mg/5 ml (PHENERGAN WITH CODEINE) 6.25-10 mg/5 mL syrup; Take 5 mLs by mouth every 4 (four) hours as needed for Cough.  Dispense: 118 mL; Refill: 0  -     Discontinue: promethazine-codeine 6.25-10 mg/5 ml (PHENERGAN WITH CODEINE) 6.25-10 mg/5 mL syrup; Take 5 mLs by mouth every 4 (four) hours as needed for Cough.  Dispense: 118 mL; Refill: 0  -     promethazine-codeine 6.25-10 mg/5 ml (PHENERGAN WITH CODEINE) 6.25-10 mg/5 mL syrup; Take 5 mLs by mouth every 4 (four) hours as needed for Cough.  Dispense: 118 mL; Refill: 0        There are no Patient Instructions on file for this visit.    Future Appointments   Date Time Provider Department Center   7/13/2023  2:40 PM Carlos Paul MD [facility]       Lab Frequency Next Occurrence   Mammo Digital Screening Bilat w/ Jim Once 08/24/2022   MRI Brain W WO Contrast Once 10/12/2022   Aspergillus fumagatus IgE Once 01/11/2023   Bermuda grass IgE Once 01/11/2023   Cat epithelium IgE Once 01/11/2023   Cladosporium IgE Once 01/11/2023   Cockroach, American IgE Once 01/11/2023    [place], bald IgE Once 01/11/2023   D. farinae IgE Once 01/11/2023   D. pteronyssinus IgE Once 01/11/2023   Dog dander IgE Once 01/11/2023   Plantain, English IgE Once 01/11/2023   Les grass IgE Once 01/11/2023   Marsh elder, rough IgE Once 01/11/2023   Mugwort IgE Once 01/11/2023   Nettle IgE Once 01/11/2023   [place], white IgE Once 01/11/2023   Penicillium IgE Once 01/11/2023   Ragweed, short, common IgE Once 01/11/2023   Ze IgE Once 01/11/2023   Allergen, Cocklebur Once 01/11/2023   Allergen, Elm [place] Once 01/11/2023   Allergen, Meadow Grass ([employer] Blue) Once 01/11/2023   Allergen, Mucor Racemosus Once 01/11/2023   Allergen, Pecan [place] IgE Once 01/11/2023   Allergen, White [place]elo Once 01/11/2023   Allergen-Alternaria Alternata Once 01/11/2023   Allergen-[place] Once 01/11/2023   Allergen-Common Pigweed Once 01/11/2023   Allergen-Silver Birch Once 01/11/2023   Feather Panel #2 Once 01/11/2023   RAST Allergen for Eastern [place] Once 01/11/2023   RAST Allergen Maple ([place]) Once 01/11/2023   RAST Allergen [place] Once 01/11/2023   RAST Allergen, Morris's Quarters Once 01/11/2023   RAST Allergen, Sheep Sorrel(Yellow Dock) Once 01/11/2023   CT Medtronic Sinuses without Once 01/11/2023   H. pylori antigen, stool Once 05/01/2023   Pancreatic elastase, fecal Once 05/01/2023   Fecal fat, qualitative Once 05/01/2023   Occult blood x 1, stool Once 05/01/2023   WBC, Stool Once 05/01/2023   Calprotectin, Stool Once 05/01/2023   Giardia / Cryptosporidum, EIA Once 05/01/2023   Stool Exam-Ova,Cysts,Parasites Once 05/01/2023   Clostridium difficile EIA Once 05/01/2023   Stool culture Once 05/01/2023   CT Abdomen Pelvis With Contrast Once 05/02/2023       No follow-ups on file.

## 2023-06-25 DIAGNOSIS — I10 ESSENTIAL HYPERTENSION: ICD-10-CM

## 2023-06-25 NOTE — TELEPHONE ENCOUNTER
No care due was identified.  Health [facility] Embedded Care Due Messages. Reference number: [id number].   6/25/2023 6:02:19 PM CDT

## 2023-06-26 DIAGNOSIS — I10 ESSENTIAL HYPERTENSION: ICD-10-CM

## 2023-06-26 RX ORDER — LOSARTAN POTASSIUM 100 MG/1
100 TABLET ORAL DAILY
Qty: 90 TABLET | Refills: 2 | Status: SHIPPED | OUTPATIENT
Start: 2023-06-26

## 2023-06-26 RX ORDER — LOSARTAN POTASSIUM 100 MG/1
100 TABLET ORAL DAILY
Qty: 90 TABLET | Refills: 3 | Status: CANCELLED | OUTPATIENT
Start: 2023-06-26

## 2023-06-26 NOTE — TELEPHONE ENCOUNTER
No care due was identified.  [facility] Embedded Care Due Messages. Reference number: [id number].   6/26/2023 5:59:32 PM CDT

## 2023-06-27 RX ORDER — PROMETHAZINE HYDROCHLORIDE 25 MG/1
25 TABLET ORAL EVERY 8 HOURS PRN
Qty: 30 TABLET | Refills: 0 | Status: SHIPPED | OUTPATIENT
Start: 2023-06-27 | End: 2023-08-13 | Stop reason: SDUPTHER

## 2023-06-27 NOTE — TELEPHONE ENCOUNTER
No care due was identified.  [facility] Embedded Care Due Messages. Reference number: [id number].   6/27/2023 6:17:43 AM CDT

## 2023-06-29 RX ORDER — BACLOFEN 10 MG/1
10 TABLET ORAL 2 TIMES DAILY PRN
Qty: 30 TABLET | Refills: 0 | Status: SHIPPED | OUTPATIENT
Start: 2023-06-29 | End: 2023-07-16 | Stop reason: SDUPTHER

## 2023-07-17 RX ORDER — BACLOFEN 10 MG/1
10 TABLET ORAL 2 TIMES DAILY PRN
Qty: 30 TABLET | Refills: 0 | Status: SHIPPED | OUTPATIENT
Start: 2023-07-17 | End: 2023-08-04 | Stop reason: SDUPTHER

## 2023-07-28 ENCOUNTER — TELEPHONE (OUTPATIENT)
Dept: PRIMARY CARE CLINIC | Facility: CLINIC | Age: 63
End: 2023-07-28
Payer: COMMERCIAL

## 2023-07-28 NOTE — TELEPHONE ENCOUNTER
----- Message from Peyton Reinoso sent at 7/28/2023  8:55 AM CDT -----  Contact: Miles  Patient is calling to speak with a nurse regarding appt . Reports having a sore throat and ear pain . Please give a call back at [phone] (

## 2023-08-02 ENCOUNTER — OFFICE VISIT (OUTPATIENT)
Dept: PRIMARY CARE CLINIC | Facility: CLINIC | Age: 63
End: 2023-08-02
Payer: COMMERCIAL

## 2023-08-02 VITALS
BODY MASS INDEX: 35.3 KG/M2 | TEMPERATURE: 97 F | DIASTOLIC BLOOD PRESSURE: 70 MMHG | SYSTOLIC BLOOD PRESSURE: 127 MMHG | WEIGHT: 191.81 LBS | OXYGEN SATURATION: 96 % | HEIGHT: 62 IN | HEART RATE: 75 BPM

## 2023-08-02 DIAGNOSIS — J01.10 ACUTE NON-RECURRENT FRONTAL SINUSITIS: Primary | ICD-10-CM

## 2023-08-02 PROCEDURE — 3074F SYST BP LT 130 MM HG: CPT | Mod: CPTII,S$GLB,, | Performed by: NURSE PRACTITIONER

## 2023-08-02 PROCEDURE — 1159F MED LIST DOCD IN RCRD: CPT | Mod: CPTII,S$GLB,, | Performed by: NURSE PRACTITIONER

## 2023-08-02 PROCEDURE — 99999 PR PBB SHADOW E&M-EST. PATIENT-LVL V: ICD-10-PCS | Mod: PBBFAC,,, | Performed by: NURSE PRACTITIONER

## 2023-08-02 PROCEDURE — 1160F PR REVIEW ALL MEDS BY PRESCRIBER/CLIN PHARMACIST DOCUMENTED: ICD-10-PCS | Mod: CPTII,S$GLB,, | Performed by: NURSE PRACTITIONER

## 2023-08-02 PROCEDURE — 99999 PR PBB SHADOW E&M-EST. PATIENT-LVL V: CPT | Mod: PBBFAC,,, | Performed by: NURSE PRACTITIONER

## 2023-08-02 PROCEDURE — 3078F PR MOST RECENT DIASTOLIC BLOOD PRESSURE < 80 MM HG: ICD-10-PCS | Mod: CPTII,S$GLB,, | Performed by: NURSE PRACTITIONER

## 2023-08-02 PROCEDURE — 4010F PR ACE/ARB THEARPY RXD/TAKEN: ICD-10-PCS | Mod: CPTII,S$GLB,, | Performed by: NURSE PRACTITIONER

## 2023-08-02 PROCEDURE — 3074F PR MOST RECENT SYSTOLIC BLOOD PRESSURE < 130 MM HG: ICD-10-PCS | Mod: CPTII,S$GLB,, | Performed by: NURSE PRACTITIONER

## 2023-08-02 PROCEDURE — 3008F BODY MASS INDEX DOCD: CPT | Mod: CPTII,S$GLB,, | Performed by: NURSE PRACTITIONER

## 2023-08-02 PROCEDURE — 3008F PR BODY MASS INDEX (BMI) DOCUMENTED: ICD-10-PCS | Mod: CPTII,S$GLB,, | Performed by: NURSE PRACTITIONER

## 2023-08-02 PROCEDURE — 1160F RVW MEDS BY RX/DR IN RCRD: CPT | Mod: CPTII,S$GLB,, | Performed by: NURSE PRACTITIONER

## 2023-08-02 PROCEDURE — 3078F DIAST BP <80 MM HG: CPT | Mod: CPTII,S$GLB,, | Performed by: NURSE PRACTITIONER

## 2023-08-02 PROCEDURE — 1159F PR MEDICATION LIST DOCUMENTED IN MEDICAL RECORD: ICD-10-PCS | Mod: CPTII,S$GLB,, | Performed by: NURSE PRACTITIONER

## 2023-08-02 PROCEDURE — 4010F ACE/ARB THERAPY RXD/TAKEN: CPT | Mod: CPTII,S$GLB,, | Performed by: NURSE PRACTITIONER

## 2023-08-02 PROCEDURE — 99213 OFFICE O/P EST LOW 20 MIN: CPT | Mod: S$GLB,,, | Performed by: NURSE PRACTITIONER

## 2023-08-02 PROCEDURE — 99213 PR OFFICE/OUTPT VISIT, EST, LEVL III, 20-29 MIN: ICD-10-PCS | Mod: S$GLB,,, | Performed by: NURSE PRACTITIONER

## 2023-08-02 RX ORDER — AZITHROMYCIN 250 MG/1
TABLET, FILM COATED ORAL
Qty: 6 EACH | Refills: 0 | Status: SHIPPED | OUTPATIENT
Start: 2023-08-02 | End: 2023-08-12

## 2023-08-02 NOTE — PATIENT INSTRUCTIONS
Continue Lozenges  Continue Tylenol  Use over the counter Mucinex as need  Start and complete ZPAK

## 2023-08-02 NOTE — PROGRESS NOTES
Chief Complaint  Chief Complaint   Patient presents with    Cough         HPI     HPI  Miles Bowen is a 62 y.o. female with medical diagnoses as listed in the medical history and problem list that presents for Acute Frontal Sinusitis. This patient is new to me.    Acute Frontal Sinusitis x 10 days: Symptoms are nasal congestion, frontal sinus pressure, ear fullness, post nasal drip, scratchy throat and productive cough. Endorses body aches. Denies fever, chills or night sweats. Negative home Covid testing approx four days ago. Has used coricidin, lozenges, Robitussin-DM and Tylenol with mild relief. Reports not liking the way nasal steroids and sprays tastes so she does not use them. Adverse reaction to oral steroids in the past. Reports allergies to cough syrup however, she has tolerated Robitussin-DM.     Pertinent negatives are chest pain/palpitations/tightness/discomfort, SOB, GI upset, urinary/bowel changes, unexplained weight loss/gain, dizziness/syncope.       History     PAST MEDICAL HISTORY:  Past Medical History:   Diagnosis Date    Anxiety     Depression     GERD (gastroesophageal reflux disease)     Heart murmur     Hematemesis without nausea 11/4/2016    Hypertension     Multinodular goiter 12/23/2016    MVP (mitral valve prolapse)     Obesity        PAST SURGICAL HISTORY:  Past Surgical History:   Procedure Laterality Date    BACK SURGERY  2016    Bone fusion    COLONOSCOPY  2014    FIXATION KYPHOPLASTY THORACIC SPINE  05/2016    HYSTERECTOMY  2004    ZULMA,BSO for endometriosis    knee replacement Right 11/2020    NASAL SINUS SURGERY  2015    Polyps removed    SINUS SURGERY  11/2014    SPINE SURGERY  NA    TONSILLECTOMY         SOCIAL HISTORY:  Social History     Socioeconomic History    Marital status:    Tobacco Use    Smoking status: Former     Current packs/day: 0.00     Average packs/day: 0.3 packs/day for 1 year (0.3 ttl pk-yrs)     Types: Cigarettes     Start date: 1/1/2005      Quit date: 2006     Years since quittin.5    Smokeless tobacco: Never   Substance and Sexual Activity    Alcohol use: Not Currently     Comment: rarely    Drug use: No    Sexual activity: Yes     Partners: Male     Birth control/protection: None, See Surgical Hx   Social History Narrative     with 1 adult child, no pets or smokers in household.     Social Determinants of Health     Financial Resource Strain: Unknown (2022)    Overall Financial Resource Strain (CARDIA)     Difficulty of Paying Living Expenses: Patient refused   Food Insecurity: Unknown (2022)    Hunger Vital Sign     Worried About Running Out of Food in the Last Year: Patient refused     Ran Out of Food in the Last Year: Patient refused   Transportation Needs: Unknown (2022)    PRAPARE - Transportation     Lack of Transportation (Medical): Patient refused     Lack of Transportation (Non-Medical): Patient refused   Physical Activity: Unknown (2022)    Exercise Vital Sign     Days of Exercise per Week: Patient refused   Stress: Unknown (2022)    [place] [employer] - Occupational Stress Questionnaire     Feeling of Stress : Patient refused   Social Connections: Unknown (2022)    Social Connection and Isolation Panel [NHANES]     Frequency of Communication with Friends and Family: Patient refused     Frequency of Social Gatherings with Friends and Family: Patient refused     Active Member of Clubs or Organizations: Patient refused     Attends Club or Organization Meetings: Patient refused     Marital Status: Patient refused   Housing Stability: Unknown (2022)    Housing Stability Vital Sign     Unable to Pay for Housing in the Last Year: Patient refused     Unstable Housing in the Last Year: Patient refused       FAMILY HISTORY:  Family History   Problem Relation Age of Onset    Hypertension Mother     Hypertension Father     Heart attack Father 63    Breast cancer Maternal  Grandmother     Colon cancer Neg Hx     Stomach cancer Neg Hx        ALLERGIES AND MEDICATIONS: updated and reviewed.  Review of patient's allergies indicates:   Allergen Reactions    Aimovig autoinjector [erenumab-aooe] Other (See Comments)     Neurological problems      Amoxicillin      GI upset/ stomach pain    Benadryl allergy decongestant     Ibuprofen Rash     GI upset    Nsaids (non-steroidal anti-inflammatory drug) Rash     GI Upset    Prednisone Palpitations    Singulair [montelukast] Palpitations    Tessalon perle [benzonatate] Palpitations     Current Outpatient Medications   Medication Sig Dispense Refill    ACETAMINOPHEN (TYLENOL EXTRA STRENGTH ORAL) Take 2 tablets by mouth every 4 to 6 hours as needed.      azithromycin (Z-MANFRED) 250 MG tablet Take 2 tablets by mouth on day 1; Take 1 tablet by mouth on days 2-5 6 each 0    baclofen (LIORESAL) 10 MG tablet Take 1 tablet (10 mg total) by mouth 2 (two) times daily as needed (muscle spasm). 30 tablet 0    diazePAM (VALIUM) 10 MG Tab every 12 (twelve) hours as needed.      dicyclomine (BENTYL) 10 MG capsule Take 1 capsule by mouth 4 times daily before meals and nightly prn 40 capsule 0    diphenoxylate-atropine 2.5-0.025 mg (LOMOTIL) 2.5-0.025 mg per tablet Take 1 tablet by mouth 4 (four) times daily as needed for Diarrhea. 30 tablet 0    duloxetine (CYMBALTA) 60 MG capsule Take 1 capsule by mouth once daily.   0    estradioL (ESTRACE) 0.5 MG tablet Take 1 tablet (0.5 mg total) by mouth once daily. 30 tablet 11    hydroCHLOROthiazide (HYDRODIURIL) 12.5 MG Tab Take 1 tablet (12.5 mg total) by mouth once daily. 90 tablet 0    HYDROcodone-acetaminophen (NORCO) 5-325 mg per tablet Take 1 tablet by mouth once a day as needed for pain 30 tablet 0    HYDROcodone-acetaminophen (NORCO) 5-325 mg per tablet Take 1 tablet by mouth once a day as needed for pain 30 tablet 0    HYDROcodone-acetaminophen (NORCO) 5-325 mg per tablet Take 1 tablet by mouth once daily as needed  "for pain 30 tablet 0    LORazepam (ATIVAN) 2 MG Tab SMARTSI Tablet(s) By Mouth Every 8-10 Hours PRN      losartan (COZAAR) 100 MG tablet Take 1 tablet (100 mg total) by mouth once daily. 90 tablet 2    pantoprazole (PROTONIX) 40 MG tablet Take 1 tablet (40 mg total) by mouth once daily. 90 tablet 0    potassium chloride SA (K-DUR,KLOR-CON) 20 MEQ tablet Take 1 tablet (20 mEq total) by mouth once daily. 90 tablet 2    promethazine (PHENERGAN) 25 MG tablet Take 1 tablet (25 mg total) by mouth every 8 (eight) hours as needed for Nausea. 30 tablet 0    rimegepant 75 mg odt Take 1 tablet (75 mg total) by mouth every other day. Place ODT tablet on or under the tongue. 16 tablet 5    topiramate (TOPAMAX) 25 MG tablet Take 1 tablet (25 mg total) by mouth every morning AND 2 tablets (50 mg total) every evening. 90 tablet 6    zolpidem (AMBIEN CR) 12.5 MG CR tablet Take 12.5 mg by mouth nightly.       No current facility-administered medications for this visit.           Exam     ROS  Review of Systems   Constitutional:  Negative for appetite change, chills, fatigue and fever.   HENT:  Positive for congestion, postnasal drip, sinus pressure, sinus pain and sore throat. Negative for ear pain, rhinorrhea and sneezing.    Respiratory:  Positive for cough. Negative for shortness of breath.    Cardiovascular:  Negative for chest pain and palpitations.   Gastrointestinal:  Negative for abdominal pain, constipation, diarrhea, nausea and vomiting.   Genitourinary:  Negative for dysuria.   Musculoskeletal:  Negative for arthralgias.   Neurological:  Negative for headaches.           Physical Exam  Vitals:    23 1307   BP: 127/70   Pulse: 75   Temp: 97.3 °F (36.3 °C)   SpO2: 96%   Weight: 87 kg (191 lb 12.8 oz)   Height: 5' 2" (1.575 m)    Body mass index is 35.08 kg/m².  Weight: 87 kg (191 lb 12.8 oz)   Height: 5' 2" (157.5 cm)   Physical Exam  Constitutional:       General: She is not in acute distress.     Appearance: " Normal appearance.   HENT:      Head: Normocephalic and atraumatic.      Right Ear: External ear normal.      Left Ear: External ear normal.      Nose: Congestion present.      Right Sinus: Frontal sinus tenderness present.      Left Sinus: Frontal sinus tenderness present.      Mouth/Throat:      Pharynx: Posterior oropharyngeal erythema present.   Eyes:      Pupils: Pupils are equal, round, and reactive to light.   Cardiovascular:      Rate and Rhythm: Normal rate and regular rhythm.   Pulmonary:      Effort: Pulmonary effort is normal. No respiratory distress.      Breath sounds: Normal breath sounds. No wheezing.   Abdominal:      General: Bowel sounds are normal.      Palpations: Abdomen is soft.   Musculoskeletal:      Cervical back: Neck supple.   Lymphadenopathy:      Cervical: No cervical adenopathy.   Skin:     General: Skin is warm and dry.   Neurological:      Mental Status: She is alert and oriented to person, place, and time.             Health Maintenance         Date Due Completion Date    Shingles Vaccine (1 of 2) Never done ---    COVID-19 Vaccine (5 - Pfizer series) 01/07/2022 11/12/2021    Mammogram 04/13/2022 4/13/2021    Influenza Vaccine (1) 09/01/2023 9/7/2022    Colorectal Cancer Screening 01/30/2025 1/30/2015    Hemoglobin A1c (Diabetic Prevention Screening) 03/21/2025 3/21/2022    Pap Smear 12/02/2025 12/2/2022    Lipid Panel 03/21/2027 3/21/2022    TETANUS VACCINE 02/01/2031 2/1/2021              Assessment & Plan     Assessment & Plan  Problem List Items Addressed This Visit    None  Visit Diagnoses       Acute non-recurrent frontal sinusitis    -  Primary  -We discussed effective administration of Z-MANFRED, adverse effects and side effects  - Continue Robitussin-DM as prescribed    Relevant Medications    azithromycin (Z-MANFRED) 250 MG tablet              Health Maintenance reviewed,: Deferred at this time    Follow-up: If symptoms worsen or fail to improve     30+ minutes of total time spent  on the encounter, which includes face to face time and non-face to face time preparing to see the patient (eg, review of tests), Obtaining and/or reviewing separately obtained history, documenting clinical information in the electronic or other health record, independently interpreting results (not separately reported) and communicating results to the patient/family/caregiver, or Care coordination (not separately reported).

## 2023-08-03 ENCOUNTER — E-VISIT (OUTPATIENT)
Dept: INTERNAL MEDICINE | Facility: CLINIC | Age: 63
End: 2023-08-03
Payer: COMMERCIAL

## 2023-08-03 ENCOUNTER — PATIENT MESSAGE (OUTPATIENT)
Dept: INTERNAL MEDICINE | Facility: CLINIC | Age: 63
End: 2023-08-03

## 2023-08-03 DIAGNOSIS — J01.90 ACUTE SINUSITIS, RECURRENCE NOT SPECIFIED, UNSPECIFIED LOCATION: Primary | ICD-10-CM

## 2023-08-03 PROCEDURE — 99499 UNLISTED E&M SERVICE: CPT | Mod: ,,, | Performed by: PHYSICIAN ASSISTANT

## 2023-08-03 PROCEDURE — 99499 NO LOS: ICD-10-PCS | Mod: ,,, | Performed by: PHYSICIAN ASSISTANT

## 2023-08-03 RX ORDER — DOXYCYCLINE 100 MG/1
100 CAPSULE ORAL 2 TIMES DAILY
Qty: 20 CAPSULE | Refills: 0 | Status: SHIPPED | OUTPATIENT
Start: 2023-08-03 | End: 2023-08-17

## 2023-08-03 NOTE — PROGRESS NOTES
Patient ID: Miles Bowen is a 62 y.o. female.    Chief Complaint: URI (Entered automatically based on patient selection in Patient Portal.)    The patient initiated a request through [employer] on 8/3/2023 for evaluation and management with a chief complaint of URI (Entered automatically based on patient selection in Patient Portal.)     I evaluated the questionnaire submission on 8/3/2023.    Ohs Peq Evisit Upper Respitatory/Cough Questionnaire    8/3/2023 12:29 PM CDT - Filed by Patient   Do you agree to participate in an E-Visit? Yes   If you have any of the following symptoms, please present to your local ER or call 911:  I acknowledge   What is the main issue that you would like for your doctor to address today? Coughing congesrion Swvere headache tiredness   Are you able to take your vital signs? No   What symptoms do you currently have?  Cough;  Fatigue;  Headache;  Nasal Congestion;  New loss of taste or smell;  Runny nose;  Sore throat;  Pain around the nose and face;  Ear pain   Describe your cough: Dry;  Bothersome (interferes with daily activities)   Have you had any of the following? Difficulty swallowing;  Difficulty seeing, or seeing blurry or double   Please enter a few details about your swallowing, breathing, or visual problems. Swallowing due to sore throat   Have you ever smoked? I have smoked in the past   Have you had a fever? Yes   What has been the range of your fever? Less than 100.4   When did your symptoms first appear? 7/24/2023   In the last two weeks, have you been in close contact with someone who has COVID-19 or the Flu? Yes, Flu   In the last two weeks, have you worked or volunteered in a healthcare facility or as a ? Healthcare facilities include a hospital, medical or dental clinic, long-term care facility, or nursing home No   Do you live in a long-term care facility, nursing home, group home, or homeless shelter? No   List what you have done or taken to help  your symptoms. Tylenol, Coriciden,OTC Cough Medicine, Cough Drops   How severe are your symptoms? Severe   Have your symptoms improved since they first appeared? Worse   Have you taken an at home Covid test? Yes   What were the results? Negative   Have you taken a Flu test? No   Have you been fully vaccinated for COVID? (2 Pfizer, 2 Moderna or 1 Les & Les vaccine injections) Yes   Have you received a booster? No   Have you recieved a Flu shot? No   Do you have transportation to get tested for COVID if it is indicated and ordered for you at an [facility] location? No   Provide any information you feel is important to your history not asked above    Please attach any relevant images or files          Recent Labs Obtained:  No visits with results within 7 Day(s) from this visit.   Latest known visit with results is:   Office Visit on 2023   Component Date Value Ref Range Status    POC Rapid COVID 2023 Negative  Negative Final     Acceptable 2023 Yes   Final       Encounter Diagnosis   Name Primary?    Acute sinusitis, recurrence not specified, unspecified location Yes        No orders of the defined types were placed in this encounter.     Medications Ordered This Encounter   Medications    doxycycline (MONODOX) 100 MG capsule     Sig: Take 1 capsule (100 mg total) by mouth 2 (two) times daily. Do not take with milk or yogurt for 10 days     Dispense:  20 capsule     Refill:  0        Follow up if symptoms worsen or fail to improve.      E-Visit Time Trackin mins

## 2023-08-04 ENCOUNTER — PATIENT MESSAGE (OUTPATIENT)
Dept: INTERNAL MEDICINE | Facility: CLINIC | Age: 63
End: 2023-08-04
Payer: COMMERCIAL

## 2023-08-04 DIAGNOSIS — K58.2 IRRITABLE BOWEL SYNDROME WITH BOTH CONSTIPATION AND DIARRHEA: ICD-10-CM

## 2023-08-04 DIAGNOSIS — R10.9 ABDOMINAL PAIN, ACUTE: ICD-10-CM

## 2023-08-04 RX ORDER — BACLOFEN 10 MG/1
10 TABLET ORAL 2 TIMES DAILY PRN
Qty: 30 TABLET | Refills: 0 | Status: SHIPPED | OUTPATIENT
Start: 2023-08-04 | End: 2023-08-26 | Stop reason: SDUPTHER

## 2023-08-04 RX ORDER — DICYCLOMINE HYDROCHLORIDE 10 MG/1
CAPSULE ORAL
Qty: 40 CAPSULE | Refills: 1 | Status: SHIPPED | OUTPATIENT
Start: 2023-08-04 | End: 2024-03-07 | Stop reason: SDUPTHER

## 2023-08-13 DIAGNOSIS — I10 ESSENTIAL HYPERTENSION: ICD-10-CM

## 2023-08-13 RX ORDER — HYDROCHLOROTHIAZIDE 12.5 MG/1
12.5 TABLET ORAL DAILY
Qty: 90 TABLET | Refills: 1 | Status: SHIPPED | OUTPATIENT
Start: 2023-08-13 | End: 2024-02-22 | Stop reason: SDUPTHER

## 2023-08-13 NOTE — TELEPHONE ENCOUNTER
No care due was identified.  [facility] Embedded Care Due Messages. Reference number: [id number].   8/13/2023 4:54:37 AM CDT

## 2023-08-13 NOTE — TELEPHONE ENCOUNTER
Refill Routing Note   Medication(s) are not appropriate for processing by [facility] for the following reason(s):      Required labs abnormal    [facility] action(s):  Defer Care Due:  None identified            Appointments  past 12m or future 3m with PCP    Date Provider   Last Visit   6/20/2023 Carlos Paul MD   Next Visit   11/1/2023 Carlos Paul MD   ED visits in past 90 days: 0        Note composed:1:32 PM 08/13/2023

## 2023-08-13 NOTE — TELEPHONE ENCOUNTER
No care due was identified.  [facility] Embedded Care Due Messages. Reference number: [id number].   8/13/2023 4:53:20 AM CDT

## 2023-08-14 RX ORDER — PROMETHAZINE HYDROCHLORIDE 25 MG/1
25 TABLET ORAL EVERY 8 HOURS PRN
Qty: 30 TABLET | Refills: 0 | Status: SHIPPED | OUTPATIENT
Start: 2023-08-14 | End: 2023-10-02 | Stop reason: SDUPTHER

## 2023-08-24 ENCOUNTER — LAB VISIT (OUTPATIENT)
Dept: LAB | Facility: HOSPITAL | Age: 63
End: 2023-08-24
Attending: INTERNAL MEDICINE
Payer: COMMERCIAL

## 2023-08-24 ENCOUNTER — OFFICE VISIT (OUTPATIENT)
Dept: INTERNAL MEDICINE | Facility: CLINIC | Age: 63
End: 2023-08-24
Payer: COMMERCIAL

## 2023-08-24 VITALS
TEMPERATURE: 98 F | OXYGEN SATURATION: 97 % | DIASTOLIC BLOOD PRESSURE: 80 MMHG | SYSTOLIC BLOOD PRESSURE: 116 MMHG | WEIGHT: 192.25 LBS | BODY MASS INDEX: 35.38 KG/M2 | HEIGHT: 62 IN | HEART RATE: 70 BPM

## 2023-08-24 DIAGNOSIS — R10.13 EPIGASTRIC PAIN: Primary | ICD-10-CM

## 2023-08-24 DIAGNOSIS — R10.13 EPIGASTRIC PAIN: ICD-10-CM

## 2023-08-24 DIAGNOSIS — R05.9 COUGH, UNSPECIFIED TYPE: ICD-10-CM

## 2023-08-24 LAB
ALBUMIN SERPL BCP-MCNC: 3.7 G/DL (ref 3.5–5.2)
ALP SERPL-CCNC: 72 U/L (ref 55–135)
ALT SERPL W/O P-5'-P-CCNC: 16 U/L (ref 10–44)
ANION GAP SERPL CALC-SCNC: 9 MMOL/L (ref 8–16)
AST SERPL-CCNC: 12 U/L (ref 10–40)
BASOPHILS # BLD AUTO: 0.05 K/UL (ref 0–0.2)
BASOPHILS NFR BLD: 0.8 % (ref 0–1.9)
BILIRUB SERPL-MCNC: 0.3 MG/DL (ref 0.1–1)
BUN SERPL-MCNC: 13 MG/DL (ref 8–23)
CALCIUM SERPL-MCNC: 9.2 MG/DL (ref 8.7–10.5)
CHLORIDE SERPL-SCNC: 112 MMOL/L (ref 95–110)
CO2 SERPL-SCNC: 21 MMOL/L (ref 23–29)
CREAT SERPL-MCNC: 0.7 MG/DL (ref 0.5–1.4)
CTP QC/QA: YES
DIFFERENTIAL METHOD: ABNORMAL
EOSINOPHIL # BLD AUTO: 0.1 K/UL (ref 0–0.5)
EOSINOPHIL NFR BLD: 1.6 % (ref 0–8)
ERYTHROCYTE [DISTWIDTH] IN BLOOD BY AUTOMATED COUNT: 14.6 % (ref 11.5–14.5)
EST. GFR  (NO RACE VARIABLE): >60 ML/MIN/1.73 M^2
GLUCOSE SERPL-MCNC: 74 MG/DL (ref 70–110)
HCT VFR BLD AUTO: 39.5 % (ref 37–48.5)
HGB BLD-MCNC: 12.5 G/DL (ref 12–16)
IMM GRANULOCYTES # BLD AUTO: 0.02 K/UL (ref 0–0.04)
IMM GRANULOCYTES NFR BLD AUTO: 0.3 % (ref 0–0.5)
LIPASE SERPL-CCNC: 22 U/L (ref 4–60)
LYMPHOCYTES # BLD AUTO: 2.3 K/UL (ref 1–4.8)
LYMPHOCYTES NFR BLD: 36 % (ref 18–48)
MCH RBC QN AUTO: 28.5 PG (ref 27–31)
MCHC RBC AUTO-ENTMCNC: 31.6 G/DL (ref 32–36)
MCV RBC AUTO: 90 FL (ref 82–98)
MONOCYTES # BLD AUTO: 0.3 K/UL (ref 0.3–1)
MONOCYTES NFR BLD: 5 % (ref 4–15)
NEUTROPHILS # BLD AUTO: 3.6 K/UL (ref 1.8–7.7)
NEUTROPHILS NFR BLD: 56.3 % (ref 38–73)
NRBC BLD-RTO: 0 /100 WBC
PLATELET # BLD AUTO: 365 K/UL (ref 150–450)
PMV BLD AUTO: 9.6 FL (ref 9.2–12.9)
POTASSIUM SERPL-SCNC: 4 MMOL/L (ref 3.5–5.1)
PROT SERPL-MCNC: 6.7 G/DL (ref 6–8.4)
RBC # BLD AUTO: 4.38 M/UL (ref 4–5.4)
SARS-COV-2 RDRP RESP QL NAA+PROBE: NEGATIVE
SODIUM SERPL-SCNC: 142 MMOL/L (ref 136–145)
WBC # BLD AUTO: 6.44 K/UL (ref 3.9–12.7)

## 2023-08-24 PROCEDURE — 87635 SARS-COV-2 COVID-19 AMP PRB: CPT | Mod: QW,S$GLB,, | Performed by: INTERNAL MEDICINE

## 2023-08-24 PROCEDURE — 36415 COLL VENOUS BLD VENIPUNCTURE: CPT | Performed by: INTERNAL MEDICINE

## 2023-08-24 PROCEDURE — 99999 PR PBB SHADOW E&M-EST. PATIENT-LVL V: ICD-10-PCS | Mod: PBBFAC,,, | Performed by: INTERNAL MEDICINE

## 2023-08-24 PROCEDURE — 3079F PR MOST RECENT DIASTOLIC BLOOD PRESSURE 80-89 MM HG: ICD-10-PCS | Mod: CPTII,S$GLB,, | Performed by: INTERNAL MEDICINE

## 2023-08-24 PROCEDURE — 83690 ASSAY OF LIPASE: CPT | Performed by: INTERNAL MEDICINE

## 2023-08-24 PROCEDURE — 1159F MED LIST DOCD IN RCRD: CPT | Mod: CPTII,S$GLB,, | Performed by: INTERNAL MEDICINE

## 2023-08-24 PROCEDURE — 99999 PR PBB SHADOW E&M-EST. PATIENT-LVL V: CPT | Mod: PBBFAC,,, | Performed by: INTERNAL MEDICINE

## 2023-08-24 PROCEDURE — 4010F PR ACE/ARB THEARPY RXD/TAKEN: ICD-10-PCS | Mod: CPTII,S$GLB,, | Performed by: INTERNAL MEDICINE

## 2023-08-24 PROCEDURE — 3008F BODY MASS INDEX DOCD: CPT | Mod: CPTII,S$GLB,, | Performed by: INTERNAL MEDICINE

## 2023-08-24 PROCEDURE — 80053 COMPREHEN METABOLIC PANEL: CPT | Performed by: INTERNAL MEDICINE

## 2023-08-24 PROCEDURE — 99214 PR OFFICE/OUTPT VISIT, EST, LEVL IV, 30-39 MIN: ICD-10-PCS | Mod: S$GLB,,, | Performed by: INTERNAL MEDICINE

## 2023-08-24 PROCEDURE — 87635: ICD-10-PCS | Mod: QW,S$GLB,, | Performed by: INTERNAL MEDICINE

## 2023-08-24 PROCEDURE — 86677 HELICOBACTER PYLORI ANTIBODY: CPT | Performed by: INTERNAL MEDICINE

## 2023-08-24 PROCEDURE — 3074F PR MOST RECENT SYSTOLIC BLOOD PRESSURE < 130 MM HG: ICD-10-PCS | Mod: CPTII,S$GLB,, | Performed by: INTERNAL MEDICINE

## 2023-08-24 PROCEDURE — 99214 OFFICE O/P EST MOD 30 MIN: CPT | Mod: S$GLB,,, | Performed by: INTERNAL MEDICINE

## 2023-08-24 PROCEDURE — 3008F PR BODY MASS INDEX (BMI) DOCUMENTED: ICD-10-PCS | Mod: CPTII,S$GLB,, | Performed by: INTERNAL MEDICINE

## 2023-08-24 PROCEDURE — 3079F DIAST BP 80-89 MM HG: CPT | Mod: CPTII,S$GLB,, | Performed by: INTERNAL MEDICINE

## 2023-08-24 PROCEDURE — 1159F PR MEDICATION LIST DOCUMENTED IN MEDICAL RECORD: ICD-10-PCS | Mod: CPTII,S$GLB,, | Performed by: INTERNAL MEDICINE

## 2023-08-24 PROCEDURE — 4010F ACE/ARB THERAPY RXD/TAKEN: CPT | Mod: CPTII,S$GLB,, | Performed by: INTERNAL MEDICINE

## 2023-08-24 PROCEDURE — 85025 COMPLETE CBC W/AUTO DIFF WBC: CPT | Performed by: INTERNAL MEDICINE

## 2023-08-24 PROCEDURE — 3074F SYST BP LT 130 MM HG: CPT | Mod: CPTII,S$GLB,, | Performed by: INTERNAL MEDICINE

## 2023-08-24 RX ORDER — FAMOTIDINE 20 MG/1
20 TABLET, FILM COATED ORAL 2 TIMES DAILY
Qty: 60 TABLET | Refills: 0 | Status: SHIPPED | OUTPATIENT
Start: 2023-08-24 | End: 2023-10-01 | Stop reason: SDUPTHER

## 2023-08-24 NOTE — PROGRESS NOTES
"Subjective:      Patient ID: Miles Bowen is a 63 y.o. female.    Chief Complaint: Nausea    HPI    62 yo with   Patient Active Problem List   Diagnosis    Hypertension    Depression    Anxiety    GERD (gastroesophageal reflux disease)    Hiatal hernia    DDD (degenerative disc disease), lumbar    Common migraine    Allergic rhinitis    IBS (irritable bowel syndrome)    Multinodular goiter    MVP (mitral valve prolapse)    Nasal polyps    Obesity    Abnormal ECG    Palpitations    Atypical chest pain    Shortness of breath    Migraine with aura and without status migrainosus, not intractable     Past Medical History:   Diagnosis Date    Anxiety     Depression     GERD (gastroesophageal reflux disease)     Heart murmur     Hematemesis without nausea 11/4/2016    Hypertension     Multinodular goiter 12/23/2016    MVP (mitral valve prolapse)     Obesity      C/o 2 weeks ago beginning with epigastric pain , nausea, emesis. No hematemesis. Lasted a few days then pain sig improved. Continues with mild nausea without emesis. Pain worsened again this am. Also had diarrhea yesterday and today. No foreign travel. No sick contacts. No raw foods.     Also reports cough and post nasal drip x 2 days. No sinus pain.     Review of Systems   Constitutional:  Negative for chills and fever.   HENT:  Positive for postnasal drip. Negative for ear pain, sinus pain, sore throat and trouble swallowing.    Respiratory:  Negative for cough.    Cardiovascular:  Negative for chest pain.   Gastrointestinal:  Negative for abdominal pain and blood in stool.   Genitourinary:  Negative for dysuria and hematuria.   Neurological:  Negative for seizures and syncope.     Objective:   /80 (BP Location: Right arm, Patient Position: Sitting, BP Method: Large (Manual))   Pulse 70   Temp 97.8 °F (36.6 °C) (Tympanic)   Ht 5' 2" (1.575 m)   Wt 87.2 kg (192 lb 3.9 oz)   SpO2 97%   BMI 35.16 kg/m²     Physical Exam  Constitutional:       " General: She is awake.      Appearance: Normal appearance.   HENT:      Head: Normocephalic and atraumatic.      Mouth/Throat:      Mouth: Mucous membranes are moist.      Pharynx: Posterior oropharyngeal erythema present. No pharyngeal swelling, oropharyngeal exudate or uvula swelling.   Eyes:      Conjunctiva/sclera: Conjunctivae normal.   Pulmonary:      Effort: Pulmonary effort is normal.   Abdominal:      General: Bowel sounds are normal.      Tenderness: There is no abdominal tenderness. There is no guarding or rebound.   Musculoskeletal:      Cervical back: Normal range of motion.   Neurological:      Mental Status: She is alert. Mental status is at baseline.   Psychiatric:         Mood and Affect: Mood normal.         Behavior: Behavior normal. Behavior is cooperative.         Thought Content: Thought content normal.         Judgment: Judgment normal.         Lab Results   Component Value Date    WBC 6.44 08/24/2023    HGB 12.5 08/24/2023    HGB 15.1 05/01/2023    HGB 12.4 10/12/2022    HCT 39.5 08/24/2023    MCV 90 08/24/2023    MCV 92 05/01/2023    MCV 92 10/12/2022     08/24/2023    CHOL 248 (H) 03/21/2022    TRIG 99 03/21/2022     (H) 03/21/2022    LDLCALC 127.2 03/21/2022    LDLCALC 138.2 04/09/2021    LDLCALC 109 08/20/2015    ALT 16 08/24/2023    AST 12 08/24/2023     08/24/2023    K 4.0 08/24/2023    CALCIUM 9.2 08/24/2023     (H) 08/24/2023    CO2 21 (L) 08/24/2023    BUN 13 08/24/2023    CREATININE 0.7 08/24/2023    CREATININE 0.9 05/01/2023    CREATININE 0.8 10/12/2022    EGFRNORACEVR >60.0 08/24/2023    EGFRNORACEVR >60.0 05/01/2023    EGFRNORACEVR >60 10/12/2022    TSH 1.628 10/12/2022    TSH 0.554 03/21/2022    TSH 0.656 02/23/2022    GLU 74 08/24/2023    HGBA1C 5.7 (H) 03/21/2022    HGBA1C 5.8 (H) 04/09/2021    HGBA1C 6.0 10/16/2020          The ASCVD Risk score (Chuy NICOLAS, et al., 2019) failed to calculate for the following reasons:    The valid HDL cholesterol range  is 20 to 100 mg/dL     Assessment:     1. Epigastric pain    2. Cough, unspecified type      Plan:   1. Epigastric pain  -     H. PYLORI ANTIBODY, IGG; Future; Expected date: 08/24/2023  -     LIPASE; Future; Expected date: 08/24/2023  -     famotidine (PEPCID) 20 MG tablet; Take 1 tablet (20 mg total) by mouth 2 (two) times daily.  Dispense: 60 tablet; Refill: 0  -     Comprehensive Metabolic Panel; Future; Expected date: 08/24/2023  -     CBC Auto Differential; Future; Expected date: 08/24/2023    2. Cough, unspecified type  -     POCT COVID-19 Rapid Screening        There are no Patient Instructions on file for this visit.    Future Appointments   Date Time Provider Department Center   8/29/2023 12:40 PM Carlos Paul MD [facility]  [place]   11/1/2023  8:40 AM Carlos Paul MD [facility]  [place]       Lab Frequency Next Occurrence   Mammo Digital Screening Bilat w/ Jim Once 08/24/2022   MRI Brain W WO Contrast Once 10/12/2022   Aspergillus fumagatus IgE Once 01/11/2023   Bermuda grass IgE Once 01/11/2023   Cat epithelium IgE Once 01/11/2023   Cladosporium IgE Once 01/11/2023   Cockroach, American IgE Once 01/11/2023   Moore, bald IgE Once 01/11/2023   D. farinae IgE Once 01/11/2023   D. pteronyssinus IgE Once 01/11/2023   Dog dander IgE Once 01/11/2023   Plantain, English IgE Once 01/11/2023   Les grass IgE Once 01/11/2023   Marsh elder, rough IgE Once 01/11/2023   Mugwort IgE Once 01/11/2023   Nettle IgE Once 01/11/2023   [place], white IgE Once 01/11/2023   Penicillium IgE Once 01/11/2023   Ragweed, short, common IgE Once 01/11/2023   Ze IgE Once 01/11/2023   Allergen, Cocklebur Once 01/11/2023   Allergen, Elm [place] Once 01/11/2023   Allergen, Meadow Grass ([place] Blue) Once 01/11/2023   Allergen, Mucor Racemosus Once 01/11/2023   Allergen, Pecan [place] IgE Once 01/11/2023   Allergen, White Carmelo Once 01/11/2023   Allergen-Alternaria Alternata Once 01/11/2023   Allergen-Pamlico Once 01/11/2023    Allergen-Common Pigweed Once 01/11/2023   Allergen-Silver Birch Once 01/11/2023   Feather Panel #2 Once 01/11/2023   RAST Allergen for Eastern [place] Once 01/11/2023   RAST Allergen Maple ([place]) Once 01/11/2023   RAST Allergen [place]le Once 01/11/2023   RAST Allergen, [place] Once 01/11/2023   RAST Allergen, Sheep Sorrel(Yellow Dock) Once 01/11/2023   CT Medtronic Sinuses without Once 01/11/2023   H. pylori antigen, stool Once 05/01/2023   Pancreatic elastase, fecal Once 05/01/2023   Fecal fat, qualitative Once 05/01/2023   Occult blood x 1, stool Once 05/01/2023   WBC, Stool Once 05/01/2023   Calprotectin, Stool Once 05/01/2023   Giardia / Cryptosporidum, EIA Once 05/01/2023   Stool Exam-Ova,Cysts,Parasites Once 05/01/2023   Clostridium difficile EIA Once 05/01/2023   Stool culture Once 05/01/2023   CT Abdomen Pelvis With Contrast Once 05/02/2023   H. PYLORI ANTIBODY, IGG Once 08/24/2023   LIPASE Once 08/24/2023       No follow-ups on file.

## 2023-08-25 ENCOUNTER — PATIENT MESSAGE (OUTPATIENT)
Dept: INTERNAL MEDICINE | Facility: CLINIC | Age: 63
End: 2023-08-25
Payer: COMMERCIAL

## 2023-08-25 DIAGNOSIS — R10.13 EPIGASTRIC PAIN: Primary | ICD-10-CM

## 2023-08-25 LAB — H PYLORI IGG SERPL QL IA: NEGATIVE

## 2023-08-25 NOTE — LETTER
August 28, 2023      The [address]  [address] [address] [facility]  [place] [address]  Phone: [phone]  Fax: [phone]       Patient: Miles Bowen   YOB: 1960  Date of Visit: 08/28/2023    To Whom It May Concern:    Valerie Bowen  was at [facility] on 08/24/23. The patient may return to work/school on 08/26/23. If you have any questions or concerns, or if I can be of further assistance, please do not hesitate to contact me.    Sincerely,    Ellen Ellison LPN

## 2023-08-28 RX ORDER — BACLOFEN 10 MG/1
10 TABLET ORAL 2 TIMES DAILY PRN
Qty: 30 TABLET | Refills: 0 | Status: SHIPPED | OUTPATIENT
Start: 2023-08-28 | End: 2023-09-20 | Stop reason: SDUPTHER

## 2023-09-03 DIAGNOSIS — K21.9 GASTROESOPHAGEAL REFLUX DISEASE: ICD-10-CM

## 2023-09-03 NOTE — TELEPHONE ENCOUNTER
No care due was identified.  [facility] Embedded Care Due Messages. Reference number: [id number].   9/03/2023 7:01:06 AM CDT

## 2023-09-04 RX ORDER — PANTOPRAZOLE SODIUM 40 MG/1
40 TABLET, DELAYED RELEASE ORAL DAILY
Qty: 90 TABLET | Refills: 3 | Status: SHIPPED | OUTPATIENT
Start: 2023-09-04

## 2023-09-04 NOTE — TELEPHONE ENCOUNTER
Refill Decision Note   Miles Jessi  is requesting a refill authorization.  Brief Assessment and Rationale for Refill:  Approve     Medication Therapy Plan:         Comments:     Note composed:4:02 AM 09/04/2023

## 2023-09-06 ENCOUNTER — PATIENT MESSAGE (OUTPATIENT)
Dept: GASTROENTEROLOGY | Facility: CLINIC | Age: 63
End: 2023-09-06

## 2023-09-06 ENCOUNTER — TELEPHONE (OUTPATIENT)
Dept: INTERNAL MEDICINE | Facility: CLINIC | Age: 63
End: 2023-09-06
Payer: COMMERCIAL

## 2023-09-06 ENCOUNTER — OFFICE VISIT (OUTPATIENT)
Dept: GASTROENTEROLOGY | Facility: CLINIC | Age: 63
End: 2023-09-06
Payer: COMMERCIAL

## 2023-09-06 DIAGNOSIS — R10.13 EPIGASTRIC ABDOMINAL PAIN: Primary | ICD-10-CM

## 2023-09-06 DIAGNOSIS — R10.13 EPIGASTRIC PAIN: ICD-10-CM

## 2023-09-06 PROCEDURE — 4010F ACE/ARB THERAPY RXD/TAKEN: CPT | Mod: CPTII,95,, | Performed by: INTERNAL MEDICINE

## 2023-09-06 PROCEDURE — 4010F PR ACE/ARB THEARPY RXD/TAKEN: ICD-10-PCS | Mod: CPTII,95,, | Performed by: INTERNAL MEDICINE

## 2023-09-06 PROCEDURE — 99214 PR OFFICE/OUTPT VISIT, EST, LEVL IV, 30-39 MIN: ICD-10-PCS | Mod: 95,,, | Performed by: INTERNAL MEDICINE

## 2023-09-06 PROCEDURE — 1160F RVW MEDS BY RX/DR IN RCRD: CPT | Mod: CPTII,95,, | Performed by: INTERNAL MEDICINE

## 2023-09-06 PROCEDURE — 1159F PR MEDICATION LIST DOCUMENTED IN MEDICAL RECORD: ICD-10-PCS | Mod: CPTII,95,, | Performed by: INTERNAL MEDICINE

## 2023-09-06 PROCEDURE — 99214 OFFICE O/P EST MOD 30 MIN: CPT | Mod: 95,,, | Performed by: INTERNAL MEDICINE

## 2023-09-06 PROCEDURE — 1159F MED LIST DOCD IN RCRD: CPT | Mod: CPTII,95,, | Performed by: INTERNAL MEDICINE

## 2023-09-06 PROCEDURE — 1160F PR REVIEW ALL MEDS BY PRESCRIBER/CLIN PHARMACIST DOCUMENTED: ICD-10-PCS | Mod: CPTII,95,, | Performed by: INTERNAL MEDICINE

## 2023-09-06 NOTE — PROGRESS NOTES
Clinic Progress Note:  [facility] Progress Note    Reason for Visit:  The primary encounter diagnosis was Epigastric abdominal pain. A diagnosis of Epigastric pain was also pertinent to this visit.    PCP: Carlos Paul       HPI:    20 minutes of total time spent on the encounter, which includes face to face time and non-face to face time preparing to see the patient (eg, review of tests), Obtaining and/or reviewing separately obtained history, Documenting clinical information in the electronic or other health record, Independently interpreting results (not separately reported) and communicating results to the patient/family/caregiver, or Care coordination (not separately reported).   Each patient to whom he or she provides medical services by telemedicine is: (1) informed of the relationship between the physician and patient and the respective role of any other health care provider with respect to management of the patient; and (2) notified that he or she may decline to receive medical services by telemedicine and may withdraw from such care at any time.    She had vomiting, diarrhea, and epigastric abdominal pain.   Symptoms started a couple weeks ago.   Pain has subsided some.   Diarrhea and vomiting subsided.   She is concerned about the pain still lingering.   She was prescribed Pepcid by PCP.   She is taking Protonix.   Denies new changes in diet.         ROS:  As per HPI       Allergies: Reviewed    Home Medications:   Medication List with Changes/Refills   Current Medications    ACETAMINOPHEN (TYLENOL EXTRA STRENGTH ORAL)    Take 2 tablets by mouth every 4 to 6 hours as needed.    BACLOFEN (LIORESAL) 10 MG TABLET    Take 1 tablet (10 mg total) by mouth 2 (two) times daily as needed (muscle spasm).    DIAZEPAM (VALIUM) 10 MG TAB    every 12 (twelve) hours as needed.    DICYCLOMINE (BENTYL) 10 MG CAPSULE    Take 1 capsule by mouth 4 times daily before meals and nightly as needed     DIPHENOXYLATE-ATROPINE 2.5-0.025 MG (LOMOTIL) 2.5-0.025 MG PER TABLET    Take 1 tablet by mouth 4 (four) times daily as needed for Diarrhea.    DULOXETINE (CYMBALTA) 60 MG CAPSULE    Take 1 capsule by mouth once daily.     ESTRADIOL (ESTRACE) 0.5 MG TABLET    Take 1 tablet (0.5 mg total) by mouth once daily.    FAMOTIDINE (PEPCID) 20 MG TABLET    Take 1 tablet (20 mg total) by mouth 2 (two) times daily.    HYDROCHLOROTHIAZIDE (HYDRODIURIL) 12.5 MG TAB    Take 1 tablet (12.5 mg total) by mouth once daily.    HYDROCODONE-ACETAMINOPHEN (NORCO) 5-325 MG PER TABLET    Take 1 tablet by mouth once a day as needed for pain    HYDROCODONE-ACETAMINOPHEN (NORCO) 5-325 MG PER TABLET    Take 1 tablet by mouth once a day as needed for pain    HYDROCODONE-ACETAMINOPHEN (NORCO) 5-325 MG PER TABLET    Take 1 tablet by mouth once daily as needed for pain    HYDROCODONE-ACETAMINOPHEN (NORCO) 5-325 MG PER TABLET    Take 1 Tablet Once A Day as needed for pain.    HYDROCODONE-ACETAMINOPHEN (NORCO) 5-325 MG PER TABLET    Take 1 Tablet Once A Day as needed for pain.    HYDROCODONE-ACETAMINOPHEN (NORCO) 5-325 MG PER TABLET    Take 1 Tablet Once A Day as needed for pain.    LORAZEPAM (ATIVAN) 2 MG TAB    SMARTSI Tablet(s) By Mouth Every 8-10 Hours PRN    LOSARTAN (COZAAR) 100 MG TABLET    Take 1 tablet (100 mg total) by mouth once daily.    PANTOPRAZOLE (PROTONIX) 40 MG TABLET    Take 1 tablet (40 mg total) by mouth once daily.    POTASSIUM CHLORIDE SA (K-DUR,KLOR-CON) 20 MEQ TABLET    Take 1 tablet (20 mEq total) by mouth once daily.    PROMETHAZINE (PHENERGAN) 25 MG TABLET    Take 1 tablet (25 mg total) by mouth every 8 (eight) hours as needed for Nausea.    RIMEGEPANT 75 MG ODT    Take 1 tablet (75 mg total) by mouth every other day. Place ODT tablet on or under the tongue.    TOPIRAMATE (TOPAMAX) 25 MG TABLET    Take 1 tablet (25 mg total) by mouth every morning AND 2 tablets (50 mg total) every evening.    ZOLPIDEM (AMBIEN CR) 12.5  MG CR TABLET    Take 12.5 mg by mouth nightly.         Physical Exam:  Vital Signs:  There were no vitals taken for this visit.  There is no height or weight on file to calculate BMI.    Physical Exam  Vitals reviewed: virtual visit.          Labs: Pertinent labs reviewed.      Assessment:  Problem List Items Addressed This Visit          GI    Epigastric pain    Current Assessment & Plan     Plan:   -US abdomen ordered   -EGD ordered   -Continue PPI   -Avoid NSAIDs          Other Visit Diagnoses       Epigastric abdominal pain    -  Primary          Epigastric abdominal pain  -     US Abdomen Complete; Future; Expected date: 09/06/2023  -     Ambulatory referral/consult to Endo Procedure ; Future; Expected date: 09/07/2023    Epigastric pain            Will arrange follow-up with Dr Samuel.     Order summary:  Orders Placed This Encounter   Procedures    US Abdomen Complete    Ambulatory referral/consult to Endo Procedure        Thank you so much for allowing me to participate in the care of Miles Wheat MD  Gastroenterology and Hepatology  [facility]

## 2023-09-06 NOTE — TELEPHONE ENCOUNTER
There is an evisit for this patient on same day she saw GI. Did she mean to do evisit with me also or did she think she was answering questions for gi virtual visit. Typically just need to follow GI recommendations and treatment plan when seeing GI. Not usually necessary for appointments with me and GI for same issue/concern

## 2023-09-07 ENCOUNTER — HOSPITAL ENCOUNTER (OUTPATIENT)
Dept: PREADMISSION TESTING | Facility: HOSPITAL | Age: 63
Discharge: HOME | End: 2023-09-07
Attending: INTERNAL MEDICINE
Payer: COMMERCIAL

## 2023-09-07 DIAGNOSIS — R10.13 EPIGASTRIC ABDOMINAL PAIN: Primary | ICD-10-CM

## 2023-09-21 RX ORDER — BACLOFEN 10 MG/1
10 TABLET ORAL 2 TIMES DAILY PRN
Qty: 30 TABLET | Refills: 0 | Status: SHIPPED | OUTPATIENT
Start: 2023-09-21 | End: 2023-10-31 | Stop reason: SDUPTHER

## 2023-09-25 ENCOUNTER — OFFICE VISIT (OUTPATIENT)
Dept: INTERNAL MEDICINE | Facility: CLINIC | Age: 63
End: 2023-09-25
Payer: COMMERCIAL

## 2023-09-25 ENCOUNTER — TELEPHONE (OUTPATIENT)
Dept: PSYCHIATRY | Facility: CLINIC | Age: 63
End: 2023-09-25
Payer: COMMERCIAL

## 2023-09-25 VITALS
OXYGEN SATURATION: 99 % | HEART RATE: 81 BPM | TEMPERATURE: 97 F | SYSTOLIC BLOOD PRESSURE: 118 MMHG | WEIGHT: 189.13 LBS | HEIGHT: 62 IN | BODY MASS INDEX: 34.8 KG/M2 | DIASTOLIC BLOOD PRESSURE: 70 MMHG

## 2023-09-25 DIAGNOSIS — F41.9 ANXIETY: ICD-10-CM

## 2023-09-25 DIAGNOSIS — M54.50 ACUTE BILATERAL LOW BACK PAIN WITHOUT SCIATICA: Primary | ICD-10-CM

## 2023-09-25 DIAGNOSIS — F32.A DEPRESSION, UNSPECIFIED DEPRESSION TYPE: ICD-10-CM

## 2023-09-25 PROCEDURE — 96372 THER/PROPH/DIAG INJ SC/IM: CPT | Mod: S$GLB,,, | Performed by: PHYSICIAN ASSISTANT

## 2023-09-25 PROCEDURE — 96372 PR INJECTION,THERAP/PROPH/DIAG2ST, IM OR SUBCUT: ICD-10-PCS | Mod: S$GLB,,, | Performed by: PHYSICIAN ASSISTANT

## 2023-09-25 PROCEDURE — 3074F PR MOST RECENT SYSTOLIC BLOOD PRESSURE < 130 MM HG: ICD-10-PCS | Mod: CPTII,S$GLB,, | Performed by: PHYSICIAN ASSISTANT

## 2023-09-25 PROCEDURE — 3078F PR MOST RECENT DIASTOLIC BLOOD PRESSURE < 80 MM HG: ICD-10-PCS | Mod: CPTII,S$GLB,, | Performed by: PHYSICIAN ASSISTANT

## 2023-09-25 PROCEDURE — 1160F PR REVIEW ALL MEDS BY PRESCRIBER/CLIN PHARMACIST DOCUMENTED: ICD-10-PCS | Mod: CPTII,S$GLB,, | Performed by: PHYSICIAN ASSISTANT

## 2023-09-25 PROCEDURE — 1160F RVW MEDS BY RX/DR IN RCRD: CPT | Mod: CPTII,S$GLB,, | Performed by: PHYSICIAN ASSISTANT

## 2023-09-25 PROCEDURE — 1159F MED LIST DOCD IN RCRD: CPT | Mod: CPTII,S$GLB,, | Performed by: PHYSICIAN ASSISTANT

## 2023-09-25 PROCEDURE — 99999 PR PBB SHADOW E&M-EST. PATIENT-LVL V: CPT | Mod: PBBFAC,,, | Performed by: PHYSICIAN ASSISTANT

## 2023-09-25 PROCEDURE — 4010F PR ACE/ARB THEARPY RXD/TAKEN: ICD-10-PCS | Mod: CPTII,S$GLB,, | Performed by: PHYSICIAN ASSISTANT

## 2023-09-25 PROCEDURE — 99999 PR PBB SHADOW E&M-EST. PATIENT-LVL V: ICD-10-PCS | Mod: PBBFAC,,, | Performed by: PHYSICIAN ASSISTANT

## 2023-09-25 PROCEDURE — 99213 PR OFFICE/OUTPT VISIT, EST, LEVL III, 20-29 MIN: ICD-10-PCS | Mod: 25,S$GLB,, | Performed by: PHYSICIAN ASSISTANT

## 2023-09-25 PROCEDURE — 3074F SYST BP LT 130 MM HG: CPT | Mod: CPTII,S$GLB,, | Performed by: PHYSICIAN ASSISTANT

## 2023-09-25 PROCEDURE — 3008F PR BODY MASS INDEX (BMI) DOCUMENTED: ICD-10-PCS | Mod: CPTII,S$GLB,, | Performed by: PHYSICIAN ASSISTANT

## 2023-09-25 PROCEDURE — 3078F DIAST BP <80 MM HG: CPT | Mod: CPTII,S$GLB,, | Performed by: PHYSICIAN ASSISTANT

## 2023-09-25 PROCEDURE — 3008F BODY MASS INDEX DOCD: CPT | Mod: CPTII,S$GLB,, | Performed by: PHYSICIAN ASSISTANT

## 2023-09-25 PROCEDURE — 4010F ACE/ARB THERAPY RXD/TAKEN: CPT | Mod: CPTII,S$GLB,, | Performed by: PHYSICIAN ASSISTANT

## 2023-09-25 PROCEDURE — 99213 OFFICE O/P EST LOW 20 MIN: CPT | Mod: 25,S$GLB,, | Performed by: PHYSICIAN ASSISTANT

## 2023-09-25 PROCEDURE — 1159F PR MEDICATION LIST DOCUMENTED IN MEDICAL RECORD: ICD-10-PCS | Mod: CPTII,S$GLB,, | Performed by: PHYSICIAN ASSISTANT

## 2023-09-25 RX ORDER — KETOROLAC TROMETHAMINE 30 MG/ML
60 INJECTION, SOLUTION INTRAMUSCULAR; INTRAVENOUS
Status: COMPLETED | OUTPATIENT
Start: 2023-09-25 | End: 2023-09-25

## 2023-09-25 RX ORDER — DICLOFENAC SODIUM 10 MG/G
2 GEL TOPICAL 3 TIMES DAILY
Qty: 100 G | Refills: 1 | Status: SHIPPED | OUTPATIENT
Start: 2023-09-25 | End: 2024-03-07

## 2023-09-25 RX ORDER — METHOCARBAMOL 750 MG/1
750 TABLET, FILM COATED ORAL 4 TIMES DAILY PRN
Qty: 30 TABLET | Refills: 0 | Status: SHIPPED | OUTPATIENT
Start: 2023-09-25 | End: 2023-10-05

## 2023-09-25 RX ADMIN — KETOROLAC TROMETHAMINE 60 MG: 30 INJECTION, SOLUTION INTRAMUSCULAR; INTRAVENOUS at 04:09

## 2023-09-25 NOTE — LETTER
September 25, 2023      The [address]  [address] [address] [facility]  [place] [address]  Phone: [phone]  Fax: [phone]       Patient: Miles Bowen   YOB: 1960  Date of Visit: 09/25/2023    To Whom It May Concern:    Valerie Bowen  was at [facility] on 09/25/2023. The patient may return to work/school on 09/26/2023 with no restrictions. If you have any questions or concerns, or if I can be of further assistance, please do not hesitate to contact me.    Sincerely,    Patricia Resendez PA-C

## 2023-09-25 NOTE — TELEPHONE ENCOUNTER
----- Message from Karyna Dyson sent at 9/25/2023  7:18 AM CDT -----  Contact: patient  Miles Braunalice Butterin would like a call back at [phone], in regards to scheduling an appt.

## 2023-09-25 NOTE — PROGRESS NOTES
Subjective:      Patient ID: Miles Bowen is a 63 y.o. female.    Chief Complaint: Low-back Pain    Back Pain  This is a new problem. The current episode started 1 to 4 weeks ago. The problem has been gradually worsening since onset. The pain is present in the lumbar spine and gluteal. The pain does not radiate. The pain is at a severity of 8/10. The symptoms are aggravated by bending (stepping up, going from sitting to standing, beding over). Pertinent negatives include no abdominal pain, bladder incontinence, bowel incontinence, chest pain, dysuria, fever, headaches, leg pain, numbness, paresis, paresthesias, pelvic pain, perianal numbness, tingling, weakness or weight loss. She has tried ice, muscle relaxant and heat (icey hot, tylenol) for the symptoms. The treatment provided no relief.   Also, current psychiatrist is retiring. Would like to get established with one here at [facility].       Patient Active Problem List   Diagnosis    Hypertension    Depression    Anxiety    GERD (gastroesophageal reflux disease)    Hiatal hernia    DDD (degenerative disc disease), lumbar    Common migraine    Allergic rhinitis    IBS (irritable bowel syndrome)    Multinodular goiter    MVP (mitral valve prolapse)    Nasal polyps    Obesity    Abnormal ECG    Palpitations    Atypical chest pain    Shortness of breath    Migraine with aura and without status migrainosus, not intractable    Epigastric pain         Current Outpatient Medications:     ACETAMINOPHEN (TYLENOL EXTRA STRENGTH ORAL), Take 2 tablets by mouth every 4 to 6 hours as needed., Disp: , Rfl:     baclofen (LIORESAL) 10 MG tablet, Take 1 tablet (10 mg total) by mouth 2 (two) times daily as needed (muscle spasm)., Disp: 30 tablet, Rfl: 0    diazePAM (VALIUM) 10 MG Tab, every 12 (twelve) hours as needed., Disp: , Rfl:     dicyclomine (BENTYL) 10 MG capsule, Take 1 capsule by mouth 4 times daily before meals and nightly as needed, Disp: 40 capsule, Rfl: 1     diphenoxylate-atropine 2.5-0.025 mg (LOMOTIL) 2.5-0.025 mg per tablet, Take 1 tablet by mouth 4 (four) times daily as needed for Diarrhea., Disp: 30 tablet, Rfl: 0    duloxetine (CYMBALTA) 60 MG capsule, Take 1 capsule by mouth once daily. , Disp: , Rfl: 0    estradioL (ESTRACE) 0.5 MG tablet, Take 1 tablet (0.5 mg total) by mouth once daily., Disp: 30 tablet, Rfl: 11    famotidine (PEPCID) 20 MG tablet, Take 1 tablet (20 mg total) by mouth 2 (two) times daily., Disp: 60 tablet, Rfl: 0    hydroCHLOROthiazide (HYDRODIURIL) 12.5 MG Tab, Take 1 tablet (12.5 mg total) by mouth once daily., Disp: 90 tablet, Rfl: 1    HYDROcodone-acetaminophen (NORCO) 5-325 mg per tablet, Take 1 tablet by mouth once a day as needed for pain, Disp: 30 tablet, Rfl: 0    HYDROcodone-acetaminophen (NORCO) 5-325 mg per tablet, Take 1 tablet by mouth once a day as needed for pain, Disp: 30 tablet, Rfl: 0    HYDROcodone-acetaminophen (NORCO) 5-325 mg per tablet, Take 1 tablet by mouth once daily as needed for pain, Disp: 30 tablet, Rfl: 0    [START ON 10/11/2023] HYDROcodone-acetaminophen (NORCO) 5-325 mg per tablet, Take 1 Tablet Once A Day as needed for pain., Disp: 30 tablet, Rfl: 0    HYDROcodone-acetaminophen (NORCO) 5-325 mg per tablet, Take 1 Tablet Once A Day as needed for pain., Disp: 30 tablet, Rfl: 0    HYDROcodone-acetaminophen (NORCO) 5-325 mg per tablet, Take 1 Tablet Once A Day as needed for pain., Disp: 30 tablet, Rfl: 0    LORazepam (ATIVAN) 2 MG Tab, SMARTSI Tablet(s) By Mouth Every 8-10 Hours PRN, Disp: , Rfl:     losartan (COZAAR) 100 MG tablet, Take 1 tablet (100 mg total) by mouth once daily., Disp: 90 tablet, Rfl: 2    pantoprazole (PROTONIX) 40 MG tablet, Take 1 tablet (40 mg total) by mouth once daily., Disp: 90 tablet, Rfl: 3    potassium chloride SA (K-DUR,KLOR-CON) 20 MEQ tablet, Take 1 tablet (20 mEq total) by mouth once daily., Disp: 90 tablet, Rfl: 2    promethazine (PHENERGAN) 25 MG tablet, Take 1 tablet (25 mg  "total) by mouth every 8 (eight) hours as needed for Nausea., Disp: 30 tablet, Rfl: 0    rimegepant 75 mg odt, Take 1 tablet (75 mg total) by mouth every other day. Place ODT tablet on or under the tongue., Disp: 16 tablet, Rfl: 5    topiramate (TOPAMAX) 25 MG tablet, Take 1 tablet (25 mg total) by mouth every morning AND 2 tablets (50 mg total) every evening., Disp: 90 tablet, Rfl: 6    zolpidem (AMBIEN CR) 12.5 MG CR tablet, Take 12.5 mg by mouth nightly., Disp: , Rfl:     diclofenac sodium (VOLTAREN) 1 % Gel, Apply 2 g topically 3 (three) times daily. for 7 days, Disp: 100 g, Rfl: 1    methocarbamoL (ROBAXIN) 750 MG Tab, Take 1 tablet (750 mg total) by mouth 4 (four) times daily as needed., Disp: 30 tablet, Rfl: 0    Current Facility-Administered Medications:     ketorolac injection 60 mg, 60 mg, Intramuscular, 1 time in Clinic/HOD, Patricia Resendez PA-C    Review of Systems   Constitutional:  Negative for fever and weight loss.   Cardiovascular:  Negative for chest pain.   Gastrointestinal:  Negative for abdominal pain and bowel incontinence.   Genitourinary:  Negative for bladder incontinence, dysuria and pelvic pain.   Musculoskeletal:  Positive for back pain.   Neurological:  Negative for tingling, weakness, numbness, headaches and paresthesias.     Objective:   /70 (BP Location: Left arm, Patient Position: Sitting, BP Method: Large (Manual))   Pulse 81   Temp 97.4 °F (36.3 °C) (Tympanic)   Ht 5' 2" (1.575 m)   Wt 85.8 kg (189 lb 2.5 oz)   SpO2 99%   BMI 34.60 kg/m²     Physical Exam  Vitals and nursing note reviewed.   Constitutional:       General: She is not in acute distress.     Appearance: Normal appearance. She is well-developed. She is not ill-appearing, toxic-appearing or diaphoretic.   HENT:      Head: Normocephalic and atraumatic.   Cardiovascular:      Rate and Rhythm: Normal rate and regular rhythm.      Heart sounds: Normal heart sounds. No murmur heard.     No friction rub. " No gallop.   Pulmonary:      Effort: Pulmonary effort is normal. No respiratory distress.      Breath sounds: Normal breath sounds. No wheezing or rales.   Musculoskeletal:         General: Normal range of motion.      Lumbar back: Tenderness and bony tenderness present. No swelling, edema, deformity, signs of trauma, lacerations or spasms. Normal range of motion. No scoliosis.   Skin:     General: Skin is warm.      Capillary Refill: Capillary refill takes less than 2 seconds.      Findings: No rash.   Neurological:      Mental Status: She is alert and oriented to person, place, and time.      Motor: No weakness.      Gait: Gait normal.   Psychiatric:         Mood and Affect: Mood normal.         Behavior: Behavior normal.         Thought Content: Thought content normal.         Judgment: Judgment normal.         Assessment:     1. Acute bilateral low back pain without sciatica    2. Depression, unspecified depression type    3. Anxiety      Plan:   Acute bilateral low back pain without sciatica  -     diclofenac sodium (VOLTAREN) 1 % Gel; Apply 2 g topically 3 (three) times daily. for 7 days  Dispense: 100 g; Refill: 1  -     ketorolac injection 60 mg  -     methocarbamoL (ROBAXIN) 750 MG Tab; Take 1 tablet (750 mg total) by mouth 4 (four) times daily as needed.  Dispense: 30 tablet; Refill: 0    Depression, unspecified depression type  -     Ambulatory referral/consult to Psychiatry; Future; Expected date: 10/02/2023    Anxiety  -     Ambulatory referral/consult to Psychiatry; Future; Expected date: 10/02/2023      Follow up if symptoms worsen or fail to improve.

## 2023-09-28 ENCOUNTER — PATIENT MESSAGE (OUTPATIENT)
Dept: INTERNAL MEDICINE | Facility: CLINIC | Age: 63
End: 2023-09-28
Payer: COMMERCIAL

## 2023-10-01 DIAGNOSIS — R10.13 EPIGASTRIC PAIN: ICD-10-CM

## 2023-10-01 RX ORDER — FAMOTIDINE 20 MG/1
20 TABLET, FILM COATED ORAL 2 TIMES DAILY
Qty: 180 TABLET | Refills: 0 | Status: SHIPPED | OUTPATIENT
Start: 2023-10-01 | End: 2024-02-22 | Stop reason: SDUPTHER

## 2023-10-01 NOTE — TELEPHONE ENCOUNTER
No care due was identified.  [facility] Embedded Care Due Messages. Reference number: [id number].   10/01/2023 7:33:56 AM CDT

## 2023-10-01 NOTE — TELEPHONE ENCOUNTER
Refill Routing Note   Medication(s) are not appropriate for processing by [facility] for the following reason(s):      New or recently adjusted medication    [facility] action(s):  Defer Care Due:  None identified            Appointments  past 12m or future 3m with PCP    Date Provider   Last Visit   8/26/2023 Carlos Paul MD   Next Visit   11/1/2023 Carlos Paul MD   ED visits in past 90 days: 0        Note composed:6:37 PM 10/01/2023

## 2023-10-02 RX ORDER — PROMETHAZINE HYDROCHLORIDE 25 MG/1
25 TABLET ORAL EVERY 8 HOURS PRN
Qty: 30 TABLET | Refills: 0 | Status: SHIPPED | OUTPATIENT
Start: 2023-10-02 | End: 2023-10-31 | Stop reason: SDUPTHER

## 2023-10-02 NOTE — TELEPHONE ENCOUNTER
No care due was identified.  [facility] Embedded Care Due Messages. Reference number: [id number].   10/02/2023 6:37:42 AM CDT

## 2023-10-04 ENCOUNTER — PATIENT MESSAGE (OUTPATIENT)
Dept: PSYCHIATRY | Facility: CLINIC | Age: 63
End: 2023-10-04
Payer: COMMERCIAL

## 2023-10-31 RX ORDER — BACLOFEN 10 MG/1
10 TABLET ORAL 2 TIMES DAILY PRN
Qty: 30 TABLET | Refills: 0 | Status: SHIPPED | OUTPATIENT
Start: 2023-10-31 | End: 2023-11-22 | Stop reason: SDUPTHER

## 2023-10-31 RX ORDER — PROMETHAZINE HYDROCHLORIDE 25 MG/1
25 TABLET ORAL EVERY 8 HOURS PRN
Qty: 30 TABLET | Refills: 0 | Status: SHIPPED | OUTPATIENT
Start: 2023-10-31 | End: 2023-12-12 | Stop reason: SDUPTHER

## 2023-10-31 NOTE — TELEPHONE ENCOUNTER
No care due was identified.  [facility] Embedded Care Due Messages. Reference number: [id number].   10/31/2023 12:35:50 AM CDT

## 2023-11-22 RX ORDER — BACLOFEN 10 MG/1
10 TABLET ORAL 2 TIMES DAILY PRN
Qty: 30 TABLET | Refills: 0 | Status: SHIPPED | OUTPATIENT
Start: 2023-11-22 | End: 2023-12-12 | Stop reason: SDUPTHER

## 2023-12-05 DIAGNOSIS — G43.109 MIGRAINE WITH AURA AND WITHOUT STATUS MIGRAINOSUS, NOT INTRACTABLE: ICD-10-CM

## 2023-12-05 RX ORDER — RIMEGEPANT SULFATE 75 MG/75MG
75 TABLET, ORALLY DISINTEGRATING ORAL EVERY OTHER DAY
Qty: 16 TABLET | Refills: 2 | Status: SHIPPED | OUTPATIENT
Start: 2023-12-05 | End: 2024-03-28

## 2023-12-12 RX ORDER — PROMETHAZINE HYDROCHLORIDE 25 MG/1
25 TABLET ORAL EVERY 8 HOURS PRN
Qty: 30 TABLET | Refills: 0 | Status: SHIPPED | OUTPATIENT
Start: 2023-12-12 | End: 2024-02-22 | Stop reason: SDUPTHER

## 2023-12-12 RX ORDER — BACLOFEN 10 MG/1
10 TABLET ORAL 2 TIMES DAILY PRN
Qty: 30 TABLET | Refills: 0 | Status: SHIPPED | OUTPATIENT
Start: 2023-12-12 | End: 2023-12-28 | Stop reason: SDUPTHER

## 2023-12-12 NOTE — TELEPHONE ENCOUNTER
No care due was identified.  Health [facility] Embedded Care Due Messages. Reference number: [id number].   12/12/2023 8:18:56 AM CST

## 2023-12-19 ENCOUNTER — OFFICE VISIT (OUTPATIENT)
Dept: INTERNAL MEDICINE | Facility: CLINIC | Age: 63
End: 2023-12-19
Payer: COMMERCIAL

## 2023-12-19 VITALS
HEART RATE: 109 BPM | HEIGHT: 62 IN | TEMPERATURE: 98 F | WEIGHT: 184.31 LBS | BODY MASS INDEX: 33.92 KG/M2 | DIASTOLIC BLOOD PRESSURE: 84 MMHG | OXYGEN SATURATION: 96 % | SYSTOLIC BLOOD PRESSURE: 118 MMHG

## 2023-12-19 DIAGNOSIS — J10.1 INFLUENZA A: Primary | ICD-10-CM

## 2023-12-19 DIAGNOSIS — R05.9 COUGH, UNSPECIFIED TYPE: ICD-10-CM

## 2023-12-19 LAB
CTP QC/QA: YES
MOLECULAR STREP A: NEGATIVE
POC MOLECULAR INFLUENZA A AGN: POSITIVE
POC MOLECULAR INFLUENZA B AGN: NEGATIVE
SARS-COV-2 RDRP RESP QL NAA+PROBE: NEGATIVE

## 2023-12-19 PROCEDURE — 3079F DIAST BP 80-89 MM HG: CPT | Mod: CPTII,S$GLB,, | Performed by: INTERNAL MEDICINE

## 2023-12-19 PROCEDURE — 87651 POCT STREP A MOLECULAR: ICD-10-PCS | Mod: QW,S$GLB,, | Performed by: INTERNAL MEDICINE

## 2023-12-19 PROCEDURE — 1159F MED LIST DOCD IN RCRD: CPT | Mod: CPTII,S$GLB,, | Performed by: INTERNAL MEDICINE

## 2023-12-19 PROCEDURE — 87651 STREP A DNA AMP PROBE: CPT | Mod: QW,S$GLB,, | Performed by: INTERNAL MEDICINE

## 2023-12-19 PROCEDURE — 87502 POCT INFLUENZA A/B MOLECULAR: ICD-10-PCS | Mod: QW,S$GLB,, | Performed by: INTERNAL MEDICINE

## 2023-12-19 PROCEDURE — 3079F PR MOST RECENT DIASTOLIC BLOOD PRESSURE 80-89 MM HG: ICD-10-PCS | Mod: CPTII,S$GLB,, | Performed by: INTERNAL MEDICINE

## 2023-12-19 PROCEDURE — 3074F SYST BP LT 130 MM HG: CPT | Mod: CPTII,S$GLB,, | Performed by: INTERNAL MEDICINE

## 2023-12-19 PROCEDURE — 1159F PR MEDICATION LIST DOCUMENTED IN MEDICAL RECORD: ICD-10-PCS | Mod: CPTII,S$GLB,, | Performed by: INTERNAL MEDICINE

## 2023-12-19 PROCEDURE — 3008F BODY MASS INDEX DOCD: CPT | Mod: CPTII,S$GLB,, | Performed by: INTERNAL MEDICINE

## 2023-12-19 PROCEDURE — 4010F PR ACE/ARB THEARPY RXD/TAKEN: ICD-10-PCS | Mod: CPTII,S$GLB,, | Performed by: INTERNAL MEDICINE

## 2023-12-19 PROCEDURE — 3074F PR MOST RECENT SYSTOLIC BLOOD PRESSURE < 130 MM HG: ICD-10-PCS | Mod: CPTII,S$GLB,, | Performed by: INTERNAL MEDICINE

## 2023-12-19 PROCEDURE — 87635: ICD-10-PCS | Mod: QW,S$GLB,, | Performed by: INTERNAL MEDICINE

## 2023-12-19 PROCEDURE — 99999 PR PBB SHADOW E&M-EST. PATIENT-LVL III: CPT | Mod: PBBFAC,,, | Performed by: INTERNAL MEDICINE

## 2023-12-19 PROCEDURE — 87502 INFLUENZA DNA AMP PROBE: CPT | Mod: QW,S$GLB,, | Performed by: INTERNAL MEDICINE

## 2023-12-19 PROCEDURE — 99214 OFFICE O/P EST MOD 30 MIN: CPT | Mod: S$GLB,,, | Performed by: INTERNAL MEDICINE

## 2023-12-19 PROCEDURE — 3008F PR BODY MASS INDEX (BMI) DOCUMENTED: ICD-10-PCS | Mod: CPTII,S$GLB,, | Performed by: INTERNAL MEDICINE

## 2023-12-19 PROCEDURE — 99999 PR PBB SHADOW E&M-EST. PATIENT-LVL III: ICD-10-PCS | Mod: PBBFAC,,, | Performed by: INTERNAL MEDICINE

## 2023-12-19 PROCEDURE — 4010F ACE/ARB THERAPY RXD/TAKEN: CPT | Mod: CPTII,S$GLB,, | Performed by: INTERNAL MEDICINE

## 2023-12-19 PROCEDURE — 99214 PR OFFICE/OUTPT VISIT, EST, LEVL IV, 30-39 MIN: ICD-10-PCS | Mod: S$GLB,,, | Performed by: INTERNAL MEDICINE

## 2023-12-19 PROCEDURE — 87635 SARS-COV-2 COVID-19 AMP PRB: CPT | Mod: QW,S$GLB,, | Performed by: INTERNAL MEDICINE

## 2023-12-19 RX ORDER — OSELTAMIVIR PHOSPHATE 75 MG/1
75 CAPSULE ORAL 2 TIMES DAILY
Qty: 10 CAPSULE | Refills: 0 | Status: SHIPPED | OUTPATIENT
Start: 2023-12-19 | End: 2023-12-24

## 2023-12-19 NOTE — PROGRESS NOTES
"Subjective:      Patient ID: Miles Bowen is a 63 y.o. female.    Chief Complaint: Cough    Cough  This is a new problem. Episode onset: 2 days. The problem has been unchanged. The problem occurs every few hours. The cough is Non-productive. Associated symptoms include chills, a fever, headaches, postnasal drip and a sore throat (mild). Pertinent negatives include no ear pain, nasal congestion, shortness of breath or wheezing. Nothing aggravates the symptoms. Treatments tried: coriciden tylenol. The treatment provided mild relief.       64 yo with   Patient Active Problem List   Diagnosis    Hypertension    Depression    Anxiety    GERD (gastroesophageal reflux disease)    Hiatal hernia    DDD (degenerative disc disease), lumbar    Common migraine    Allergic rhinitis    IBS (irritable bowel syndrome)    Multinodular goiter    MVP (mitral valve prolapse)    Nasal polyps    Obesity    Abnormal ECG    Palpitations    Atypical chest pain    Shortness of breath    Migraine with aura and without status migrainosus, not intractable    Epigastric pain     Past Medical History:   Diagnosis Date    Anxiety     Depression     GERD (gastroesophageal reflux disease)     Heart murmur     Hematemesis without nausea 11/4/2016    Hypertension     Multinodular goiter 12/23/2016    MVP (mitral valve prolapse)     Obesity        Review of Systems   Constitutional:  Positive for chills and fever.   HENT:  Positive for postnasal drip and sore throat (mild). Negative for congestion and ear pain.    Respiratory:  Positive for cough. Negative for shortness of breath and wheezing.    Neurological:  Positive for headaches.     Objective:   /84 (BP Location: Left arm, Patient Position: Sitting, BP Method: Large (Manual))   Pulse 109   Temp 97.7 °F (36.5 °C) (Tympanic)   Ht 5' 2.01" (1.575 m)   Wt 83.6 kg (184 lb 4.9 oz)   SpO2 96%   BMI 33.70 kg/m²     Physical Exam  Constitutional:       General: She is awake.      " Appearance: Normal appearance.   HENT:      Head: Normocephalic and atraumatic.      Nose:      Right Sinus: No maxillary sinus tenderness or frontal sinus tenderness.      Left Sinus: No maxillary sinus tenderness or frontal sinus tenderness.      Mouth/Throat:      Mouth: Mucous membranes are moist.      Pharynx: Posterior oropharyngeal erythema present. No pharyngeal swelling, oropharyngeal exudate or uvula swelling.   Eyes:      Conjunctiva/sclera: Conjunctivae normal.   Pulmonary:      Effort: Pulmonary effort is normal.   Musculoskeletal:      Cervical back: Normal range of motion.   Neurological:      Mental Status: She is alert. Mental status is at baseline.   Psychiatric:         Mood and Affect: Mood normal.         Behavior: Behavior normal. Behavior is cooperative.         Thought Content: Thought content normal.         Judgment: Judgment normal.         Lab Results   Component Value Date    WBC 6.44 08/24/2023    HGB 12.5 08/24/2023    HGB 15.1 05/01/2023    HGB 12.4 10/12/2022    HCT 39.5 08/24/2023    MCV 90 08/24/2023    MCV 92 05/01/2023    MCV 92 10/12/2022     08/24/2023    CHOL 248 (H) 03/21/2022    TRIG 99 03/21/2022     (H) 03/21/2022    LDLCALC 127.2 03/21/2022    LDLCALC 138.2 04/09/2021    LDLCALC 109 08/20/2015    ALT 16 08/24/2023    AST 12 08/24/2023     08/24/2023    K 4.0 08/24/2023    CALCIUM 9.2 08/24/2023     (H) 08/24/2023    CO2 21 (L) 08/24/2023    BUN 13 08/24/2023    CREATININE 0.7 08/24/2023    CREATININE 0.9 05/01/2023    CREATININE 0.8 10/12/2022    EGFRNORACEVR >60.0 08/24/2023    EGFRNORACEVR >60.0 05/01/2023    EGFRNORACEVR >60 10/12/2022    TSH 1.628 10/12/2022    TSH 0.554 03/21/2022    TSH 0.656 02/23/2022    GLU 74 08/24/2023    HGBA1C 5.7 (H) 03/21/2022    HGBA1C 5.8 (H) 04/09/2021    HGBA1C 6.0 10/16/2020          The ASCVD Risk score (Chuy NICOLAS, et al., 2019) failed to calculate for the following reasons:    The valid HDL cholesterol range  is 20 to 100 mg/dL     Assessment:     1. Influenza A    2. Cough, unspecified type      Plan:   1. Influenza A  -     oseltamivir (TAMIFLU) 75 MG capsule; Take 1 capsule (75 mg total) by mouth 2 (two) times daily. for 5 days  Dispense: 10 capsule; Refill: 0    2. Cough, unspecified type  -     POCT COVID-19 Rapid Screening  -     POCT Influenza A/B Molecular  -     POCT Strep A, Molecular        Patient Instructions   flonase nasal spray 2 spays each nostril for nasal congestion, post nasal drip, runny nose    Delsym as needed for cough    Allegra, coricidin,  or zyrtec for allergies, post nasal drip, runny nose, watery eyes.    cepacol throat lozenges and/or chloraseptic spray for sore throat    mucinex for chest congestion.     Tylenol or ibuprofen for pain or fever.     Isolate until fever free for 24 hours without fever reducing medications.        No future appointments.    Lab Frequency Next Occurrence   Mammo Digital Screening Bilat w/ Jim Once 08/24/2022   Aspergillus fumagatus IgE Once 01/11/2023   Bermuda grass IgE Once 01/11/2023   Cat epithelium IgE Once 01/11/2023   Cladosporium IgE Once 01/11/2023   Cockroach, American IgE Once 01/11/2023   [place], bald IgE Once 01/11/2023   D. farinae IgE Once 01/11/2023   D. pteronyssinus IgE Once 01/11/2023   Dog dander IgE Once 01/11/2023   Plantain, English IgE Once 01/11/2023   Les grass IgE Once 01/11/2023   Marsh elder, rough IgE Once 01/11/2023   Mugwort IgE Once 01/11/2023   Nettle IgE Once 01/11/2023   [place], white IgE Once 01/11/2023   Penicillium IgE Once 01/11/2023   Ragweed, short, common IgE Once 01/11/2023   Ze IgE Once 01/11/2023   Allergen, Cocklebur Once 01/11/2023   Allergen, Elm [place] Once 01/11/2023   Allergen, Meadow Grass ([place] Blue) Once 01/11/2023   Allergen, Mucor Racemosus Once 01/11/2023   Allergen, Pecan [place] IgE Once 01/11/2023   Allergen, White [place] Once 01/11/2023   Allergen-Alternaria Alternata Once 01/11/2023    Allergen-Benzie Once 01/11/2023   Allergen-Common Pigweed Once 01/11/2023   Allergen-Silver Birch Once 01/11/2023   Feather Panel #2 Once 01/11/2023   RAST Allergen for Eastern [place] Once 01/11/2023   RAST Allergen Maple ([place]) Once 01/11/2023   RAST Allergen [place] Once 01/11/2023   RAST Allergen, [place] Once 01/11/2023   RAST Allergen, Sheep Sorrel([place]ellow [place]) Once 01/11/2023   CT Medtronic Sinuses without Once 01/11/2023   H. pylori antigen, stool Once 05/01/2023   Pancreatic elastase, fecal Once 05/01/2023   Fecal fat, qualitative Once 05/01/2023   Occult blood x 1, stool Once 05/01/2023   WBC, Stool Once 05/01/2023   Calprotectin, Stool Once 05/01/2023   Giardia / Cryptosporidum, EIA Once 05/01/2023   Stool Exam-Ova,Cysts,Parasites Once 05/01/2023   Clostridium difficile EIA Once 05/01/2023   Stool culture Once 05/01/2023   CT Abdomen Pelvis With Contrast Once 05/02/2023   Ambulatory referral/consult to Gastroenterology Once 09/02/2023   US Abdomen Complete Once 09/06/2023   Ambulatory referral/consult to Psychiatry Once 10/02/2023       Follow up if symptoms worsen or fail to improve.

## 2023-12-19 NOTE — LETTER
December 19, 2023      The [address]  [address] [address] [facility]  [place] [address]  Phone: [phone]  Fax: [phone]       Patient: Miles Bowen   YOB: 1960  Date of Visit: 12/19/2023    To Whom It May Concern:    Valerie Bowen  was at [facility] on 12/19/2023. The patient may return to work once she is fever free for 24 hours. If you have any questions or concerns, or if I can be of further assistance, please do not hesitate to contact me.    Sincerely,    Ellen Ellison LPN

## 2023-12-19 NOTE — PATIENT INSTRUCTIONS
flonase nasal spray 2 spays each nostril for nasal congestion, post nasal drip, runny nose    Delsym as needed for cough    Allegra, coricidin,  or zyrtec for allergies, post nasal drip, runny nose, watery eyes.    cepacol throat lozenges and/or chloraseptic spray for sore throat    mucinex for chest congestion.     Tylenol or ibuprofen for pain or fever.     Isolate until fever free for 24 hours without fever reducing medications.

## 2023-12-29 RX ORDER — BACLOFEN 10 MG/1
10 TABLET ORAL 2 TIMES DAILY PRN
Qty: 30 TABLET | Refills: 0 | Status: SHIPPED | OUTPATIENT
Start: 2023-12-29 | End: 2024-01-23 | Stop reason: SDUPTHER

## 2024-01-24 RX ORDER — BACLOFEN 10 MG/1
10 TABLET ORAL 2 TIMES DAILY PRN
Qty: 30 TABLET | Refills: 0 | Status: SHIPPED | OUTPATIENT
Start: 2024-01-24 | End: 2024-02-22 | Stop reason: SDUPTHER

## 2024-02-09 ENCOUNTER — PATIENT OUTREACH (OUTPATIENT)
Dept: ADMINISTRATIVE | Facility: HOSPITAL | Age: 64
End: 2024-02-09
Payer: COMMERCIAL

## 2024-02-22 DIAGNOSIS — G43.011 INTRACTABLE MIGRAINE WITHOUT AURA AND WITH STATUS MIGRAINOSUS: ICD-10-CM

## 2024-02-22 DIAGNOSIS — R10.13 EPIGASTRIC PAIN: ICD-10-CM

## 2024-02-22 DIAGNOSIS — I10 ESSENTIAL HYPERTENSION: ICD-10-CM

## 2024-02-22 RX ORDER — BACLOFEN 10 MG/1
10 TABLET ORAL 2 TIMES DAILY PRN
Qty: 30 TABLET | Refills: 0 | Status: SHIPPED | OUTPATIENT
Start: 2024-02-22 | End: 2024-03-12 | Stop reason: SDUPTHER

## 2024-02-22 RX ORDER — HYDROCHLOROTHIAZIDE 12.5 MG/1
12.5 TABLET ORAL DAILY
Qty: 90 TABLET | Refills: 1 | Status: SHIPPED | OUTPATIENT
Start: 2024-02-22 | End: 2024-08-21

## 2024-02-22 RX ORDER — FAMOTIDINE 20 MG/1
20 TABLET, FILM COATED ORAL 2 TIMES DAILY
Qty: 180 TABLET | Refills: 1 | Status: SHIPPED | OUTPATIENT
Start: 2024-02-22

## 2024-02-22 RX ORDER — PROMETHAZINE HYDROCHLORIDE 25 MG/1
25 TABLET ORAL EVERY 8 HOURS PRN
Qty: 30 TABLET | Refills: 0 | Status: SHIPPED | OUTPATIENT
Start: 2024-02-22 | End: 2024-04-05 | Stop reason: SDUPTHER

## 2024-02-22 NOTE — TELEPHONE ENCOUNTER
Refill Decision Note   Miles Jessi  is requesting a refill authorization.  Brief Assessment and Rationale for Refill:  Approve     Medication Therapy Plan:         Comments:     Note composed:9:28 AM 02/22/2024

## 2024-02-22 NOTE — TELEPHONE ENCOUNTER
No care due was identified.  [facility] Embedded Care Due Messages. Reference number: [id number].   2/22/2024 8:37:52 AM CST

## 2024-02-22 NOTE — TELEPHONE ENCOUNTER
No care due was identified.  [facility] Embedded Care Due Messages. Reference number: [id number].   2/22/2024 8:38:23 AM CST

## 2024-02-26 ENCOUNTER — PATIENT MESSAGE (OUTPATIENT)
Dept: NEUROLOGY | Facility: CLINIC | Age: 64
End: 2024-02-26
Payer: COMMERCIAL

## 2024-02-26 RX ORDER — TOPIRAMATE 25 MG/1
TABLET ORAL
Qty: 90 TABLET | Refills: 6 | Status: SHIPPED | OUTPATIENT
Start: 2024-02-26 | End: 2024-09-23

## 2024-03-07 DIAGNOSIS — K58.2 IRRITABLE BOWEL SYNDROME WITH BOTH CONSTIPATION AND DIARRHEA: ICD-10-CM

## 2024-03-07 DIAGNOSIS — R10.9 ABDOMINAL PAIN, ACUTE: ICD-10-CM

## 2024-03-07 RX ORDER — DICYCLOMINE HYDROCHLORIDE 10 MG/1
CAPSULE ORAL
Qty: 40 CAPSULE | Refills: 1 | Status: SHIPPED | OUTPATIENT
Start: 2024-03-07

## 2024-03-07 NOTE — TELEPHONE ENCOUNTER
Contacted patient. Informed her that per Dr. Paul, due to her having severe abdominal pain, she would need to have a visit in clinic and not virtually. Pt currently had 1:40 visit scheduled with another provider for today. Advised that she keep this appointment to be evaluated. Pt stated that she waited on the line for the visit. Informed pt that during her wait, Dr. Paul was reviewing her chart and symptoms to see if he could see her virtually or not. Pt became irate and began yelling. Informed pt that I would have to end the call if she continued to yell. Pt stated she would like to talk to Dr. Paul. Informed pt that Dr Paul does not contact the patients via phone directly due to being in clinic during working hours. Pt asked to speak to our legal department. Informed her that I wasn't sure of the contact info. Pt stated that I needed to learn because I am not allowed to tell patients that the doctor cannot contact them. Informed pt I would be placing her on hold for a moment. Pt ended call while on hold.

## 2024-03-07 NOTE — TELEPHONE ENCOUNTER
No care due was identified.  [facility] Embedded Care Due Messages. Reference number: [id number].   3/07/2024 9:11:33 AM CST

## 2024-03-11 ENCOUNTER — OFFICE VISIT (OUTPATIENT)
Dept: INTERNAL MEDICINE | Facility: CLINIC | Age: 64
End: 2024-03-11
Payer: COMMERCIAL

## 2024-03-11 ENCOUNTER — LAB VISIT (OUTPATIENT)
Dept: LAB | Facility: HOSPITAL | Age: 64
End: 2024-03-11
Attending: PHYSICIAN ASSISTANT
Payer: COMMERCIAL

## 2024-03-11 VITALS
HEART RATE: 97 BPM | OXYGEN SATURATION: 100 % | TEMPERATURE: 98 F | RESPIRATION RATE: 18 BRPM | HEIGHT: 62 IN | DIASTOLIC BLOOD PRESSURE: 72 MMHG | WEIGHT: 182.31 LBS | BODY MASS INDEX: 33.55 KG/M2 | SYSTOLIC BLOOD PRESSURE: 124 MMHG

## 2024-03-11 DIAGNOSIS — R19.7 DIARRHEA, UNSPECIFIED TYPE: ICD-10-CM

## 2024-03-11 DIAGNOSIS — R10.33 PERIUMBILICAL ABDOMINAL PAIN: Primary | ICD-10-CM

## 2024-03-11 DIAGNOSIS — R10.33 PERIUMBILICAL ABDOMINAL PAIN: ICD-10-CM

## 2024-03-11 LAB
ALBUMIN SERPL BCP-MCNC: 4.4 G/DL (ref 3.5–5.2)
ALP SERPL-CCNC: 65 U/L (ref 55–135)
ALT SERPL W/O P-5'-P-CCNC: 14 U/L (ref 10–44)
ANION GAP SERPL CALC-SCNC: 7 MMOL/L (ref 8–16)
AST SERPL-CCNC: 13 U/L (ref 10–40)
BASOPHILS # BLD AUTO: 0.06 K/UL (ref 0–0.2)
BASOPHILS NFR BLD: 0.7 % (ref 0–1.9)
BILIRUB SERPL-MCNC: 0.5 MG/DL (ref 0.1–1)
BUN SERPL-MCNC: 13 MG/DL (ref 8–23)
CALCIUM SERPL-MCNC: 9.7 MG/DL (ref 8.7–10.5)
CHLORIDE SERPL-SCNC: 103 MMOL/L (ref 95–110)
CO2 SERPL-SCNC: 29 MMOL/L (ref 23–29)
CREAT SERPL-MCNC: 0.8 MG/DL (ref 0.5–1.4)
DIFFERENTIAL METHOD BLD: ABNORMAL
EOSINOPHIL # BLD AUTO: 0.1 K/UL (ref 0–0.5)
EOSINOPHIL NFR BLD: 1.1 % (ref 0–8)
ERYTHROCYTE [DISTWIDTH] IN BLOOD BY AUTOMATED COUNT: 13.3 % (ref 11.5–14.5)
EST. GFR  (NO RACE VARIABLE): >60 ML/MIN/1.73 M^2
GLUCOSE SERPL-MCNC: 91 MG/DL (ref 70–110)
HCT VFR BLD AUTO: 42.1 % (ref 37–48.5)
HGB BLD-MCNC: 14.1 G/DL (ref 12–16)
IMM GRANULOCYTES # BLD AUTO: 0.02 K/UL (ref 0–0.04)
IMM GRANULOCYTES NFR BLD AUTO: 0.2 % (ref 0–0.5)
LYMPHOCYTES # BLD AUTO: 2.5 K/UL (ref 1–4.8)
LYMPHOCYTES NFR BLD: 30.2 % (ref 18–48)
MCH RBC QN AUTO: 29.4 PG (ref 27–31)
MCHC RBC AUTO-ENTMCNC: 33.5 G/DL (ref 32–36)
MCV RBC AUTO: 88 FL (ref 82–98)
MONOCYTES # BLD AUTO: 0.4 K/UL (ref 0.3–1)
MONOCYTES NFR BLD: 4.5 % (ref 4–15)
NEUTROPHILS # BLD AUTO: 5.2 K/UL (ref 1.8–7.7)
NEUTROPHILS NFR BLD: 63.3 % (ref 38–73)
NRBC BLD-RTO: 0 /100 WBC
PLATELET # BLD AUTO: 342 K/UL (ref 150–450)
PMV BLD AUTO: 8.6 FL (ref 9.2–12.9)
POTASSIUM SERPL-SCNC: 3.6 MMOL/L (ref 3.5–5.1)
PROT SERPL-MCNC: 7.5 G/DL (ref 6–8.4)
RBC # BLD AUTO: 4.8 M/UL (ref 4–5.4)
SODIUM SERPL-SCNC: 139 MMOL/L (ref 136–145)
WBC # BLD AUTO: 8.2 K/UL (ref 3.9–12.7)

## 2024-03-11 PROCEDURE — 36415 COLL VENOUS BLD VENIPUNCTURE: CPT | Performed by: PHYSICIAN ASSISTANT

## 2024-03-11 PROCEDURE — 99999 PR PBB SHADOW E&M-EST. PATIENT-LVL IV: CPT | Mod: PBBFAC,,, | Performed by: PHYSICIAN ASSISTANT

## 2024-03-11 PROCEDURE — 85025 COMPLETE CBC W/AUTO DIFF WBC: CPT | Performed by: PHYSICIAN ASSISTANT

## 2024-03-11 PROCEDURE — 99214 OFFICE O/P EST MOD 30 MIN: CPT | Mod: S$GLB,,, | Performed by: PHYSICIAN ASSISTANT

## 2024-03-11 PROCEDURE — 80053 COMPREHEN METABOLIC PANEL: CPT | Performed by: PHYSICIAN ASSISTANT

## 2024-03-11 PROCEDURE — 4010F ACE/ARB THERAPY RXD/TAKEN: CPT | Mod: CPTII,S$GLB,, | Performed by: PHYSICIAN ASSISTANT

## 2024-03-11 RX ORDER — CIPROFLOXACIN 500 MG/1
500 TABLET ORAL 2 TIMES DAILY
Qty: 20 TABLET | Refills: 0 | Status: SHIPPED | OUTPATIENT
Start: 2024-03-11 | End: 2024-03-22

## 2024-03-11 RX ORDER — METRONIDAZOLE 500 MG/1
500 TABLET ORAL 3 TIMES DAILY
Qty: 30 TABLET | Refills: 0 | Status: SHIPPED | OUTPATIENT
Start: 2024-03-11 | End: 2024-03-22

## 2024-03-11 NOTE — PROGRESS NOTES
Subjective:      Patient ID: Miles Bowen is a 63 y.o. female.    Chief Complaint: Abdominal Pain, Nausea, and Diarrhea    Diarrhea since Wednesday/thursday    Diarrhea   This is a recurrent problem. The current episode started in the past 7 days. The problem occurs 5 to 10 times per day. The problem has been unchanged. The stool consistency is described as Watery. The patient states that diarrhea awakens her from sleep. Associated symptoms include abdominal pain, bloating and increased flatus. Pertinent negatives include no arthralgias, chills, coughing, fever, headaches, myalgias, sweats, URI, vomiting or weight loss. Nothing aggravates the symptoms. There are no known risk factors. She has tried nothing for the symptoms. There is no history of bowel resection, inflammatory bowel disease, irritable bowel syndrome, malabsorption, a recent abdominal surgery or short gut syndrome.   Pt had similar problem in may of last year.   Was supposed to have CT abd/pelvis with contrast but never completed.   Colonoscopy due next year.       Patient Active Problem List   Diagnosis    Hypertension    Depression    Anxiety    GERD (gastroesophageal reflux disease)    Hiatal hernia    DDD (degenerative disc disease), lumbar    Common migraine    Allergic rhinitis    IBS (irritable bowel syndrome)    Multinodular goiter    MVP (mitral valve prolapse)    Nasal polyps    Obesity    Abnormal ECG    Palpitations    Atypical chest pain    Shortness of breath    Migraine with aura and without status migrainosus, not intractable    Epigastric pain         Current Outpatient Medications:     ACETAMINOPHEN (TYLENOL EXTRA STRENGTH ORAL), Take 2 tablets by mouth every 4 to 6 hours as needed., Disp: , Rfl:     baclofen (LIORESAL) 10 MG tablet, Take 1 tablet (10 mg total) by mouth 2 (two) times daily as needed (muscle spasm)., Disp: 30 tablet, Rfl: 0    diazePAM (VALIUM) 10 MG Tab, every 12 (twelve) hours as needed., Disp: , Rfl:      dicyclomine (BENTYL) 10 MG capsule, Take 1 capsule by mouth 4 times daily before meals and nightly as needed, Disp: 40 capsule, Rfl: 1    diphenoxylate-atropine 2.5-0.025 mg (LOMOTIL) 2.5-0.025 mg per tablet, Take 1 tablet by mouth 4 (four) times daily as needed for Diarrhea., Disp: 30 tablet, Rfl: 0    duloxetine (CYMBALTA) 60 MG capsule, Take 1 capsule by mouth once daily. , Disp: , Rfl: 0    estradioL (ESTRACE) 0.5 MG tablet, Take 1 tablet (0.5 mg total) by mouth once daily., Disp: 30 tablet, Rfl: 0    famotidine (PEPCID) 20 MG tablet, Take 1 tablet (20 mg total) by mouth 2 (two) times daily., Disp: 180 tablet, Rfl: 1    hydroCHLOROthiazide (HYDRODIURIL) 12.5 MG Tab, Take 1 tablet (12.5 mg total) by mouth once daily., Disp: 90 tablet, Rfl: 1    HYDROcodone-acetaminophen (NORCO) 5-325 mg per tablet, Take 1 tablet by mouth once a day as needed for pain, Disp: 30 tablet, Rfl: 0    HYDROcodone-acetaminophen (NORCO) 5-325 mg per tablet, Take 1 tablet by mouth once a day as needed for pain, Disp: 30 tablet, Rfl: 0    HYDROcodone-acetaminophen (NORCO) 5-325 mg per tablet, Take 1 tablet by mouth once daily as needed for pain, Disp: 30 tablet, Rfl: 0    HYDROcodone-acetaminophen (NORCO) 5-325 mg per tablet, Take 1 Tablet Once A Day as needed for pain., Disp: 30 tablet, Rfl: 0    HYDROcodone-acetaminophen (NORCO) 5-325 mg per tablet, Take 1 Tablet Once A Day as needed for pain., Disp: 30 tablet, Rfl: 0    HYDROcodone-acetaminophen (NORCO) 5-325 mg per tablet, Take 1 Tablet Once A Day as needed for pain., Disp: 30 tablet, Rfl: 0    HYDROcodone-acetaminophen (NORCO) 5-325 mg per tablet, Take 1 Tablet Once A Day as needed, Disp: 30 tablet, Rfl: 0    HYDROcodone-acetaminophen (NORCO) 5-325 mg per tablet, Take 1 Tablet Once A Day as needed, Disp: 30 tablet, Rfl: 0    HYDROcodone-acetaminophen (NORCO) 5-325 mg per tablet, Take 1 Tablet Once A Day as needed, Disp: 30 tablet, Rfl: 0    HYDROcodone-acetaminophen (NORCO) 5-325 mg  per tablet, Take 1 tablet by mouth once a day as needed for pain, Disp: 30 tablet, Rfl: 0    LORazepam (ATIVAN) 2 MG Tab, SMARTSI Tablet(s) By Mouth Every 8-10 Hours PRN, Disp: , Rfl:     losartan (COZAAR) 100 MG tablet, Take 1 tablet (100 mg total) by mouth once daily., Disp: 90 tablet, Rfl: 2    pantoprazole (PROTONIX) 40 MG tablet, Take 1 tablet (40 mg total) by mouth once daily., Disp: 90 tablet, Rfl: 3    potassium chloride SA (K-DUR,KLOR-CON) 20 MEQ tablet, Take 1 tablet (20 mEq total) by mouth once daily., Disp: 90 tablet, Rfl: 2    promethazine (PHENERGAN) 25 MG tablet, Take 1 tablet (25 mg total) by mouth every 8 (eight) hours as needed for Nausea., Disp: 30 tablet, Rfl: 0    rimegepant (NURTEC) 75 mg odt, Take 1 tablet (75 mg total) by mouth every other day. Place ODT tablet on or under the tongue., Disp: 16 tablet, Rfl: 2    topiramate (TOPAMAX) 25 MG tablet, Take 1 tablet (25 mg total) by mouth every morning AND 2 tablets (50 mg total) every evening., Disp: 90 tablet, Rfl: 6    zolpidem (AMBIEN CR) 12.5 MG CR tablet, Take 12.5 mg by mouth nightly., Disp: , Rfl:     ciprofloxacin HCl (CIPRO) 500 MG tablet, Take 1 tablet (500 mg total) by mouth 2 (two) times daily. for 10 days, Disp: 20 tablet, Rfl: 0    diclofenac sodium (VOLTAREN) 1 % Gel, Apply 2 g topically 3 (three) times daily. for 7 days, Disp: 100 g, Rfl: 1    metroNIDAZOLE (FLAGYL) 500 MG tablet, Take 1 tablet (500 mg total) by mouth 3 (three) times daily. for 10 days, Disp: 30 tablet, Rfl: 0    Review of Systems   Constitutional:  Negative for activity change, appetite change, chills, diaphoresis, fatigue, fever, unexpected weight change and weight loss.   HENT: Negative.  Negative for congestion, hearing loss, postnasal drip, rhinorrhea, sore throat, trouble swallowing and voice change.    Eyes: Negative.  Negative for visual disturbance.   Respiratory: Negative.  Negative for cough, choking, chest tightness and shortness of breath.   "  Cardiovascular:  Negative for chest pain, palpitations and leg swelling.   Gastrointestinal:  Positive for abdominal distention, abdominal pain, bloating, diarrhea, flatus and nausea. Negative for anal bleeding, blood in stool, constipation, rectal pain and vomiting.   Endocrine: Negative for cold intolerance, heat intolerance, polydipsia and polyuria.   Genitourinary: Negative.  Negative for difficulty urinating and frequency.   Musculoskeletal:  Negative for arthralgias, back pain, gait problem, joint swelling and myalgias.   Skin:  Negative for color change, pallor, rash and wound.   Neurological:  Negative for dizziness, tremors, weakness, light-headedness, numbness and headaches.   Hematological:  Negative for adenopathy.   Psychiatric/Behavioral:  Negative for behavioral problems, confusion, self-injury, sleep disturbance and suicidal ideas. The patient is not nervous/anxious.      Objective:   /72 (BP Location: Right arm, Patient Position: Sitting, BP Method: Large (Manual))   Pulse 97   Temp 98 °F (36.7 °C) (Tympanic)   Resp 18   Ht 5' 2" (1.575 m)   Wt 82.7 kg (182 lb 5.1 oz)   SpO2 100%   BMI 33.35 kg/m²     Physical Exam  Vitals and nursing note reviewed.   Constitutional:       General: She is not in acute distress.     Appearance: Normal appearance. She is well-developed. She is not ill-appearing, toxic-appearing or diaphoretic.   HENT:      Head: Normocephalic and atraumatic.   Cardiovascular:      Rate and Rhythm: Normal rate and regular rhythm.      Heart sounds: Normal heart sounds. No murmur heard.     No friction rub. No gallop.   Pulmonary:      Effort: Pulmonary effort is normal. No respiratory distress.      Breath sounds: Normal breath sounds. No wheezing or rales.   Abdominal:      General: There is no distension.      Palpations: Abdomen is soft. There is no mass.      Tenderness: There is abdominal tenderness. There is no guarding.   Musculoskeletal:         General: Normal " range of motion.   Skin:     General: Skin is warm.      Capillary Refill: Capillary refill takes less than 2 seconds.      Findings: No rash.   Neurological:      Mental Status: She is alert and oriented to person, place, and time.      Motor: No weakness.      Gait: Gait normal.   Psychiatric:         Mood and Affect: Mood normal.         Behavior: Behavior normal.         Thought Content: Thought content normal.         Judgment: Judgment normal.         Assessment:     1. Periumbilical abdominal pain    2. Diarrhea, unspecified type      Plan:   Periumbilical abdominal pain  -     CT Abdomen Pelvis With IV Contrast Routine Oral Contrast; Future; Expected date: 03/11/2024  -     ciprofloxacin HCl (CIPRO) 500 MG tablet; Take 1 tablet (500 mg total) by mouth 2 (two) times daily. for 10 days  Dispense: 20 tablet; Refill: 0  -     metroNIDAZOLE (FLAGYL) 500 MG tablet; Take 1 tablet (500 mg total) by mouth 3 (three) times daily. for 10 days  Dispense: 30 tablet; Refill: 0  -     Creatinine, serum; Future; Expected date: 03/11/2024  -     CBC Auto Differential; Future; Expected date: 03/11/2024  -     Comprehensive Metabolic Panel; Future; Expected date: 03/11/2024    Diarrhea, unspecified type  -     CT Abdomen Pelvis With IV Contrast Routine Oral Contrast; Future; Expected date: 03/11/2024  -     ciprofloxacin HCl (CIPRO) 500 MG tablet; Take 1 tablet (500 mg total) by mouth 2 (two) times daily. for 10 days  Dispense: 20 tablet; Refill: 0  -     metroNIDAZOLE (FLAGYL) 500 MG tablet; Take 1 tablet (500 mg total) by mouth 3 (three) times daily. for 10 days  Dispense: 30 tablet; Refill: 0  -     Creatinine, serum; Future; Expected date: 03/11/2024  -     CBC Auto Differential; Future; Expected date: 03/11/2024  -     Comprehensive Metabolic Panel; Future; Expected date: 03/11/2024    -sounds like diverticulitis. CT to confirm. Pt may not be able to complete CT scan. Advised her that I dilip rx the treatment. If no  improvement in 2-3 days after starting abx will need to complete CT.  -low fiber diet    Follow up if symptoms worsen or fail to improve.

## 2024-03-12 ENCOUNTER — PATIENT MESSAGE (OUTPATIENT)
Dept: INTERNAL MEDICINE | Facility: CLINIC | Age: 64
End: 2024-03-12
Payer: COMMERCIAL

## 2024-03-12 RX ORDER — BACLOFEN 10 MG/1
10 TABLET ORAL 2 TIMES DAILY PRN
Qty: 30 TABLET | Refills: 0 | Status: SHIPPED | OUTPATIENT
Start: 2024-03-12 | End: 2024-04-05 | Stop reason: SDUPTHER

## 2024-03-12 RX ORDER — BACLOFEN 10 MG/1
10 TABLET ORAL 2 TIMES DAILY PRN
Qty: 30 TABLET | Refills: 0 | OUTPATIENT
Start: 2024-03-12

## 2024-04-05 NOTE — TELEPHONE ENCOUNTER
No care due was identified.  [facility] Embedded Care Due Messages. Reference number: [id number].   4/05/2024 4:19:28 PM CDT

## 2024-04-07 RX ORDER — BACLOFEN 10 MG/1
10 TABLET ORAL 2 TIMES DAILY PRN
Qty: 30 TABLET | Refills: 0 | Status: SHIPPED | OUTPATIENT
Start: 2024-04-07 | End: 2024-05-14 | Stop reason: SDUPTHER

## 2024-04-08 DIAGNOSIS — I10 ESSENTIAL HYPERTENSION: ICD-10-CM

## 2024-04-08 RX ORDER — PROMETHAZINE HYDROCHLORIDE 25 MG/1
25 TABLET ORAL EVERY 8 HOURS PRN
Qty: 30 TABLET | Refills: 0 | Status: SHIPPED | OUTPATIENT
Start: 2024-04-08

## 2024-04-08 NOTE — TELEPHONE ENCOUNTER
No care due was identified.  [facility] Embedded Care Due Messages. Reference number: [id number].   4/08/2024 11:02:54 AM CDT

## 2024-04-09 RX ORDER — LOSARTAN POTASSIUM 100 MG/1
100 TABLET ORAL DAILY
Qty: 90 TABLET | Refills: 2 | Status: SHIPPED | OUTPATIENT
Start: 2024-04-09

## 2024-04-09 NOTE — TELEPHONE ENCOUNTER
Refill Decision Note   Miles Jessi  is requesting a refill authorization.  Brief Assessment and Rationale for Refill:  Approve     Medication Therapy Plan:         Comments:     Note composed:8:44 AM 04/09/2024

## 2024-04-30 ENCOUNTER — PATIENT MESSAGE (OUTPATIENT)
Dept: INTERNAL MEDICINE | Facility: CLINIC | Age: 64
End: 2024-04-30

## 2024-04-30 ENCOUNTER — OFFICE VISIT (OUTPATIENT)
Dept: INTERNAL MEDICINE | Facility: CLINIC | Age: 64
End: 2024-04-30
Payer: COMMERCIAL

## 2024-04-30 ENCOUNTER — LAB VISIT (OUTPATIENT)
Dept: LAB | Facility: HOSPITAL | Age: 64
End: 2024-04-30
Attending: INTERNAL MEDICINE
Payer: COMMERCIAL

## 2024-04-30 VITALS
WEIGHT: 180.56 LBS | HEIGHT: 62 IN | TEMPERATURE: 98 F | OXYGEN SATURATION: 99 % | BODY MASS INDEX: 33.23 KG/M2 | DIASTOLIC BLOOD PRESSURE: 68 MMHG | SYSTOLIC BLOOD PRESSURE: 108 MMHG | HEART RATE: 73 BPM

## 2024-04-30 DIAGNOSIS — E66.9 OBESITY (BMI 30-39.9): ICD-10-CM

## 2024-04-30 DIAGNOSIS — Z00.00 PREVENTATIVE HEALTH CARE: ICD-10-CM

## 2024-04-30 DIAGNOSIS — R35.0 FREQUENCY OF MICTURITION: Primary | ICD-10-CM

## 2024-04-30 DIAGNOSIS — R35.0 FREQUENCY OF MICTURITION: ICD-10-CM

## 2024-04-30 DIAGNOSIS — R30.0 BURNING WITH URINATION: Primary | ICD-10-CM

## 2024-04-30 LAB
BILIRUB UR QL STRIP: NEGATIVE
CLARITY UR: CLEAR
COLOR UR: YELLOW
GLUCOSE UR QL STRIP: NEGATIVE
HGB UR QL STRIP: NEGATIVE
KETONES UR QL STRIP: NEGATIVE
LEUKOCYTE ESTERASE UR QL STRIP: NEGATIVE
NITRITE UR QL STRIP: NEGATIVE
PH UR STRIP: 7 [PH] (ref 5–8)
PROT UR QL STRIP: NEGATIVE
SP GR UR STRIP: 1.01 (ref 1–1.03)
URN SPEC COLLECT METH UR: NORMAL

## 2024-04-30 PROCEDURE — 1159F MED LIST DOCD IN RCRD: CPT | Mod: CPTII,S$GLB,, | Performed by: INTERNAL MEDICINE

## 2024-04-30 PROCEDURE — 99999 PR PBB SHADOW E&M-EST. PATIENT-LVL V: CPT | Mod: PBBFAC,,, | Performed by: INTERNAL MEDICINE

## 2024-04-30 PROCEDURE — 3078F DIAST BP <80 MM HG: CPT | Mod: CPTII,S$GLB,, | Performed by: INTERNAL MEDICINE

## 2024-04-30 PROCEDURE — 99213 OFFICE O/P EST LOW 20 MIN: CPT | Mod: S$GLB,,, | Performed by: INTERNAL MEDICINE

## 2024-04-30 PROCEDURE — 81003 URINALYSIS AUTO W/O SCOPE: CPT | Performed by: INTERNAL MEDICINE

## 2024-04-30 PROCEDURE — 4010F ACE/ARB THERAPY RXD/TAKEN: CPT | Mod: CPTII,S$GLB,, | Performed by: INTERNAL MEDICINE

## 2024-04-30 PROCEDURE — 3074F SYST BP LT 130 MM HG: CPT | Mod: CPTII,S$GLB,, | Performed by: INTERNAL MEDICINE

## 2024-04-30 PROCEDURE — 3008F BODY MASS INDEX DOCD: CPT | Mod: CPTII,S$GLB,, | Performed by: INTERNAL MEDICINE

## 2024-04-30 RX ORDER — LORAZEPAM 1 MG/1
TABLET ORAL
COMMUNITY
Start: 2024-04-28

## 2024-04-30 RX ORDER — ZOLPIDEM TARTRATE 10 MG/1
10 TABLET ORAL NIGHTLY PRN
COMMUNITY
Start: 2024-04-28

## 2024-04-30 NOTE — LETTER
April 30, 2024      The [address]  [address] [facility]  [place] [address]  Phone: [phone]  Fax: [phone]       Patient: Miles Bowen   YOB: 1960  Date of Visit: 04/30/2024    To Whom It May Concern:    Valerie Bowen  was at [facility] on 04/30/2024. The patient may return to work/school on 05/01/24 with no restrictions. If you have any questions or concerns, or if I can be of further assistance, please do not hesitate to contact me.    Sincerely,    Ellen Ellison LPN

## 2024-04-30 NOTE — PROGRESS NOTES
"Subjective:      Patient ID: Miles Bowen is a 63 y.o. female.    Chief Complaint: Urinary Frequency    Urinary Frequency   This is a new problem. The current episode started in the past 7 days. The problem occurs intermittently. The problem has been unchanged. The pain is mild. There has been no fever. Associated symptoms include frequency and urgency. Pertinent negatives include no chills, discharge, flank pain, hematuria, nausea, sweats, vomiting, weight loss, constipation or rash. She has tried nothing for the symptoms. Her past medical history is significant for urinary stasis. There is no history of diabetes insipidus, diabetes mellitus or a single kidney.           Review of Systems   Constitutional:  Negative for chills, fever and weight loss.   Gastrointestinal:  Negative for constipation, nausea and vomiting.   Genitourinary:  Positive for frequency and urgency. Negative for flank pain and hematuria.   Skin:  Negative for rash.     Objective:   /68 (BP Location: Right arm, Patient Position: Sitting, BP Method: Medium (Manual))   Pulse 73   Temp 97.6 °F (36.4 °C) (Tympanic)   Ht 5' 2" (1.575 m)   Wt 81.9 kg (180 lb 8.9 oz)   SpO2 99%   BMI 33.02 kg/m²     Physical Exam  Constitutional:       General: She is awake.      Appearance: Normal appearance.   HENT:      Head: Normocephalic and atraumatic.   Eyes:      Conjunctiva/sclera: Conjunctivae normal.   Pulmonary:      Effort: Pulmonary effort is normal.   Abdominal:      Tenderness: There is abdominal tenderness (Suprapubic area). There is no right CVA tenderness, left CVA tenderness, guarding or rebound.   Musculoskeletal:      Cervical back: Normal range of motion.   Neurological:      Mental Status: She is alert. Mental status is at baseline.   Psychiatric:         Mood and Affect: Mood normal.         Behavior: Behavior normal. Behavior is cooperative.         Thought Content: Thought content normal.         Judgment: Judgment normal. "         Lab Results   Component Value Date    WBC 8.20 03/11/2024    HGB 14.1 03/11/2024    HGB 12.5 08/24/2023    HGB 15.1 05/01/2023    HCT 42.1 03/11/2024    MCV 88 03/11/2024    MCV 90 08/24/2023    MCV 92 05/01/2023     03/11/2024    CHOL 248 (H) 03/21/2022    TRIG 99 03/21/2022     (H) 03/21/2022    LDLCALC 127.2 03/21/2022    LDLCALC 138.2 04/09/2021    LDLCALC 109 08/20/2015    ALT 14 03/11/2024    AST 13 03/11/2024     03/11/2024    K 3.6 03/11/2024    CALCIUM 9.7 03/11/2024     03/11/2024    CO2 29 03/11/2024    BUN 13 03/11/2024    CREATININE 0.8 03/11/2024    CREATININE 0.7 08/24/2023    CREATININE 0.9 05/01/2023    EGFRNORACEVR >60 03/11/2024    EGFRNORACEVR >60.0 08/24/2023    EGFRNORACEVR >60.0 05/01/2023    TSH 1.628 10/12/2022    TSH 0.554 03/21/2022    TSH 0.656 02/23/2022    GLU 91 03/11/2024    HGBA1C 5.7 (H) 03/21/2022    HGBA1C 5.8 (H) 04/09/2021    HGBA1C 6.0 10/16/2020          The ASCVD Risk score (Chuy NICOLAS, et al., 2019) failed to calculate for the following reasons:    The valid HDL cholesterol range is 20 to 100 mg/dL     Assessment:     1. Frequency of micturition    2. Preventative health care    3. Obesity (BMI 30-39.9)      Plan:   1. Frequency of micturition  -     Urinalysis, Reflex to Urine Culture Urine, Clean Catch; Future; Expected date: 04/30/2024    2. Preventative health care  -     CBC Auto Differential; Future; Expected date: 07/30/2024  -     Comprehensive Metabolic Panel; Future; Expected date: 07/30/2024  -     TSH; Future; Expected date: 07/30/2024  -     Lipid Panel; Future; Expected date: 07/29/2024  -     Hemoglobin A1C; Future; Expected date: 07/30/2024    3. Obesity (BMI 30-39.9)  -     Ambulatory referral/consult to [facility] Lifestyle and Wellness; Future; Expected date: 05/07/2024        There are no Patient Instructions on file for this visit.    Future Appointments   Date Time Provider Department Center   5/7/2024  2:00 PM Rekha  Martin PLASCENCIA MD [facility] OBGYN [place] [facility]   6/5/2024  3:00 PM Maria Eugenia Hedrick MD [facility] INT [facility]   7/23/2024  7:50 AM LABORATORY, [facility] LAB [facility]   7/30/2024 11:20 AM Carlos Paul MD [facility] IM [place]       Lab Frequency Next Occurrence   Mammo Digital Screening Bilat w/ Jim Once 08/24/2022   H. pylori antigen, stool Once 05/01/2023   Pancreatic elastase, fecal Once 05/01/2023   Fecal fat, qualitative Once 05/01/2023   Occult blood x 1, stool Once 05/01/2023   WBC, Stool Once 05/01/2023   Calprotectin, Stool Once 05/01/2023   Giardia / Cryptosporidum, EIA Once 05/01/2023   Stool Exam-Ova,Cysts,Parasites Once 05/01/2023   Clostridium difficile EIA Once 05/01/2023   Stool culture Once 05/01/2023   CT Abdomen Pelvis With Contrast Once 05/02/2023   Ambulatory referral/consult to Gastroenterology Once 09/02/2023   US Abdomen Complete Once 09/06/2023   Ambulatory referral/consult to Psychiatry Once 10/02/2023   CT Abdomen Pelvis With IV Contrast Routine Oral Contrast Once 03/11/2024   Creatinine, serum Once 03/11/2024   Ambulatory referral/consult to Endo Procedure  Once 03/12/2024       No follow-ups on file.

## 2024-05-01 ENCOUNTER — PATIENT MESSAGE (OUTPATIENT)
Dept: INTERNAL MEDICINE | Facility: CLINIC | Age: 64
End: 2024-05-01
Payer: COMMERCIAL

## 2024-05-07 ENCOUNTER — OFFICE VISIT (OUTPATIENT)
Dept: INTERNAL MEDICINE | Facility: CLINIC | Age: 64
End: 2024-05-07
Payer: COMMERCIAL

## 2024-05-07 ENCOUNTER — LAB VISIT (OUTPATIENT)
Dept: LAB | Facility: HOSPITAL | Age: 64
End: 2024-05-07
Attending: INTERNAL MEDICINE
Payer: COMMERCIAL

## 2024-05-07 VITALS
HEART RATE: 106 BPM | TEMPERATURE: 98 F | DIASTOLIC BLOOD PRESSURE: 82 MMHG | WEIGHT: 188.5 LBS | OXYGEN SATURATION: 98 % | BODY MASS INDEX: 34.69 KG/M2 | HEIGHT: 62 IN | SYSTOLIC BLOOD PRESSURE: 128 MMHG

## 2024-05-07 DIAGNOSIS — K21.9 GASTROESOPHAGEAL REFLUX DISEASE, UNSPECIFIED WHETHER ESOPHAGITIS PRESENT: ICD-10-CM

## 2024-05-07 DIAGNOSIS — K59.00 CONSTIPATION, UNSPECIFIED CONSTIPATION TYPE: ICD-10-CM

## 2024-05-07 DIAGNOSIS — R13.10 ODYNOPHAGIA: ICD-10-CM

## 2024-05-07 DIAGNOSIS — R13.19 ESOPHAGEAL DYSPHAGIA: Primary | ICD-10-CM

## 2024-05-07 PROCEDURE — 3008F BODY MASS INDEX DOCD: CPT | Mod: CPTII,S$GLB,, | Performed by: INTERNAL MEDICINE

## 2024-05-07 PROCEDURE — 99214 OFFICE O/P EST MOD 30 MIN: CPT | Mod: S$GLB,,, | Performed by: INTERNAL MEDICINE

## 2024-05-07 PROCEDURE — 4010F ACE/ARB THERAPY RXD/TAKEN: CPT | Mod: CPTII,S$GLB,, | Performed by: INTERNAL MEDICINE

## 2024-05-07 PROCEDURE — 3079F DIAST BP 80-89 MM HG: CPT | Mod: CPTII,S$GLB,, | Performed by: INTERNAL MEDICINE

## 2024-05-07 PROCEDURE — 1159F MED LIST DOCD IN RCRD: CPT | Mod: CPTII,S$GLB,, | Performed by: INTERNAL MEDICINE

## 2024-05-07 PROCEDURE — 99999 PR PBB SHADOW E&M-EST. PATIENT-LVL V: CPT | Mod: PBBFAC,,, | Performed by: INTERNAL MEDICINE

## 2024-05-07 PROCEDURE — 36415 COLL VENOUS BLD VENIPUNCTURE: CPT | Performed by: INTERNAL MEDICINE

## 2024-05-07 PROCEDURE — 3074F SYST BP LT 130 MM HG: CPT | Mod: CPTII,S$GLB,, | Performed by: INTERNAL MEDICINE

## 2024-05-07 PROCEDURE — 86677 HELICOBACTER PYLORI ANTIBODY: CPT | Performed by: INTERNAL MEDICINE

## 2024-05-07 RX ORDER — SUCRALFATE 1 G/1
1 TABLET ORAL
Qty: 40 TABLET | Refills: 1 | Status: SHIPPED | OUTPATIENT
Start: 2024-05-07

## 2024-05-07 NOTE — PROGRESS NOTES
"Subjective:      Patient ID: Miles Bowen is a 63 y.o. female.    Chief Complaint: DARK STOOL    HPI    62 yo with   Patient Active Problem List   Diagnosis    Hypertension    Depression    Anxiety    GERD (gastroesophageal reflux disease)    Hiatal hernia    DDD (degenerative disc disease), lumbar    Common migraine    Allergic rhinitis    IBS (irritable bowel syndrome)    Multinodular goiter    MVP (mitral valve prolapse)    Nasal polyps    Obesity    Abnormal ECG    Palpitations    Atypical chest pain    Shortness of breath    Migraine with aura and without status migrainosus, not intractable    Epigastric pain     Past Medical History:   Diagnosis Date    Anxiety     Depression     GERD (gastroesophageal reflux disease)     Heart murmur     Hematemesis without nausea 11/4/2016    Hypertension     Multinodular goiter 12/23/2016    MVP (mitral valve prolapse)     Obesity      Here today c/o dark brown stool. Smell more foul than usual. 2 episodes. Has had some abd cramping. Some constipation yesterday.     Also c/o on dysphagia and odynophagia starting yesterday. Some epigastric pain starting yesterday. Tiredness starting yesterday.  No blood. No melena.     Has not tried any treatment other than an extra protonix and pepcid which does not help.           Review of Systems   Constitutional:  Negative for chills and fever.   Respiratory:  Negative for cough.    Cardiovascular:  Negative for chest pain.   Gastrointestinal:  Negative for vomiting.   Skin:  Negative for rash and wound.     Objective:   /82 (BP Location: Right arm, Patient Position: Sitting, BP Method: Large (Manual))   Pulse 106   Temp 98 °F (36.7 °C) (Tympanic)   Ht 5' 2" (1.575 m)   Wt 85.5 kg (188 lb 7.9 oz)   SpO2 98%   BMI 34.48 kg/m²     Physical Exam  Constitutional:       General: She is not in acute distress.     Appearance: She is well-developed. She is not ill-appearing.   HENT:      Head: Normocephalic and atraumatic. "   Eyes:      Extraocular Movements: Extraocular movements intact.   Neck:      Thyroid: No thyromegaly.   Cardiovascular:      Rate and Rhythm: Normal rate and regular rhythm.   Pulmonary:      Effort: Pulmonary effort is normal.      Breath sounds: Normal breath sounds. No wheezing or rales.   Abdominal:      General: Bowel sounds are normal.      Palpations: Abdomen is soft.      Tenderness: There is no abdominal tenderness. There is no guarding or rebound.   Musculoskeletal:         General: No swelling.      Cervical back: Neck supple. No rigidity.   Lymphadenopathy:      Cervical: No cervical adenopathy.   Skin:     General: Skin is warm and dry.   Neurological:      Mental Status: She is alert and oriented to person, place, and time.   Psychiatric:         Behavior: Behavior normal.         Lab Results   Component Value Date    WBC 8.20 03/11/2024    HGB 14.1 03/11/2024    HGB 12.5 08/24/2023    HGB 15.1 05/01/2023    HCT 42.1 03/11/2024    MCV 88 03/11/2024    MCV 90 08/24/2023    MCV 92 05/01/2023     03/11/2024    CHOL 248 (H) 03/21/2022    TRIG 99 03/21/2022     (H) 03/21/2022    LDLCALC 127.2 03/21/2022    LDLCALC 138.2 04/09/2021    LDLCALC 109 08/20/2015    ALT 14 03/11/2024    AST 13 03/11/2024     03/11/2024    K 3.6 03/11/2024    CALCIUM 9.7 03/11/2024     03/11/2024    CO2 29 03/11/2024    BUN 13 03/11/2024    CREATININE 0.8 03/11/2024    CREATININE 0.7 08/24/2023    CREATININE 0.9 05/01/2023    EGFRNORACEVR >60 03/11/2024    EGFRNORACEVR >60.0 08/24/2023    EGFRNORACEVR >60.0 05/01/2023    TSH 1.628 10/12/2022    TSH 0.554 03/21/2022    TSH 0.656 02/23/2022    GLU 91 03/11/2024    HGBA1C 5.7 (H) 03/21/2022    HGBA1C 5.8 (H) 04/09/2021    HGBA1C 6.0 10/16/2020          The ASCVD Risk score (Chuy NICOLAS, et al., 2019) failed to calculate for the following reasons:    The valid HDL cholesterol range is 20 to 100 mg/dL     Assessment:     1. Esophageal dysphagia    2.  Odynophagia    3. Gastroesophageal reflux disease, unspecified whether esophagitis present    4. Constipation, unspecified constipation type      Plan:   1. Esophageal dysphagia  -     Ambulatory referral/consult to Gastroenterology; Future; Expected date: 05/14/2024    2. Odynophagia  -     Ambulatory referral/consult to Gastroenterology; Future; Expected date: 05/14/2024    3. Gastroesophageal reflux disease, unspecified whether esophagitis present  -     sucralfate (CARAFATE) 1 gram tablet; Take 1 tablet (1 g total) by mouth 4 (four) times daily before meals and nightly.  Dispense: 40 tablet; Refill: 1  -     H. pylori Antibody, IgG; Future; Expected date: 05/07/2024    4. Constipation, unspecified constipation type    Stool softener/mirilax    There are no Patient Instructions on file for this visit.    Future Appointments   Date Time Provider Department Center   5/15/2024  8:20 AM Martin Da Silva MD [facility] OBN [place] [facility]   6/5/2024  3:00 PM Maria Eugenia Hedrick MD [facility] INT TOM[facility]   6/13/2024  1:30 PM Kylie Samuel MD [facility] GASTRO [facility]   7/23/2024  7:50 AM LABORATORY, [facility] LAB [facility]   7/30/2024 11:20 AM Carlos Paul MD [facility] IM [place]       Lab Frequency Next Occurrence   Mammo Digital Screening Bilat w/ Jim Once 08/24/2022   Pancreatic elastase, fecal Once 05/01/2023   Fecal fat, qualitative Once 05/01/2023   Calprotectin, Stool Once 05/01/2023   Giardia / Cryptosporidum, EIA Once 05/01/2023   Clostridium difficile EIA Once 05/01/2023   Ambulatory referral/consult to Gastroenterology Once 09/02/2023   US Abdomen Complete Once 09/06/2023   Ambulatory referral/consult to Psychiatry Once 10/02/2023   CT Abdomen Pelvis With IV Contrast Routine Oral Contrast Once 03/11/2024   Creatinine, serum Once 03/11/2024   Ambulatory referral/consult to Endo Procedure  Once 03/12/2024   CBC Auto Differential Once 07/30/2024   Comprehensive Metabolic Panel Once 07/30/2024   TSH Once  07/30/2024   Ambulatory referral/consult to [facility] Lifestyle and Wellness Once 05/07/2024   Lipid Panel Once 07/29/2024   Hemoglobin A1C Once 07/30/2024   Ambulatory referral/consult to Urology Once 05/07/2024       Follow up if symptoms worsen or fail to improve.

## 2024-05-08 LAB — H PYLORI IGG SERPL QL IA: NEGATIVE

## 2024-05-14 RX ORDER — BACLOFEN 10 MG/1
10 TABLET ORAL 2 TIMES DAILY PRN
Qty: 30 TABLET | Refills: 0 | Status: SHIPPED | OUTPATIENT
Start: 2024-05-14 | End: 2024-06-12 | Stop reason: SDUPTHER

## 2024-06-13 RX ORDER — BACLOFEN 10 MG/1
10 TABLET ORAL 2 TIMES DAILY PRN
Qty: 30 TABLET | Refills: 0 | Status: SHIPPED | OUTPATIENT
Start: 2024-06-13

## 2024-06-19 NOTE — TELEPHONE ENCOUNTER
No care due was identified.  [facility] Embedded Care Due Messages. Reference number: [id number].   6/19/2024 3:59:22 AM CDT

## 2024-06-20 RX ORDER — PROMETHAZINE HYDROCHLORIDE 25 MG/1
25 TABLET ORAL EVERY 8 HOURS PRN
Qty: 30 TABLET | Refills: 0 | Status: SHIPPED | OUTPATIENT
Start: 2024-06-20

## 2024-07-01 ENCOUNTER — OFFICE VISIT (OUTPATIENT)
Dept: FAMILY MEDICINE | Facility: CLINIC | Age: 64
End: 2024-07-01
Payer: COMMERCIAL

## 2024-07-01 ENCOUNTER — TELEPHONE (OUTPATIENT)
Dept: FAMILY MEDICINE | Facility: CLINIC | Age: 64
End: 2024-07-01

## 2024-07-01 VITALS
DIASTOLIC BLOOD PRESSURE: 72 MMHG | OXYGEN SATURATION: 97 % | BODY MASS INDEX: 33.02 KG/M2 | WEIGHT: 179.44 LBS | HEIGHT: 62 IN | RESPIRATION RATE: 18 BRPM | HEART RATE: 76 BPM | SYSTOLIC BLOOD PRESSURE: 112 MMHG | TEMPERATURE: 98 F

## 2024-07-01 DIAGNOSIS — R05.9 COUGH, UNSPECIFIED TYPE: ICD-10-CM

## 2024-07-01 DIAGNOSIS — J06.9 VIRAL URI: Primary | ICD-10-CM

## 2024-07-01 DIAGNOSIS — R52 GENERALIZED BODY ACHES: ICD-10-CM

## 2024-07-01 PROBLEM — F33.0 MAJOR DEPRESSIVE DISORDER, RECURRENT, MILD: Status: ACTIVE | Noted: 2024-07-01

## 2024-07-01 PROBLEM — R10.13 EPIGASTRIC PAIN: Status: RESOLVED | Noted: 2023-09-06 | Resolved: 2024-07-01

## 2024-07-01 LAB
CTP QC/QA: YES
SARS-COV-2 RDRP RESP QL NAA+PROBE: NEGATIVE

## 2024-07-01 PROCEDURE — 3078F DIAST BP <80 MM HG: CPT | Mod: CPTII,S$GLB,, | Performed by: REGISTERED NURSE

## 2024-07-01 PROCEDURE — 4010F ACE/ARB THERAPY RXD/TAKEN: CPT | Mod: CPTII,S$GLB,, | Performed by: REGISTERED NURSE

## 2024-07-01 PROCEDURE — 99999 PR PBB SHADOW E&M-EST. PATIENT-LVL III: CPT | Mod: PBBFAC,,, | Performed by: REGISTERED NURSE

## 2024-07-01 PROCEDURE — 3008F BODY MASS INDEX DOCD: CPT | Mod: CPTII,S$GLB,, | Performed by: REGISTERED NURSE

## 2024-07-01 PROCEDURE — 99213 OFFICE O/P EST LOW 20 MIN: CPT | Mod: 25,S$GLB,, | Performed by: REGISTERED NURSE

## 2024-07-01 PROCEDURE — 87635 SARS-COV-2 COVID-19 AMP PRB: CPT | Mod: QW,S$GLB,, | Performed by: REGISTERED NURSE

## 2024-07-01 PROCEDURE — 3074F SYST BP LT 130 MM HG: CPT | Mod: CPTII,S$GLB,, | Performed by: REGISTERED NURSE

## 2024-07-01 NOTE — PROGRESS NOTES
SUBJECTIVE:     Miles Bowen  MRN:  [id number]  63 y.o. female    CHIEF COMPLAINT:     Congestion    HPI:    Miles Bowen reports not feeling well x 2 days.  Reports cough, congestion, fatigue and body aches.  Positive for ill contacts at work.  Tx with Tylenol.      Review of Systems   Constitutional:  Positive for malaise/fatigue. Negative for chills and fever.   HENT:  Positive for congestion. Negative for ear pain, sinus pain and sore throat.    Respiratory:  Positive for cough. Negative for hemoptysis, shortness of breath and wheezing.    Cardiovascular: Negative.    Gastrointestinal: Negative.    Genitourinary: Negative.    Musculoskeletal:  Positive for myalgias.   Neurological:  Negative for dizziness, tingling, tremors and headaches.       Review of patient's allergies indicates:   Allergen Reactions    Aimovig autoinjector [erenumab-aooe] Other (See Comments)     Neurological problems      Benadryl allergy decongestant     Ibuprofen Rash     GI upset    Nsaids (non-steroidal anti-inflammatory drug) Rash     GI Upset    Penicillins Rash     GI issues    Prednisone Palpitations    Singulair [montelukast] Palpitations    Sulfa (sulfonamide antibiotics) Rash    Tessalon perle [benzonatate] Palpitations       Patient Active Problem List   Diagnosis    Hypertension    Depression    Anxiety    GERD (gastroesophageal reflux disease)    Hiatal hernia    DDD (degenerative disc disease), lumbar    Common migraine    Allergic rhinitis    IBS (irritable bowel syndrome)    Multinodular goiter    MVP (mitral valve prolapse)    Nasal polyps    Obesity    Abnormal ECG    Palpitations    Atypical chest pain    Shortness of breath    Migraine with aura and without status migrainosus, not intractable    Major depressive disorder, recurrent, mild       Current Outpatient Medications   Medication Instructions    acetaminophen-codeine 300-60mg (TYLENOL #4) 300-60 mg Tab Take 1 Tablet by mouth Every 4 Hours as needed for pain.  "   baclofen (LIORESAL) 10 mg, Oral, 2 times daily PRN    dicyclomine (BENTYL) 10 MG capsule Take 1 capsule by mouth 4 times daily before meals and nightly as needed    duloxetine (CYMBALTA) 60 MG capsule 1 capsule, Oral, Daily    estradioL (ESTRACE) 0.5 mg, Oral, Daily    famotidine (PEPCID) 20 mg, Oral, 2 times daily    hydroCHLOROthiazide (HYDRODIURIL) 12.5 mg, Oral, Daily    HYDROcodone-acetaminophen (NORCO) 5-325 mg per tablet Take 1 Tablet by mouth Once A Day as needed    [START ON 7/5/2024] HYDROcodone-acetaminophen (NORCO) 5-325 mg per tablet Take 1 Tablet by mouth Once A Day as needed    LORazepam (ATIVAN) 1 MG tablet Oral    losartan (COZAAR) 100 mg, Oral, Daily    pantoprazole (PROTONIX) 40 mg, Oral, Daily    potassium chloride SA (K-DUR,KLOR-CON) 20 MEQ tablet 20 mEq, Oral, Daily    promethazine (PHENERGAN) 25 mg, Oral, Every 8 hours PRN    sucralfate (CARAFATE) 1 g, Oral, Before meals & nightly    topiramate (TOPAMAX) 25 MG tablet Take 1 tablet (25 mg total) by mouth every morning AND 2 tablets (50 mg total) every evening.    zolpidem (AMBIEN) 10 mg, Oral, Nightly PRN         Past medical, surgical, family and social histories have been reviewed today.      OBJECTIVE:     Vitals:    07/01/24 1439   BP: 112/72   Pulse: 76   Resp: 18   Temp: 97.7 °F (36.5 °C)   TempSrc: Tympanic   SpO2: 97%   Weight: 81.4 kg (179 lb 7.3 oz)   Height: 5' 2" (1.575 m)   PainSc:   7   PainLoc: Generalized       Physical Exam  Vitals reviewed.   Constitutional:       General: She is not in acute distress.  HENT:      Head: Normocephalic and atraumatic.      Right Ear: Tympanic membrane normal. There is impacted cerumen.      Left Ear: Tympanic membrane normal. There is impacted cerumen.      Ears:      Comments: Bilat partial cerumen impaction     Nose: Mucosal edema present. No rhinorrhea.      Right Sinus: No maxillary sinus tenderness or frontal sinus tenderness.      Left Sinus: No maxillary sinus tenderness or frontal " sinus tenderness.      Mouth/Throat:      Mouth: Mucous membranes are moist.      Pharynx: Oropharynx is clear. No pharyngeal swelling or posterior oropharyngeal erythema.   Eyes:      Pupils: Pupils are equal, round, and reactive to light.   Cardiovascular:      Rate and Rhythm: Normal rate and regular rhythm.   Pulmonary:      Effort: Pulmonary effort is normal.      Breath sounds: Normal breath sounds.   Lymphadenopathy:      Cervical: No cervical adenopathy.   Neurological:      Mental Status: She is alert and oriented to person, place, and time.      Motor: No weakness.      Gait: Gait normal.   Psychiatric:         Mood and Affect: Mood normal.         Behavior: Behavior normal.         Thought Content: Thought content normal.         Judgment: Judgment normal.           Results for orders placed or performed in visit on 07/01/24   POCT COVID-19 Rapid Screening   Result Value Ref Range    POC Rapid COVID Negative Negative     Acceptable Yes        ASSESSMENT:     1. Viral URI    2. Cough, unspecified type  -     POCT COVID-19 Rapid Screening    3. Generalized body aches  -     POCT COVID-19 Rapid Screening      PLAN:     Symptomatic care for now, she is highly sensitive to multiple medications.  Increase rest, hydration, otc med that she knows is tolerable & can take.  Work note provided to return on Wed 7/3/24.  Contact PCP back if s/s worsen or persist.      SHANICE Pedroza  [facility]       25 minutes of total time spent on the encounter, which includes face to face time and non-face to face time preparing to see the patient.  This includes obtaining and/or reviewing separately obtained history, performing a medically appropriate examination and/or evaluation, and counseling and educating the patient/family/caregiver.  Includes documenting clinical information in the electronic or other health record, independently interpreting results (not separately  reported) and communicating results to the patient/family/caregiver, with care coordination (not separately reported).  Medications, tests and/or procedures ordered as necessary along with referring and communicating with other health professionals (when not separately reported).

## 2024-07-06 DIAGNOSIS — K21.9 GASTROESOPHAGEAL REFLUX DISEASE: ICD-10-CM

## 2024-07-06 NOTE — TELEPHONE ENCOUNTER
No care due was identified.  [facility] Embedded Care Due Messages. Reference number: [id number].   7/06/2024 11:50:46 AM CDT

## 2024-07-08 RX ORDER — PANTOPRAZOLE SODIUM 40 MG/1
40 TABLET, DELAYED RELEASE ORAL DAILY
Qty: 90 TABLET | Refills: 3 | Status: SHIPPED | OUTPATIENT
Start: 2024-07-08

## 2024-07-16 RX ORDER — BACLOFEN 10 MG/1
10 TABLET ORAL 2 TIMES DAILY PRN
Qty: 30 TABLET | Refills: 0 | Status: SHIPPED | OUTPATIENT
Start: 2024-07-16

## 2024-07-31 ENCOUNTER — LAB VISIT (OUTPATIENT)
Dept: LAB | Facility: HOSPITAL | Age: 64
End: 2024-07-31
Attending: INTERNAL MEDICINE
Payer: COMMERCIAL

## 2024-07-31 ENCOUNTER — OFFICE VISIT (OUTPATIENT)
Dept: INTERNAL MEDICINE | Facility: CLINIC | Age: 64
End: 2024-07-31
Payer: COMMERCIAL

## 2024-07-31 VITALS
WEIGHT: 187.19 LBS | HEIGHT: 62 IN | DIASTOLIC BLOOD PRESSURE: 84 MMHG | OXYGEN SATURATION: 98 % | HEART RATE: 89 BPM | TEMPERATURE: 97 F | SYSTOLIC BLOOD PRESSURE: 122 MMHG | BODY MASS INDEX: 34.45 KG/M2

## 2024-07-31 DIAGNOSIS — R05.1 ACUTE COUGH: ICD-10-CM

## 2024-07-31 DIAGNOSIS — Z12.31 ENCOUNTER FOR SCREENING MAMMOGRAM FOR BREAST CANCER: ICD-10-CM

## 2024-07-31 DIAGNOSIS — J32.9 SINUSITIS, UNSPECIFIED CHRONICITY, UNSPECIFIED LOCATION: ICD-10-CM

## 2024-07-31 DIAGNOSIS — R53.83 FATIGUE, UNSPECIFIED TYPE: Primary | ICD-10-CM

## 2024-07-31 DIAGNOSIS — R53.83 FATIGUE, UNSPECIFIED TYPE: ICD-10-CM

## 2024-07-31 LAB
BASOPHILS # BLD AUTO: 0.06 K/UL (ref 0–0.2)
BASOPHILS NFR BLD: 1 % (ref 0–1.9)
CTP QC/QA: YES
CTP QC/QA: YES
DIFFERENTIAL METHOD BLD: ABNORMAL
EOSINOPHIL # BLD AUTO: 0.2 K/UL (ref 0–0.5)
EOSINOPHIL NFR BLD: 3 % (ref 0–8)
ERYTHROCYTE [DISTWIDTH] IN BLOOD BY AUTOMATED COUNT: 13.8 % (ref 11.5–14.5)
HCT VFR BLD AUTO: 41.6 % (ref 37–48.5)
HGB BLD-MCNC: 12.9 G/DL (ref 12–16)
IMM GRANULOCYTES # BLD AUTO: 0.01 K/UL (ref 0–0.04)
IMM GRANULOCYTES NFR BLD AUTO: 0.2 % (ref 0–0.5)
LYMPHOCYTES # BLD AUTO: 2.7 K/UL (ref 1–4.8)
LYMPHOCYTES NFR BLD: 44.7 % (ref 18–48)
MCH RBC QN AUTO: 29.3 PG (ref 27–31)
MCHC RBC AUTO-ENTMCNC: 31 G/DL (ref 32–36)
MCV RBC AUTO: 94 FL (ref 82–98)
MONOCYTES # BLD AUTO: 0.5 K/UL (ref 0.3–1)
MONOCYTES NFR BLD: 8.7 % (ref 4–15)
NEUTROPHILS # BLD AUTO: 2.5 K/UL (ref 1.8–7.7)
NEUTROPHILS NFR BLD: 42.4 % (ref 38–73)
NRBC BLD-RTO: 0 /100 WBC
PLATELET # BLD AUTO: 373 K/UL (ref 150–450)
PMV BLD AUTO: 9.5 FL (ref 9.2–12.9)
POC MOLECULAR INFLUENZA A AGN: NEGATIVE
POC MOLECULAR INFLUENZA B AGN: NEGATIVE
RBC # BLD AUTO: 4.41 M/UL (ref 4–5.4)
SARS-COV-2 RDRP RESP QL NAA+PROBE: NEGATIVE
WBC # BLD AUTO: 5.99 K/UL (ref 3.9–12.7)

## 2024-07-31 PROCEDURE — 87502 INFLUENZA DNA AMP PROBE: CPT | Mod: QW,S$GLB,, | Performed by: INTERNAL MEDICINE

## 2024-07-31 PROCEDURE — 3008F BODY MASS INDEX DOCD: CPT | Mod: CPTII,S$GLB,, | Performed by: INTERNAL MEDICINE

## 2024-07-31 PROCEDURE — 87635 SARS-COV-2 COVID-19 AMP PRB: CPT | Mod: QW,S$GLB,, | Performed by: INTERNAL MEDICINE

## 2024-07-31 PROCEDURE — 99999 PR PBB SHADOW E&M-EST. PATIENT-LVL V: CPT | Mod: PBBFAC,,, | Performed by: INTERNAL MEDICINE

## 2024-07-31 PROCEDURE — 3074F SYST BP LT 130 MM HG: CPT | Mod: CPTII,S$GLB,, | Performed by: INTERNAL MEDICINE

## 2024-07-31 PROCEDURE — 85025 COMPLETE CBC W/AUTO DIFF WBC: CPT | Performed by: INTERNAL MEDICINE

## 2024-07-31 PROCEDURE — 1159F MED LIST DOCD IN RCRD: CPT | Mod: CPTII,S$GLB,, | Performed by: INTERNAL MEDICINE

## 2024-07-31 PROCEDURE — 99214 OFFICE O/P EST MOD 30 MIN: CPT | Mod: S$GLB,,, | Performed by: INTERNAL MEDICINE

## 2024-07-31 PROCEDURE — 36415 COLL VENOUS BLD VENIPUNCTURE: CPT | Performed by: INTERNAL MEDICINE

## 2024-07-31 PROCEDURE — 3079F DIAST BP 80-89 MM HG: CPT | Mod: CPTII,S$GLB,, | Performed by: INTERNAL MEDICINE

## 2024-07-31 PROCEDURE — 4010F ACE/ARB THERAPY RXD/TAKEN: CPT | Mod: CPTII,S$GLB,, | Performed by: INTERNAL MEDICINE

## 2024-07-31 RX ORDER — DOXYCYCLINE HYCLATE 100 MG
100 TABLET ORAL EVERY 12 HOURS
Qty: 14 TABLET | Refills: 0 | Status: SHIPPED | OUTPATIENT
Start: 2024-07-31 | End: 2024-08-09

## 2024-07-31 NOTE — PROGRESS NOTES
"Subjective:      Patient ID: Miles Bowen is a 63 y.o. female.    Chief Complaint: Fatigue    HPI      62 yo with   Patient Active Problem List   Diagnosis    Hypertension    Depression    Anxiety    GERD (gastroesophageal reflux disease)    Hiatal hernia    DDD (degenerative disc disease), lumbar    Allergic rhinitis    IBS (irritable bowel syndrome)    Multinodular goiter    MVP (mitral valve prolapse)    Nasal polyps    Obesity    Abnormal ECG    Palpitations    Atypical chest pain    Shortness of breath    Migraine with aura and without status migrainosus, not intractable    Major depressive disorder, recurrent, mild     Past Medical History:   Diagnosis Date    Anxiety     Depression     GERD (gastroesophageal reflux disease)     Heart murmur     Hematemesis without nausea 11/4/2016    Hypertension     Multinodular goiter 12/23/2016    MVP (mitral valve prolapse)     Obesity      Here today c/o 3 day of fatigue.     End of last week had runny nose sinus congestion and pressure. Yellow brown/dc. Mild cough. No worsening. Coricedin not helping. Tmax 100.6 (Sunday)    Sleeping well.     Review of Systems   Constitutional:  Negative for chills and fever.   HENT:  Positive for congestion, rhinorrhea, sinus pressure and sinus pain. Negative for ear pain, trouble swallowing and voice change.    Eyes:  Negative for pain.   Respiratory:  Positive for cough. Negative for chest tightness, shortness of breath and wheezing.    Cardiovascular:  Negative for chest pain, palpitations and leg swelling.   Gastrointestinal:  Negative for abdominal pain, nausea and vomiting.   Genitourinary:  Negative for dysuria.   Musculoskeletal:  Negative for neck pain and neck stiffness.   Skin:  Negative for rash and wound.     Objective:   /84 (BP Location: Right arm, Patient Position: Sitting, BP Method: Medium (Manual))   Pulse 89   Temp 97.3 °F (36.3 °C) (Tympanic)   Ht 5' 2" (1.575 m)   Wt 84.9 kg (187 lb 2.7 oz)   SpO2 " 98%   BMI 34.23 kg/m²     Physical Exam  Constitutional:       General: She is awake. She is not in acute distress.     Appearance: Normal appearance. She is well-developed. She is not ill-appearing.   HENT:      Head: Normocephalic and atraumatic.      Right Ear: Tympanic membrane normal.      Left Ear: Tympanic membrane normal.      Nose:      Right Turbinates: Swollen.      Left Turbinates: Swollen.      Right Sinus: Maxillary sinus tenderness present.      Left Sinus: Maxillary sinus tenderness present.      Mouth/Throat:      Pharynx: Oropharynx is clear. Posterior oropharyngeal erythema present. No pharyngeal swelling, oropharyngeal exudate or uvula swelling.   Eyes:      Conjunctiva/sclera: Conjunctivae normal.   Cardiovascular:      Rate and Rhythm: Normal rate.   Pulmonary:      Effort: Pulmonary effort is normal.   Musculoskeletal:      Cervical back: Normal range of motion.   Skin:     General: Skin is warm and dry.   Neurological:      Mental Status: She is alert. Mental status is at baseline.   Psychiatric:         Mood and Affect: Mood normal.         Behavior: Behavior normal. Behavior is cooperative.         Thought Content: Thought content normal.         Judgment: Judgment normal.         Lab Results   Component Value Date    WBC 8.20 03/11/2024    HGB 14.1 03/11/2024    HGB 12.5 08/24/2023    HGB 15.1 05/01/2023    HCT 42.1 03/11/2024    MCV 88 03/11/2024    MCV 90 08/24/2023    MCV 92 05/01/2023     03/11/2024    CHOL 248 (H) 03/21/2022    TRIG 99 03/21/2022     (H) 03/21/2022    LDLCALC 127.2 03/21/2022    LDLCALC 138.2 04/09/2021    LDLCALC 109 08/20/2015    ALT 14 03/11/2024    AST 13 03/11/2024     03/11/2024    K 3.6 03/11/2024    CALCIUM 9.7 03/11/2024     03/11/2024    CO2 29 03/11/2024    BUN 13 03/11/2024    CREATININE 0.8 03/11/2024    CREATININE 0.7 08/24/2023    CREATININE 0.9 05/01/2023    EGFRNORACEVR >60 03/11/2024    EGFRNORACEVR >60.0 08/24/2023     EGFRNORACEVR >60.0 05/01/2023    TSH 1.628 10/12/2022    TSH 0.554 03/21/2022    TSH 0.656 02/23/2022    GLU 91 03/11/2024    HGBA1C 5.7 (H) 03/21/2022    HGBA1C 5.8 (H) 04/09/2021    HGBA1C 6.0 10/16/2020          The ASCVD Risk score (Chuy DK, et al., 2019) failed to calculate for the following reasons:    The valid HDL cholesterol range is 20 to 100 mg/dL     Assessment:     1. Fatigue, unspecified type    2. Encounter for screening mammogram for breast cancer    3. Acute cough    4. Sinusitis, unspecified chronicity, unspecified location      Plan:   1. Fatigue, unspecified type  -     POCT COVID-19 Rapid Screening  -     CBC Auto Differential; Future; Expected date: 07/31/2024    2. Encounter for screening mammogram for breast cancer  -     Mammo Digital Screening Bilat w/ Jim; Future; Expected date: 07/31/2024    3. Acute cough  -     POCT COVID-19 Rapid Screening  -     POCT Influenza A/B Molecular    4. Sinusitis, unspecified chronicity, unspecified location  -     CBC Auto Differential; Future; Expected date: 07/31/2024  -     doxycycline (VIBRA-TABS) 100 MG tablet; Take 1 tablet (100 mg total) by mouth every 12 (twelve) hours. for 7 days  Dispense: 14 tablet; Refill: 0        Patient Instructions   Check with your pharmacy regarding shingles vaccine.      Future Appointments   Date Time Provider Department Center   8/15/2024  3:00 PM [facility] [id number] V MAMMO [place]   11/26/2024  1:40 PM Carlos Paul MD [facility] IM [place]   5/20/2025  8:10 AM Martin Da Silva MD [facility] [place] [facility]       Lab Frequency Next Occurrence   Mammo Digital Screening Bilat w/ Jim Once 08/24/2022   Ambulatory referral/consult to Gastroenterology Once 09/02/2023   US Abdomen Complete Once 09/06/2023   Ambulatory referral/consult to Psychiatry Once 10/02/2023   CT Abdomen Pelvis With IV Contrast Routine Oral Contrast Once 03/11/2024   Creatinine, serum Once 03/11/2024   Ambulatory referral/consult to Endo  Procedure  Once 03/12/2024   CBC Auto Differential Once 07/30/2024   Comprehensive Metabolic Panel Once 07/30/2024   TSH Once 07/30/2024   Ambulatory referral/consult to [facility] Once 05/07/2024   Lipid Panel Once 07/29/2024   Hemoglobin A1C Once 07/30/2024   Ambulatory referral/consult to Urology Once 05/07/2024   Ambulatory referral/consult to Gastroenterology Once 05/14/2024       Follow up if symptoms worsen or fail to improve.  Work excuse today and tomorrow.

## 2024-08-07 ENCOUNTER — PATIENT MESSAGE (OUTPATIENT)
Dept: OTOLARYNGOLOGY | Facility: CLINIC | Age: 64
End: 2024-08-07
Payer: COMMERCIAL

## 2024-08-07 ENCOUNTER — E-VISIT (OUTPATIENT)
Dept: INTERNAL MEDICINE | Facility: CLINIC | Age: 64
End: 2024-08-07
Payer: COMMERCIAL

## 2024-08-07 DIAGNOSIS — J32.9 SINUSITIS, UNSPECIFIED CHRONICITY, UNSPECIFIED LOCATION: Primary | ICD-10-CM

## 2024-08-21 ENCOUNTER — PATIENT MESSAGE (OUTPATIENT)
Dept: INTERNAL MEDICINE | Facility: CLINIC | Age: 64
End: 2024-08-21
Payer: COMMERCIAL

## 2024-08-21 DIAGNOSIS — I10 ESSENTIAL HYPERTENSION: ICD-10-CM

## 2024-08-21 RX ORDER — BACLOFEN 10 MG/1
10 TABLET ORAL 2 TIMES DAILY PRN
Qty: 30 TABLET | Refills: 0 | Status: SHIPPED | OUTPATIENT
Start: 2024-08-21

## 2024-08-21 RX ORDER — HYDROCHLOROTHIAZIDE 12.5 MG/1
12.5 TABLET ORAL DAILY
Qty: 90 TABLET | Refills: 2 | Status: SHIPPED | OUTPATIENT
Start: 2024-08-21 | End: 2025-05-18

## 2024-08-21 NOTE — TELEPHONE ENCOUNTER
Refill Decision Note   Miles Bowen  is requesting a refill authorization.  Brief Assessment and Rationale for Refill:  Approve     Medication Therapy Plan:         Comments:     Note composed:4:40 PM 08/21/2024             Appointments     Last Visit   8/7/2024 Carlos Paul MD   Next Visit   11/26/2024 Carlos Paul MD

## 2024-08-21 NOTE — TELEPHONE ENCOUNTER
No care due was identified.  [facility] Embedded Care Due Messages. Reference number: [id number].   8/21/2024 4:48:18 AM CDT

## 2024-09-03 ENCOUNTER — LAB VISIT (OUTPATIENT)
Dept: LAB | Facility: HOSPITAL | Age: 64
End: 2024-09-03
Attending: PHYSICIAN ASSISTANT
Payer: COMMERCIAL

## 2024-09-03 ENCOUNTER — OFFICE VISIT (OUTPATIENT)
Dept: INTERNAL MEDICINE | Facility: CLINIC | Age: 64
End: 2024-09-03
Payer: COMMERCIAL

## 2024-09-03 VITALS
HEIGHT: 62 IN | TEMPERATURE: 97 F | DIASTOLIC BLOOD PRESSURE: 80 MMHG | BODY MASS INDEX: 33.87 KG/M2 | WEIGHT: 184.06 LBS | SYSTOLIC BLOOD PRESSURE: 108 MMHG | RESPIRATION RATE: 18 BRPM | OXYGEN SATURATION: 96 % | HEART RATE: 84 BPM

## 2024-09-03 DIAGNOSIS — K21.9 GASTROESOPHAGEAL REFLUX DISEASE, UNSPECIFIED WHETHER ESOPHAGITIS PRESENT: ICD-10-CM

## 2024-09-03 DIAGNOSIS — K29.50 CHRONIC GASTRITIS, PRESENCE OF BLEEDING UNSPECIFIED, UNSPECIFIED GASTRITIS TYPE: Primary | ICD-10-CM

## 2024-09-03 DIAGNOSIS — K29.50 CHRONIC GASTRITIS, PRESENCE OF BLEEDING UNSPECIFIED, UNSPECIFIED GASTRITIS TYPE: ICD-10-CM

## 2024-09-03 DIAGNOSIS — R10.13 EPIGASTRIC PAIN: ICD-10-CM

## 2024-09-03 DIAGNOSIS — R49.0 HOARSENESS: ICD-10-CM

## 2024-09-03 DIAGNOSIS — K92.1 TARRY STOOLS: ICD-10-CM

## 2024-09-03 LAB
ALBUMIN SERPL BCP-MCNC: 4.2 G/DL (ref 3.5–5.2)
ALP SERPL-CCNC: 57 U/L (ref 55–135)
ALT SERPL W/O P-5'-P-CCNC: 12 U/L (ref 10–44)
ANION GAP SERPL CALC-SCNC: 8 MMOL/L (ref 8–16)
AST SERPL-CCNC: 10 U/L (ref 10–40)
BASOPHILS # BLD AUTO: 0.06 K/UL (ref 0–0.2)
BASOPHILS NFR BLD: 0.8 % (ref 0–1.9)
BILIRUB SERPL-MCNC: 0.4 MG/DL (ref 0.1–1)
BUN SERPL-MCNC: 13 MG/DL (ref 8–23)
CALCIUM SERPL-MCNC: 9.8 MG/DL (ref 8.7–10.5)
CHLORIDE SERPL-SCNC: 105 MMOL/L (ref 95–110)
CO2 SERPL-SCNC: 26 MMOL/L (ref 23–29)
CREAT SERPL-MCNC: 0.8 MG/DL (ref 0.5–1.4)
DIFFERENTIAL METHOD BLD: ABNORMAL
EOSINOPHIL # BLD AUTO: 0.2 K/UL (ref 0–0.5)
EOSINOPHIL NFR BLD: 3.1 % (ref 0–8)
ERYTHROCYTE [DISTWIDTH] IN BLOOD BY AUTOMATED COUNT: 13.8 % (ref 11.5–14.5)
EST. GFR  (NO RACE VARIABLE): >60 ML/MIN/1.73 M^2
GLUCOSE SERPL-MCNC: 90 MG/DL (ref 70–110)
HCT VFR BLD AUTO: 41.5 % (ref 37–48.5)
HGB BLD-MCNC: 14 G/DL (ref 12–16)
IMM GRANULOCYTES # BLD AUTO: 0.01 K/UL (ref 0–0.04)
IMM GRANULOCYTES NFR BLD AUTO: 0.1 % (ref 0–0.5)
LIPASE SERPL-CCNC: 30 U/L (ref 4–60)
LYMPHOCYTES # BLD AUTO: 3.1 K/UL (ref 1–4.8)
LYMPHOCYTES NFR BLD: 42.2 % (ref 18–48)
MCH RBC QN AUTO: 30 PG (ref 27–31)
MCHC RBC AUTO-ENTMCNC: 33.7 G/DL (ref 32–36)
MCV RBC AUTO: 89 FL (ref 82–98)
MONOCYTES # BLD AUTO: 0.4 K/UL (ref 0.3–1)
MONOCYTES NFR BLD: 5.1 % (ref 4–15)
NEUTROPHILS # BLD AUTO: 3.6 K/UL (ref 1.8–7.7)
NEUTROPHILS NFR BLD: 48.7 % (ref 38–73)
NRBC BLD-RTO: 0 /100 WBC
PLATELET # BLD AUTO: 284 K/UL (ref 150–450)
PMV BLD AUTO: 8.8 FL (ref 9.2–12.9)
POTASSIUM SERPL-SCNC: 3.5 MMOL/L (ref 3.5–5.1)
PROT SERPL-MCNC: 7.2 G/DL (ref 6–8.4)
RBC # BLD AUTO: 4.67 M/UL (ref 4–5.4)
SODIUM SERPL-SCNC: 139 MMOL/L (ref 136–145)
WBC # BLD AUTO: 7.44 K/UL (ref 3.9–12.7)

## 2024-09-03 PROCEDURE — 4010F ACE/ARB THERAPY RXD/TAKEN: CPT | Mod: CPTII,S$GLB,, | Performed by: PHYSICIAN ASSISTANT

## 2024-09-03 PROCEDURE — 3079F DIAST BP 80-89 MM HG: CPT | Mod: CPTII,S$GLB,, | Performed by: PHYSICIAN ASSISTANT

## 2024-09-03 PROCEDURE — 1160F RVW MEDS BY RX/DR IN RCRD: CPT | Mod: CPTII,S$GLB,, | Performed by: PHYSICIAN ASSISTANT

## 2024-09-03 PROCEDURE — 99999 PR PBB SHADOW E&M-EST. PATIENT-LVL V: CPT | Mod: PBBFAC,,, | Performed by: PHYSICIAN ASSISTANT

## 2024-09-03 PROCEDURE — 85025 COMPLETE CBC W/AUTO DIFF WBC: CPT | Performed by: PHYSICIAN ASSISTANT

## 2024-09-03 PROCEDURE — 80053 COMPREHEN METABOLIC PANEL: CPT | Performed by: PHYSICIAN ASSISTANT

## 2024-09-03 PROCEDURE — 3074F SYST BP LT 130 MM HG: CPT | Mod: CPTII,S$GLB,, | Performed by: PHYSICIAN ASSISTANT

## 2024-09-03 PROCEDURE — 3008F BODY MASS INDEX DOCD: CPT | Mod: CPTII,S$GLB,, | Performed by: PHYSICIAN ASSISTANT

## 2024-09-03 PROCEDURE — 1159F MED LIST DOCD IN RCRD: CPT | Mod: CPTII,S$GLB,, | Performed by: PHYSICIAN ASSISTANT

## 2024-09-03 PROCEDURE — 99214 OFFICE O/P EST MOD 30 MIN: CPT | Mod: S$GLB,,, | Performed by: PHYSICIAN ASSISTANT

## 2024-09-03 PROCEDURE — 83690 ASSAY OF LIPASE: CPT | Performed by: PHYSICIAN ASSISTANT

## 2024-09-03 RX ORDER — SUCRALFATE 1 G/1
1 TABLET ORAL
Qty: 40 TABLET | Refills: 1 | Status: SHIPPED | OUTPATIENT
Start: 2024-09-03

## 2024-09-03 RX ORDER — DIAZEPAM 10 MG/1
TABLET ORAL
COMMUNITY
Start: 2024-09-02

## 2024-09-03 RX ORDER — PROMETHAZINE HYDROCHLORIDE 25 MG/1
25 TABLET ORAL EVERY 8 HOURS PRN
Qty: 30 TABLET | Refills: 0 | Status: SHIPPED | OUTPATIENT
Start: 2024-09-03

## 2024-09-03 NOTE — PROGRESS NOTES
Subjective:       Patient ID: Miles Bowen is a 64 y.o. female.    Chief Complaint: No chief complaint on file.    HPI  Health Maintenance   Topic Date Due    Shingles Vaccine (1 of 2) Never done    Mammogram  04/13/2022    Colorectal Cancer Screening  01/30/2025    Lipid Panel  03/21/2027    Pap Smear  05/15/2027    TETANUS VACCINE  02/01/2031    Hepatitis C Screening  Completed     History of Present Illness    CHIEF COMPLAINT:  Ms. Bowen presents today with upper abdominal pain.    GASTROINTESTINAL SYMPTOMS:  She reports recurrent upper abdominal pain located in the epigastric area, which worsened last week. The pain is described as feeling like 'someone punching from inside' and worsens after eating. It is not associated with cramping, tummy rumbling, heartburn, reflux, or improvement with bowel movements. Associated symptoms include nausea and decreased appetite. She denies vomiting. She experienced tarry-looking stools last week, but current stools appear normal. She reports diarrhea, primarily in the mornings, but denies constipation. She denies radiation of pain to the back.    MEDICAL HISTORY:  She has a history of acid reflux previously treated with Sucralfate. In March, she experienced a lower abdominal pain episode with diarrhea, treated with antibiotics without improvement. She also reports a pre-existing back issue unrelated to the current epigastric pain.    MEDICATIONS:  She is currently taking Protonix daily and iron supplements. Sucralfate was previously prescribed but provides minimal relief. She denies taking Pepto-Bismol.    RECENT SYMPTOMS:  She reports developing hoarseness today.    PREVIOUS DIAGNOSTIC TESTS:  She was previously referred to a GI specialist and a CT was ordered, but she did not follow through with either due to temporary symptom resolution.    Medications were reviewed    ROS:  General: -fever, -chills, -fatigue, -weight gain, -weight loss  Eyes: -vision changes,  "-redness, -discharge  ENT: -ear pain, -nasal congestion, -sore throat, +hoarseness  Cardiovascular: -chest pain, -palpitations, -lower extremity edema  Respiratory: -cough, -shortness of breath  Gastrointestinal: +abdominal pain, +nausea, -vomiting, +diarrhea, -constipation, -blood in stool, -heartburn  Genitourinary: -dysuria, -hematuria, -frequency  Musculoskeletal: -joint pain, -muscle pain  Skin: -rash, -lesion  Neurological: -headache, -dizziness, -numbness, -tingling  Psychiatric: -anxiety, -depression, -sleep difficulty                 /80 (BP Location: Left arm, Patient Position: Sitting, BP Method: Large (Manual))   Pulse 84   Temp 96.9 °F (36.1 °C)   Resp 18   Ht 5' 2" (1.575 m)   Wt 83.5 kg (184 lb 1.4 oz)   SpO2 96%   BMI 33.67 kg/m²     Objective:      Physical Exam    General: No acute distress. Well-developed. Well-nourished.  Head: Normocephalic. Atraumatic.  Eyes: EOMI. PERRLA. Conjunctivae non-injected. Sclerae anicteric.  Ears: Bilateral EACs clear. Bilateral TMs clear and intact.  Nose: Nares patent. Normal turbinates. No nasal discharge.  Mouth: Moist oral mucosa. Normal dentition.  Throat: No erythema. No exudate. Uvula midline.  Neck: Supple. Full ROM. Non-tender. No JVD. No carotid bruits. No thyromegaly. No lymphadenopathy.  Cardiovascular: Regular rate. Regular rhythm. No murmurs. No rubs. No gallops. Normal S1, S2. Peripheral pulses 2+ and symmetric.  Respiratory: Normal respiratory effort. Clear to auscultation bilaterally. No rales. No rhonchi. No wheezing.  Abdomen: Soft. TENDERNESS IN EPIGASTRIC REGION. Non-distended. Normal bowel sounds. Negative McBurney's. Negative Gallegos's. No rebound. No guarding. No hepatosplenomegaly. No masses.  Musculoskeletal: No  obvious deformity. Normal muscle development. Normal muscle tone. FROM at all joints. No swelling. No tenderness.  Back: No CVA tenderness. No spinal deformity.  Extremities: No cyanosis. No clubbing. No upper extremity " edema. No lower extremity edema.  Neurological: Alert & oriented x3. CN II-XII intact. Reflexes 2+ throughout. Strength 5/5 in all extremities. Sensation intact. No slurred speech. Normal gait.  Psychiatric: Normal mood. Normal affect. Good insight. Good judgment. No evidence of suicidal ideation. No evidence of homicidal ideation.  Skin: Warm. Dry. Intact. No rash. No ulcers. No suspicious lesions.         Assessment:       1. Chronic gastritis, presence of bleeding unspecified, unspecified gastritis type    2. Gastroesophageal reflux disease, unspecified whether esophagitis present    3. Tarry stools    4. Hoarseness    5. Epigastric pain        Plan:   Chronic gastritis, presence of bleeding unspecified, unspecified gastritis type  -     Ambulatory referral/consult to Endo Procedure ; Future; Expected date: 09/04/2024  -     CBC Auto Differential; Future; Expected date: 09/03/2024  -     Comprehensive Metabolic Panel; Future  -     Lipase; Future; Expected date: 09/03/2024  -     sucralfate (CARAFATE) 1 gram tablet; Take 1 tablet (1 g total) by mouth 4 (four) times daily before meals and nightly.  Dispense: 40 tablet; Refill: 1  -     promethazine (PHENERGAN) 25 MG tablet; Take 1 tablet (25 mg total) by mouth every 8 (eight) hours as needed for Nausea.  Dispense: 30 tablet; Refill: 0    Gastroesophageal reflux disease, unspecified whether esophagitis present  -     Ambulatory referral/consult to Endo Procedure ; Future; Expected date: 09/04/2024  -     sucralfate (CARAFATE) 1 gram tablet; Take 1 tablet (1 g total) by mouth 4 (four) times daily before meals and nightly.  Dispense: 40 tablet; Refill: 1  -     promethazine (PHENERGAN) 25 MG tablet; Take 1 tablet (25 mg total) by mouth every 8 (eight) hours as needed for Nausea.  Dispense: 30 tablet; Refill: 0    Tarry stools  -     Ambulatory referral/consult to Endo Procedure ; Future; Expected date: 09/04/2024    Hoarseness    Epigastric  pain  -     CBC Auto Differential; Future; Expected date: 09/03/2024  -     Comprehensive Metabolic Panel; Future  -     Lipase; Future; Expected date: 09/03/2024        Assessment & Plan    Suspected gastritis or possible ulcer based on upper abdominal pain and previous history  Ruled out diverticulitis due to lack of response to previous antibiotic treatment  Noted current symptoms differ from previous lower abdominal pain with diarrhea in March  Concerned about persistent symptoms despite daily Protonix use  Will proceed with endoscopy for definitive diagnosis  Considered H. pylori as potential cause of chronic gastritis  Evaluated for potential anemia and inflammation of liver/pancreas through lab work  Assessed possibility of reflux causing throat irritation, despite patient not feeling typical reflux symptoms  GASTRITIS:  - Explained that gastritis is inflammation of the stomach.  - Discussed that H.pylori is a bacteria that can cause chronic gastritis.  - Ms. Bowen to avoid carbonated beverages, spicy foods, fried foods, and acidic foods.  - Increased Protonix to twice daily.  - Start Pepcid (famotidine) 1 tablet twice daily.  - Refilled Carafate (sucralfate) 4 times daily and at night.  - Refilled anti-nausea medication.  - Referred for endoscopy.  - Contact the office when endoscopy is scheduled to discuss pre-procedure medication instructions.  SILENT REFLUX:  - Informed about the possibility of silent reflux causing throat irritation during sleep and causing her current hoarseness.  LABS:  - Ordered CBC to check for anemia.  - Ordered liver enzyme tests.  - Ordered pancreatic enzyme tests.  COVID-19 TESTING:  - Ordered COVID-19 test.           Follow up if symptoms worsen or fail to improve.    This note was generated with the assistance of ambient listening technology. Verbal consent was obtained by the patient and accompanying visitor(s) for the recording of patient appointment to facilitate this note.  I attest to having reviewed and edited the generated note for accuracy, though some syntax or spelling errors may persist. Please contact the author of this note for any clarification.

## 2024-09-05 ENCOUNTER — PATIENT MESSAGE (OUTPATIENT)
Dept: INTERNAL MEDICINE | Facility: CLINIC | Age: 64
End: 2024-09-05
Payer: COMMERCIAL

## 2024-09-05 ENCOUNTER — HOSPITAL ENCOUNTER (OUTPATIENT)
Dept: PREADMISSION TESTING | Facility: HOSPITAL | Age: 64
Discharge: HOME | End: 2024-09-05
Payer: COMMERCIAL

## 2024-09-05 ENCOUNTER — TELEPHONE (OUTPATIENT)
Dept: PREADMISSION TESTING | Facility: HOSPITAL | Age: 64
End: 2024-09-05
Payer: COMMERCIAL

## 2024-09-05 DIAGNOSIS — K21.9 GASTROESOPHAGEAL REFLUX DISEASE, UNSPECIFIED WHETHER ESOPHAGITIS PRESENT: ICD-10-CM

## 2024-09-05 DIAGNOSIS — K29.50 CHRONIC GASTRITIS, PRESENCE OF BLEEDING UNSPECIFIED, UNSPECIFIED GASTRITIS TYPE: Primary | ICD-10-CM

## 2024-09-05 DIAGNOSIS — K92.1 TARRY STOOLS: ICD-10-CM

## 2024-09-05 NOTE — TELEPHONE ENCOUNTER
----- Message from Ricardo Argueta MA sent at 9/5/2024  9:58 AM CDT -----  Regarding: FW: PT IS WANTING A CALL BACK TO SCHEDULE HER ENDOSCOPY AS SOON AS POSSIBLE  Contact: PT    ----- Message -----  From: Rogelio Armenta  Sent: 9/5/2024   7:21 AM CDT  To: [facility] Clinical Staff  Subject: PT IS WANTING A CALL BACK TO SCHEDULE HER EN#    Pt is wanting to schedule and not understanding why I can schedule the procedure. Patient does not want to get off the phone and is adamant about me scheduling her endoscopy.  Confirmed contact info below:  Contact Name: Miles Bowen  Phone Number: [phone]

## 2024-09-06 ENCOUNTER — HOSPITAL ENCOUNTER (OUTPATIENT)
Facility: HOSPITAL | Age: 64
Discharge: HOME | End: 2024-09-06
Attending: INTERNAL MEDICINE | Admitting: INTERNAL MEDICINE
Payer: COMMERCIAL

## 2024-09-06 ENCOUNTER — ANESTHESIA EVENT (OUTPATIENT)
Dept: ENDOSCOPY | Facility: HOSPITAL | Age: 64
End: 2024-09-06
Payer: COMMERCIAL

## 2024-09-06 ENCOUNTER — ANESTHESIA (OUTPATIENT)
Dept: ENDOSCOPY | Facility: HOSPITAL | Age: 64
End: 2024-09-06
Payer: COMMERCIAL

## 2024-09-06 DIAGNOSIS — R10.13 EPIGASTRIC ABDOMINAL PAIN: ICD-10-CM

## 2024-09-06 PROCEDURE — 63600175 PHARM REV CODE 636 W HCPCS: Performed by: ANESTHESIOLOGY

## 2024-09-06 PROCEDURE — 63600175 PHARM REV CODE 636 W HCPCS: Performed by: INTERNAL MEDICINE

## 2024-09-06 PROCEDURE — 37000008 HC ANESTHESIA 1ST 15 MINUTES: Performed by: INTERNAL MEDICINE

## 2024-09-06 PROCEDURE — 43239 EGD BIOPSY SINGLE/MULTIPLE: CPT | Performed by: INTERNAL MEDICINE

## 2024-09-06 PROCEDURE — 88305 TISSUE EXAM BY PATHOLOGIST: CPT | Mod: 26,,, | Performed by: PATHOLOGY

## 2024-09-06 PROCEDURE — 88305 TISSUE EXAM BY PATHOLOGIST: CPT | Performed by: PATHOLOGY

## 2024-09-06 PROCEDURE — 37000009 HC ANESTHESIA EA ADD 15 MINS: Performed by: INTERNAL MEDICINE

## 2024-09-06 PROCEDURE — 43239 EGD BIOPSY SINGLE/MULTIPLE: CPT | Mod: ,,, | Performed by: INTERNAL MEDICINE

## 2024-09-06 PROCEDURE — 27201012 HC FORCEPS, HOT/COLD, DISP: Performed by: INTERNAL MEDICINE

## 2024-09-06 RX ORDER — CHOLECALCIFEROL (VITAMIN D3) 25 MCG
1000 TABLET ORAL DAILY
COMMUNITY

## 2024-09-06 RX ORDER — PROPOFOL 10 MG/ML
VIAL (ML) INTRAVENOUS
Status: DISCONTINUED | OUTPATIENT
Start: 2024-09-06 | End: 2024-09-06

## 2024-09-06 RX ORDER — MULTIVIT WITH MINERALS/HERBS
1 TABLET ORAL DAILY
COMMUNITY

## 2024-09-06 RX ORDER — SODIUM CHLORIDE, SODIUM LACTATE, POTASSIUM CHLORIDE, CALCIUM CHLORIDE 600; 310; 30; 20 MG/100ML; MG/100ML; MG/100ML; MG/100ML
INJECTION, SOLUTION INTRAVENOUS CONTINUOUS
Status: DISCONTINUED | OUTPATIENT
Start: 2024-09-06 | End: 2024-09-06 | Stop reason: HOSPADM

## 2024-09-06 RX ADMIN — PROPOFOL 50 MG: 10 INJECTION, EMULSION INTRAVENOUS at 12:09

## 2024-09-06 RX ADMIN — PROPOFOL 150 MG: 10 INJECTION, EMULSION INTRAVENOUS at 12:09

## 2024-09-06 RX ADMIN — SODIUM CHLORIDE, POTASSIUM CHLORIDE, SODIUM LACTATE AND CALCIUM CHLORIDE: 600; 310; 30; 20 INJECTION, SOLUTION INTRAVENOUS at 11:09

## 2024-09-06 NOTE — TRANSFER OF CARE
"Anesthesia Transfer of Care Note    Patient: Miles Bowen    Procedure(s) Performed: Procedure(s) (LRB):  EGD (ESOPHAGOGASTRODUODENOSCOPY) (N/A)    Patient location: PACU    Anesthesia Type: general    Transport from OR: Transported from OR on room air with adequate spontaneous ventilation    Post pain: adequate analgesia    Post assessment: no apparent anesthetic complications and tolerated procedure well    Post vital signs: stable    Level of consciousness: awake    Nausea/Vomiting: no nausea/vomiting    Complications: none    Transfer of care protocol was followed      Last vitals: Visit Vitals  BP (!) 126/56   Pulse 67   Temp 36.5 °C (97.7 °F)   Resp 16   Ht 5' 2" (1.575 m)   Wt 83.1 kg (183 lb 3.2 oz)   SpO2 98%   Breastfeeding No   BMI 33.51 kg/m²     "

## 2024-09-06 NOTE — ANESTHESIA POSTPROCEDURE EVALUATION
Anesthesia Post Evaluation    Patient: Miles Bowen    Procedure(s) Performed: Procedure(s) (LRB):  EGD (ESOPHAGOGASTRODUODENOSCOPY) (N/A)    Final Anesthesia Type: general      Patient location during evaluation: PACU  Patient participation: Yes- Able to Participate  Level of consciousness: awake  Post-procedure vital signs: reviewed and stable  Pain management: adequate  Airway patency: patent    PONV status at discharge: No PONV  Anesthetic complications: no      Cardiovascular status: stable  Respiratory status: unassisted  Hydration status: euvolemic  Follow-up not needed.              Vitals Value Taken Time   /56 09/06/24 1259   Temp 36.5 °C (97.7 °F) 09/06/24 1259   Pulse 67 09/06/24 1259   Resp 16 09/06/24 1259   SpO2 98 % 09/06/24 1259         No case tracking events are documented in the log.      Pain/Dinesh Score: Dinesh Score: 10 (9/6/2024 12:59 PM)

## 2024-09-06 NOTE — PLAN OF CARE
Discharge instructions reviewed with patient and visitor. Handouts given & verbalized understanding with no further questions at this time.  spoke to pt at bedside, reviewed procedure and findings, answered questions. Made aware they are awaiting biopsy results with MD telephone number provided per AVS sheet. VSS on RA, no pain or nausea noted, tolerating po fluids, no complaints noted. Fall precautions reviewed, consents in chart, PIV removed at this time.   
Patient prepped for procedure. Recovery RN to call sister when patient makes it to recovery.   
Poor

## 2024-09-06 NOTE — H&P
PRE PROCEDURE H&P    Patient Name: Miles Bowen  MRN: [id number]  : 1960  Date of Procedure:  2024  Referring Physician: Patricia Resendez*  Primary Physician: Carlos Paul MD  Procedure Physician: Viraj Wheat MD       Planned Procedure: Colonoscopy  Diagnosis: epigastric abdominal pain   Chief Complaint: Same as above    HPI: Patient is an 64 y.o. female is here for the above.     Anticoagulation: None     Past Medical History:   Past Medical History:   Diagnosis Date    Anxiety     Depression     GERD (gastroesophageal reflux disease)     Heart murmur     Hematemesis without nausea 2016    Hypertension     Multinodular goiter 2016    MVP (mitral valve prolapse)     Obesity         Past Surgical History:  Past Surgical History:   Procedure Laterality Date    BACK SURGERY  2016    Bone fusion    COLONOSCOPY      FIXATION KYPHOPLASTY THORACIC SPINE  2016    HYSTERECTOMY  2004    ZULMA,BSO for endometriosis    knee replacement Right 2020    NASAL SINUS SURGERY  2015    Polyps removed    SINUS SURGERY  2014    SPINE SURGERY  NA    TONSILLECTOMY          Home Medications:  Prior to Admission medications    Medication Sig Start Date End Date Taking? Authorizing Provider   b complex vitamins tablet Take 1 tablet by mouth once daily.   Yes Provider, Historical   baclofen (LIORESAL) 10 MG tablet Take 1 tablet (10 mg total) by mouth 2 (two) times daily as needed (muscle spasm). 24  Yes Carlos Paul MD   diazePAM (VALIUM) 10 MG Tab Take by mouth. 24  Yes Provider, Historical   dicyclomine (BENTYL) 10 MG capsule Take 1 capsule by mouth 4 times daily before meals and nightly as needed 3/7/24  Yes Carlos Paul MD   duloxetine (CYMBALTA) 60 MG capsule Take 1 capsule by mouth once daily.  17  Yes Provider, Historical   estradioL (ESTRACE) 0.5 MG tablet Take 1 tablet (0.5 mg total) by mouth once daily. 5/15/24  Yes Martin Da Silva MD   famotidine  (PEPCID) 20 MG tablet Take 1 tablet (20 mg total) by mouth 2 (two) times daily. 2/22/24  Yes Carlos Paul MD   ferrous sulfate (IRON ORAL) Take 1 tablet by mouth once daily. OTC iron supplement   Yes Provider, Historical   hydroCHLOROthiazide (HYDRODIURIL) 12.5 MG Tab Take 1 tablet (12.5 mg total) by mouth once daily. 8/21/24 5/18/25 Yes Carlos Paul MD   HYDROcodone-acetaminophen (NORCO) 5-325 mg per tablet Take 1 Tablet by mouth Once A Day as needed 6/7/24  Yes    LORazepam (ATIVAN) 1 MG tablet Take by mouth. 4/28/24  Yes Provider, Historical   losartan (COZAAR) 100 MG tablet Take 1 tablet (100 mg total) by mouth once daily. 4/9/24  Yes Carlos Paul MD   pantoprazole (PROTONIX) 40 MG tablet Take 1 tablet (40 mg total) by mouth once daily. 7/8/24  Yes Carlos Paul MD   promethazine (PHENERGAN) 25 MG tablet Take 1 tablet (25 mg total) by mouth every 8 (eight) hours as needed for Nausea. 9/3/24  Yes Patricia Resendez PA-C   sucralfate (CARAFATE) 1 gram tablet Take 1 tablet (1 g total) by mouth 4 (four) times daily before meals and nightly. 9/3/24  Yes Patricia Resendez PA-C   topiramate (TOPAMAX) 25 MG tablet Take 1 tablet (25 mg total) by mouth every morning AND 2 tablets (50 mg total) every evening. 2/26/24 9/23/24 Yes Patricia Resendez PA-C   vitamin D (VITAMIN D3) 1000 units Tab Take 1,000 Units by mouth once daily.   Yes Provider, Historical   HYDROcodone-acetaminophen (NORCO) 5-325 mg per tablet Take 1 tablet by mouth once a day as needed for pain 8/12/24      potassium chloride SA (K-DUR,KLOR-CON) 20 MEQ tablet Take 1 tablet (20 mEq total) by mouth once daily. 5/4/23   Carlos Paul MD   zolpidem (AMBIEN) 10 mg Tab Take 10 mg by mouth nightly as needed. 4/28/24   Provider, Historical        Allergies:  Review of patient's allergies indicates:   Allergen Reactions    Aimovig autoinjector [erenumab-aooe] Other (See Comments)     Neurological problems      Benadryl  allergy decongestant     Ibuprofen Rash     GI upset    Nsaids (non-steroidal anti-inflammatory drug) Rash     GI Upset    Penicillins Rash     GI issues    Prednisone Palpitations    Singulair [montelukast] Palpitations    Sulfa (sulfonamide antibiotics) Rash    Tessalon perle [benzonatate] Palpitations        Social History:   Social History     Socioeconomic History    Marital status:    Tobacco Use    Smoking status: Former     Current packs/day: 0.00     Average packs/day: 0.3 packs/day for 1.2 years (0.3 ttl pk-yrs)     Types: Cigarettes     Start date: 2005     Quit date: 2006     Years since quittin.6    Smokeless tobacco: Former   Substance and Sexual Activity    Alcohol use: Not Currently     Comment: rarely    Drug use: No    Sexual activity: Yes     Partners: Male     Birth control/protection: None   Social History Narrative     with 1 adult child, no pets or smokers in household.     Social Determinants of Health     Financial Resource Strain: Patient Declined (3/7/2024)    Overall Financial Resource Strain (CARDIA)     Difficulty of Paying Living Expenses: Patient declined   Food Insecurity: Patient Declined (3/7/2024)    Hunger Vital Sign     Worried About Running Out of Food in the Last Year: Patient declined     Ran Out of Food in the Last Year: Patient declined   Transportation Needs: Patient Declined (3/7/2024)    PRAPARE - Transportation     Lack of Transportation (Medical): Patient declined     Lack of Transportation (Non-Medical): Patient declined   Physical Activity: Patient Declined (3/7/2024)    Exercise Vital Sign     Days of Exercise per Week: Patient declined     Minutes of Exercise per Session: Patient declined   Stress: Patient Declined (3/7/2024)    [employer] - Occupational Stress Questionnaire     Feeling of Stress : Patient declined   Housing Stability: Patient Declined (3/7/2024)    Housing Stability Vital Sign     Unable  "to Pay for Housing in the Last Year: Patient declined     Unstable Housing in the Last Year: Patient declined       Family History:  Family History   Problem Relation Name Age of Onset    Hypertension Mother NA     Hypertension Father NA     Heart attack Father NA 63    Breast cancer Maternal Grandmother NA     Cancer Maternal Grandmother NA     Colon cancer Neg Hx      Stomach cancer Neg Hx         ROS: No acute cardiac events, no acute respiratory complaints.     Physical Exam (all patients):    /76 (BP Location: Right arm, Patient Position: Sitting)   Pulse (!) 54   Temp 97.7 °F (36.5 °C) (Temporal)   Resp 16   Ht 5' 2" (1.575 m)   Wt 83.1 kg (183 lb 3.2 oz)   SpO2 95%   Breastfeeding No   BMI 33.51 kg/m²   Lungs: Clear to auscultation bilaterally, respirations unlabored  Heart: Regular rate and rhythm, S1 and S2 normal, no obvious murmurs  Abdomen:         Soft, non-tender, bowel sounds normal, no masses, no organomegaly    Lab Results   Component Value Date    WBC 7.44 09/03/2024    MCV 89 09/03/2024    RDW 13.8 09/03/2024     09/03/2024    INR 0.9 10/16/2020    GLU 90 09/03/2024    HGBA1C 5.7 (H) 03/21/2022    BUN 13 09/03/2024     09/03/2024    K 3.5 09/03/2024     09/03/2024        SEDATION PLAN: per anesthesia      History reviewed, vital signs satisfactory, cardiopulmonary status satisfactory, sedation options, risks and plans have been discussed with the patient  All their questions were answered and the patient agrees to the sedation procedures as planned and the patient is deemed an appropriate candidate for the sedation as planned.    Procedure explained to patient, informed consent obtained and placed in chart.    Viraj Wheat  9/6/2024  12:43 PM     "

## 2024-09-06 NOTE — ANESTHESIA PREPROCEDURE EVALUATION
09/06/2024  Miles Bowen is a 64 y.o., female.      Pre-op Assessment    I have reviewed the Patient Summary Reports.    I have reviewed the NPO Status.   I have reviewed the Medications.     Review of Systems  Anesthesia Hx:   History of prior surgery of interest to airway management or planning:            Denies Personal Hx of Anesthesia complications.                    Cardiovascular:     Hypertension                     Shortness of Breath                    Hypertension         Pulmonary:      Shortness of breath                  Hepatic/GI:    Hiatal Hernia, GERD      Gerd    Hernia, Hiatal Hernia      Musculoskeletal:  Arthritis        Arthritis     Spine Disorders:  Disc disease and Degenerative disease           Neurological:    Neuromuscular Disease,  Headaches      Dx of Headaches   Arthritis                         Neuromuscular Disease   Psych:  Psychiatric History                  Physical Exam  General: Well nourished    Airway:  Mallampati: III   Mouth Opening: Normal  TM Distance: 4 - 6 cm  Tongue: Normal  Neck ROM: Normal ROM    Dental:  Intact        Anesthesia Plan  Type of Anesthesia, risks & benefits discussed:    Anesthesia Type: Gen Natural Airway  Intra-op Monitoring Plan: Standard ASA Monitors  Post Op Pain Control Plan: multimodal analgesia  Induction:  IV  Informed Consent: Informed consent signed with the Patient and all parties understand the risks and agree with anesthesia plan.  All questions answered. Patient consented to blood products? No  ASA Score: 2  Day of Surgery Review of History & Physical: H&P Update referred to the surgeon/provider.    Ready For Surgery From Anesthesia Perspective.     .

## 2024-09-09 VITALS
SYSTOLIC BLOOD PRESSURE: 114 MMHG | OXYGEN SATURATION: 98 % | HEART RATE: 57 BPM | RESPIRATION RATE: 16 BRPM | WEIGHT: 183.19 LBS | DIASTOLIC BLOOD PRESSURE: 65 MMHG | TEMPERATURE: 98 F | BODY MASS INDEX: 33.71 KG/M2 | HEIGHT: 62 IN

## 2024-09-10 LAB
FINAL PATHOLOGIC DIAGNOSIS: NORMAL
GROSS: NORMAL
Lab: NORMAL

## 2024-09-16 RX ORDER — BACLOFEN 10 MG/1
10 TABLET ORAL 2 TIMES DAILY PRN
Qty: 30 TABLET | Refills: 0 | Status: SHIPPED | OUTPATIENT
Start: 2024-09-16

## 2024-09-24 ENCOUNTER — PATIENT MESSAGE (OUTPATIENT)
Dept: NEUROLOGY | Facility: CLINIC | Age: 64
End: 2024-09-24
Payer: COMMERCIAL

## 2024-09-24 ENCOUNTER — TELEPHONE (OUTPATIENT)
Dept: NEUROLOGY | Facility: CLINIC | Age: 64
End: 2024-09-24
Payer: COMMERCIAL

## 2024-09-24 NOTE — TELEPHONE ENCOUNTER
Spoke with patient and informed her that Ms. Davis does not see new patient virtually. Patient verbalized understanding.

## 2024-09-24 NOTE — TELEPHONE ENCOUNTER
----- Message from Patito Blankenship sent at 9/24/2024 12:38 PM CDT -----  Who Called: Pt    What is the request in detail: Requesting call back to discuss if appt today can be a virtual, pt has a very bad migraine and does not feel comfortable driving. Please advise    Can the clinic reply by [facility]? No    Best Call Back Number: [phone]      Additional Information:

## 2024-09-25 ENCOUNTER — PATIENT MESSAGE (OUTPATIENT)
Dept: NEUROLOGY | Facility: CLINIC | Age: 64
End: 2024-09-25

## 2024-09-25 ENCOUNTER — OFFICE VISIT (OUTPATIENT)
Dept: NEUROLOGY | Facility: CLINIC | Age: 64
End: 2024-09-25
Payer: COMMERCIAL

## 2024-09-25 VITALS
RESPIRATION RATE: 16 BRPM | BODY MASS INDEX: 34.12 KG/M2 | HEIGHT: 62 IN | WEIGHT: 185.44 LBS | OXYGEN SATURATION: 99 % | HEART RATE: 70 BPM | DIASTOLIC BLOOD PRESSURE: 73 MMHG | SYSTOLIC BLOOD PRESSURE: 124 MMHG

## 2024-09-25 DIAGNOSIS — G43.119 INTRACTABLE MIGRAINE WITH AURA WITHOUT STATUS MIGRAINOSUS: Primary | ICD-10-CM

## 2024-09-25 DIAGNOSIS — K21.9 GASTROESOPHAGEAL REFLUX DISEASE, UNSPECIFIED WHETHER ESOPHAGITIS PRESENT: ICD-10-CM

## 2024-09-25 DIAGNOSIS — R42 DIZZINESS: ICD-10-CM

## 2024-09-25 DIAGNOSIS — F41.9 ANXIETY: ICD-10-CM

## 2024-09-25 DIAGNOSIS — R11.0 NAUSEA: ICD-10-CM

## 2024-09-25 DIAGNOSIS — K29.50 CHRONIC GASTRITIS, PRESENCE OF BLEEDING UNSPECIFIED, UNSPECIFIED GASTRITIS TYPE: ICD-10-CM

## 2024-09-25 DIAGNOSIS — G44.229 CHRONIC TENSION-TYPE HEADACHE, NOT INTRACTABLE: ICD-10-CM

## 2024-09-25 DIAGNOSIS — F32.A DEPRESSION, UNSPECIFIED DEPRESSION TYPE: ICD-10-CM

## 2024-09-25 DIAGNOSIS — H53.8 BLURRY VISION, BILATERAL: ICD-10-CM

## 2024-09-25 PROCEDURE — 1159F MED LIST DOCD IN RCRD: CPT | Mod: CPTII,S$GLB,,

## 2024-09-25 PROCEDURE — 1160F RVW MEDS BY RX/DR IN RCRD: CPT | Mod: CPTII,S$GLB,,

## 2024-09-25 PROCEDURE — 99215 OFFICE O/P EST HI 40 MIN: CPT | Mod: S$GLB,,,

## 2024-09-25 PROCEDURE — 3008F BODY MASS INDEX DOCD: CPT | Mod: CPTII,S$GLB,,

## 2024-09-25 PROCEDURE — 3078F DIAST BP <80 MM HG: CPT | Mod: CPTII,S$GLB,,

## 2024-09-25 PROCEDURE — 4010F ACE/ARB THERAPY RXD/TAKEN: CPT | Mod: CPTII,S$GLB,,

## 2024-09-25 PROCEDURE — 99999 PR PBB SHADOW E&M-EST. PATIENT-LVL V: CPT | Mod: PBBFAC,,,

## 2024-09-25 PROCEDURE — 3074F SYST BP LT 130 MM HG: CPT | Mod: CPTII,S$GLB,,

## 2024-09-25 RX ORDER — RIMEGEPANT SULFATE 75 MG/75MG
75 TABLET, ORALLY DISINTEGRATING ORAL DAILY PRN
Qty: 9 TABLET | Refills: 0 | Status: SHIPPED | OUTPATIENT
Start: 2024-09-25 | End: 2024-10-25

## 2024-09-25 RX ORDER — PROMETHAZINE HYDROCHLORIDE 25 MG/1
25 TABLET ORAL EVERY 8 HOURS PRN
Qty: 30 TABLET | Refills: 0 | Status: SHIPPED | OUTPATIENT
Start: 2024-09-25

## 2024-09-25 RX ORDER — BUTALBITAL, ACETAMINOPHEN AND CAFFEINE 50; 325; 40 MG/1; MG/1; MG/1
1 TABLET ORAL DAILY PRN
Qty: 9 TABLET | Refills: 0 | Status: SHIPPED | OUTPATIENT
Start: 2024-09-25 | End: 2024-09-27 | Stop reason: ALTCHOICE

## 2024-09-25 RX ORDER — TOPIRAMATE 50 MG/1
TABLET, FILM COATED ORAL
Qty: 60 TABLET | Refills: 0 | Status: SHIPPED | OUTPATIENT
Start: 2024-09-25 | End: 2024-10-25

## 2024-09-25 RX ORDER — ZOLPIDEM TARTRATE 12.5 MG/1
12.5 TABLET, FILM COATED, EXTENDED RELEASE ORAL NIGHTLY
COMMUNITY
Start: 2024-09-23

## 2024-09-25 NOTE — PROGRESS NOTES
"Subjective:       Patient ID: Miles Bowen is a 64 y.o. female.      Chief Complaint: "Headaches"        Headache   Associated symptoms include dizziness, nausea and photophobia. Pertinent negatives include no abdominal pain, back pain, coughing, ear pain, eye pain, eye redness, fever, hearing loss, neck pain, numbness, rhinorrhea, seizures, sinus pressure, sore throat, tinnitus, vomiting or weakness.       HPI 64 Years old Female with PMHx of Migraines / Depression / HTN / and other medical conditions came for the evaluation and recommendation of "Headaches".      Referral: The patient was referred by self and is accompanied by self.  Patient was last evaluated and managed by Dr. Sky for Migraines 01/30/2023. She presents to re-establish care with Neurology services for Migraine Management due to scheduling conflicts.     Headache History:    Onset: Started over 10 years ago, not worsening, patient would just like to establish care due to running out of Nurtec and experiencing her first Migraine in 1 year. She reports Migraine started yesterday. Previously she reports being well controlled on Nurtec.     Description: Headaches are stabbing and aching like, building up slowly towards the night and early morning. Patient reports headaches do not wake them up from sleep. They are progressively worsening and interfering with daily activities.    Timing: Duration of 2 hours for regular headaches / Duration of 24 hours for Migraine like     Frequency: Intermittent, with 1 regular headache per week  /  1 migraine in the last year    Pain Severity: Increasing in severity, rated 7-9/10, causing significant morbidity.    Location: Starts "stabbing" pain behind right eye and right frontal area (worse to right side) radiating to the left frontal areas with pain described as "soreness"    Family History: No family history of early dementia or migraines.     Medications: OTC Tylenol / Topamax 25 mg AM and 50 mg QHS / " "Cymbalta 60 mg Daily / Phenergan 25 mg Q8H PRN Nausea    Worsening Factors: Bright light / loud noise / being in motion / Stress /  No specific food triggers identified.    Alleviating Factors: Dark and quiet room    Associated Symptoms: Bilateral eye pressure (worse on right side), light and sound sensitivity, Nausea, Bilateral blurry vision, double vision, dizziness, balance issues      Pertinent Negative Symptoms: No associated neurological deficits, no scalp sensitivity, vomiting, neck pain with radiation, ear ringing, acute thunderclap onset, focal or bilateral limb weakness, or extremity numbness and tingling.    Positional/Behavioral Factors: Headaches are not positional or postural, not worsened by movement or bending the head downward. Denies worsening with Valsalva maneuver.    Triggers: Bright light / loud noise / being in motion / Stress /     Prodromal Symptoms: Does report bilateral visual aura (floaters) prior to migraine start    Exclusions: No TMJ issues, head trauma, seizures, smoking history, caffeine overuse, vertigo, blackouts, fever, chills, or significant memory loss. No history of strokes or falls.    Ophthalmological History: Last eye clinic appointment over 2 years ago, with normal pressures, no papilledema, and no abnormalities reported. Patient requests an ophthalmology referral.     Blood Pressure: Compliant with medication; no knowledge of at home BP readings.     Sleep History: No symptoms of sleep apnea (e.g., snoring, gasping, frequent nighttime awakening, daytime fatigue).       Abortive therapies (tried and failed):     -Patient previously on Nurtec - effective - last use was 1 year ago.    -Fioricet    -Imitrex - Side effects of "heart palpitations and chest pain" will avoid all Triptans     -Avoid NSAIDs 2/2 previous SE's and GI upset / History of Chronic Gastritis     -Advil - rash      Preventative therapies (tried and failed):      -Topamax - Current    -Cymbalta - Current for " "dual purpose anx/dep and HA's     Aimovig - "sick"         Review of Systems   Constitutional:  Negative for activity change, appetite change, chills, diaphoresis, fatigue, fever and unexpected weight change.   HENT:  Negative for congestion, dental problem, drooling, ear discharge, ear pain, facial swelling, hearing loss, mouth sores, nosebleeds, postnasal drip, rhinorrhea, sinus pressure, sinus pain, sneezing, sore throat, tinnitus, trouble swallowing and voice change.    Eyes:  Positive for photophobia and visual disturbance. Negative for pain, discharge, redness and itching.        Bilateral eye pressure (worse on right side) associated with Migraines.    Respiratory:  Negative for cough, chest tightness, shortness of breath and wheezing.    Cardiovascular:  Negative for chest pain, palpitations and leg swelling.   Gastrointestinal:  Positive for nausea. Negative for abdominal distention, abdominal pain, blood in stool, constipation, diarrhea and vomiting.   Endocrine: Negative for cold intolerance, heat intolerance, polydipsia, polyphagia and polyuria.   Genitourinary:  Negative for decreased urine volume, difficulty urinating, dysuria, flank pain, frequency, hematuria, pelvic pain, urgency and vaginal discharge.   Musculoskeletal:  Negative for arthralgias, back pain, gait problem, joint swelling, myalgias, neck pain and neck stiffness.   Skin:  Negative for color change and rash.   Allergic/Immunologic: Negative for immunocompromised state.   Neurological:  Positive for dizziness and headaches. Negative for tremors, seizures, syncope, facial asymmetry, speech difficulty, weakness, light-headedness and numbness.        Balance issues associated with Migraines.    Hematological:  Negative for adenopathy. Does not bruise/bleed easily.   Psychiatric/Behavioral:  Negative for agitation, behavioral problems, confusion, decreased concentration, dysphoric mood, hallucinations, self-injury, sleep disturbance and " suicidal ideas. The patient is not nervous/anxious and is not hyperactive.    All other systems reviewed and are negative.                Current Outpatient Medications:     b complex vitamins tablet, Take 1 tablet by mouth once daily., Disp: , Rfl:     baclofen (LIORESAL) 10 MG tablet, Take 1 tablet (10 mg total) by mouth 2 (two) times daily as needed (muscle spasm)., Disp: 30 tablet, Rfl: 0    diazePAM (VALIUM) 10 MG Tab, Take by mouth., Disp: , Rfl:     duloxetine (CYMBALTA) 60 MG capsule, Take 1 capsule by mouth once daily. , Disp: , Rfl: 0    estradioL (ESTRACE) 0.5 MG tablet, Take 1 tablet (0.5 mg total) by mouth once daily., Disp: 90 tablet, Rfl: 3    famotidine (PEPCID) 20 MG tablet, Take 1 tablet (20 mg total) by mouth 2 (two) times daily., Disp: 180 tablet, Rfl: 1    ferrous sulfate (IRON ORAL), Take 1 tablet by mouth once daily. OTC iron supplement, Disp: , Rfl:     hydroCHLOROthiazide (HYDRODIURIL) 12.5 MG Tab, Take 1 tablet (12.5 mg total) by mouth once daily., Disp: 90 tablet, Rfl: 2    HYDROcodone-acetaminophen (NORCO) 5-325 mg per tablet, Take 1 Tablet by mouth Once A Day as needed, Disp: 30 tablet, Rfl: 0    HYDROcodone-acetaminophen (NORCO) 5-325 mg per tablet, take one tablet by mouth daily as needed for pain, Disp: 30 tablet, Rfl: 0    LORazepam (ATIVAN) 1 MG tablet, Take by mouth., Disp: , Rfl:     losartan (COZAAR) 100 MG tablet, Take 1 tablet (100 mg total) by mouth once daily., Disp: 90 tablet, Rfl: 2    pantoprazole (PROTONIX) 40 MG tablet, Take 1 tablet (40 mg total) by mouth once daily., Disp: 90 tablet, Rfl: 3    potassium chloride SA (K-DUR,KLOR-CON) 20 MEQ tablet, Take 1 tablet (20 mEq total) by mouth once daily., Disp: 90 tablet, Rfl: 2    promethazine (PHENERGAN) 25 MG tablet, Take 1 tablet (25 mg total) by mouth every 8 (eight) hours as needed for Nausea., Disp: 30 tablet, Rfl: 0    sucralfate (CARAFATE) 1 gram tablet, Take 1 tablet (1 g total) by mouth 4 (four) times daily before  meals and nightly., Disp: 40 tablet, Rfl: 1    topiramate (TOPAMAX) 25 MG tablet, Take 1 tablet (25 mg total) by mouth every morning AND 2 tablets (50 mg total) every evening., Disp: 90 tablet, Rfl: 6    vitamin D (VITAMIN D3) 1000 units Tab, Take 1,000 Units by mouth once daily., Disp: , Rfl:     zolpidem (AMBIEN CR) 12.5 MG CR tablet, Take 12.5 mg by mouth every evening., Disp: , Rfl:     dicyclomine (BENTYL) 10 MG capsule, Take 1 capsule by mouth 4 times daily before meals and nightly as needed (Patient not taking: Reported on 2024), Disp: 40 capsule, Rfl: 1    zolpidem (AMBIEN) 10 mg Tab, Take 10 mg by mouth nightly as needed. (Patient not taking: Reported on 2024), Disp: , Rfl:     Past Medical History:   Diagnosis Date    Anxiety     Depression     GERD (gastroesophageal reflux disease)     Heart murmur     Hematemesis without nausea 2016    Hypertension     Multinodular goiter 2016    MVP (mitral valve prolapse)     Obesity        Past Surgical History:   Procedure Laterality Date    BACK SURGERY  2016    Bone fusion    COLONOSCOPY      ESOPHAGOGASTRODUODENOSCOPY N/A 2024    Procedure: EGD (ESOPHAGOGASTRODUODENOSCOPY);  Surgeon: Viraj Wheat MD;  Location: [facility];  Service: Gastroenterology;  Laterality: N/A;    FIXATION KYPHOPLASTY THORACIC SPINE  2016    HYSTERECTOMY      ZULMA,BSO for endometriosis    knee replacement Right 2020    NASAL SINUS SURGERY  2015    Polyps removed    SINUS SURGERY  2014    SPINE SURGERY  NA    TONSILLECTOMY         Social History     Socioeconomic History    Marital status:    Tobacco Use    Smoking status: Former     Current packs/day: 0.00     Average packs/day: 0.3 packs/day for 1.2 years (0.3 ttl pk-yrs)     Types: Cigarettes     Start date: 2005     Quit date: 2006     Years since quittin.7    Smokeless tobacco: Former   Substance and Sexual Activity    Alcohol use: Not Currently     Comment: rarely     Drug use: No    Sexual activity: Yes     Partners: Male     Birth control/protection: None   Social History Narrative     with 1 adult child, no pets or smokers in household.     Social Determinants of Health     Financial Resource Strain: Patient Declined (3/7/2024)    Overall Financial Resource Strain (CARDIA)     Difficulty of Paying Living Expenses: Patient declined   Food Insecurity: Patient Declined (3/7/2024)    Hunger Vital Sign     Worried About Running Out of Food in the Last Year: Patient declined     Ran Out of Food in the Last Year: Patient declined   Transportation Needs: Patient Declined (3/7/2024)    PRAPARE - Transportation     Lack of Transportation (Medical): Patient declined     Lack of Transportation (Non-Medical): Patient declined   Physical Activity: Patient Declined (3/7/2024)    Exercise Vital Sign     Days of Exercise per Week: Patient declined     Minutes of Exercise per Session: Patient declined   Stress: Patient Declined (3/7/2024)    [employer] - Occupational Stress Questionnaire     Feeling of Stress : Patient declined   Housing Stability: Patient Declined (3/7/2024)    Housing Stability Vital Sign     Unable to Pay for Housing in the Last Year: Patient declined     Unstable Housing in the Last Year: Patient declined         Past/Current Medical/Surgical History, Past/Current Social History, Past/Current Family History and Past/Current Medications were reviewed in detail.    Objective:           VITAL SIGNS WERE REVIEWED      GENERAL APPEARANCE:     The patient looks comfortable.    BMI    No signs of respiratory distress.    Normal breathing pattern.    No dysmorphic features    Normal eye contact.       GENERAL MEDICAL EXAM:    HEENT:  Head is atraumatic normocephalic.     FUNDUSCOPIC (OPHTHALMOSCOPIC) EXAMINATION showed no disc edema (papilledema).      NECK: No JVD. No visible lesions or goiters.     CHEST-CARDIOPULMONARY: No cyanosis. No  tachypnea. Normal respiratory effort.    [id number]: No jaundice. No stomas or lesions. No visible hernias. No catheters.     SKIN, HAIR, NAILS: No pathognomonic skin rash.No neurofibromatosis. No visible lesions.No stigmata of autoimmune disease. No clubbing.    LIMBS: No varicose veins. No visible swelling.    MUSCULOSKELETAL: No visible deformities.No visible lesions.             Neurologic Exam     Mental Status   Oriented to person, place, and time.   Oriented to person.   Oriented to place. Oriented to country, city and area.   Oriented to time. Oriented to year, month, date, day and season.   Registration: recalls 3 of 3 objects. Recall at 5 minutes: recalls 3 of 3 objects. Follows 3 step commands.   Attention: normal. Concentration: normal.   Speech: speech is normal   Level of consciousness: alert  Knowledge: good. Able to perform simple calculations.   Able to name object. Able to read. Able to repeat. Able to write. Normal comprehension.     Cranial Nerves     CN II   Visual fields full to confrontation.   Visual acuity: normal  Right visual field deficit: none  Left visual field deficit: none     CN III, IV, VI   Pupils are equal, round, and reactive to light.  Extraocular motions are normal.   Right pupil: Size: 2 mm. Shape: regular. Reactivity: brisk. Consensual response: intact. Accommodation: intact.   Left pupil: Size: 2 mm. Shape: regular. Reactivity: brisk. Consensual response: intact. Accommodation: intact.   CN III: no CN III palsy  CN VI: no CN VI palsy  Nystagmus: none   Diplopia: none  Ophthalmoparesis: none  Upgaze: normal  Downgaze: normal  Conjugate gaze: present  Vestibulo-ocular reflex: present    CN V   Facial sensation intact.   Right facial sensation deficit: none  Left facial sensation deficit: none    CN VII   Facial expression full, symmetric.   Right facial weakness: none  Left facial weakness: none    CN VIII   CN VIII normal.   Hearing: intact    CN IX,  X   CN IX normal.   CN X normal.   Palate: symmetric    CN XI   CN XI normal.   Right sternocleidomastoid strength: normal  Left sternocleidomastoid strength: normal  Right trapezius strength: normal  Left trapezius strength: normal    CN XII   CN XII normal.   Tongue: not atrophic  Fasciculations: absent  Tongue deviation: none    Motor Exam   Muscle bulk: normal  Overall muscle tone: normal  Right arm pronator drift: absent  Left arm pronator drift: absent    Strength   Strength 5/5 throughout.   Right neck flexion: 5/5  Left neck flexion: 5/5  Right neck extension: 5/5  Left neck extension: 5/5  Right deltoid: 5/5  Left deltoid: 5/5  Right biceps: 5/5  Left biceps: 5/5  Right triceps: 5/5  Left triceps: 5/5  Right wrist flexion: 5/5  Left wrist flexion: 5/5  Right wrist extension: 5/5  Left wrist extension: 5/5  Right interossei: 5/5  Left interossei: 5/5  Right abdominals: 5/5  Left abdominals: 5/5  Right iliopsoas: 5/5  Left iliopsoas: 5/5  Right quadriceps: 5/5  Left quadriceps: 5/5  Right hamstrin/5  Left hamstrin/5  Right glutei: 5/5  Left glutei: 5/5  Right anterior tibial: 5/5  Left anterior tibial: 5/5  Right posterior tibial: 5/5  Left posterior tibial: 5/5  Right peroneal: 5/5  Left peroneal: 5/5  Right gastroc: 5/5  Left gastroc: 5/5    Sensory Exam   Light touch normal.   Vibration normal.   Proprioception normal.   Pinprick normal.   Graphesthesia: normal  Stereognosis: normal    Gait, Coordination, and Reflexes     Gait  Gait: normal    Coordination   Romberg: negative  Finger to nose coordination: normal  Heel to shin coordination: normal  Tandem walking coordination: normal    Tremor   Resting tremor: absent  Intention tremor: absent  Action tremor: absent    Reflexes   Reflexes 2+ except as noted.   Right brachioradialis: 2+  Left brachioradialis: 2+  Right biceps: 2+  Left biceps: 2+  Right triceps: 2+  Left triceps: 2+  Right patellar: 2+  Left patellar: 2+  Right achilles: 2+  Left  achilles: 2+  Right : 2+  Left : 2+  Right plantar: normal  Left plantar: normal  Right Lopez: absent  Left Lopez: absent  Right ankle clonus: absent  Left ankle clonus: absent  Right pendular knee jerk: absent  Left pendular knee jerk: absent        Lab Results   Component Value Date    WBC 7.44 09/03/2024    HGB 14.0 09/03/2024    HCT 41.5 09/03/2024    MCV 89 09/03/2024     09/03/2024       Sodium   Date Value Ref Range Status   09/03/2024 139 136 - 145 mmol/L Final     Potassium   Date Value Ref Range Status   09/03/2024 3.5 3.5 - 5.1 mmol/L Final     Chloride   Date Value Ref Range Status   09/03/2024 105 95 - 110 mmol/L Final     CO2   Date Value Ref Range Status   09/03/2024 26 23 - 29 mmol/L Final     Glucose   Date Value Ref Range Status   09/03/2024 90 70 - 110 mg/dL Final     BUN   Date Value Ref Range Status   09/03/2024 13 8 - 23 mg/dL Final     Creatinine   Date Value Ref Range Status   09/03/2024 0.8 0.5 - 1.4 mg/dL Final     Calcium   Date Value Ref Range Status   09/03/2024 9.8 8.7 - 10.5 mg/dL Final     Total Protein   Date Value Ref Range Status   09/03/2024 7.2 6.0 - 8.4 g/dL Final     Albumin   Date Value Ref Range Status   09/03/2024 4.2 3.5 - 5.2 g/dL Final     Total Bilirubin   Date Value Ref Range Status   09/03/2024 0.4 0.1 - 1.0 mg/dL Final     Comment:     For infants and newborns, interpretation of results should be based  on gestational age, weight and in agreement with clinical  observations.    Premature Infant recommended reference ranges:  Up to 24 hours.............<8.0 mg/dL  Up to 48 hours............<12.0 mg/dL  3-5 days..................<15.0 mg/dL  6-29 days.................<15.0 mg/dL       Alkaline Phosphatase   Date Value Ref Range Status   09/03/2024 57 55 - 135 U/L Final     AST   Date Value Ref Range Status   09/03/2024 10 10 - 40 U/L Final     ALT   Date Value Ref Range Status   09/03/2024 12 10 - 44 U/L Final     Anion Gap   Date Value Ref Range  "Status   09/03/2024 8 8 - 16 mmol/L Final     eGFR if    Date Value Ref Range Status   02/23/2022 >60.0 >60 mL/min/1.73 m^2 Final     eGFR if non    Date Value Ref Range Status   02/23/2022 >60.0 >60 mL/min/1.73 m^2 Final     Comment:     Calculation used to obtain the estimated glomerular filtration  rate (eGFR) is the CKD-EPI equation.          Lab Results   Component Value Date    [id number] 244 03/03/2022       Lab Results   Component Value Date    TSH 1.628 10/12/2022    FREET4 1.06 03/21/2022       No results found in the last 24 hours.    No results found in the last 24 hours.    Reviewed the neuroimaging independently       Assessment:   64 Years old Female with PMH as above came for an evaluation of "Headaches"    Intractable migraine with aura without status migrainosus     Chronic tension-type headache, not intractable     Blurry vision, bilateral     Nausea     Dizziness     Chronic gastritis, presence of bleeding unspecified, unspecified gastritis type     Gastroesophageal reflux disease, unspecified whether esophagitis present     Depression, unspecified depression type     Anxiety     Plan:   Patient Neurological Assessment is non-focal. Patient does report a Migraine during the exam. Patient's history and physical point to Chronic Complicated Migraines and Chronic Tension Type Headaches.       -Offered Brain MRI and diagnostic workup, including blood tests. Also offered Physical therapy services for vestibular and Migraine Therapy. Patient declined all services.     -Order Fioricet -40 mg PO Daily PRN x 1 week supply. Side effects discussed. Patient verbalized understanding. Instructed patient  to be taken for Migraine Abortive Therapy until Nurtec comes in and PA process is complete. Patient instructed to not take together. She verbalized understanding.    -Order Nurtec 75 mg PO Daily PRN for Migraine Abortive Therapy. Side effects discussed. Patient verbalized " understanding.     -Continue Phenergan 25 mg PO Q8H PRN Nausea associated with Migraines. Refills sent. Side effects discussed. Patient verbalized understanding.     -Increase Topamax from Topamax 25 mg AM and 50 mg QHS to 50 mg AM and 50 mg PM for Migraine Prevention Therapy. Side effects discussed. Patient verbalized understanding.     -Continue Cymbalta 60 mg Daily for dual purpose anxiety/depression and HA's. Side effects discussed. Patient verbalized understanding.    - Will plan to avoid NSAIDS due to patient's history of Chronic Gastritis and GERD. Instructed her to continue following GI services and PCP for management.     -Order Ophthalmology Referral for further evaluation and recommendation of visual symptoms.         LABORATORY EVALUATION    Labs: (2024) Lipase / CMP / CBC / H Pylori IgG /  -personally reviewed -non-significant abnormalities     RADIOLOGY EVALUATION     Personally Reviewed Head CT WO Contrast- done 2022 - no acute abnormalities                        MIGRAINE, COMMON, WITH AURA, EPISODIC, HIGH FREQUENCY       MANAGEMENT       HEADACHE DIARY     DISCUSSED THE THREE-FOLD MANAGEMENT OF MIGRAINE:      LIFESTYLE CHANGES:       Good sleep hygiene  Avoid general triggers like lack of sleep/too much sleep, prolonged sun exposure, excessive screen time and specific triggers based on you own diary   Minimize physical and emotional stress  Smoking avoidance and cessation  Limit caffeine drinks to 1-2 a day   Good hydration   Small frequent meals and avoid skipping meals   Moderate 30-minute-long aerobic exercises 3 times/week. Avoid strenuous exercise         ABORTIVE MEDICATIONS (ACUTE-RESCUE MEDICATIONS):     Should only be taken 2-3 times/week to avoid rebound and overuse headaches.    I-explained to the patient that pain meds especially triptans should NOT be taken daily to avoid Rebound Headache and Overuse Headache.    Take at the ONSET of the headache sumatriptan 100 mg PO  (or other  Triptan) in combination with naproxen 500 mg PO or Ibuprofen 800 mg PO for headache without nausea or vomiting.  This regimen can be repeated only once in 24 hours after 2 hours.    Side effects of triptans were discussed and include rare cardiac and cerebral ischemia and cannot be used with migraine associate with focal neurological deficits (complicated migraine) in addition to drowsiness and potential impairment of driving ability. The patient verbalized understanding.    NSAIDs can cause peptic ulcers, renal insufficiency and may increase the risk of cardiovascular diseases.  SEs were discussed with the patient. The patient verbalized understanding.    Triptans have shown to be more effective than Gepatns with more SE/AE.      AVOID NARCOTICS (OPIATES)      1. No randomized controlled study shows pain-free results with opioids in the treatment of migraine.     2. The physiologic consequences of opioid use are adverse, occur quickly, and can be permanent. Decreased gray matter, release of calcitonin gene-related peptide, dynorphin, and pro-inflammatory peptides, and activation of excitatory glutamate receptors are all associated with opioid exposure.     3. Opioids are pro-nociceptive, prevent reversal of migraine central sensitization, and interfere with triptan effectiveness.     4.Opioids precipitate bad clinical outcomes, especially transformation to daily headache.     5. They cause disease progression, comorbidity, and excessive health care consumption.           NEXT OPTIONS:    Gepants: Nuretc (rimegepant)75 mg >Ubrelvy (ubrogepant) 100 mg    Ditans: Reyvow (lasmiditan) 100 mg (No driving due to sedation)    Fioricet without codeine with Reglan.      Prednisone with Reglan.      LAST RESORT:     DHE NS Trudhesa (Max 2 a week)     C/I: concomitant use of vasoconstrictors like Triptans, strong CY inhibitors such as HAART PIs (eg, ritonavir, nelfinavir, or indinavir) and Macrolides (eg, erythromycin or  clarithromycin), CAD, PVD, Stroke/TIA and Uncontrolled HTN.  Serious SEs include Vasospasm and Fibrosis (chronic use).       IMPENDING STATUS: Prednisone and Vistaril.    STATUS MIGRAINOSUS: ED-Infusion for Status Protocol.        PREVENTATIVE (MORE ACCURATELY MIGRAINE REDUCTION) MEDICATIONS:           Since the patient's headache is very frequent a lengthy discussion about preventative medications was carried out.The patient understands that prevention means DECREASING frequency and severity and NOT elimination.The patient was made aware that any new medication can cause serious allergic reaction.The medication is considered failure only if a therapeutic dose reached and maintained for 6-8 weeks.        HELPFUL SUPPLEMENTS:     Helpful supplements include Co-Q 10, B2, Mg, Feverfew (Dolovent combination) and butterbur (Petadolex)        NEUROPHARMACOLOGY     NEXT OPTIONS:     Zonisamide/Zonegran (ZNS) 100-400 mg QHS is a good alternative to TPM in case of SE/AE.     Amitriptyline/Elavil (TCA) slow titration to 100-Age which can cause sleepiness, dry eyes, dry mouth, urinary retention, and rarely cardiac arrhythmias    Propranolol/Inderal  (BB)slow titration to 80 mg BID which can cause low blood pressure, slow heart rate, erectile dysfunction, depression, airway obstruction and heart failure exacerbations. Cannot be used with migraine associate with focal neurological deficits.    Lamotrigine/Lamictal  (LTG)slow titration to 100 mg BID which can cause serious skin rash and rare cardiac arrhythmias. LTG is superior to other therapies for specifically reducing migraine aura.     ANTI-CGRP AGENTS: Qulipta (alogepant) 60 mg QD, Erenumab (Aimovig) 140 mg SQ Pen monthly (Reported cases of Constipation and BP elevation) , Galcanezumab (Emgality) 120 mg SQ Pen monthly after a loading dose of 240 mg  and Fremnezumab (Ajovy) (Ligand Blocker): 225 mg SQ monthly or 675 mg every 3 months     Botox 200 units every 3 months.          LAST RESORT OPTIONS:      Namenda 10 mg BID     Valproic acid/ Depakote         NEUROMODULATION     Cefaly, Relivion, Nerivio and GammaCore (VNS)               WOMEN IN CHILD BEARING PERIOD     All migraine medications are not safe during pregnancy and the patient was made aware of this fact. Any pregnancy should be planned, and medications should be stopped PRIOR to pregnancy planning. Folic acid 1 mg daily was recommended. However, hormonal birth control complicates the management of migraine and can exacerbate migraine. If possible, mechanical contraception should be a better option.         PREGNANCY ISSUES         We had a lengthy discussion about managing migraine in patients who are trying to get pregnant or pregnant.    First, I recommend FA 1 mg QD     PRN medications are not safe. The safest option is Reglan PRN and not to be taken more than 2-3 times/week. Fioricet PRN is an option.     Traditional preventative options are not safe including Botox. Cefaly and Relivion are considered safe, but insurance does not cover it.        SCHOOL AND WORK ACCOMMODATIONS        Allow the patient to wear sunglasses or a cap and switch out fluorescent bulbs.    Allow the patient to arrive 5 minutes later and leave 5 minutes earlier to avoid noisy traffic.    Allow the patient to carry a water bottle and refill as needed.    Allow the patient to snack whenever is needed.    Allow the patient to decrease the computer brightness.    Allow the patient to take breaks as needed and extra time for assignments and deadlines.    Allow the patient to avoid strenuous activity as needed.                  MEDICAL/SURGICAL COMORBIDITIES     All relevant medical comorbidities noted and managed by primary care physician and medical care team.          HEALTHY LIFESTYLE AND PREVENTATIVE CARE    The patient to adhere to the age-appropriate health maintenance guidelines including screening tests and vaccinations. The patient to  adhere to  healthy lifestyle, optimal weight, exercise, healthy diet, good sleep hygiene and avoiding drugs including smoking, alcohol and recreational drugs.    I spent a total of 63 minutes on the day of the visit.This includes face to face time and non-face to face time preparing to see the patient (eg, review of tests), obtaining and/or reviewing separately obtained history, documenting clinical information in the electronic or other health record, independently interpreting results and communicating results to the patient/family/caregiver, or care coordinator.     Please do not hesitate to contact me with any updates, questions or concerns.    No follow-ups on file.    Ryan Macias, MSN, FNP-C    General Neurology

## 2024-09-27 ENCOUNTER — PATIENT MESSAGE (OUTPATIENT)
Dept: NEUROLOGY | Facility: CLINIC | Age: 64
End: 2024-09-27

## 2024-09-27 ENCOUNTER — OFFICE VISIT (OUTPATIENT)
Dept: NEUROLOGY | Facility: CLINIC | Age: 64
End: 2024-09-27
Payer: COMMERCIAL

## 2024-09-27 DIAGNOSIS — F41.9 ANXIETY: ICD-10-CM

## 2024-09-27 DIAGNOSIS — G44.229 CHRONIC TENSION-TYPE HEADACHE, NOT INTRACTABLE: ICD-10-CM

## 2024-09-27 DIAGNOSIS — G43.119 INTRACTABLE MIGRAINE WITH AURA WITHOUT STATUS MIGRAINOSUS: ICD-10-CM

## 2024-09-27 DIAGNOSIS — R11.0 NAUSEA: ICD-10-CM

## 2024-09-27 DIAGNOSIS — K29.50 CHRONIC GASTRITIS, PRESENCE OF BLEEDING UNSPECIFIED, UNSPECIFIED GASTRITIS TYPE: ICD-10-CM

## 2024-09-27 DIAGNOSIS — G43.119 INTRACTABLE MIGRAINE WITH AURA WITHOUT STATUS MIGRAINOSUS: Primary | ICD-10-CM

## 2024-09-27 DIAGNOSIS — H53.8 BLURRY VISION, BILATERAL: ICD-10-CM

## 2024-09-27 DIAGNOSIS — K21.9 GASTROESOPHAGEAL REFLUX DISEASE, UNSPECIFIED WHETHER ESOPHAGITIS PRESENT: ICD-10-CM

## 2024-09-27 DIAGNOSIS — F32.A DEPRESSION, UNSPECIFIED DEPRESSION TYPE: ICD-10-CM

## 2024-09-27 DIAGNOSIS — R42 DIZZINESS: ICD-10-CM

## 2024-09-27 RX ORDER — BUTALBITAL, ACETAMINOPHEN AND CAFFEINE 50; 325; 40 MG/1; MG/1; MG/1
1 TABLET ORAL DAILY PRN
Qty: 9 TABLET | Refills: 0 | OUTPATIENT
Start: 2024-09-27 | End: 2024-10-27

## 2024-09-27 NOTE — PROGRESS NOTES
"Subjective:       Patient ID: Miles Bowen is a 64 y.o. female.      Chief Complaint: "Headaches"        Headache   Associated symptoms include dizziness, nausea and photophobia. Pertinent negatives include no abdominal pain, back pain, coughing, ear pain, eye pain, eye redness, fever, hearing loss, neck pain, numbness, rhinorrhea, seizures, sinus pressure, sore throat, tinnitus, vomiting or weakness.       HPI 64 Years old Female with PMHx of Migraines / Depression / HTN / and other medical conditions came for the re-evaluation and recommendation of "Headaches".    The originating site (patient location) is: Home.      The distant site (neurologist location) is: Neurology Clinic at [facility].      The chief complaint leading to consultation is: "Headaches"      Visit type: Virtual visit with synchronous audio and video.      Consent: The patient verbally consented to participating in the video visit and informed that may decline to receive medical services by telemedicine and may withdraw from such care at any time.      I discussed with the patient the nature of our telemedicine visits, that:      I  would evaluate the patient and recommend diagnostics and treatments based on my assessment.    Our sessions are not being recorded and that personal health information is protected.    Our team would provide follow up care in person if/when the patient needs it.    Virtual (video/telemedicine) visits have significant limitations. A telemedicine exam is primarily focused on the history and what I can observe. Several critical parts of the neurological exam cannot be performed.         Interval History: (09/25/2024) - Ordered Fioricet -40 mg PO Daily PRN x 1 week supply, Nurtec 75 mg PO Daily PRN, Phenergan 25 mg PO Q8H PRN Nausea. Increased Topamax from  25 mg AM and 50 mg QHS to 50 mg AM and 50 mg PM and continued Cymbalta 60 mg Daily. Ordered Ophthalmology Referral.       Referral: The patient " "was referred by self and is accompanied by self.  Patient was last evaluated and managed by Dr. Sky for Migraines 01/30/2023. She presents to re-establish care with Neurology services for Migraine Management due to scheduling conflicts.     Headache History:    Onset: Started over 10 years ago, not worsening, patient would just like to establish care due to running out of Nurtec and experiencing her first Migraine in 1 year. She reports Migraine started yesterday. Previously she reports being well controlled on Nurtec.     Description: Headaches are stabbing and aching like, building up slowly towards the night and early morning. Patient reports headaches do not wake them up from sleep. They are progressively worsening and interfering with daily activities.    Timing: Duration of 2 hours for regular headaches / Duration of 24 hours for Migraine like     Frequency: Intermittent, with 1 regular headache per week  /  1 migraine in the last year    Pain Severity: Increasing in severity, rated 7-9/10, causing significant morbidity.    Location: Starts "stabbing" pain behind right eye and right frontal area (worse to right side) radiating to the left frontal areas with pain described as "soreness"    Family History: No family history of early dementia or migraines.     Medications: OTC Tylenol / Topamax 25 mg AM and 50 mg QHS / Cymbalta 60 mg Daily / Phenergan 25 mg Q8H PRN Nausea    Worsening Factors: Bright light / loud noise / being in motion / Stress /  No specific food triggers identified.    Alleviating Factors: Dark and quiet room    Associated Symptoms: Bilateral eye pressure (worse on right side), light and sound sensitivity, Nausea, Bilateral blurry vision, double vision, dizziness, balance issues      Pertinent Negative Symptoms: No associated neurological deficits, no scalp sensitivity, vomiting, neck pain with radiation, ear ringing, acute thunderclap onset, focal or bilateral limb weakness, or extremity " "numbness and tingling.    Positional/Behavioral Factors: Headaches are not positional or postural, not worsened by movement or bending the head downward. Denies worsening with Valsalva maneuver.    Triggers: Bright light / loud noise / being in motion / Stress /     Prodromal Symptoms: Does report bilateral visual aura (floaters) prior to migraine start    Exclusions: No TMJ issues, head trauma, seizures, smoking history, caffeine overuse, vertigo, blackouts, fever, chills, or significant memory loss. No history of strokes or falls.    Ophthalmological History: Last eye clinic appointment over 2 years ago, with normal pressures, no papilledema, and no abnormalities reported. Patient requests an ophthalmology referral.     Blood Pressure: Compliant with medication; no knowledge of at home BP readings.     Sleep History: No symptoms of sleep apnea (e.g., snoring, gasping, frequent nighttime awakening, daytime fatigue).           New Issues: (09/27/2024) -     No new issues per patient.     Medications: Fioricet -40 mg PO Daily PRN x 1 week supply, Nurtec 75 mg PO Daily PRN, Phenergan 25 mg PO Q8H PRN Nausea, Topamax 50 mg AM and 50 mg PM and Cymbalta 60 mg Daily. - tolerating well without side effects. Patient reports taking it as prescribed.     Headaches are the same.  Location Starts "stabbing" pain behind right eye and right frontal area (worse to right side) radiating to the left frontal areas with pain described as "soreness"  Headache auras include bilateral visual aura (floaters) prior to migraine start  Frequency of tension type is 1 per week and migraine type is 2 since last visit.   Duration of tension type is 2 hours and migraine type is 24 hours   Still "stabbing / aching" in nature  Pain intensity is 7-9/10  Worsened by Bright light / loud noise / being in motion / Stress /   Triggered by Bright light / loud noise / being in motion / Stress /   Alleviated with Dark and quiet room  Associated " "symptoms include Bilateral eye pressure (worse on right side), light and sound sensitivity, Nausea, Bilateral blurry vision, double vision, dizziness, balance issues    Patient does report changes in vision. Last eye clinic appointment was over 2 years ago, with normal pressures, no papilledema, and no abnormalities reported. Patient has an upcoming ophthalmology appointment.     - The patient reports that Nurtec is not completely alleviating migraine symptoms. It was noted that the patient did not take the medication at the onset of the migraine, which may contribute to its perceived ineffectiveness.  -The patient was advised to take Nurtec at the onset of migraine symptoms to maximize its effectiveness.  - The patient continues to experience bilateral blurry peripheral vision during migraines, which is concerning to her. She has been instructed to keep her scheduled ophthalmology appointment for further evaluation.      Abortive therapies (tried and failed): Nurtec 75 mg PO Daily PRN / Fioricet -40 mg PO Daily PRN x 1 week supply- Current    -Patient previously on Nurtec - effective - last use was 1 year ago.    -Fioricet    -Imitrex - Side effects of "heart palpitations and chest pain" will avoid all Triptans     -Avoid NSAIDs 2/2 previous SE's and GI upset / History of Chronic Gastritis     -Advil - rash      Preventative therapies (tried and failed):      -Topamax - Current    -Cymbalta - Current for dual purpose anx/dep and HA's     Aimovig - "sick"         Review of Systems   Constitutional:  Negative for activity change, appetite change, chills, diaphoresis, fatigue, fever and unexpected weight change.   HENT:  Negative for congestion, dental problem, drooling, ear discharge, ear pain, facial swelling, hearing loss, mouth sores, nosebleeds, postnasal drip, rhinorrhea, sinus pressure, sinus pain, sneezing, sore throat, tinnitus, trouble swallowing and voice change.    Eyes:  Positive for photophobia and " visual disturbance. Negative for pain, discharge, redness and itching.        Bilateral eye pressure (worse on right side) associated with Migraines.    Respiratory:  Negative for cough, chest tightness, shortness of breath and wheezing.    Cardiovascular:  Negative for chest pain, palpitations and leg swelling.   Gastrointestinal:  Positive for nausea. Negative for abdominal distention, abdominal pain, blood in stool, constipation, diarrhea and vomiting.   Endocrine: Negative for cold intolerance, heat intolerance, polydipsia, polyphagia and polyuria.   Genitourinary:  Negative for decreased urine volume, difficulty urinating, dysuria, flank pain, frequency, hematuria, pelvic pain, urgency and vaginal discharge.   Musculoskeletal:  Negative for arthralgias, back pain, gait problem, joint swelling, myalgias, neck pain and neck stiffness.   Skin:  Negative for color change and rash.   Allergic/Immunologic: Negative for immunocompromised state.   Neurological:  Positive for dizziness and headaches. Negative for tremors, seizures, syncope, facial asymmetry, speech difficulty, weakness, light-headedness and numbness.        Balance issues associated with Migraines.    Hematological:  Negative for adenopathy. Does not bruise/bleed easily.   Psychiatric/Behavioral:  Negative for agitation, behavioral problems, confusion, decreased concentration, dysphoric mood, hallucinations, self-injury, sleep disturbance and suicidal ideas. The patient is not nervous/anxious and is not hyperactive.    All other systems reviewed and are negative.                Current Outpatient Medications:     b complex vitamins tablet, Take 1 tablet by mouth once daily., Disp: , Rfl:     baclofen (LIORESAL) 10 MG tablet, Take 1 tablet (10 mg total) by mouth 2 (two) times daily as needed (muscle spasm)., Disp: 30 tablet, Rfl: 0    butalbital-acetaminophen-caffeine -40 mg (FIORICET, ESGIC) -40 mg per tablet, Take 1 tablet by mouth daily as  needed for Headaches., Disp: 9 tablet, Rfl: 0    diazePAM (VALIUM) 10 MG Tab, Take by mouth., Disp: , Rfl:     dicyclomine (BENTYL) 10 MG capsule, Take 1 capsule by mouth 4 times daily before meals and nightly as needed, Disp: 40 capsule, Rfl: 1    duloxetine (CYMBALTA) 60 MG capsule, Take 1 capsule by mouth once daily. , Disp: , Rfl: 0    estradioL (ESTRACE) 0.5 MG tablet, Take 1 tablet (0.5 mg total) by mouth once daily., Disp: 90 tablet, Rfl: 3    famotidine (PEPCID) 20 MG tablet, Take 1 tablet (20 mg total) by mouth 2 (two) times daily., Disp: 180 tablet, Rfl: 1    ferrous sulfate (IRON ORAL), Take 1 tablet by mouth once daily. OTC iron supplement, Disp: , Rfl:     hydroCHLOROthiazide (HYDRODIURIL) 12.5 MG Tab, Take 1 tablet (12.5 mg total) by mouth once daily., Disp: 90 tablet, Rfl: 2    HYDROcodone-acetaminophen (NORCO) 5-325 mg per tablet, Take 1 Tablet by mouth Once A Day as needed, Disp: 30 tablet, Rfl: 0    HYDROcodone-acetaminophen (NORCO) 5-325 mg per tablet, take one tablet by mouth daily as needed for pain, Disp: 30 tablet, Rfl: 0    LORazepam (ATIVAN) 1 MG tablet, Take by mouth., Disp: , Rfl:     losartan (COZAAR) 100 MG tablet, Take 1 tablet (100 mg total) by mouth once daily., Disp: 90 tablet, Rfl: 2    NURTEC 75 mg odt, Take 1 tablet (75 mg total) by mouth daily as needed for Migraine. Place ODT tablet on the tongue; alternatively the ODT tablet may be placed under the tongue, Disp: 9 tablet, Rfl: 0    pantoprazole (PROTONIX) 40 MG tablet, Take 1 tablet (40 mg total) by mouth once daily., Disp: 90 tablet, Rfl: 3    potassium chloride SA (K-DUR,KLOR-CON) 20 MEQ tablet, Take 1 tablet (20 mEq total) by mouth once daily., Disp: 90 tablet, Rfl: 2    promethazine (PHENERGAN) 25 MG tablet, Take 1 tablet (25 mg total) by mouth every 8 (eight) hours as needed for Nausea., Disp: 30 tablet, Rfl: 0    sucralfate (CARAFATE) 1 gram tablet, Take 1 tablet (1 g total) by mouth 4 (four) times daily before meals and  nightly., Disp: 40 tablet, Rfl: 1    topiramate (TOPAMAX) 50 MG tablet, Take 1 tablet (50 mg total) by mouth every morning AND 1 tablet (50 mg total) every evening., Disp: 60 tablet, Rfl: 0    vitamin D (VITAMIN D3) 1000 units Tab, Take 1,000 Units by mouth once daily., Disp: , Rfl:     zolpidem (AMBIEN CR) 12.5 MG CR tablet, Take 12.5 mg by mouth every evening., Disp: , Rfl:     zolpidem (AMBIEN) 10 mg Tab, Take 10 mg by mouth nightly as needed., Disp: , Rfl:     Past Medical History:   Diagnosis Date    Anxiety     Depression     GERD (gastroesophageal reflux disease)     Heart murmur     Hematemesis without nausea 2016    Hypertension     Multinodular goiter 2016    MVP (mitral valve prolapse)     Obesity        Past Surgical History:   Procedure Laterality Date    BACK SURGERY      Bone fusion    COLONOSCOPY      ESOPHAGOGASTRODUODENOSCOPY N/A 2024    Procedure: EGD (ESOPHAGOGASTRODUODENOSCOPY);  Surgeon: Viraj Wheat MD;  Location: [facility];  Service: Gastroenterology;  Laterality: N/A;    FIXATION KYPHOPLASTY THORACIC SPINE  2016    HYSTERECTOMY      ZULMA,BSO for endometriosis    knee replacement Right 2020    NASAL SINUS SURGERY      Polyps removed    SINUS SURGERY  2014    SPINE SURGERY  NA    TONSILLECTOMY         Social History     Socioeconomic History    Marital status:    Tobacco Use    Smoking status: Former     Current packs/day: 0.00     Average packs/day: 0.3 packs/day for 1.2 years (0.3 ttl pk-yrs)     Types: Cigarettes     Start date: 2005     Quit date: 2006     Years since quittin.7    Smokeless tobacco: Former   Substance and Sexual Activity    Alcohol use: Not Currently     Comment: rarely    Drug use: No    Sexual activity: Yes     Partners: Male     Birth control/protection: None   Social History Narrative     with 1 adult child, no pets or smokers in household.     Social Determinants of Health     Financial  Resource Strain: Patient Declined (3/7/2024)    Overall Financial Resource Strain (CARDIA)     Difficulty of Paying Living Expenses: Patient declined   Food Insecurity: Patient Declined (3/7/2024)    Hunger Vital Sign     Worried About Running Out of Food in the Last Year: Patient declined     Ran Out of Food in the Last Year: Patient declined   Transportation Needs: Patient Declined (3/7/2024)    PRAPARE - Transportation     Lack of Transportation (Medical): Patient declined     Lack of Transportation (Non-Medical): Patient declined   Physical Activity: Patient Declined (3/7/2024)    Exercise Vital Sign     Days of Exercise per Week: Patient declined     Minutes of Exercise per Session: Patient declined   Stress: Patient Declined (3/7/2024)    [employer] [place] [employer] - Occupational Stress Questionnaire     Feeling of Stress : Patient declined   Housing Stability: Patient Declined (3/7/2024)    Housing Stability Vital Sign     Unable to Pay for Housing in the Last Year: Patient declined     Unstable Housing in the Last Year: Patient declined         Past/Current Medical/Surgical History, Past/Current Social History, Past/Current Family History and Past/Current Medications were reviewed in detail.    Objective:       GENERAL APPEARANCE:     The patient looks comfortable.    No signs of respiratory distress.    Normal breathing pattern.    No dysmorphic features    Normal eye contact.     GENERAL MEDICAL EXAM:    HEENT:  Head is atraumatic normocephalic.      Neck and Axillae: No JVD. No visible lesions.    Cardiopulmonary: No cyanosis. No tachypnea. Normal respiratory effort.    Gastrointestinal/Urogenital:  No jaundice. No stomas or lesions. No visible hernias. No catheters.     Skin, Hair and Nails: No pathognonomic skin rash. No neurofibromatosis. No visible lesions.No stigmata of autoimmune disease. No clubbing.    Limbs: No varicose veins. No visible swelling.    Muskoskeletal: No visible  deformities.No visible lesions.                 Neurologic Exam     Mental Status   Oriented to person, place, and time.   Oriented to person.   Oriented to place. Oriented to country, city and area.   Oriented to time. Oriented to year, month, date, day and season.   Registration: recalls 3 of 3 objects. Recall at 5 minutes: recalls 3 of 3 objects. Follows 3 step commands.   Attention: normal. Concentration: normal.   Speech: speech is normal   Level of consciousness: alert  Knowledge: good. Able to perform simple calculations.   Able to name object. Able to read. Able to repeat. Able to write. Normal comprehension.     Cranial Nerves     CN II   Visual fields full to confrontation.   Visual acuity: normal  Right visual field deficit: none  Left visual field deficit: none     CN III, IV, VI   Extraocular motions are normal.   Right pupil: Consensual response: intact. Accommodation: intact.   Left pupil: Consensual response: intact. Accommodation: intact.   CN III: no CN III palsy  CN VI: no CN VI palsy  Nystagmus: none   Diplopia: none  Ophthalmoparesis: none  Upgaze: normal  Downgaze: normal  Conjugate gaze: present  Vestibulo-ocular reflex: present    CN V   Facial sensation intact.   Right facial sensation deficit: none  Left facial sensation deficit: none    CN VII   Facial expression full, symmetric.   Right facial weakness: none  Left facial weakness: none    CN VIII   CN VIII normal.   Hearing: intact    CN IX, X   CN IX normal.   CN X normal.   Palate: symmetric    CN XII   CN XII normal.   Tongue: not atrophic  Fasciculations: absent  Tongue deviation: none    Motor Exam   Muscle bulk: normal  Overall muscle tone: normal  Right arm pronator drift: absent  Left arm pronator drift: absent    Sensory Exam   Graphesthesia: normal  Stereognosis: normal    Gait, Coordination, and Reflexes     Gait  Gait: normal    Tremor   Resting tremor: absent  Intention tremor: absent  Action tremor: absent        Lab Results    Component Value Date    WBC 7.44 09/03/2024    HGB 14.0 09/03/2024    HCT 41.5 09/03/2024    MCV 89 09/03/2024     09/03/2024       Sodium   Date Value Ref Range Status   09/03/2024 139 136 - 145 mmol/L Final     Potassium   Date Value Ref Range Status   09/03/2024 3.5 3.5 - 5.1 mmol/L Final     Chloride   Date Value Ref Range Status   09/03/2024 105 95 - 110 mmol/L Final     CO2   Date Value Ref Range Status   09/03/2024 26 23 - 29 mmol/L Final     Glucose   Date Value Ref Range Status   09/03/2024 90 70 - 110 mg/dL Final     BUN   Date Value Ref Range Status   09/03/2024 13 8 - 23 mg/dL Final     Creatinine   Date Value Ref Range Status   09/03/2024 0.8 0.5 - 1.4 mg/dL Final     Calcium   Date Value Ref Range Status   09/03/2024 9.8 8.7 - 10.5 mg/dL Final     Total Protein   Date Value Ref Range Status   09/03/2024 7.2 6.0 - 8.4 g/dL Final     Albumin   Date Value Ref Range Status   09/03/2024 4.2 3.5 - 5.2 g/dL Final     Total Bilirubin   Date Value Ref Range Status   09/03/2024 0.4 0.1 - 1.0 mg/dL Final     Comment:     For infants and newborns, interpretation of results should be based  on gestational age, weight and in agreement with clinical  observations.    Premature Infant recommended reference ranges:  Up to 24 hours.............<8.0 mg/dL  Up to 48 hours............<12.0 mg/dL  3-5 days..................<15.0 mg/dL  6-29 days.................<15.0 mg/dL       Alkaline Phosphatase   Date Value Ref Range Status   09/03/2024 57 55 - 135 U/L Final     AST   Date Value Ref Range Status   09/03/2024 10 10 - 40 U/L Final     ALT   Date Value Ref Range Status   09/03/2024 12 10 - 44 U/L Final     Anion Gap   Date Value Ref Range Status   09/03/2024 8 8 - 16 mmol/L Final     eGFR if    Date Value Ref Range Status   02/23/2022 >60.0 >60 mL/min/1.73 m^2 Final     eGFR if non    Date Value Ref Range Status   02/23/2022 >60.0 >60 mL/min/1.73 m^2 Final     Comment:      "Calculation used to obtain the estimated glomerular filtration  rate (eGFR) is the CKD-EPI equation.          Lab Results   Component Value Date    [id number] 244 03/03/2022       Lab Results   Component Value Date    TSH 1.628 10/12/2022    FREET4 1.06 03/21/2022       No results found in the last 24 hours.    No results found in the last 24 hours.    Reviewed the neuroimaging independently       Assessment:   64 Years old Female with PMH as above came for an evaluation of "Headaches"    Intractable migraine with aura without status migrainosus     Chronic tension-type headache, not intractable     Blurry vision, bilateral     Nausea     Dizziness     Chronic gastritis, presence of bleeding unspecified, unspecified gastritis type     Gastroesophageal reflux disease, unspecified whether esophagitis present     Depression, unspecified depression type     Anxiety     Plan:   Patient Neurological Assessment is non-focal. Patient does report a Migraine during the exam. Patient's history and physical point to Chronic Complicated Migraines and Chronic Tension Type Headaches.       -Offered Brain MRI and diagnostic workup, including blood tests. Also offered Physical therapy services for vestibular and Migraine Therapy. Patient declined all services.     -Discontinue Fioricet -40 mg PO Daily PRN x 1 week supply. Nurtec is available for abortive therapy.     -Continue Nurtec 75 mg PO Daily PRN for Migraine Abortive Therapy. Side effects discussed. Patient verbalized understanding.     -Continue Phenergan 25 mg PO Q8H PRN Nausea associated with Migraines. Refills sent. Side effects discussed. Patient verbalized understanding.     -Continue Topamax 50 mg AM and 50 mg PM for Migraine Prevention Therapy. Side effects discussed. Patient verbalized understanding.     -Continue Cymbalta 60 mg Daily for dual purpose anxiety/depression and HA's. Side effects discussed. Patient verbalized understanding.    - Will plan to avoid " NSAIDS due to patient's history of Chronic Gastritis and GERD. Instructed her to continue following GI services and PCP for management.     -Continue Ophthalmology Referral for further evaluation and recommendation of visual symptoms.         LABORATORY EVALUATION    Labs: (2024) Lipase / CMP / CBC / H Pylori IgG /  -personally reviewed -non-significant abnormalities     RADIOLOGY EVALUATION     Personally Reviewed Head CT WO Contrast- done 2022 - no acute abnormalities                        MIGRAINE, COMMON, WITH AURA, EPISODIC, HIGH FREQUENCY       MANAGEMENT       HEADACHE DIARY     DISCUSSED THE THREE-FOLD MANAGEMENT OF MIGRAINE:      LIFESTYLE CHANGES:       Good sleep hygiene  Avoid general triggers like lack of sleep/too much sleep, prolonged sun exposure, excessive screen time and specific triggers based on you own diary   Minimize physical and emotional stress  Smoking avoidance and cessation  Limit caffeine drinks to 1-2 a day   Good hydration   Small frequent meals and avoid skipping meals   Moderate 30-minute-long aerobic exercises 3 times/week. Avoid strenuous exercise         ABORTIVE MEDICATIONS (ACUTE-RESCUE MEDICATIONS):     Should only be taken 2-3 times/week to avoid rebound and overuse headaches.    I-explained to the patient that pain meds especially triptans should NOT be taken daily to avoid Rebound Headache and Overuse Headache.    Take at the ONSET of the headache sumatriptan 100 mg PO  (or other Triptan) in combination with naproxen 500 mg PO or Ibuprofen 800 mg PO for headache without nausea or vomiting.  This regimen can be repeated only once in 24 hours after 2 hours.    Side effects of triptans were discussed and include rare cardiac and cerebral ischemia and cannot be used with migraine associate with focal neurological deficits (complicated migraine) in addition to drowsiness and potential impairment of driving ability. The patient verbalized understanding.    NSAIDs can cause peptic  ulcers, renal insufficiency and may increase the risk of cardiovascular diseases.  SEs were discussed with the patient. The patient verbalized understanding.    Triptans have shown to be more effective than Gepatns with more SE/AE.      AVOID NARCOTICS (OPIATES)      1. No randomized controlled study shows pain-free results with opioids in the treatment of migraine.     2. The physiologic consequences of opioid use are adverse, occur quickly, and can be permanent. Decreased gray matter, release of calcitonin gene-related peptide, dynorphin, and pro-inflammatory peptides, and activation of excitatory glutamate receptors are all associated with opioid exposure.     3. Opioids are pro-nociceptive, prevent reversal of migraine central sensitization, and interfere with triptan effectiveness.     4.Opioids precipitate bad clinical outcomes, especially transformation to daily headache.     5. They cause disease progression, comorbidity, and excessive health care consumption.           NEXT OPTIONS:    Gepants: Nuretc (rimegepant)75 mg >Ubrelvy (ubrogepant) 100 mg    Ditans: Reyvow (lasmiditan) 100 mg (No driving due to sedation)    Fioricet without codeine with Reglan.      Prednisone with Reglan.      LAST RESORT:     DHE NS Trudhesa (Max 2 a week)     C/I: concomitant use of vasoconstrictors like Triptans, strong CY inhibitors such as HAART PIs (eg, ritonavir, nelfinavir, or indinavir) and Macrolides (eg, erythromycin or clarithromycin), CAD, PVD, Stroke/TIA and Uncontrolled HTN.  Serious SEs include Vasospasm and Fibrosis (chronic use).       IMPENDING STATUS: Prednisone and Vistaril.    STATUS MIGRAINOSUS: ED-Infusion for Status Protocol.        PREVENTATIVE (MORE ACCURATELY MIGRAINE REDUCTION) MEDICATIONS:           Since the patient's headache is very frequent a lengthy discussion about preventative medications was carried out.The patient understands that prevention means DECREASING frequency and severity and NOT  elimination.The patient was made aware that any new medication can cause serious allergic reaction.The medication is considered failure only if a therapeutic dose reached and maintained for 6-8 weeks.        HELPFUL SUPPLEMENTS:     Helpful supplements include Co-Q 10, B2, Mg, Feverfew (Dolovent combination) and butterbur (Petadolex)        NEUROPHARMACOLOGY     NEXT OPTIONS:     Zonisamide/Zonegran (ZNS) 100-400 mg QHS is a good alternative to TPM in case of SE/AE.     Amitriptyline/Elavil (TCA) slow titration to 100-Age which can cause sleepiness, dry eyes, dry mouth, urinary retention, and rarely cardiac arrhythmias    Propranolol/Inderal  (BB)slow titration to 80 mg BID which can cause low blood pressure, slow heart rate, erectile dysfunction, depression, airway obstruction and heart failure exacerbations. Cannot be used with migraine associate with focal neurological deficits.    Lamotrigine/Lamictal  (LTG)slow titration to 100 mg BID which can cause serious skin rash and rare cardiac arrhythmias. LTG is superior to other therapies for specifically reducing migraine aura.     ANTI-CGRP AGENTS: Qulipta (alogepant) 60 mg QD, Erenumab (Aimovig) 140 mg SQ Pen monthly (Reported cases of Constipation and BP elevation) , Galcanezumab (Emgality) 120 mg SQ Pen monthly after a loading dose of 240 mg  and Fremnezumab (Ajovy) (Ligand Blocker): 225 mg SQ monthly or 675 mg every 3 months     Botox 200 units every 3 months.         LAST RESORT OPTIONS:      Namenda 10 mg BID     Valproic acid/ Depakote         NEUROMODULATION     Cefaly, Relivion, Nerivio and GammaCore (VNS)               WOMEN IN CHILD BEARING PERIOD     All migraine medications are not safe during pregnancy and the patient was made aware of this fact. Any pregnancy should be planned, and medications should be stopped PRIOR to pregnancy planning. Folic acid 1 mg daily was recommended. However, hormonal birth control complicates the management of migraine and  can exacerbate migraine. If possible, mechanical contraception should be a better option.         PREGNANCY ISSUES         We had a lengthy discussion about managing migraine in patients who are trying to get pregnant or pregnant.    First, I recommend FA 1 mg QD     PRN medications are not safe. The safest option is Reglan PRN and not to be taken more than 2-3 times/week. Fioricet PRN is an option.     Traditional preventative options are not safe including Botox. Cefaly and Relivion are considered safe, but insurance does not cover it.        SCHOOL AND WORK ACCOMMODATIONS        Allow the patient to wear sunglasses or a cap and switch out fluorescent bulbs.    Allow the patient to arrive 5 minutes later and leave 5 minutes earlier to avoid noisy traffic.    Allow the patient to carry a water bottle and refill as needed.    Allow the patient to snack whenever is needed.    Allow the patient to decrease the computer brightness.    Allow the patient to take breaks as needed and extra time for assignments and deadlines.    Allow the patient to avoid strenuous activity as needed.                  MEDICAL/SURGICAL COMORBIDITIES     All relevant medical comorbidities noted and managed by primary care physician and medical care team.          HEALTHY LIFESTYLE AND PREVENTATIVE CARE    The patient to adhere to the age-appropriate health maintenance guidelines including screening tests and vaccinations. The patient to adhere to  healthy lifestyle, optimal weight, exercise, healthy diet, good sleep hygiene and avoiding drugs including smoking, alcohol and recreational drugs.    I spent a total of 38 minutes on the day of the visit.This includes face to face time and non-face to face time preparing to see the patient (eg, review of tests), obtaining and/or reviewing separately obtained history, documenting clinical information in the electronic or other health record, independently interpreting results and communicating  results to the patient/family/caregiver, or care coordinator.     Please do not hesitate to contact me with any updates, questions or concerns.    No follow-ups on file.    Ryan Macias, MSN, FNP-C    General Neurology

## 2024-10-08 RX ORDER — BACLOFEN 10 MG/1
10 TABLET ORAL 2 TIMES DAILY PRN
Qty: 30 TABLET | Refills: 0 | Status: SHIPPED | OUTPATIENT
Start: 2024-10-08

## 2024-10-18 ENCOUNTER — TELEPHONE (OUTPATIENT)
Dept: PAIN MEDICINE | Facility: CLINIC | Age: 64
End: 2024-10-18
Payer: COMMERCIAL

## 2024-10-18 NOTE — TELEPHONE ENCOUNTER
Patient called and confirmed time and location for appointment. Patient given instructions about how our Interventional Pain Department practices. Patient advised to bring any prior images to the appointment.   Spoke with patient she was with [facility] - Dr. Jenkins ans was discharge for being rude to staff.  Has had nerve block recently and will like to keep up with those.  Taking Norco but informed her that may not be an option here.  She has pain in lower back that radiates into legs.

## 2024-10-18 NOTE — TELEPHONE ENCOUNTER
Patient called and confirmed time and location for appointment. Patient given instructions about how our Interventional Pain Department practices. Patient advised to bring any prior images to the appointment.    Patient twice with no answer left message to return call to confirm appt. For 10/22/24

## 2024-10-18 NOTE — TELEPHONE ENCOUNTER
----- Message from Med Assistant Gómez sent at 10/18/2024 12:42 PM CDT -----  Contact: pt    ----- Message -----  From: Mary Yates  Sent: 10/18/2024  12:28 PM CDT  To: Rashid Guzman Staff    Type:  Patient Returning Call    Who Called: pt  Who Left Message for Patient: ANALILIA  Does the patient know what this is regarding?:   Would the patient rather a call back or a response via [facility]? phone  Best Call Back Number: [phone] (pt cannot answer until after 5pm)  Additional Information:

## 2024-10-24 DIAGNOSIS — G43.119 INTRACTABLE MIGRAINE WITH AURA WITHOUT STATUS MIGRAINOSUS: ICD-10-CM

## 2024-10-24 RX ORDER — TOPIRAMATE 50 MG/1
TABLET, FILM COATED ORAL
Qty: 60 TABLET | Refills: 0 | Status: SHIPPED | OUTPATIENT
Start: 2024-10-24 | End: 2024-11-24

## 2024-10-29 RX ORDER — BACLOFEN 10 MG/1
10 TABLET ORAL 2 TIMES DAILY PRN
Qty: 30 TABLET | Refills: 0 | Status: SHIPPED | OUTPATIENT
Start: 2024-10-29

## 2024-10-30 ENCOUNTER — OFFICE VISIT (OUTPATIENT)
Dept: INTERNAL MEDICINE | Facility: CLINIC | Age: 64
End: 2024-10-30
Payer: COMMERCIAL

## 2024-10-30 ENCOUNTER — OFFICE VISIT (OUTPATIENT)
Dept: NEUROLOGY | Facility: CLINIC | Age: 64
End: 2024-10-30
Payer: COMMERCIAL

## 2024-10-30 VITALS
TEMPERATURE: 98 F | OXYGEN SATURATION: 97 % | DIASTOLIC BLOOD PRESSURE: 90 MMHG | SYSTOLIC BLOOD PRESSURE: 140 MMHG | BODY MASS INDEX: 34.64 KG/M2 | HEART RATE: 91 BPM | WEIGHT: 188.25 LBS | HEIGHT: 62 IN

## 2024-10-30 DIAGNOSIS — R42 DIZZINESS: ICD-10-CM

## 2024-10-30 DIAGNOSIS — F32.A DEPRESSION, UNSPECIFIED DEPRESSION TYPE: ICD-10-CM

## 2024-10-30 DIAGNOSIS — K29.50 CHRONIC GASTRITIS, PRESENCE OF BLEEDING UNSPECIFIED, UNSPECIFIED GASTRITIS TYPE: ICD-10-CM

## 2024-10-30 DIAGNOSIS — G44.229 CHRONIC TENSION-TYPE HEADACHE, NOT INTRACTABLE: ICD-10-CM

## 2024-10-30 DIAGNOSIS — K21.9 GASTROESOPHAGEAL REFLUX DISEASE, UNSPECIFIED WHETHER ESOPHAGITIS PRESENT: ICD-10-CM

## 2024-10-30 DIAGNOSIS — H53.8 BLURRY VISION, BILATERAL: ICD-10-CM

## 2024-10-30 DIAGNOSIS — R11.0 NAUSEA: ICD-10-CM

## 2024-10-30 DIAGNOSIS — G43.119 INTRACTABLE MIGRAINE WITH AURA WITHOUT STATUS MIGRAINOSUS: Primary | ICD-10-CM

## 2024-10-30 DIAGNOSIS — F41.9 ANXIETY: ICD-10-CM

## 2024-10-30 DIAGNOSIS — R05.9 COUGH, UNSPECIFIED TYPE: Primary | ICD-10-CM

## 2024-10-30 DIAGNOSIS — J06.9 UPPER RESPIRATORY TRACT INFECTION, UNSPECIFIED TYPE: ICD-10-CM

## 2024-10-30 LAB
CTP QC/QA: YES
SARS-COV-2 RDRP RESP QL NAA+PROBE: NEGATIVE

## 2024-10-30 PROCEDURE — 99214 OFFICE O/P EST MOD 30 MIN: CPT | Mod: 95,,,

## 2024-10-30 PROCEDURE — 99214 OFFICE O/P EST MOD 30 MIN: CPT | Mod: S$GLB,,, | Performed by: INTERNAL MEDICINE

## 2024-10-30 PROCEDURE — 87635 SARS-COV-2 COVID-19 AMP PRB: CPT | Mod: QW,S$GLB,, | Performed by: INTERNAL MEDICINE

## 2024-10-30 PROCEDURE — 99999 PR PBB SHADOW E&M-EST. PATIENT-LVL III: CPT | Mod: PBBFAC,,, | Performed by: INTERNAL MEDICINE

## 2024-10-30 PROCEDURE — 3008F BODY MASS INDEX DOCD: CPT | Mod: CPTII,S$GLB,, | Performed by: INTERNAL MEDICINE

## 2024-10-30 PROCEDURE — 4010F ACE/ARB THERAPY RXD/TAKEN: CPT | Mod: CPTII,S$GLB,, | Performed by: INTERNAL MEDICINE

## 2024-10-30 PROCEDURE — 3080F DIAST BP >= 90 MM HG: CPT | Mod: CPTII,S$GLB,, | Performed by: INTERNAL MEDICINE

## 2024-10-30 PROCEDURE — 3075F SYST BP GE 130 - 139MM HG: CPT | Mod: CPTII,S$GLB,, | Performed by: INTERNAL MEDICINE

## 2024-10-30 PROCEDURE — 1160F RVW MEDS BY RX/DR IN RCRD: CPT | Mod: CPTII,95,,

## 2024-10-30 PROCEDURE — 4010F ACE/ARB THERAPY RXD/TAKEN: CPT | Mod: CPTII,95,,

## 2024-10-30 PROCEDURE — 1159F MED LIST DOCD IN RCRD: CPT | Mod: CPTII,95,,

## 2024-10-30 RX ORDER — TOPIRAMATE 50 MG/1
TABLET, FILM COATED ORAL
Qty: 90 TABLET | Refills: 0 | Status: SHIPPED | OUTPATIENT
Start: 2024-10-30 | End: 2024-11-29

## 2024-10-30 RX ORDER — PROMETHAZINE HYDROCHLORIDE AND CODEINE PHOSPHATE 6.25; 1 MG/5ML; MG/5ML
5 SOLUTION ORAL EVERY 4 HOURS PRN
Qty: 118 ML | Refills: 0 | Status: SHIPPED | OUTPATIENT
Start: 2024-10-30 | End: 2024-11-06

## 2024-10-30 RX ORDER — AZELASTINE 1 MG/ML
1 SPRAY, METERED NASAL 2 TIMES DAILY
Qty: 30 ML | Refills: 0 | Status: SHIPPED | OUTPATIENT
Start: 2024-10-30 | End: 2025-10-30

## 2024-10-30 NOTE — PROGRESS NOTES
"Subjective:      Patient ID: Miles Bowen is a 64 y.o. female.    Chief Complaint: Cough    HPI    63 yo with    Patient Active Problem List   Diagnosis    Hypertension    Depression    Anxiety    GERD (gastroesophageal reflux disease)    Hiatal hernia    DDD (degenerative disc disease), lumbar    Allergic rhinitis    IBS (irritable bowel syndrome)    Multinodular goiter    MVP (mitral valve prolapse)    Nasal polyps    Obesity    Abnormal ECG    Palpitations    Atypical chest pain    Shortness of breath    Migraine with aura and without status migrainosus, not intractable    Major depressive disorder, recurrent, mild     Past Medical History:   Diagnosis Date    Anxiety     Depression     GERD (gastroesophageal reflux disease)     Heart murmur     Hematemesis without nausea 11/4/2016    Hypertension     Multinodular goiter 12/23/2016    MVP (mitral valve prolapse)     Obesity      C/o  Throat and nasal Congestion   Clear runny nose  Coughing mostly dry    Symptoms 3 to 4 days. Waxing and waning.      Chorecedi, cough drops, tylenol only of mild help    Not having her typical sinus infection pain.       Review of Systems   Constitutional:  Negative for chills and fever.   Respiratory:  Negative for cough.    Cardiovascular:  Negative for chest pain.   Gastrointestinal:  Negative for abdominal pain.     Objective:   BP (!) 140/90 (BP Location: Right arm, Patient Position: Sitting)   Pulse 91   Temp 98.4 °F (36.9 °C) (Tympanic)   Ht 5' 2" (1.575 m)   Wt 85.4 kg (188 lb 4.4 oz)   SpO2 97%   BMI 34.44 kg/m²     Physical Exam  Constitutional:       General: She is awake.      Appearance: Normal appearance.   HENT:      Head: Normocephalic and atraumatic.      Right Ear: Tympanic membrane normal.      Left Ear: Tympanic membrane normal.      Nose:      Right Nostril: No epistaxis.      Left Nostril: No epistaxis.      Right Turbinates: Swollen.      Left Turbinates: Swollen.      Right Sinus: No maxillary " sinus tenderness or frontal sinus tenderness.      Left Sinus: No maxillary sinus tenderness or frontal sinus tenderness.      Mouth/Throat:      Pharynx: Postnasal drip present. No pharyngeal swelling, oropharyngeal exudate or uvula swelling.   Eyes:      Conjunctiva/sclera: Conjunctivae normal.   Pulmonary:      Effort: Pulmonary effort is normal.   Musculoskeletal:      Cervical back: Normal range of motion.   Neurological:      Mental Status: She is alert. Mental status is at baseline.   Psychiatric:         Mood and Affect: Mood normal.         Behavior: Behavior normal. Behavior is cooperative.         Thought Content: Thought content normal.         Judgment: Judgment normal.         Lab Results   Component Value Date    WBC 7.44 09/03/2024    HGB 14.0 09/03/2024    HGB 12.9 07/31/2024    HGB 14.1 03/11/2024    HCT 41.5 09/03/2024    MCV 89 09/03/2024    MCV 94 07/31/2024    MCV 88 03/11/2024     09/03/2024    CHOL 248 (H) 03/21/2022    TRIG 99 03/21/2022     (H) 03/21/2022    LDLCALC 127.2 03/21/2022    LDLCALC 138.2 04/09/2021    LDLCALC 109 08/20/2015    ALT 12 09/03/2024    AST 10 09/03/2024     09/03/2024    K 3.5 09/03/2024    CALCIUM 9.8 09/03/2024     09/03/2024    CO2 26 09/03/2024    BUN 13 09/03/2024    CREATININE 0.8 09/03/2024    CREATININE 0.8 03/11/2024    CREATININE 0.7 08/24/2023    EGFRNORACEVR >60 09/03/2024    EGFRNORACEVR >60 03/11/2024    EGFRNORACEVR >60.0 08/24/2023    TSH 1.628 10/12/2022    TSH 0.554 03/21/2022    TSH 0.656 02/23/2022    GLU 90 09/03/2024    HGBA1C 5.7 (H) 03/21/2022    HGBA1C 5.8 (H) 04/09/2021    HGBA1C 6.0 10/16/2020          The ASCVD Risk score (Chuy DK, et al., 2019) failed to calculate for the following reasons:    The valid HDL cholesterol range is 20 to 100 mg/dL     Assessment:     1. Cough, unspecified type    2. Upper respiratory tract infection, unspecified type      Plan:   1. Cough, unspecified type  -     POCT COVID-19  Rapid Screening  -     promethazine-codeine 6.25-10 mg/5 ml (PHENERGAN WITH CODEINE) 6.25-10 mg/5 mL syrup; Take 5 mLs by mouth every 4 (four) hours as needed for Cough.  Dispense: 118 mL; Refill: 0    2. Upper respiratory tract infection, unspecified type  -     azelastine (ASTELIN) 137 mcg (0.1 %) nasal spray; 1 spray (137 mcg total) by Nasal route 2 (two) times daily. Prn runny nose  Dispense: 30 mL; Refill: 0  -     promethazine-codeine 6.25-10 mg/5 ml (PHENERGAN WITH CODEINE) 6.25-10 mg/5 mL syrup; Take 5 mLs by mouth every 4 (four) hours as needed for Cough.  Dispense: 118 mL; Refill: 0        There are no Patient Instructions on file for this visit.    Future Appointments   Date Time Provider Department Center   11/6/2024 11:20 AM Patricia Resendez PA-C [facility]   11/15/2024  4:00 PM [facility] [id number]-SCR [facility] MAMMO [facility]   11/26/2024 11:15 AM Thomas Mike [place] [facility]   12/2/2024  2:20 PM Ryan Macias NP [facility] NEURO [facility]   12/7/2024 10:00 AM Carlos Paul MD [facility]   1/2/2025  1:20 PM Maxwell Ryan OD [facility] OPHTHAL [facility]   5/20/2025  8:10 AM Martin Da Silva MD [facility] OBGYN [facility]       Lab Frequency Next Occurrence   CT Abdomen Pelvis With IV Contrast Routine Oral Contrast Once 03/11/2024   Creatinine, serum Once 03/11/2024   CBC Auto Differential Once 07/30/2024   Comprehensive Metabolic Panel Once 07/30/2024   TSH Once 07/30/2024   Ambulatory referral/consult to [facility] Once 05/07/2024   Ambulatory referral/consult to Urology Once 05/07/2024   Ambulatory referral/consult to Gastroenterology Once 05/14/2024   Mammo Digital Screening Bilat w/ Jim Once 07/31/2024   Ambulatory referral/consult to ENT Once 08/14/2024   CT [employer] Sinuses without Once 08/07/2024   Ambulatory referral/consult to Ophthalmology Once 10/02/2024       No follow-ups on file.  Work excuse for today and tomorrow.

## 2024-11-14 RX ORDER — BACLOFEN 10 MG/1
10 TABLET ORAL 2 TIMES DAILY PRN
Qty: 30 TABLET | Refills: 0 | Status: SHIPPED | OUTPATIENT
Start: 2024-11-14

## 2024-12-01 DIAGNOSIS — G44.229 CHRONIC TENSION-TYPE HEADACHE, NOT INTRACTABLE: ICD-10-CM

## 2024-12-01 DIAGNOSIS — G43.119 INTRACTABLE MIGRAINE WITH AURA WITHOUT STATUS MIGRAINOSUS: ICD-10-CM

## 2024-12-03 RX ORDER — TOPIRAMATE 50 MG/1
TABLET, FILM COATED ORAL
Qty: 90 TABLET | Refills: 0 | Status: SHIPPED | OUTPATIENT
Start: 2024-12-03 | End: 2025-01-03

## 2024-12-04 RX ORDER — BACLOFEN 10 MG/1
10 TABLET ORAL 2 TIMES DAILY PRN
Qty: 30 TABLET | Refills: 0 | Status: SHIPPED | OUTPATIENT
Start: 2024-12-04

## 2024-12-27 RX ORDER — BACLOFEN 10 MG/1
10 TABLET ORAL 2 TIMES DAILY PRN
Qty: 30 TABLET | Refills: 0 | Status: SHIPPED | OUTPATIENT
Start: 2024-12-27

## 2025-01-06 DIAGNOSIS — G43.119 INTRACTABLE MIGRAINE WITH AURA WITHOUT STATUS MIGRAINOSUS: ICD-10-CM

## 2025-01-06 DIAGNOSIS — G44.229 CHRONIC TENSION-TYPE HEADACHE, NOT INTRACTABLE: ICD-10-CM

## 2025-01-07 ENCOUNTER — TELEPHONE (OUTPATIENT)
Facility: CLINIC | Age: 65
End: 2025-01-07
Payer: COMMERCIAL

## 2025-01-07 RX ORDER — TOPIRAMATE 50 MG/1
TABLET, FILM COATED ORAL
Qty: 90 TABLET | Refills: 0 | Status: SHIPPED | OUTPATIENT
Start: 2025-01-07 | End: 2025-02-06

## 2025-01-07 NOTE — TELEPHONE ENCOUNTER
Called pharmacy regarding closed PA. Tech stated that the PA went through with a low cost for pickup. Verbalized understanding.         Called pt regarding appt scheduling. She did not answer. Left .

## 2025-01-23 ENCOUNTER — PATIENT MESSAGE (OUTPATIENT)
Dept: INTERNAL MEDICINE | Facility: CLINIC | Age: 65
End: 2025-01-23
Payer: COMMERCIAL

## 2025-01-24 DIAGNOSIS — I10 ESSENTIAL HYPERTENSION: ICD-10-CM

## 2025-01-24 RX ORDER — LOSARTAN POTASSIUM 100 MG/1
100 TABLET ORAL DAILY
Qty: 90 TABLET | Refills: 2 | Status: SHIPPED | OUTPATIENT
Start: 2025-01-24

## 2025-01-24 RX ORDER — BACLOFEN 10 MG/1
10 TABLET ORAL 2 TIMES DAILY PRN
Qty: 30 TABLET | Refills: 0 | Status: SHIPPED | OUTPATIENT
Start: 2025-01-24

## 2025-01-24 NOTE — TELEPHONE ENCOUNTER
----- Message from Tammy sent at 1/24/2025  1:29 PM CST -----  Contact: Miles  Type:  RX Refill Request    Who Called: Miles  Refill or New Rx:refill  RX Name and Strength:losartan (COZAAR) 100 MG tablet   How is the patient currently taking it? (ex. 1XDay):  Is this a 30 day or 90 day RX  Preferred Pharmacy with phone number:  [facility]   [address] [place] [address] [phone]    Local or Mail Order:local  Ordering Provider:kai  Would the patient rather a call back or a response via [facility]? call  Best Call Back Number:[phone]  Additional Information: Pt is completley out, she has left several messages for the refill and hasn't heard back

## 2025-02-10 ENCOUNTER — PATIENT MESSAGE (OUTPATIENT)
Dept: NEUROLOGY | Facility: CLINIC | Age: 65
End: 2025-02-10
Payer: COMMERCIAL

## 2025-02-10 ENCOUNTER — TELEPHONE (OUTPATIENT)
Dept: PAIN MEDICINE | Facility: CLINIC | Age: 65
End: 2025-02-10
Payer: COMMERCIAL

## 2025-02-10 DIAGNOSIS — G44.229 CHRONIC TENSION-TYPE HEADACHE, NOT INTRACTABLE: ICD-10-CM

## 2025-02-10 DIAGNOSIS — G43.119 INTRACTABLE MIGRAINE WITH AURA WITHOUT STATUS MIGRAINOSUS: ICD-10-CM

## 2025-02-10 RX ORDER — TOPIRAMATE 50 MG/1
TABLET, FILM COATED ORAL
Qty: 90 TABLET | Refills: 0 | Status: SHIPPED | OUTPATIENT
Start: 2025-02-10 | End: 2025-03-12

## 2025-02-10 RX ORDER — TOPIRAMATE 50 MG/1
TABLET, FILM COATED ORAL
Qty: 90 TABLET | Refills: 0 | Status: CANCELLED | OUTPATIENT
Start: 2025-02-10 | End: 2025-03-12

## 2025-02-10 NOTE — TELEPHONE ENCOUNTER
Shaina,     Can you please send me the refill request and schedule her for a virtual follow up appointment at her earliest convince if she needs to discuss any concerns and if not please schedule her for a follow-up in the month of April for her 6 months follow-up.

## 2025-02-10 NOTE — TELEPHONE ENCOUNTER
Called pt to schedule follow up to discuss refills, as the patient has not been seen for quite some time. Pt did not answer. Left vm and sent message in portal.

## 2025-02-11 RX ORDER — BACLOFEN 10 MG/1
10 TABLET ORAL 2 TIMES DAILY PRN
Qty: 30 TABLET | Refills: 0 | Status: SHIPPED | OUTPATIENT
Start: 2025-02-11

## 2025-02-25 ENCOUNTER — OFFICE VISIT (OUTPATIENT)
Dept: INTERNAL MEDICINE | Facility: CLINIC | Age: 65
End: 2025-02-25
Payer: COMMERCIAL

## 2025-02-25 VITALS
HEIGHT: 62 IN | DIASTOLIC BLOOD PRESSURE: 90 MMHG | OXYGEN SATURATION: 100 % | TEMPERATURE: 98 F | SYSTOLIC BLOOD PRESSURE: 138 MMHG | WEIGHT: 191.56 LBS | HEART RATE: 101 BPM | BODY MASS INDEX: 35.25 KG/M2

## 2025-02-25 DIAGNOSIS — J06.9 VIRAL URI WITH COUGH: Primary | ICD-10-CM

## 2025-02-25 PROCEDURE — 3075F SYST BP GE 130 - 139MM HG: CPT | Mod: CPTII,S$GLB,, | Performed by: PHYSICIAN ASSISTANT

## 2025-02-25 PROCEDURE — 3008F BODY MASS INDEX DOCD: CPT | Mod: CPTII,S$GLB,, | Performed by: PHYSICIAN ASSISTANT

## 2025-02-25 PROCEDURE — 1159F MED LIST DOCD IN RCRD: CPT | Mod: CPTII,S$GLB,, | Performed by: PHYSICIAN ASSISTANT

## 2025-02-25 PROCEDURE — 3080F DIAST BP >= 90 MM HG: CPT | Mod: CPTII,S$GLB,, | Performed by: PHYSICIAN ASSISTANT

## 2025-02-25 PROCEDURE — 1160F RVW MEDS BY RX/DR IN RCRD: CPT | Mod: CPTII,S$GLB,, | Performed by: PHYSICIAN ASSISTANT

## 2025-02-25 PROCEDURE — 99999 PR PBB SHADOW E&M-EST. PATIENT-LVL V: CPT | Mod: PBBFAC,,, | Performed by: PHYSICIAN ASSISTANT

## 2025-02-25 PROCEDURE — 4010F ACE/ARB THERAPY RXD/TAKEN: CPT | Mod: CPTII,S$GLB,, | Performed by: PHYSICIAN ASSISTANT

## 2025-02-25 PROCEDURE — 99213 OFFICE O/P EST LOW 20 MIN: CPT | Mod: S$GLB,,, | Performed by: PHYSICIAN ASSISTANT

## 2025-02-25 NOTE — PROGRESS NOTES
Subjective:      Patient ID: Miles Bowen is a 64 y.o. female.    Chief Complaint: Cough    Cough  This is a new problem. The current episode started yesterday. The cough is Non-productive. Associated symptoms include chills, a fever, myalgias, nasal congestion, postnasal drip and rhinorrhea. Pertinent negatives include no chest pain, ear congestion, ear pain, headaches, heartburn, hemoptysis, rash, sore throat, shortness of breath, sweats, weight loss or wheezing. Treatments tried: tylenol, coricidn.   History of Present Illness    CHIEF COMPLAINT:  Ms. Bowen presents today with acute onset of viral-like symptoms including sinus pressure, body aches, and fatigue.    HISTORY OF PRESENT ILLNESS:  She reports onset of symptoms a couple days ago, initially experiencing severe dizziness affecting vision. Symptoms have progressively worsened with development of sneezing, sinus pressure, sore throat, and severe body aches affecting mobility and ability to walk. She reports fever controlled with Tylenol. She has a dry cough, nasal congestion, and post-nasal drainage.    MEDICATIONS:  She is currently taking Tylenol and Coricidin cough and cold for symptom management without significant relief.    MEDICAL HISTORY:  She has a history of acid reflux.    ALLERGIES:  She reports multiple medication allergies.    Medications were reviewed        Problem List[1]    Current Medications[2]    Review of Systems   Constitutional:  Positive for activity change, chills, fatigue and fever. Negative for appetite change, diaphoresis, unexpected weight change and weight loss.   HENT:  Positive for congestion, postnasal drip, rhinorrhea, sinus pressure and sinus pain. Negative for ear pain, hearing loss, sore throat, trouble swallowing and voice change.    Eyes: Negative.  Negative for visual disturbance.   Respiratory:  Positive for cough. Negative for hemoptysis, choking, chest tightness, shortness of breath and wheezing.   "  Cardiovascular:  Negative for chest pain, palpitations and leg swelling.   Gastrointestinal:  Negative for abdominal distention, abdominal pain, blood in stool, constipation, diarrhea, heartburn, nausea and vomiting.   Endocrine: Negative for cold intolerance, heat intolerance, polydipsia and polyuria.   Genitourinary: Negative.  Negative for difficulty urinating and frequency.   Musculoskeletal:  Positive for arthralgias and myalgias. Negative for back pain, gait problem, joint swelling, neck pain and neck stiffness.   Skin:  Negative for color change, pallor, rash and wound.   Neurological:  Negative for dizziness, tremors, weakness, light-headedness, numbness and headaches.   Hematological:  Negative for adenopathy.   Psychiatric/Behavioral:  Negative for behavioral problems, confusion, self-injury, sleep disturbance and suicidal ideas. The patient is not nervous/anxious.      Objective:   BP (!) 138/90 (BP Location: Left arm, Patient Position: Sitting)   Pulse 101   Temp 97.9 °F (36.6 °C) (Tympanic)   Ht 5' 2" (1.575 m)   Wt 86.9 kg (191 lb 9.3 oz)   SpO2 100%   BMI 35.04 kg/m²     Physical Exam  Vitals and nursing note reviewed.   Constitutional:       General: She is not in acute distress.     Appearance: Normal appearance. She is well-developed. She is not ill-appearing, toxic-appearing or diaphoretic.   HENT:      Head: Normocephalic and atraumatic.      Right Ear: Hearing, tympanic membrane, ear canal and external ear normal. No tenderness.      Left Ear: Hearing, tympanic membrane, ear canal and external ear normal. No tenderness.      Nose: Mucosal edema, congestion and rhinorrhea present.      Right Sinus: Maxillary sinus tenderness and frontal sinus tenderness present.      Left Sinus: Maxillary sinus tenderness and frontal sinus tenderness present.      Mouth/Throat:      Mouth: Mucous membranes are moist. No oral lesions.      Dentition: Normal dentition. No dental caries or dental abscesses. "      Tongue: No lesions.      Palate: No lesions.      Pharynx: Uvula midline. Oropharyngeal exudate and posterior oropharyngeal erythema present. No pharyngeal swelling or uvula swelling.      Tonsils: No tonsillar exudate or tonsillar abscesses.   Eyes:      General:         Right eye: No discharge.         Left eye: No discharge.      Extraocular Movements: Extraocular movements intact.      Conjunctiva/sclera: Conjunctivae normal.      Pupils: Pupils are equal, round, and reactive to light.   Cardiovascular:      Rate and Rhythm: Normal rate and regular rhythm.      Heart sounds: Normal heart sounds. No murmur heard.     No friction rub. No gallop.   Pulmonary:      Effort: Pulmonary effort is normal. No respiratory distress.      Breath sounds: Normal breath sounds. No wheezing or rales.   Musculoskeletal:      Cervical back: Normal range of motion and neck supple.   Lymphadenopathy:      Cervical: No cervical adenopathy.   Skin:     General: Skin is warm.      Coloration: Skin is not pale.      Findings: No erythema or rash.   Neurological:      Mental Status: She is alert and oriented to person, place, and time.   Psychiatric:         Mood and Affect: Mood normal.         Behavior: Behavior normal.         Thought Content: Thought content normal.         Judgment: Judgment normal.         Assessment:     1. Viral URI with cough      Plan:   Viral URI with cough  -     POCT COVID-19 Rapid Screening  -     POCT Influenza A/B Molecular      Assessment & Plan    Assessed symptoms as consistent with viral infection, likely flu or COVID-19  Ruled out bacterial sinus infection due to sudden onset of symptoms  Ordered flu and COVID-19 swab tests to confirm diagnosis  Recommend supportive care and symptomatic treatment  Advised against antibiotic use at this time  Will reassess need for antibiotics if symptoms persist for over 1 week    VIRAL INFECTION:  - Assessed the patient's condition, noting onset of symptoms  including sneezing, sinus pressure, sore throat, headache, body aches, and fever a few days ago.  - Diagnosed the condition as viral, likely not a sinus infection due to sudden onset.  - Explained the difference between viral and bacterial infections, emphasizing the sudden onset and severity of viral symptoms.  - Discussed the typical course of viral illness, with peak symptoms around days 3-4 and gradual improvement thereafter.  - Educated the patient on the importance of staying hydrated and resting during illness.  - Ms. Bowen to rest and stay hydrated.  - Recommend drinking hot tea with honey.  - Advised the patient to contact the office if symptoms persist or sinus pressure remains after 1 week.    LABS:  - Ordered flu and COVID-19 swab tests.    MEDICATIONS/SUPPLEMENTS:  - Prescribed Coricidin (cough and cold formulation) as directed on the package.  - Advised Tylenol 500-1000 mg 3 times daily, not to exceed 3000 mg total daily dose.  - Instructed the patient to take medications around the clock as directed.      ALLERGIES:  - Documented the patient's report of multiple allergies to various substances, which may limit treatment options.       This note was generated with the assistance of ambient listening technology. Verbal consent was obtained by the patient and accompanying visitor(s) for the recording of patient appointment to facilitate this note. I attest to having reviewed and edited the generated note for accuracy, though some syntax or spelling errors may persist. Please contact the author of this note for any clarification.    Follow up if symptoms worsen or fail to improve.           [1]   Patient Active Problem List  Diagnosis    Hypertension    Depression    Anxiety    GERD (gastroesophageal reflux disease)    Hiatal hernia    DDD (degenerative disc disease), lumbar    Allergic rhinitis    IBS (irritable bowel syndrome)    Multinodular goiter    MVP (mitral valve prolapse)    Nasal polyps     Obesity    Abnormal ECG    Palpitations    Atypical chest pain    Shortness of breath    Migraine with aura and without status migrainosus, not intractable    Major depressive disorder, recurrent, mild   [2]   Current Outpatient Medications:     azelastine (ASTELIN) 137 mcg (0.1 %) nasal spray, 1 spray (137 mcg total) by Nasal route 2 (two) times daily. Prn runny nose, Disp: 30 mL, Rfl: 0    b complex vitamins tablet, Take 1 tablet by mouth once daily., Disp: , Rfl:     baclofen (LIORESAL) 10 MG tablet, Take 1 tablet (10 mg total) by mouth 2 (two) times daily as needed (muscle spasm), Disp: 30 tablet, Rfl: 0    diazePAM (VALIUM) 10 MG Tab, Take by mouth., Disp: , Rfl:     dicyclomine (BENTYL) 10 MG capsule, Take 1 capsule by mouth 4 times daily before meals and nightly as needed, Disp: 40 capsule, Rfl: 1    duloxetine (CYMBALTA) 60 MG capsule, Take 1 capsule by mouth once daily. , Disp: , Rfl: 0    estradioL (ESTRACE) 0.5 MG tablet, Take 1 tablet (0.5 mg total) by mouth once daily., Disp: 90 tablet, Rfl: 3    famotidine (PEPCID) 20 MG tablet, Take 1 tablet (20 mg total) by mouth 2 (two) times daily., Disp: 180 tablet, Rfl: 1    ferrous sulfate (IRON ORAL), Take 1 tablet by mouth once daily. OTC iron supplement, Disp: , Rfl:     hydroCHLOROthiazide (HYDRODIURIL) 12.5 MG Tab, Take 1 tablet (12.5 mg total) by mouth once daily., Disp: 90 tablet, Rfl: 2    LORazepam (ATIVAN) 1 MG tablet, Take by mouth., Disp: , Rfl:     losartan (COZAAR) 100 MG tablet, Take 1 tablet (100 mg total) by mouth once daily., Disp: 90 tablet, Rfl: 2    pantoprazole (PROTONIX) 40 MG tablet, Take 1 tablet (40 mg total) by mouth once daily., Disp: 90 tablet, Rfl: 3    potassium chloride SA (K-DUR,KLOR-CON) 20 MEQ tablet, Take 1 tablet (20 mEq total) by mouth once daily., Disp: 90 tablet, Rfl: 2    promethazine (PHENERGAN) 25 MG tablet, Take 1 tablet (25 mg total) by mouth every 8 (eight) hours as needed for Nausea., Disp: 30 tablet, Rfl: 0     sucralfate (CARAFATE) 1 gram tablet, Take 1 tablet (1 g total) by mouth 4 (four) times daily before meals and nightly., Disp: 40 tablet, Rfl: 1    topiramate (TOPAMAX) 50 MG tablet, Take 1 tablet (50 mg total) by mouth every morning AND 2 tablets (100 mg total) every evening., Disp: 90 tablet, Rfl: 0    vitamin D (VITAMIN D3) 1000 units Tab, Take 1,000 Units by mouth once daily., Disp: , Rfl:     zolpidem (AMBIEN CR) 12.5 MG CR tablet, Take 12.5 mg by mouth every evening., Disp: , Rfl:     zolpidem (AMBIEN) 10 mg Tab, Take 10 mg by mouth nightly as needed., Disp: , Rfl:

## 2025-02-26 ENCOUNTER — PATIENT MESSAGE (OUTPATIENT)
Dept: INTERNAL MEDICINE | Facility: CLINIC | Age: 65
End: 2025-02-26
Payer: COMMERCIAL

## 2025-02-26 DIAGNOSIS — J01.90 ACUTE SINUSITIS, RECURRENCE NOT SPECIFIED, UNSPECIFIED LOCATION: Primary | ICD-10-CM

## 2025-02-28 RX ORDER — DOXYCYCLINE 100 MG/1
100 CAPSULE ORAL 2 TIMES DAILY
Qty: 20 CAPSULE | Refills: 0 | Status: SHIPPED | OUTPATIENT
Start: 2025-02-28 | End: 2025-03-10

## 2025-03-04 RX ORDER — BACLOFEN 10 MG/1
10 TABLET ORAL 2 TIMES DAILY PRN
Qty: 30 TABLET | Refills: 0 | Status: SHIPPED | OUTPATIENT
Start: 2025-03-04

## 2025-03-14 ENCOUNTER — OFFICE VISIT (OUTPATIENT)
Dept: INTERNAL MEDICINE | Facility: CLINIC | Age: 65
End: 2025-03-14
Payer: COMMERCIAL

## 2025-03-14 ENCOUNTER — PATIENT MESSAGE (OUTPATIENT)
Dept: INTERNAL MEDICINE | Facility: CLINIC | Age: 65
End: 2025-03-14

## 2025-03-14 VITALS
HEIGHT: 62 IN | WEIGHT: 188.5 LBS | DIASTOLIC BLOOD PRESSURE: 84 MMHG | SYSTOLIC BLOOD PRESSURE: 136 MMHG | TEMPERATURE: 97 F | HEART RATE: 98 BPM | OXYGEN SATURATION: 99 % | BODY MASS INDEX: 34.69 KG/M2

## 2025-03-14 DIAGNOSIS — K29.50 CHRONIC GASTRITIS, PRESENCE OF BLEEDING UNSPECIFIED, UNSPECIFIED GASTRITIS TYPE: ICD-10-CM

## 2025-03-14 DIAGNOSIS — K21.9 GASTROESOPHAGEAL REFLUX DISEASE, UNSPECIFIED WHETHER ESOPHAGITIS PRESENT: ICD-10-CM

## 2025-03-14 DIAGNOSIS — R19.7 DIARRHEA, UNSPECIFIED TYPE: Primary | ICD-10-CM

## 2025-03-14 DIAGNOSIS — I10 PRIMARY HYPERTENSION: ICD-10-CM

## 2025-03-14 DIAGNOSIS — Z12.11 COLON CANCER SCREENING: ICD-10-CM

## 2025-03-14 DIAGNOSIS — Z23 NEED FOR VACCINATION: ICD-10-CM

## 2025-03-14 PROCEDURE — 99214 OFFICE O/P EST MOD 30 MIN: CPT | Mod: 25,S$GLB,, | Performed by: INTERNAL MEDICINE

## 2025-03-14 PROCEDURE — 1159F MED LIST DOCD IN RCRD: CPT | Mod: CPTII,S$GLB,, | Performed by: INTERNAL MEDICINE

## 2025-03-14 PROCEDURE — 3079F DIAST BP 80-89 MM HG: CPT | Mod: CPTII,S$GLB,, | Performed by: INTERNAL MEDICINE

## 2025-03-14 PROCEDURE — 90656 IIV3 VACC NO PRSV 0.5 ML IM: CPT | Mod: S$GLB,,, | Performed by: INTERNAL MEDICINE

## 2025-03-14 PROCEDURE — 3075F SYST BP GE 130 - 139MM HG: CPT | Mod: CPTII,S$GLB,, | Performed by: INTERNAL MEDICINE

## 2025-03-14 PROCEDURE — 99999 PR PBB SHADOW E&M-EST. PATIENT-LVL V: CPT | Mod: PBBFAC,,, | Performed by: INTERNAL MEDICINE

## 2025-03-14 PROCEDURE — 4010F ACE/ARB THERAPY RXD/TAKEN: CPT | Mod: CPTII,S$GLB,, | Performed by: INTERNAL MEDICINE

## 2025-03-14 PROCEDURE — G2211 COMPLEX E/M VISIT ADD ON: HCPCS | Mod: S$GLB,,, | Performed by: INTERNAL MEDICINE

## 2025-03-14 PROCEDURE — 90471 IMMUNIZATION ADMIN: CPT | Mod: S$GLB,,, | Performed by: INTERNAL MEDICINE

## 2025-03-14 PROCEDURE — 3008F BODY MASS INDEX DOCD: CPT | Mod: CPTII,S$GLB,, | Performed by: INTERNAL MEDICINE

## 2025-03-14 RX ORDER — DIPHENOXYLATE HYDROCHLORIDE AND ATROPINE SULFATE 2.5; .025 MG/1; MG/1
1 TABLET ORAL 4 TIMES DAILY PRN
Qty: 20 TABLET | Refills: 0 | Status: SHIPPED | OUTPATIENT
Start: 2025-03-14 | End: 2025-03-14 | Stop reason: SDUPTHER

## 2025-03-14 RX ORDER — DIPHENOXYLATE HYDROCHLORIDE AND ATROPINE SULFATE 2.5; .025 MG/1; MG/1
1 TABLET ORAL 4 TIMES DAILY PRN
Qty: 20 TABLET | Refills: 0 | Status: SHIPPED | OUTPATIENT
Start: 2025-03-14

## 2025-03-14 RX ORDER — SUCRALFATE 1 G/1
1 TABLET ORAL
Qty: 40 TABLET | Refills: 1 | Status: SHIPPED | OUTPATIENT
Start: 2025-03-14

## 2025-03-14 NOTE — PATIENT INSTRUCTIONS
Stay hydrated as discussed. At least 64 ounces of water daily.    Try over counter probiotic daily.

## 2025-03-14 NOTE — PROGRESS NOTES
Subjective:      Patient ID: Miles Bowen is a 64 y.o. female.    Chief Complaint: Diarrhea, Nausea, and Fatigue    Diarrhea   This is a new problem. The current episode started in the past 7 days. The problem occurs 5 to 10 times per day. The problem has been waxing and waning. Pertinent negatives include no abdominal pain, arthralgias, bloating, chills, coughing, fever, headaches, myalgias, sweats, URI, vomiting or weight loss. Nothing aggravates the symptoms. There are no known risk factors. Treatments tried: otc meds. The treatment provided no relief. Her past medical history is significant for irritable bowel syndrome. There is no history of bowel resection, inflammatory bowel disease, a recent abdominal surgery or short gut syndrome.     History of Present Illness               63 yo with Problem List[1]  Past Medical History:   Diagnosis Date    Anxiety     Depression     GERD (gastroesophageal reflux disease)     Heart murmur     Hematemesis without nausea 11/4/2016    Hypertension     Multinodular goiter 12/23/2016    MVP (mitral valve prolapse)     Obesity      Here today c/o diarrhea    Patient also requests refill for sucralfate which she has used chronically for gastritis/GERD symptoms.  Originally prescribed by her gastroenterologist.  She does not feel that she has worsening symptoms.  She is aware to reach out to Gastroenterology if GERD symptoms worsen.  Review of Systems   Constitutional:  Negative for chills, diaphoresis, fever, unexpected weight change and weight loss.   HENT:  Negative for congestion.    Eyes:  Negative for visual disturbance.   Respiratory:  Negative for cough, shortness of breath and wheezing.    Cardiovascular:  Negative for chest pain, palpitations and leg swelling.   Gastrointestinal:  Positive for diarrhea. Negative for abdominal pain, anal bleeding, bloating, blood in stool, constipation, rectal pain and vomiting.   Musculoskeletal:  Negative for arthralgias and  "myalgias.   Skin:  Negative for rash and wound.   Neurological:  Negative for headaches.     Objective:   /84 (BP Location: Left arm, Patient Position: Sitting)   Pulse 98   Temp 97.3 °F (36.3 °C)   Ht 5' 2" (1.575 m)   Wt 85.5 kg (188 lb 7.9 oz)   SpO2 99%   BMI 34.48 kg/m²     Physical Exam  Constitutional:       General: She is awake. She is not in acute distress.     Appearance: Normal appearance. She is well-developed.   HENT:      Head: Normocephalic and atraumatic.      Right Ear: External ear normal.      Left Ear: External ear normal.   Eyes:      Conjunctiva/sclera: Conjunctivae normal.      Pupils: Pupils are equal, round, and reactive to light.   Neck:      Thyroid: No thyromegaly.   Cardiovascular:      Rate and Rhythm: Normal rate and regular rhythm.   Pulmonary:      Effort: Pulmonary effort is normal.      Breath sounds: Normal breath sounds. No wheezing or rales.   Abdominal:      General: Bowel sounds are normal.      Palpations: Abdomen is soft.      Tenderness: There is no abdominal tenderness.   Musculoskeletal:      Cervical back: Normal range of motion and neck supple.   Lymphadenopathy:      Cervical: No cervical adenopathy.   Skin:     General: Skin is warm and dry.   Neurological:      Mental Status: She is alert and oriented to person, place, and time. Mental status is at baseline.   Psychiatric:         Mood and Affect: Mood normal.         Behavior: Behavior normal. Behavior is cooperative.         Thought Content: Thought content normal.         Judgment: Judgment normal.         Lab Results   Component Value Date    WBC 7.44 09/03/2024    HGB 14.0 09/03/2024    HGB 12.9 07/31/2024    HGB 14.1 03/11/2024    HCT 41.5 09/03/2024    MCV 89 09/03/2024    MCV 94 07/31/2024    MCV 88 03/11/2024     09/03/2024    CHOL 248 (H) 03/21/2022    TRIG 99 03/21/2022     (H) 03/21/2022    LDLCALC 127.2 03/21/2022    LDLCALC 138.2 04/09/2021    LDLCALC 109 08/20/2015    ALT 12 " 09/03/2024    AST 10 09/03/2024     09/03/2024    K 3.5 09/03/2024    CALCIUM 9.8 09/03/2024     09/03/2024    CO2 26 09/03/2024    BUN 13 09/03/2024    CREATININE 0.8 09/03/2024    CREATININE 0.8 03/11/2024    CREATININE 0.7 08/24/2023    EGFRNORACEVR >60 09/03/2024    EGFRNORACEVR >60 03/11/2024    EGFRNORACEVR >60.0 08/24/2023    TSH 1.628 10/12/2022    TSH 0.554 03/21/2022    TSH 0.656 02/23/2022    GLU 90 09/03/2024    HGBA1C 5.7 (H) 03/21/2022    HGBA1C 5.8 (H) 04/09/2021    HGBA1C 6.0 10/16/2020          The ASCVD Risk score (Chuy DK, et al., 2019) failed to calculate for the following reasons:    Cannot find a previous HDL lab    Cannot find a previous total cholesterol lab     Assessment:     1. Diarrhea, unspecified type    2. Need for vaccination    3. Colon cancer screening    4. Chronic gastritis, presence of bleeding unspecified, unspecified gastritis type    5. Gastroesophageal reflux disease, unspecified whether esophagitis present    6. Primary hypertension      Plan:   1. Diarrhea, unspecified type  -     Discontinue: diphenoxylate-atropine 2.5-0.025 mg (LOMOTIL) 2.5-0.025 mg per tablet; Take 1 tablet by mouth 4 (four) times daily as needed for Diarrhea.  Dispense: 20 tablet; Refill: 0  -     diphenoxylate-atropine 2.5-0.025 mg (LOMOTIL) 2.5-0.025 mg per tablet; Take 1 tablet by mouth 4 (four) times daily as needed for Diarrhea.  Dispense: 20 tablet; Refill: 0  Hydration as discussed.  Notify me if symptoms worsen or do not resolve    2. Need for vaccination  -     Influenza - Trivalent - PF (ADULT)    3. Colon cancer screening  -     Ambulatory referral/consult to Endo Procedure ; Future; Expected date: 03/15/2025    4. Chronic gastritis, presence of bleeding unspecified, unspecified gastritis type  -     sucralfate (CARAFATE) 1 gram tablet; Take 1 tablet (1 g total) by mouth 4 (four) times daily before meals and nightly.  Dispense: 40 tablet; Refill: 1    5.  Gastroesophageal reflux disease, unspecified whether esophagitis present  -     sucralfate (CARAFATE) 1 gram tablet; Take 1 tablet (1 g total) by mouth 4 (four) times daily before meals and nightly.  Dispense: 40 tablet; Refill: 1    6. Primary hypertension    Blood pressure controlled.    Patient Instructions   Stay hydrated as discussed. At least 64 ounces of water daily.    Try over counter probiotic daily.       Future Appointments   Date Time Provider Department Center   3/26/2025 11:40 AM PRE-ADMIT, ENDO -[facility] PREADMT [place]   4/11/2025 11:30 AM Thomas Mike MD [facility] PAIN [facility]   4/11/2025  3:20 PM Carlos Paul MD [facility] IM [place]   4/15/2025  7:40 AM Ryan Macias NP [facility] NEURO [facility]   5/20/2025  8:10 AM Martin Da Silva MD [facility] OBN [facility]       Lab Frequency Next Occurrence   Creatinine, serum Once 03/11/2024   CBC Auto Differential Once 07/30/2024   Comprehensive Metabolic Panel Once 07/30/2024   TSH Once 07/30/2024   Ambulatory referral/consult to [facility] Once 05/07/2024   Ambulatory referral/consult to Urology Once 05/07/2024   Ambulatory referral/consult to Gastroenterology Once 05/14/2024   Mammo Digital Screening Bilat w/ Jim Once 07/31/2024   Ambulatory referral/consult to ENT Once 08/14/2024   CT [employer] Sinuses without Once 08/07/2024   Ambulatory referral/consult to Ophthalmology Once 10/02/2024       Follow up if symptoms worsen or fail to improve.                  [1]   Patient Active Problem List  Diagnosis    Hypertension    Depression    Anxiety    GERD (gastroesophageal reflux disease)    Hiatal hernia    DDD (degenerative disc disease), lumbar    Allergic rhinitis    IBS (irritable bowel syndrome)    Multinodular goiter    MVP (mitral valve prolapse)    Nasal polyps    Obesity    Abnormal ECG    Palpitations    Atypical chest pain    Shortness of breath    Migraine with aura and without status migrainosus, not  intractable    Major depressive disorder, recurrent, mild

## 2025-03-24 RX ORDER — BACLOFEN 10 MG/1
10 TABLET ORAL 2 TIMES DAILY PRN
Qty: 30 TABLET | Refills: 0 | Status: SHIPPED | OUTPATIENT
Start: 2025-03-24

## 2025-03-25 DIAGNOSIS — G43.119 INTRACTABLE MIGRAINE WITH AURA WITHOUT STATUS MIGRAINOSUS: ICD-10-CM

## 2025-03-25 DIAGNOSIS — G44.229 CHRONIC TENSION-TYPE HEADACHE, NOT INTRACTABLE: ICD-10-CM

## 2025-03-26 ENCOUNTER — HOSPITAL ENCOUNTER (OUTPATIENT)
Dept: PREADMISSION TESTING | Facility: HOSPITAL | Age: 65
Discharge: HOME | End: 2025-03-26
Attending: INTERNAL MEDICINE
Payer: COMMERCIAL

## 2025-03-26 DIAGNOSIS — Z12.11 COLON CANCER SCREENING: ICD-10-CM

## 2025-03-26 RX ORDER — TOPIRAMATE 50 MG/1
TABLET, FILM COATED ORAL
Qty: 90 TABLET | Refills: 0 | Status: SHIPPED | OUTPATIENT
Start: 2025-03-26 | End: 2025-04-25

## 2025-04-16 ENCOUNTER — PATIENT OUTREACH (OUTPATIENT)
Dept: ADMINISTRATIVE | Facility: HOSPITAL | Age: 65
End: 2025-04-16
Payer: COMMERCIAL

## 2025-04-21 RX ORDER — BACLOFEN 10 MG/1
10 TABLET ORAL 2 TIMES DAILY PRN
Qty: 30 TABLET | Refills: 0 | Status: SHIPPED | OUTPATIENT
Start: 2025-04-21

## 2025-04-22 ENCOUNTER — PATIENT MESSAGE (OUTPATIENT)
Facility: CLINIC | Age: 65
End: 2025-04-22
Payer: COMMERCIAL

## 2025-04-25 ENCOUNTER — OFFICE VISIT (OUTPATIENT)
Dept: NEUROLOGY | Facility: CLINIC | Age: 65
End: 2025-04-25
Payer: COMMERCIAL

## 2025-04-25 DIAGNOSIS — R42 DIZZINESS: ICD-10-CM

## 2025-04-25 DIAGNOSIS — G43.119 INTRACTABLE MIGRAINE WITH AURA WITHOUT STATUS MIGRAINOSUS: Primary | ICD-10-CM

## 2025-04-25 DIAGNOSIS — K29.50 CHRONIC GASTRITIS, PRESENCE OF BLEEDING UNSPECIFIED, UNSPECIFIED GASTRITIS TYPE: ICD-10-CM

## 2025-04-25 DIAGNOSIS — H53.8 BLURRY VISION, BILATERAL: ICD-10-CM

## 2025-04-25 DIAGNOSIS — F41.9 ANXIETY: ICD-10-CM

## 2025-04-25 DIAGNOSIS — G44.229 CHRONIC TENSION-TYPE HEADACHE, NOT INTRACTABLE: ICD-10-CM

## 2025-04-25 DIAGNOSIS — R11.0 NAUSEA: ICD-10-CM

## 2025-04-25 DIAGNOSIS — K21.9 GASTROESOPHAGEAL REFLUX DISEASE, UNSPECIFIED WHETHER ESOPHAGITIS PRESENT: ICD-10-CM

## 2025-04-25 DIAGNOSIS — F32.A DEPRESSION, UNSPECIFIED DEPRESSION TYPE: ICD-10-CM

## 2025-04-25 RX ORDER — TOPIRAMATE 50 MG/1
TABLET, FILM COATED ORAL
Qty: 90 TABLET | Refills: 2 | Status: SHIPPED | OUTPATIENT
Start: 2025-04-25 | End: 2025-07-24

## 2025-04-25 RX ORDER — RIMEGEPANT SULFATE 75 MG/75MG
75 TABLET, ORALLY DISINTEGRATING ORAL DAILY PRN
Qty: 8 TABLET | Refills: 2 | Status: SHIPPED | OUTPATIENT
Start: 2025-04-25

## 2025-04-25 NOTE — PROGRESS NOTES
"Subjective:       Patient ID: Miles Bowen is a 64 y.o. female.      Chief Complaint: "Headaches"        Headache   Associated symptoms include dizziness, nausea and photophobia. Pertinent negatives include no abdominal pain, back pain, coughing, ear pain, eye pain, eye redness, fever, hearing loss, neck pain, numbness, rhinorrhea, seizures, sinus pressure, sore throat, tinnitus, vomiting or weakness.       HPI 64 Years old Female with PMHx of Migraines / Depression / HTN / and other medical conditions came for the follow-up evaluation and recommendation of "Headaches".    The originating site (patient location) is: Home.      The distant site (neurologist location) is: Neurology Clinic at [facility].      The chief complaint leading to consultation is: "Headaches"      Visit type: Virtual visit with synchronous audio and video.      Consent: The patient verbally consented to participating in the video visit and informed that may decline to receive medical services by telemedicine and may withdraw from such care at any time.      I discussed with the patient the nature of our telemedicine visits, that:      I  would evaluate the patient and recommend diagnostics and treatments based on my assessment.    Our sessions are not being recorded and that personal health information is protected.    Our team would provide follow up care in person if/when the patient needs it.    Virtual (video/telemedicine) visits have significant limitations. A telemedicine exam is primarily focused on the history and what I can observe. Several critical parts of the neurological exam cannot be performed.         Interval History: (09/25/2024) - Ordered Fioricet -40 mg PO Daily PRN x 1 week supply, Nurtec 75 mg PO Daily PRN, Phenergan 25 mg PO Q8H PRN Nausea. Increased Topamax from  25 mg AM and 50 mg QHS to 50 mg AM and 50 mg PM and continued Cymbalta 60 mg Daily. Ordered Ophthalmology Referral.       Headache " "History:    Onset: Started over 10 years ago, not worsening, patient would just like to establish care due to running out of Nurtec and experiencing her first Migraine in 1 year. She reports Migraine started yesterday. Previously she reports being well controlled on Nurtec.     Description: Headaches are stabbing and aching like, building up slowly towards the night and early morning. Patient reports headaches do not wake them up from sleep. They are progressively worsening and interfering with daily activities.    Timing: Duration of 2 hours for regular headaches / Duration of 24 hours for Migraine like     Frequency: Intermittent, with 1 regular headache per week  /  1 migraine in the last year    Pain Severity: Increasing in severity, rated 7-9/10, causing significant morbidity.    Location: Starts "stabbing" pain behind right eye and right frontal area (worse to right side) radiating to the left frontal areas with pain described as "soreness"    Family History: No family history of early dementia or migraines.     Medications: OTC Tylenol / Topamax 25 mg AM and 50 mg QHS / Cymbalta 60 mg Daily / Phenergan 25 mg Q8H PRN Nausea    Worsening Factors: Bright light / loud noise / being in motion / Stress /  No specific food triggers identified.    Alleviating Factors: Dark and quiet room    Associated Symptoms: Bilateral eye pressure (worse on right side), light and sound sensitivity, Nausea, Bilateral blurry vision, double vision, dizziness, balance issues      Pertinent Negative Symptoms: No associated neurological deficits, no scalp sensitivity, vomiting, neck pain with radiation, ear ringing, acute thunderclap onset, focal or bilateral limb weakness, or extremity numbness and tingling.    Positional/Behavioral Factors: Headaches are not positional or postural, not worsened by movement or bending the head downward. Denies worsening with Valsalva maneuver.    Triggers: Bright light / loud noise / being in motion / " "Stress /     Prodromal Symptoms: Does report bilateral visual aura (floaters) prior to migraine start    Exclusions: No TMJ issues, head trauma, seizures, smoking history, caffeine overuse, vertigo, blackouts, fever, chills, or significant memory loss. No history of strokes or falls. No symptoms of sleep apnea     Ophthalmological History: Last eye clinic appointment over 2 years ago, with normal pressures, no papilledema, and no abnormalities reported. Patient requests an ophthalmology referral.     Blood Pressure: Compliant with medication; no knowledge of at home BP readings.             Interval History:  (09/27/2024) - Discontinued Fioricet / Continued Nurtec 75 mg PO Daily PRN, Phenergan 25 mg PO Q8H PRN, Topamax 50 mg AM and 50 mg PM, and Cymbalta 60 mg Daily.     Headaches are the same.  Location Starts "stabbing" pain behind right eye and right frontal area (worse to right side) radiating to the left frontal areas with pain described as "soreness"  Headache auras include bilateral visual aura (floaters) prior to migraine start  Frequency of tension type is 1 per week and migraine type is 2 since last visit.   Duration of tension type is 2 hours and migraine type is 24 hours   Still "stabbing / aching" in nature  Pain intensity is 7-9/10  Worsened by Bright light / loud noise / being in motion / Stress /   Alleviated with Dark and quiet room  Associated symptoms include Bilateral eye pressure (worse on right side), light and sound sensitivity, Nausea, Bilateral blurry vision, double vision, dizziness, balance issues    Patient does report changes in vision.   - The patient reports that Nurtec is not completely alleviating migraine symptoms. It was noted that the patient did not take the medication at the onset of the migraine, which may contribute to its perceived ineffectiveness.  -The patient was advised to take Nurtec at the onset of migraine symptoms to maximize its effectiveness.  - The patient " "continues to experience bilateral blurry peripheral vision during migraines, which is concerning to her. She has been instructed to keep her scheduled ophthalmology appointment for further evaluation.          Interval History:  (10/30/2024) - Continued Nurtec 75 mg PO Daily PRN, Phenergan 25 mg PO Q8H PRN, and Cymbalta. 60 mg Daily. Increased Topamax from 50 mg PO BID to 50 mg AM and 100 mg PM.     Headaches are the same.  Patient reports having a current cold or sinus infection that is making her migraines worse at this time.  Frequency of tension type is 1 per week and migraine type is 2 since last visit.   Duration of tension type is 2 hours and migraine type is 24 hours   Still "stabbing / aching" in nature  Pain intensity is 7-9/10  Worsened by Bright light / loud noise / being in motion / Stress / strong smells  Alleviated with Dark and quiet room  Associated symptoms include Bilateral eye pressure (worse on right side), light and sound sensitivity, Nausea, Bilateral blurry vision, double vision, dizziness, balance issues      - The patient continues to experience bilateral blurry peripheral vision during migraines, which is concerning to her. She has been instructed to keep her scheduled ophthalmology appointment for further evaluation. She needs to re-schedule.         New Issues: (04/25/2025) -     No new issues per patient.     Medications: Nurtec 75 mg PO Daily PRN, Phenergan 25 mg PO Q8H PRN Nausea, Topamax 50 mg AM and 100 mg PM and Cymbalta 60 mg Daily - tolerating well without side effects. Patient reports taking it as prescribed.     Headaches have significantly improved per patient since last visit.   Location Starts "stabbing" pain behind right eye and right frontal area (worse to right side) radiating to the left frontal areas with pain described as "soreness"  Headache auras include bilateral visual aura (floaters) prior to migraine start  Frequency - decreased -  tension type is 1-2 per month " "and migraine type is 0 since last visit.   Duration - decreased - tension type is 2 hours and migraine type is 0  Still "stabbing / aching" in nature  Pain intensity is 7/10  Worsened by Bright light / loud noise / being in motion / Stress / strong smells  Triggered by Bright light / loud noise / being in motion / Stress /   Alleviated with Dark and quiet room  Associated symptoms include Bilateral eye pressure (worse on right side), light and sound sensitivity, Nausea, Bilateral blurry vision, double vision, dizziness, balance issues    No other neurological deficits.     - Patient does report changes in vision. Last eye clinic appointment was over 2 years ago, with normal pressures, no papilledema, and no abnormalities reported.     - The patient continues to experience bilateral blurry peripheral vision during migraines, which is concerning to her. She has been instructed to keep her scheduled ophthalmology appointment for further evaluation. She needs to re-schedule.       Abortive therapies (tried and failed): Nurtec 75 mg PO Daily PRN - Current    -Patient previously on Nurtec - effective - last use was 1 year ago.    -Fioricet    -Imitrex - Side effects of "heart palpitations and chest pain" will avoid all Triptans     -Avoid NSAIDs 2/2 previous SE's and GI upset / History of Chronic Gastritis     -Advil - rash      Preventative therapies (tried and failed):      -Topamax - Current    -Cymbalta - Current for dual purpose anx/dep and HA's     Aimovig - "sick"       Review of Systems   Constitutional:  Negative for activity change, appetite change, chills, diaphoresis, fatigue, fever and unexpected weight change.   HENT:  Negative for congestion, dental problem, drooling, ear discharge, ear pain, facial swelling, hearing loss, mouth sores, nosebleeds, postnasal drip, rhinorrhea, sinus pressure, sinus pain, sneezing, sore throat, tinnitus, trouble swallowing and voice change.    Eyes:  Positive for photophobia " and visual disturbance. Negative for pain, discharge, redness and itching.        Bilateral eye pressure (worse on right side) associated with Migraines.    Respiratory:  Negative for cough, chest tightness, shortness of breath and wheezing.    Cardiovascular:  Negative for chest pain, palpitations and leg swelling.   Gastrointestinal:  Positive for nausea. Negative for abdominal distention, abdominal pain, blood in stool, constipation, diarrhea and vomiting.   Endocrine: Negative for cold intolerance, heat intolerance, polydipsia, polyphagia and polyuria.   Genitourinary:  Negative for decreased urine volume, difficulty urinating, dysuria, flank pain, frequency, hematuria, pelvic pain, urgency and vaginal discharge.   Musculoskeletal:  Negative for arthralgias, back pain, gait problem, joint swelling, myalgias, neck pain and neck stiffness.   Skin:  Negative for color change and rash.   Allergic/Immunologic: Negative for immunocompromised state.   Neurological:  Positive for dizziness and headaches. Negative for tremors, seizures, syncope, facial asymmetry, speech difficulty, weakness, light-headedness and numbness.        Balance issues associated with Migraines.    Hematological:  Negative for adenopathy. Does not bruise/bleed easily.   Psychiatric/Behavioral:  Negative for agitation, behavioral problems, confusion, decreased concentration, dysphoric mood, hallucinations, self-injury, sleep disturbance and suicidal ideas. The patient is not nervous/anxious and is not hyperactive.    All other systems reviewed and are negative.          Current Outpatient Medications:     azelastine (ASTELIN) 137 mcg (0.1 %) nasal spray, 1 spray (137 mcg total) by Nasal route 2 (two) times daily. Prn runny nose, Disp: 30 mL, Rfl: 0    b complex vitamins tablet, Take 1 tablet by mouth once daily., Disp: , Rfl:     baclofen (LIORESAL) 10 MG tablet, Take 1 tablet (10 mg total) by mouth 2 (two) times daily as needed (muscle spasm),  Disp: 30 tablet, Rfl: 0    diazePAM (VALIUM) 10 MG Tab, Take by mouth., Disp: , Rfl:     dicyclomine (BENTYL) 10 MG capsule, Take 1 capsule by mouth 4 times daily before meals and nightly as needed, Disp: 40 capsule, Rfl: 1    diphenoxylate-atropine 2.5-0.025 mg (LOMOTIL) 2.5-0.025 mg per tablet, Take 1 tablet by mouth 4 (four) times daily as needed for Diarrhea., Disp: 20 tablet, Rfl: 0    duloxetine (CYMBALTA) 60 MG capsule, Take 1 capsule by mouth once daily. , Disp: , Rfl: 0    estradioL (ESTRACE) 0.5 MG tablet, Take 1 tablet (0.5 mg total) by mouth once daily., Disp: 90 tablet, Rfl: 3    famotidine (PEPCID) 20 MG tablet, Take 1 tablet (20 mg total) by mouth 2 (two) times daily., Disp: 180 tablet, Rfl: 1    ferrous sulfate (IRON ORAL), Take 1 tablet by mouth once daily. OTC iron supplement, Disp: , Rfl:     hydroCHLOROthiazide 12.5 MG Tab, Take 1 tablet (12.5 mg total) by mouth once daily., Disp: 90 tablet, Rfl: 2    LORazepam (ATIVAN) 1 MG tablet, Take by mouth., Disp: , Rfl:     losartan (COZAAR) 100 MG tablet, Take 1 tablet (100 mg total) by mouth once daily., Disp: 90 tablet, Rfl: 2    pantoprazole (PROTONIX) 40 MG tablet, Take 1 tablet (40 mg total) by mouth once daily., Disp: 90 tablet, Rfl: 3    potassium chloride SA (K-DUR,KLOR-CON) 20 MEQ tablet, Take 1 tablet (20 mEq total) by mouth once daily., Disp: 90 tablet, Rfl: 2    promethazine (PHENERGAN) 25 MG tablet, Take 1 tablet (25 mg total) by mouth every 8 (eight) hours as needed for Nausea., Disp: 30 tablet, Rfl: 0    sucralfate (CARAFATE) 1 gram tablet, Take 1 tablet (1 g total) by mouth 4 (four) times daily before meals and nightly., Disp: 40 tablet, Rfl: 1    topiramate (TOPAMAX) 50 MG tablet, Take 1 tablet (50 mg total) by mouth every morning AND 2 tablets (100 mg total) every evening., Disp: 90 tablet, Rfl: 0    vitamin D (VITAMIN D3) 1000 units Tab, Take 1,000 Units by mouth once daily., Disp: , Rfl:     zolpidem (AMBIEN CR) 12.5 MG CR tablet,  Take 12.5 mg by mouth every evening., Disp: , Rfl:     zolpidem (AMBIEN) 10 mg Tab, Take 10 mg by mouth nightly as needed., Disp: , Rfl:     Past Medical History:   Diagnosis Date    Anxiety     Depression     GERD (gastroesophageal reflux disease)     Heart murmur     Hematemesis without nausea 2016    Hypertension     Multinodular goiter 2016    MVP (mitral valve prolapse)     Obesity        Past Surgical History:   Procedure Laterality Date    BACK SURGERY      Bone fusion    COLONOSCOPY      ESOPHAGOGASTRODUODENOSCOPY N/A 2024    Procedure: EGD (ESOPHAGOGASTRODUODENOSCOPY);  Surgeon: Viraj Wheat MD;  Location: [facility];  Service: Gastroenterology;  Laterality: N/A;    FIXATION KYPHOPLASTY THORACIC SPINE  2016    HYSTERECTOMY      ZULMA,BSO for endometriosis    knee replacement Right 2020    NASAL SINUS SURGERY      Polyps removed    SINUS SURGERY  2014    SPINE SURGERY  NA    TONSILLECTOMY         Social History     Socioeconomic History    Marital status:    Tobacco Use    Smoking status: Former     Current packs/day: 0.00     Average packs/day: 0.3 packs/day for 1.2 years (0.3 ttl pk-yrs)     Types: Cigarettes     Start date: 2005     Quit date: 2006     Years since quittin.3    Smokeless tobacco: Former   Substance and Sexual Activity    Alcohol use: Not Currently     Comment: rarely    Drug use: No    Sexual activity: Yes     Partners: Male     Birth control/protection: None   Social History Narrative     with 1 adult child, no pets or smokers in household.     Social Drivers of Health     Financial Resource Strain: Patient Declined (3/7/2024)    Overall Financial Resource Strain (CARDIA)     Difficulty of Paying Living Expenses: Patient declined   Food Insecurity: Patient Declined (3/7/2024)    Hunger Vital Sign     Worried About Running Out of Food in the Last Year: Patient declined     Ran Out of Food in the Last Year: Patient  declined   Transportation Needs: Patient Declined (3/7/2024)    PRAPARE - Transportation     Lack of Transportation (Medical): Patient declined     Lack of Transportation (Non-Medical): Patient declined   Physical Activity: Patient Declined (3/7/2024)    Exercise Vital Sign     Days of Exercise per Week: Patient declined     Minutes of Exercise per Session: Patient declined   Stress: Patient Declined (3/7/2024)    [facility] [employer] - Occupational Stress Questionnaire     Feeling of Stress : Patient declined   Housing Stability: Patient Declined (3/7/2024)    Housing Stability Vital Sign     Unable to Pay for Housing in the Last Year: Patient declined     Unstable Housing in the Last Year: Patient declined         Past/Current Medical/Surgical History, Past/Current Social History, Past/Current Family History and Past/Current Medications were reviewed in detail.    Objective:       GENERAL APPEARANCE:     The patient looks comfortable.    No signs of respiratory distress.    Normal breathing pattern.    No dysmorphic features    Normal eye contact.     GENERAL MEDICAL EXAM:    HEENT:  Head is atraumatic normocephalic.      Neck and Axillae: No JVD. No visible lesions.    Cardiopulmonary: No cyanosis. No tachypnea. Normal respiratory effort.    Gastrointestinal/Urogenital:  No jaundice. No stomas or lesions. No visible hernias. No catheters.     Skin, Hair and Nails: No pathognonomic skin rash. No neurofibromatosis. No visible lesions.No stigmata of autoimmune disease. No clubbing.    Limbs: No varicose veins. No visible swelling.    Muskoskeletal: No visible deformities.No visible lesions.                 Neurologic Exam     Mental Status   Oriented to person, place, and time.   Oriented to person.   Oriented to place. Oriented to country, city and area.   Oriented to time. Oriented to year, month, date, day and season.   Registration: recalls 3 of 3 objects. Recall at 5 minutes: recalls 3 of 3  objects. Follows 3 step commands.   Attention: normal. Concentration: normal.   Speech: speech is normal   Level of consciousness: alert  Knowledge: good. Able to perform simple calculations.   Able to name object. Able to read. Able to repeat. Able to write. Normal comprehension.     Cranial Nerves     CN II   Visual fields full to confrontation.   Visual acuity: normal  Right visual field deficit: none  Left visual field deficit: none     CN III, IV, VI   Extraocular motions are normal.   Right pupil: Consensual response: intact. Accommodation: intact.   Left pupil: Consensual response: intact. Accommodation: intact.   CN III: no CN III palsy  CN VI: no CN VI palsy  Nystagmus: none   Diplopia: none  Ophthalmoparesis: none  Upgaze: normal  Downgaze: normal  Conjugate gaze: present  Vestibulo-ocular reflex: present    CN V   Facial sensation intact.   Right facial sensation deficit: none  Left facial sensation deficit: none    CN VII   Facial expression full, symmetric.   Right facial weakness: none  Left facial weakness: none    CN VIII   CN VIII normal.   Hearing: intact    CN IX, X   CN IX normal.   CN X normal.   Palate: symmetric    CN XII   CN XII normal.   Tongue: not atrophic  Fasciculations: absent  Tongue deviation: none    Motor Exam   Muscle bulk: normal  Overall muscle tone: normal  Right arm pronator drift: absent  Left arm pronator drift: absent    Sensory Exam   Graphesthesia: normal  Stereognosis: normal    Gait, Coordination, and Reflexes     Gait  Gait: normal    Tremor   Resting tremor: absent  Intention tremor: absent  Action tremor: absent        Lab Results   Component Value Date    WBC 7.44 09/03/2024    HGB 14.0 09/03/2024    HCT 41.5 09/03/2024    MCV 89 09/03/2024     09/03/2024       Sodium   Date Value Ref Range Status   09/03/2024 139 136 - 145 mmol/L Final     Potassium   Date Value Ref Range Status   09/03/2024 3.5 3.5 - 5.1 mmol/L Final     Chloride   Date Value Ref Range  Status   09/03/2024 105 95 - 110 mmol/L Final     CO2   Date Value Ref Range Status   09/03/2024 26 23 - 29 mmol/L Final     Glucose   Date Value Ref Range Status   09/03/2024 90 70 - 110 mg/dL Final     BUN   Date Value Ref Range Status   09/03/2024 13 8 - 23 mg/dL Final     Creatinine   Date Value Ref Range Status   09/03/2024 0.8 0.5 - 1.4 mg/dL Final     Calcium   Date Value Ref Range Status   09/03/2024 9.8 8.7 - 10.5 mg/dL Final     Total Protein   Date Value Ref Range Status   09/03/2024 7.2 6.0 - 8.4 g/dL Final     Albumin   Date Value Ref Range Status   09/03/2024 4.2 3.5 - 5.2 g/dL Final     Total Bilirubin   Date Value Ref Range Status   09/03/2024 0.4 0.1 - 1.0 mg/dL Final     Comment:     For infants and newborns, interpretation of results should be based  on gestational age, weight and in agreement with clinical  observations.    Premature Infant recommended reference ranges:  Up to 24 hours.............<8.0 mg/dL  Up to 48 hours............<12.0 mg/dL  3-5 days..................<15.0 mg/dL  6-29 days.................<15.0 mg/dL       Alkaline Phosphatase   Date Value Ref Range Status   09/03/2024 57 55 - 135 U/L Final     AST   Date Value Ref Range Status   09/03/2024 10 10 - 40 U/L Final     ALT   Date Value Ref Range Status   09/03/2024 12 10 - 44 U/L Final     Anion Gap   Date Value Ref Range Status   09/03/2024 8 8 - 16 mmol/L Final     eGFR if    Date Value Ref Range Status   02/23/2022 >60.0 >60 mL/min/1.73 m^2 Final     eGFR if non    Date Value Ref Range Status   02/23/2022 >60.0 >60 mL/min/1.73 m^2 Final     Comment:     Calculation used to obtain the estimated glomerular filtration  rate (eGFR) is the CKD-EPI equation.          Lab Results   Component Value Date    QUEXWTAA01 244 03/03/2022       Lab Results   Component Value Date    TSH 1.628 10/12/2022    FREET4 1.06 03/21/2022       No results found in the last 24 hours.    No results found in the last 24  "hours.    Reviewed the neuroimaging independently       Assessment:   64 Years old Female with PMH as above came for an evaluation of "Headaches"    Intractable migraine with aura without status migrainosus     Chronic tension-type headache, not intractable     Blurry vision, bilateral     Nausea     Dizziness     Chronic gastritis, presence of bleeding unspecified, unspecified gastritis type     Gastroesophageal reflux disease, unspecified whether esophagitis present     Depression, unspecified depression type     Anxiety     Plan:   Patient Neurological Assessment is non-focal. Patient's history and physical point to Chronic Complicated Migraines and Chronic Tension Type Headaches.       -Offered Brain MRI and diagnostic workup, including blood tests. Also offered Physical therapy services for vestibular and Migraine Therapy. Patient declined all services.      -Continue Nurtec 75 mg PO Daily PRN for Migraine Abortive Therapy. Side effects discussed. Patient verbalized understanding.  No refills needed.    -Continue Phenergan 25 mg PO Q8H PRN Nausea associated with Migraines. Refills sent. Side effects discussed. Patient verbalized understanding.     -Continue Topamax 50 mg AM and 100 mg PM for Migraine Prevention Therapy. Side effects discussed. Patient verbalized understanding.  Refills sent.    -Continue Cymbalta 60 mg Daily for dual purpose anxiety/depression and HA's. Side effects discussed. Patient verbalized understanding.    -Will plan to avoid NSAIDS due to patient's history of Chronic Gastritis and GERD. Instructed her to continue following GI services and PCP for management.     -Continue Ophthalmology Referral for further evaluation and recommendation of visual symptoms.     -Continue following Psychiatry for Anxiety / Depression management.     1. Intractable migraine with aura without status migrainosus  - topiramate (TOPAMAX) 50 MG tablet; Take 1 tablet (50 mg total) by mouth every morning AND 2 " tablets (100 mg total) every evening.  Dispense: 90 tablet; Refill: 2  - rimegepant (NURTEC) 75 mg odt; Take 1 tablet (75 mg total) by mouth daily as needed for Migraine. Place ODT tablet on the tongue; alternatively the ODT tablet may be placed under the tongue. Maximum: 75 mg per 24 hours  Dispense: 8 tablet; Refill: 2    2. Chronic tension-type headache, not intractable  - topiramate (TOPAMAX) 50 MG tablet; Take 1 tablet (50 mg total) by mouth every morning AND 2 tablets (100 mg total) every evening.  Dispense: 90 tablet; Refill: 2    3. Blurry vision, bilateral    4. Nausea    5. Dizziness    6. Chronic gastritis, presence of bleeding unspecified, unspecified gastritis type    7. Gastroesophageal reflux disease, unspecified whether esophagitis present    8. Depression, unspecified depression type    9. Anxiety         LABORATORY EVALUATION    Labs: (2024) Lipase / CMP / CBC / H Pylori IgG /  -personally reviewed -non-significant abnormalities         RADIOLOGY EVALUATION     Personally Reviewed Head CT WO Contrast- done 2022 - no acute abnormalities               MIGRAINE, COMMON, WITH AURA, EPISODIC, HIGH FREQUENCY       MANAGEMENT       HEADACHE DIARY     DISCUSSED THE THREE-FOLD MANAGEMENT OF MIGRAINE:      LIFESTYLE CHANGES:       Good sleep hygiene  Avoid general triggers like lack of sleep/too much sleep, prolonged sun exposure, excessive screen time and specific triggers based on you own diary   Minimize physical and emotional stress  Smoking avoidance and cessation  Limit caffeine drinks to 1-2 a day   Good hydration   Small frequent meals and avoid skipping meals   Moderate 30-minute-long aerobic exercises 3 times/week. Avoid strenuous exercise         ABORTIVE MEDICATIONS (ACUTE-RESCUE MEDICATIONS):     Should only be taken 2-3 times/week to avoid rebound and overuse headaches.    I-explained to the patient that pain meds especially triptans should NOT be taken daily to avoid Rebound Headache and  Overuse Headache.    Take at the ONSET of the headache sumatriptan 100 mg PO  (or other Triptan) in combination with naproxen 500 mg PO or Ibuprofen 800 mg PO for headache without nausea or vomiting.  This regimen can be repeated only once in 24 hours after 2 hours.    Side effects of triptans were discussed and include rare cardiac and cerebral ischemia and cannot be used with migraine associate with focal neurological deficits (complicated migraine) in addition to drowsiness and potential impairment of driving ability. The patient verbalized understanding.    NSAIDs can cause peptic ulcers, renal insufficiency and may increase the risk of cardiovascular diseases.  SEs were discussed with the patient. The patient verbalized understanding.    Triptans have shown to be more effective than Gepatns with more SE/AE.      AVOID NARCOTICS (OPIATES)      1. No randomized controlled study shows pain-free results with opioids in the treatment of migraine.     2. The physiologic consequences of opioid use are adverse, occur quickly, and can be permanent. Decreased gray matter, release of calcitonin gene-related peptide, dynorphin, and pro-inflammatory peptides, and activation of excitatory glutamate receptors are all associated with opioid exposure.     3. Opioids are pro-nociceptive, prevent reversal of migraine central sensitization, and interfere with triptan effectiveness.     4.Opioids precipitate bad clinical outcomes, especially transformation to daily headache.     5. They cause disease progression, comorbidity, and excessive health care consumption.           NEXT OPTIONS:    Gepants: Nuretc (rimegepant)75 mg >Ubrelvy (ubrogepant) 100 mg    Ditans: Reyvow (lasmiditan) 100 mg (No driving due to sedation)    Fioricet without codeine with Reglan.      Prednisone with Reglan.      LAST RESORT:     DHE NS Trudhesa (Max 2 a week)     C/I: concomitant use of vasoconstrictors like Triptans, strong CY inhibitors such as  HAART PIs (eg, ritonavir, nelfinavir, or indinavir) and Macrolides (eg, erythromycin or clarithromycin), CAD, PVD, Stroke/TIA and Uncontrolled HTN.  Serious SEs include Vasospasm and Fibrosis (chronic use).       IMPENDING STATUS: Prednisone and Vistaril.    STATUS MIGRAINOSUS: ED-Infusion for Status Protocol.        PREVENTATIVE (MORE ACCURATELY MIGRAINE REDUCTION) MEDICATIONS:           Since the patient's headache is very frequent a lengthy discussion about preventative medications was carried out.The patient understands that prevention means DECREASING frequency and severity and NOT elimination.The patient was made aware that any new medication can cause serious allergic reaction.The medication is considered failure only if a therapeutic dose reached and maintained for 6-8 weeks.        HELPFUL SUPPLEMENTS:     Helpful supplements include Co-Q 10, B2, Mg, Feverfew (Dolovent combination) and butterbur (Petadolex)        NEUROPHARMACOLOGY     NEXT OPTIONS:     Zonisamide/Zonegran (ZNS) 100-400 mg QHS is a good alternative to TPM in case of SE/AE.     Amitriptyline/Elavil (TCA) slow titration to 100-Age which can cause sleepiness, dry eyes, dry mouth, urinary retention, and rarely cardiac arrhythmias    Propranolol/Inderal  (BB)slow titration to 80 mg BID which can cause low blood pressure, slow heart rate, erectile dysfunction, depression, airway obstruction and heart failure exacerbations. Cannot be used with migraine associate with focal neurological deficits.    Lamotrigine/Lamictal  (LTG)slow titration to 100 mg BID which can cause serious skin rash and rare cardiac arrhythmias. LTG is superior to other therapies for specifically reducing migraine aura.     ANTI-CGRP AGENTS: Qulipta (alogepant) 60 mg QD, Erenumab (Aimovig) 140 mg SQ Pen monthly (Reported cases of Constipation and BP elevation) , Galcanezumab (Emgality) 120 mg SQ Pen monthly after a loading dose of 240 mg  and Fremnezumab (Ajovy) (Ligand  Blocker): 225 mg SQ monthly or 675 mg every 3 months     Botox 200 units every 3 months.         LAST RESORT OPTIONS:      Namenda 10 mg BID     Valproic acid/ Depakote         NEUROMODULATION     Cefaly, Relivion, Nerivio and GammaCore (VNS)               WOMEN IN CHILD BEARING PERIOD     All migraine medications are not safe during pregnancy and the patient was made aware of this fact. Any pregnancy should be planned, and medications should be stopped PRIOR to pregnancy planning. Folic acid 1 mg daily was recommended. However, hormonal birth control complicates the management of migraine and can exacerbate migraine. If possible, mechanical contraception should be a better option.         PREGNANCY ISSUES         We had a lengthy discussion about managing migraine in patients who are trying to get pregnant or pregnant.    First, I recommend FA 1 mg QD     PRN medications are not safe. The safest option is Reglan PRN and not to be taken more than 2-3 times/week. Fioricet PRN is an option.     Traditional preventative options are not safe including Botox. Cefaly and Relivion are considered safe, but insurance does not cover it.        SCHOOL AND WORK ACCOMMODATIONS        Allow the patient to wear sunglasses or a cap and switch out fluorescent bulbs.    Allow the patient to arrive 5 minutes later and leave 5 minutes earlier to avoid noisy traffic.    Allow the patient to carry a water bottle and refill as needed.    Allow the patient to snack whenever is needed.    Allow the patient to decrease the computer brightness.    Allow the patient to take breaks as needed and extra time for assignments and deadlines.    Allow the patient to avoid strenuous activity as needed.                  MEDICAL/SURGICAL COMORBIDITIES     All relevant medical comorbidities noted and managed by primary care physician and medical care team.          HEALTHY LIFESTYLE AND PREVENTATIVE CARE    The patient to adhere to the age-appropriate  health maintenance guidelines including screening tests and vaccinations. The patient to adhere to  healthy lifestyle, optimal weight, exercise, healthy diet, good sleep hygiene and avoiding drugs including smoking, alcohol and recreational drugs.    I spent a total of 23 minutes on the day of the visit.This includes face to face time and non-face to face time preparing to see the patient (eg, review of tests), obtaining and/or reviewing separately obtained history, documenting clinical information in the electronic or other health record, independently interpreting results and communicating results to the patient/family/caregiver, or care coordinator.     Please do not hesitate to contact me with any updates, questions or concerns.    No follow-ups on file.    Ryan Macias, MSN, FNP-C    General Neurology

## 2025-06-05 DIAGNOSIS — I10 ESSENTIAL HYPERTENSION: ICD-10-CM

## 2025-06-06 DIAGNOSIS — G43.119 INTRACTABLE MIGRAINE WITH AURA WITHOUT STATUS MIGRAINOSUS: ICD-10-CM

## 2025-06-06 RX ORDER — HYDROCHLOROTHIAZIDE 12.5 MG/1
12.5 TABLET ORAL DAILY
Qty: 90 TABLET | Refills: 2 | Status: SHIPPED | OUTPATIENT
Start: 2025-06-06 | End: 2026-03-03

## 2025-06-06 RX ORDER — RIMEGEPANT SULFATE 75 MG/75MG
75 TABLET, ORALLY DISINTEGRATING ORAL DAILY PRN
Qty: 8 TABLET | Refills: 2 | Status: SHIPPED | OUTPATIENT
Start: 2025-06-06

## 2025-06-20 RX ORDER — BACLOFEN 10 MG/1
10 TABLET ORAL 2 TIMES DAILY PRN
Qty: 30 TABLET | Refills: 0 | Status: SHIPPED | OUTPATIENT
Start: 2025-06-20

## 2025-06-29 DIAGNOSIS — K21.9 GASTROESOPHAGEAL REFLUX DISEASE: ICD-10-CM

## 2025-06-29 NOTE — TELEPHONE ENCOUNTER
No care due was identified.  [facility] Embedded Care Due Messages. Reference number: [id number].   6/29/2025 6:56:43 PM CDT

## 2025-06-29 NOTE — TELEPHONE ENCOUNTER
No care due was identified.  [facility] Embedded Care Due Messages. Reference number: [id number].   6/29/2025 12:55:15 PM CDT

## 2025-06-30 RX ORDER — PANTOPRAZOLE SODIUM 40 MG/1
40 TABLET, DELAYED RELEASE ORAL DAILY
Qty: 90 TABLET | Refills: 2 | Status: SHIPPED | OUTPATIENT
Start: 2025-06-30

## 2025-06-30 RX ORDER — PANTOPRAZOLE SODIUM 40 MG/1
40 TABLET, DELAYED RELEASE ORAL DAILY
Qty: 90 TABLET | Refills: 3 | Status: SHIPPED | OUTPATIENT
Start: 2025-06-30

## 2025-06-30 NOTE — TELEPHONE ENCOUNTER
Refill Decision Note   Miles Jessi  is requesting a refill authorization.  Brief Assessment and Rationale for Refill:  Approve     Medication Therapy Plan:         Comments:     Note composed:12:12 PM 06/30/2025

## 2025-07-09 RX ORDER — BACLOFEN 10 MG/1
10 TABLET ORAL 2 TIMES DAILY PRN
Qty: 30 TABLET | Refills: 0 | Status: SHIPPED | OUTPATIENT
Start: 2025-07-09

## 2025-08-06 RX ORDER — BACLOFEN 10 MG/1
10 TABLET ORAL 2 TIMES DAILY PRN
Qty: 30 TABLET | Refills: 0 | Status: SHIPPED | OUTPATIENT
Start: 2025-08-06

## 2025-08-27 DIAGNOSIS — Z78.0 MENOPAUSE: ICD-10-CM

## 2025-08-27 RX ORDER — BACLOFEN 10 MG/1
10 TABLET ORAL 2 TIMES DAILY PRN
Qty: 30 TABLET | Refills: 0 | Status: SHIPPED | OUTPATIENT
Start: 2025-08-27

## 2025-08-29 DIAGNOSIS — G44.229 CHRONIC TENSION-TYPE HEADACHE, NOT INTRACTABLE: ICD-10-CM

## 2025-08-29 DIAGNOSIS — G43.119 INTRACTABLE MIGRAINE WITH AURA WITHOUT STATUS MIGRAINOSUS: ICD-10-CM

## 2025-09-02 RX ORDER — TOPIRAMATE 50 MG/1
TABLET, FILM COATED ORAL
Qty: 90 TABLET | Refills: 2 | Status: SHIPPED | OUTPATIENT
Start: 2025-09-02 | End: 2025-12-01